# Patient Record
Sex: FEMALE | Race: WHITE | Employment: FULL TIME | URBAN - METROPOLITAN AREA
[De-identification: names, ages, dates, MRNs, and addresses within clinical notes are randomized per-mention and may not be internally consistent; named-entity substitution may affect disease eponyms.]

---

## 2018-01-25 PROBLEM — E87.1 ACUTE HYPONATREMIA: Status: ACTIVE | Noted: 2018-01-25

## 2018-01-25 PROBLEM — E87.1 HYPONATREMIA: Status: ACTIVE | Noted: 2018-01-25

## 2018-10-14 ENCOUNTER — HOSPITAL ENCOUNTER (EMERGENCY)
Age: 53
Discharge: HOME | End: 2018-10-14
Attending: EMERGENCY MEDICINE
Payer: COMMERCIAL

## 2018-10-14 ENCOUNTER — APPOINTMENT (OUTPATIENT)
Dept: GENERAL RADIOLOGY | Age: 53
End: 2018-10-14
Payer: COMMERCIAL

## 2018-10-14 VITALS
DIASTOLIC BLOOD PRESSURE: 92 MMHG | SYSTOLIC BLOOD PRESSURE: 156 MMHG | BODY MASS INDEX: 19.51 KG/M2 | WEIGHT: 106 LBS | TEMPERATURE: 98.3 F | OXYGEN SATURATION: 97 % | HEIGHT: 62 IN | RESPIRATION RATE: 14 BRPM | HEART RATE: 76 BPM

## 2018-10-14 DIAGNOSIS — S42.92XA TRAUMATIC CLOSED DISPLACED FRACTURE OF LEFT SHOULDER WITH ANTERIOR DISLOCATION, INITIAL ENCOUNTER: Primary | ICD-10-CM

## 2018-10-14 PROCEDURE — 96374 THER/PROPH/DIAG INJ IV PUSH: CPT

## 2018-10-14 PROCEDURE — 73030 X-RAY EXAM OF SHOULDER: CPT

## 2018-10-14 PROCEDURE — 96375 TX/PRO/DX INJ NEW DRUG ADDON: CPT

## 2018-10-14 PROCEDURE — 23650 CLTX SHO DSLC W/MNPJ WO ANES: CPT

## 2018-10-14 PROCEDURE — 6360000002 HC RX W HCPCS

## 2018-10-14 PROCEDURE — 99283 EMERGENCY DEPT VISIT LOW MDM: CPT

## 2018-10-14 PROCEDURE — 6360000002 HC RX W HCPCS: Performed by: EMERGENCY MEDICINE

## 2018-10-14 RX ORDER — MIDAZOLAM HYDROCHLORIDE 1 MG/ML
2 INJECTION INTRAMUSCULAR; INTRAVENOUS ONCE
Status: COMPLETED | OUTPATIENT
Start: 2018-10-14 | End: 2018-10-14

## 2018-10-14 RX ORDER — HYDROCODONE BITARTRATE AND ACETAMINOPHEN 7.5; 325 MG/1; MG/1
1 TABLET ORAL EVERY 6 HOURS PRN
Qty: 12 TABLET | Refills: 0 | Status: SHIPPED | OUTPATIENT
Start: 2018-10-14 | End: 2018-10-17

## 2018-10-14 RX ORDER — MEPERIDINE HYDROCHLORIDE 50 MG/ML
50 INJECTION INTRAMUSCULAR; INTRAVENOUS; SUBCUTANEOUS ONCE
Status: DISCONTINUED | OUTPATIENT
Start: 2018-10-14 | End: 2018-10-14

## 2018-10-14 RX ORDER — ONDANSETRON 2 MG/ML
INJECTION INTRAMUSCULAR; INTRAVENOUS
Status: COMPLETED
Start: 2018-10-14 | End: 2018-10-14

## 2018-10-14 RX ORDER — ONDANSETRON 2 MG/ML
4 INJECTION INTRAMUSCULAR; INTRAVENOUS EVERY 6 HOURS PRN
Status: DISCONTINUED | OUTPATIENT
Start: 2018-10-14 | End: 2018-10-14 | Stop reason: HOSPADM

## 2018-10-14 RX ORDER — MIDAZOLAM HYDROCHLORIDE 1 MG/ML
INJECTION INTRAMUSCULAR; INTRAVENOUS
Status: COMPLETED
Start: 2018-10-14 | End: 2018-10-14

## 2018-10-14 RX ORDER — HYDROXYZINE HYDROCHLORIDE 50 MG/ML
50 INJECTION, SOLUTION INTRAMUSCULAR ONCE
Status: DISCONTINUED | OUTPATIENT
Start: 2018-10-14 | End: 2018-10-14

## 2018-10-14 RX ADMIN — MIDAZOLAM HYDROCHLORIDE 2 MG: 1 INJECTION INTRAMUSCULAR; INTRAVENOUS at 12:40

## 2018-10-14 RX ADMIN — HYDROMORPHONE HYDROCHLORIDE 1 MG: 1 INJECTION, SOLUTION INTRAMUSCULAR; INTRAVENOUS; SUBCUTANEOUS at 12:22

## 2018-10-14 RX ADMIN — MIDAZOLAM 2 MG: 1 INJECTION INTRAMUSCULAR; INTRAVENOUS at 12:40

## 2018-10-14 RX ADMIN — ONDANSETRON HYDROCHLORIDE 4 MG: 2 SOLUTION INTRAMUSCULAR; INTRAVENOUS at 12:25

## 2018-10-14 RX ADMIN — ONDANSETRON 4 MG: 2 INJECTION INTRAMUSCULAR; INTRAVENOUS at 12:25

## 2018-10-14 RX ADMIN — HYDROMORPHONE HYDROCHLORIDE 1 MG: 1 INJECTION, SOLUTION INTRAMUSCULAR; INTRAVENOUS; SUBCUTANEOUS at 12:36

## 2018-10-14 ASSESSMENT — PAIN DESCRIPTION - ORIENTATION
ORIENTATION: RIGHT
ORIENTATION: LEFT
ORIENTATION: LEFT

## 2018-10-14 ASSESSMENT — PAIN SCALES - GENERAL
PAINLEVEL_OUTOF10: 10
PAINLEVEL_OUTOF10: 3
PAINLEVEL_OUTOF10: 10
PAINLEVEL_OUTOF10: 10
PAINLEVEL_OUTOF10: 4

## 2018-10-14 ASSESSMENT — PAIN DESCRIPTION - PROGRESSION
CLINICAL_PROGRESSION: GRADUALLY IMPROVING
CLINICAL_PROGRESSION: GRADUALLY WORSENING

## 2018-10-14 ASSESSMENT — PAIN DESCRIPTION - ONSET: ONSET: SUDDEN

## 2018-10-14 ASSESSMENT — PAIN DESCRIPTION - FREQUENCY: FREQUENCY: CONTINUOUS

## 2018-10-14 ASSESSMENT — PAIN DESCRIPTION - PAIN TYPE
TYPE: ACUTE PAIN

## 2018-10-14 ASSESSMENT — PAIN DESCRIPTION - LOCATION
LOCATION: SHOULDER

## 2018-10-14 NOTE — ED NOTES
Pt alert, answers questions appropriately, states that she is hungry, hasnnot eaten since Friday-had ordered food yesterday and then fell and was not able to eat.      Sean Burns RN  10/14/18 9221

## 2018-10-14 NOTE — ED NOTES
Pt tolerated shoulder reduction well, awake alert, and states that her shoulder feels a lot better.      Jarrett Arboleda RN  10/14/18 4943

## 2018-10-14 NOTE — ED NOTES
Taking soda without difficulty, continues to state shoulder feels better, alert and orineted x4, sister at bedside.      Emely Kingston RN  10/14/18 9504

## 2018-10-17 ENCOUNTER — OFFICE VISIT (OUTPATIENT)
Dept: ORTHOPEDIC SURGERY | Age: 53
End: 2018-10-17
Payer: COMMERCIAL

## 2018-10-17 VITALS
DIASTOLIC BLOOD PRESSURE: 88 MMHG | BODY MASS INDEX: 19.51 KG/M2 | HEART RATE: 63 BPM | WEIGHT: 106 LBS | HEIGHT: 62 IN | SYSTOLIC BLOOD PRESSURE: 127 MMHG | TEMPERATURE: 98.1 F

## 2018-10-17 DIAGNOSIS — M25.512 ACUTE PAIN OF LEFT SHOULDER: Primary | ICD-10-CM

## 2018-10-17 DIAGNOSIS — S43.005A CLOSED DISLOCATION OF LEFT SHOULDER, INITIAL ENCOUNTER: ICD-10-CM

## 2018-10-17 PROCEDURE — G8427 DOCREV CUR MEDS BY ELIG CLIN: HCPCS | Performed by: ORTHOPAEDIC SURGERY

## 2018-10-17 PROCEDURE — G8484 FLU IMMUNIZE NO ADMIN: HCPCS | Performed by: ORTHOPAEDIC SURGERY

## 2018-10-17 PROCEDURE — G8420 CALC BMI NORM PARAMETERS: HCPCS | Performed by: ORTHOPAEDIC SURGERY

## 2018-10-17 PROCEDURE — 99203 OFFICE O/P NEW LOW 30 MIN: CPT | Performed by: ORTHOPAEDIC SURGERY

## 2018-10-17 PROCEDURE — 3017F COLORECTAL CA SCREEN DOC REV: CPT | Performed by: ORTHOPAEDIC SURGERY

## 2018-10-17 PROCEDURE — 4004F PT TOBACCO SCREEN RCVD TLK: CPT | Performed by: ORTHOPAEDIC SURGERY

## 2018-10-17 RX ORDER — SODIUM CHLORIDE 1000 MG
1 TABLET, SOLUBLE MISCELLANEOUS 2 TIMES DAILY
COMMUNITY
Start: 2018-06-22

## 2018-10-17 RX ORDER — OXYCODONE HYDROCHLORIDE AND ACETAMINOPHEN 5; 325 MG/1; MG/1
1 TABLET ORAL EVERY 6 HOURS PRN
Qty: 28 TABLET | Refills: 0 | Status: ON HOLD | OUTPATIENT
Start: 2018-10-17 | End: 2018-10-23

## 2018-10-17 NOTE — PROGRESS NOTES
New Shoulder Patient Visit     Referring Provider:   No referring provider defined for this encounter. CHIEF COMPLAINT:   Chief Complaint   Patient presents with    Shoulder Pain     Left shoulder dislocation & fx greater tuberosity 10/13/18. Pt. tripped over dog falling on left side. Xray 10/14/18 [facility] ([facility] HPI:      Kimo Snyder is a 48y.o. year old Female who suffered a fall on 10/13/18 and injured the left shoulder. She initially tried to manage this conservatively at home, but had persistent pain and poor function and went to the emergency room the next day where x-rays showed a fracture dislocation. She had reduction of the shoulder was placed in the sling. She follows up here today. She is still having significant pain and is unable to move the arm. She denies any previous history of shoulder injury      PAST MEDICAL HISTORY  Past Medical History:   Diagnosis Date    Hypertension     Hyponatremia     MVP (mitral valve prolapse)        PAST SURGICAL HISTORY  Past Surgical History:   Procedure Laterality Date    FOOT SURGERY         FAMILY HISTORY   History reviewed. No pertinent family history. SOCIAL HISTORY  Social History     Occupational History    Not on file. Social History Main Topics    Smoking status: Current Every Day Smoker     Packs/day: 1.00     Types: Cigarettes    Smokeless tobacco: Never Used    Alcohol use Yes      Comment: few times a week    Drug use: No    Sexual activity: Not on file       CURRENT MEDICATIONS     Current Outpatient Prescriptions:     sodium chloride 1 g tablet, Take 1 g by mouth daily , Disp: , Rfl:     oxyCODONE-acetaminophen (PERCOCET) 5-325 MG per tablet, Take 1 tablet by mouth every 6 hours as needed for Pain for up to 7 days. ., Disp: 28 tablet, Rfl: 0    LOSARTAN POTASSIUM PO, Take by mouth nightly, Disp: , Rfl:     HYDROcodone-acetaminophen (NORCO) 7.5-325 MG per tablet, Take 1 tablet by mouth every 6 hours as needed for

## 2018-10-18 ENCOUNTER — TELEPHONE (OUTPATIENT)
Dept: ORTHOPEDIC SURGERY | Age: 53
End: 2018-10-18

## 2018-10-18 RX ORDER — NEBIVOLOL 10 MG/1
10 TABLET ORAL DAILY
Status: ON HOLD | COMMUNITY
End: 2021-03-04 | Stop reason: HOSPADM

## 2018-10-18 RX ORDER — LOSARTAN POTASSIUM 100 MG/1
100 TABLET ORAL NIGHTLY
Status: ON HOLD | COMMUNITY
End: 2021-03-01

## 2018-10-18 RX ORDER — DILTIAZEM HYDROCHLORIDE 120 MG/1
120 CAPSULE, EXTENDED RELEASE ORAL 2 TIMES DAILY
COMMUNITY
End: 2019-02-20

## 2018-10-18 NOTE — TELEPHONE ENCOUNTER
Carlos Vieira in [place] scheduling to schedule inpt L shoulder ORIF greater tuberosity (CPT: 91852) for 10/23/18 in [address] [place] [address].

## 2018-10-23 ENCOUNTER — ANESTHESIA (OUTPATIENT)
Dept: OPERATING ROOM | Age: 53
End: 2018-10-23
Payer: COMMERCIAL

## 2018-10-23 ENCOUNTER — HOSPITAL ENCOUNTER (OUTPATIENT)
Dept: GENERAL RADIOLOGY | Age: 53
Discharge: HOME | End: 2018-10-25
Attending: ORTHOPAEDIC SURGERY
Payer: COMMERCIAL

## 2018-10-23 ENCOUNTER — ANESTHESIA EVENT (OUTPATIENT)
Dept: OPERATING ROOM | Age: 53
End: 2018-10-23
Payer: COMMERCIAL

## 2018-10-23 ENCOUNTER — HOSPITAL ENCOUNTER (OUTPATIENT)
Age: 53
Setting detail: SURGERY ADMIT
Discharge: HOME | End: 2018-10-23
Attending: ORTHOPAEDIC SURGERY | Admitting: ORTHOPAEDIC SURGERY
Payer: COMMERCIAL

## 2018-10-23 VITALS
OXYGEN SATURATION: 95 % | HEIGHT: 62 IN | SYSTOLIC BLOOD PRESSURE: 157 MMHG | RESPIRATION RATE: 10 BRPM | DIASTOLIC BLOOD PRESSURE: 104 MMHG | TEMPERATURE: 98.2 F | WEIGHT: 106 LBS | BODY MASS INDEX: 19.51 KG/M2 | HEART RATE: 70 BPM

## 2018-10-23 VITALS — SYSTOLIC BLOOD PRESSURE: 146 MMHG | OXYGEN SATURATION: 100 % | DIASTOLIC BLOOD PRESSURE: 91 MMHG

## 2018-10-23 DIAGNOSIS — R52 PAIN: ICD-10-CM

## 2018-10-23 DIAGNOSIS — M25.512 ACUTE PAIN OF LEFT SHOULDER: ICD-10-CM

## 2018-10-23 DIAGNOSIS — S43.005A CLOSED DISLOCATION OF LEFT SHOULDER, INITIAL ENCOUNTER: ICD-10-CM

## 2018-10-23 PROCEDURE — L3650 SO 8 ABD RESTRAINT PRE OTS: HCPCS | Performed by: ORTHOPAEDIC SURGERY

## 2018-10-23 PROCEDURE — 3209999900 FLUORO FOR SURGICAL PROCEDURES

## 2018-10-23 PROCEDURE — 6360000002 HC RX W HCPCS: Performed by: NURSE ANESTHETIST, CERTIFIED REGISTERED

## 2018-10-23 PROCEDURE — 6360000002 HC RX W HCPCS: Performed by: ANESTHESIOLOGY

## 2018-10-23 PROCEDURE — 3700000000 HC ANESTHESIA ATTENDED CARE: Performed by: ORTHOPAEDIC SURGERY

## 2018-10-23 PROCEDURE — 7100000001 HC PACU RECOVERY - ADDTL 15 MIN: Performed by: ORTHOPAEDIC SURGERY

## 2018-10-23 PROCEDURE — 2500000003 HC RX 250 WO HCPCS: Performed by: NURSE ANESTHETIST, CERTIFIED REGISTERED

## 2018-10-23 PROCEDURE — 2580000003 HC RX 258: Performed by: ORTHOPAEDIC SURGERY

## 2018-10-23 PROCEDURE — 3600000003 HC SURGERY LEVEL 3 BASE: Performed by: ORTHOPAEDIC SURGERY

## 2018-10-23 PROCEDURE — 6360000002 HC RX W HCPCS: Performed by: ORTHOPAEDIC SURGERY

## 2018-10-23 PROCEDURE — 7100000011 HC PHASE II RECOVERY - ADDTL 15 MIN: Performed by: ORTHOPAEDIC SURGERY

## 2018-10-23 PROCEDURE — 7100000010 HC PHASE II RECOVERY - FIRST 15 MIN: Performed by: ORTHOPAEDIC SURGERY

## 2018-10-23 PROCEDURE — C1713 ANCHOR/SCREW BN/BN,TIS/BN: HCPCS | Performed by: ORTHOPAEDIC SURGERY

## 2018-10-23 PROCEDURE — 23630 OPTX GR HMRL TBRS FX INT FIX: CPT | Performed by: ORTHOPAEDIC SURGERY

## 2018-10-23 PROCEDURE — 3600000013 HC SURGERY LEVEL 3 ADDTL 15MIN: Performed by: ORTHOPAEDIC SURGERY

## 2018-10-23 PROCEDURE — 7100000000 HC PACU RECOVERY - FIRST 15 MIN: Performed by: ORTHOPAEDIC SURGERY

## 2018-10-23 PROCEDURE — 2709999900 HC NON-CHARGEABLE SUPPLY: Performed by: ORTHOPAEDIC SURGERY

## 2018-10-23 PROCEDURE — 3700000001 HC ADD 15 MINUTES (ANESTHESIA): Performed by: ORTHOPAEDIC SURGERY

## 2018-10-23 PROCEDURE — 64415 NJX AA&/STRD BRCH PLXS IMG: CPT | Performed by: ANESTHESIOLOGY

## 2018-10-23 PROCEDURE — 6370000000 HC RX 637 (ALT 250 FOR IP)

## 2018-10-23 DEVICE — IMPLANTABLE DEVICE: Type: IMPLANTABLE DEVICE | Site: HUMERUS | Status: FUNCTIONAL

## 2018-10-23 DEVICE — PLATE BNE L90MM 3 H HUM SHLDR EL SUTUREPLT: Type: IMPLANTABLE DEVICE | Site: HUMERUS | Status: FUNCTIONAL

## 2018-10-23 RX ORDER — PROMETHAZINE HYDROCHLORIDE 25 MG/ML
25 INJECTION, SOLUTION INTRAMUSCULAR; INTRAVENOUS PRN
Status: DISCONTINUED | OUTPATIENT
Start: 2018-10-23 | End: 2018-10-30 | Stop reason: HOSPADM

## 2018-10-23 RX ORDER — LABETALOL HYDROCHLORIDE 5 MG/ML
5 INJECTION, SOLUTION INTRAVENOUS EVERY 10 MIN PRN
Status: DISCONTINUED | OUTPATIENT
Start: 2018-10-23 | End: 2018-10-30 | Stop reason: HOSPADM

## 2018-10-23 RX ORDER — SODIUM CHLORIDE 0.9 % (FLUSH) 0.9 %
10 SYRINGE (ML) INJECTION PRN
Status: DISCONTINUED | OUTPATIENT
Start: 2018-10-23 | End: 2018-10-30 | Stop reason: HOSPADM

## 2018-10-23 RX ORDER — OXYCODONE HYDROCHLORIDE AND ACETAMINOPHEN 5; 325 MG/1; MG/1
TABLET ORAL
Status: COMPLETED
Start: 2018-10-23 | End: 2018-10-23

## 2018-10-23 RX ORDER — PROPOFOL 10 MG/ML
INJECTION, EMULSION INTRAVENOUS PRN
Status: DISCONTINUED | OUTPATIENT
Start: 2018-10-23 | End: 2018-10-23 | Stop reason: SDUPTHER

## 2018-10-23 RX ORDER — SODIUM CHLORIDE 0.9 % (FLUSH) 0.9 %
10 SYRINGE (ML) INJECTION EVERY 12 HOURS SCHEDULED
Status: DISCONTINUED | OUTPATIENT
Start: 2018-10-23 | End: 2018-10-30 | Stop reason: HOSPADM

## 2018-10-23 RX ORDER — SODIUM CHLORIDE 9 MG/ML
INJECTION, SOLUTION INTRAVENOUS CONTINUOUS
Status: DISCONTINUED | OUTPATIENT
Start: 2018-10-23 | End: 2018-10-30 | Stop reason: HOSPADM

## 2018-10-23 RX ORDER — ASPIRIN 325 MG
325 TABLET, DELAYED RELEASE (ENTERIC COATED) ORAL DAILY
Qty: 28 TABLET | Refills: 0 | Status: SHIPPED | OUTPATIENT
Start: 2018-10-23 | End: 2018-12-07

## 2018-10-23 RX ORDER — FENTANYL CITRATE 50 UG/ML
INJECTION, SOLUTION INTRAMUSCULAR; INTRAVENOUS PRN
Status: DISCONTINUED | OUTPATIENT
Start: 2018-10-23 | End: 2018-10-23 | Stop reason: SDUPTHER

## 2018-10-23 RX ORDER — NEOSTIGMINE METHYLSULFATE 1 MG/ML
INJECTION, SOLUTION INTRAVENOUS PRN
Status: DISCONTINUED | OUTPATIENT
Start: 2018-10-23 | End: 2018-10-23 | Stop reason: SDUPTHER

## 2018-10-23 RX ORDER — ROCURONIUM BROMIDE 10 MG/ML
INJECTION, SOLUTION INTRAVENOUS PRN
Status: DISCONTINUED | OUTPATIENT
Start: 2018-10-23 | End: 2018-10-23 | Stop reason: SDUPTHER

## 2018-10-23 RX ORDER — DEXAMETHASONE SODIUM PHOSPHATE 4 MG/ML
INJECTION, SOLUTION INTRA-ARTICULAR; INTRALESIONAL; INTRAMUSCULAR; INTRAVENOUS; SOFT TISSUE PRN
Status: DISCONTINUED | OUTPATIENT
Start: 2018-10-23 | End: 2018-10-23 | Stop reason: SDUPTHER

## 2018-10-23 RX ORDER — OXYCODONE HYDROCHLORIDE AND ACETAMINOPHEN 5; 325 MG/1; MG/1
1 TABLET ORAL EVERY 6 HOURS PRN
Qty: 28 TABLET | Refills: 0 | Status: SHIPPED | OUTPATIENT
Start: 2018-10-23 | End: 2018-10-30

## 2018-10-23 RX ORDER — MIDAZOLAM HYDROCHLORIDE 1 MG/ML
INJECTION INTRAMUSCULAR; INTRAVENOUS PRN
Status: DISCONTINUED | OUTPATIENT
Start: 2018-10-23 | End: 2018-10-23 | Stop reason: SDUPTHER

## 2018-10-23 RX ORDER — GLYCOPYRROLATE 0.2 MG/ML
INJECTION INTRAMUSCULAR; INTRAVENOUS PRN
Status: DISCONTINUED | OUTPATIENT
Start: 2018-10-23 | End: 2018-10-23 | Stop reason: SDUPTHER

## 2018-10-23 RX ORDER — FENTANYL CITRATE 50 UG/ML
50 INJECTION, SOLUTION INTRAMUSCULAR; INTRAVENOUS ONCE
Status: COMPLETED | OUTPATIENT
Start: 2018-10-23 | End: 2018-10-23

## 2018-10-23 RX ORDER — ONDANSETRON 2 MG/ML
INJECTION INTRAMUSCULAR; INTRAVENOUS PRN
Status: DISCONTINUED | OUTPATIENT
Start: 2018-10-23 | End: 2018-10-23 | Stop reason: SDUPTHER

## 2018-10-23 RX ORDER — OXYCODONE HYDROCHLORIDE AND ACETAMINOPHEN 5; 325 MG/1; MG/1
1 TABLET ORAL ONCE
Status: COMPLETED | OUTPATIENT
Start: 2018-10-23 | End: 2018-10-23

## 2018-10-23 RX ORDER — MEPERIDINE HYDROCHLORIDE 25 MG/ML
12.5 INJECTION INTRAMUSCULAR; INTRAVENOUS; SUBCUTANEOUS EVERY 5 MIN PRN
Status: DISCONTINUED | OUTPATIENT
Start: 2018-10-23 | End: 2018-10-30 | Stop reason: HOSPADM

## 2018-10-23 RX ORDER — LIDOCAINE HYDROCHLORIDE 20 MG/ML
INJECTION, SOLUTION INFILTRATION; PERINEURAL PRN
Status: DISCONTINUED | OUTPATIENT
Start: 2018-10-23 | End: 2018-10-23 | Stop reason: SDUPTHER

## 2018-10-23 RX ORDER — MIDAZOLAM HYDROCHLORIDE 1 MG/ML
1 INJECTION INTRAMUSCULAR; INTRAVENOUS EVERY 5 MIN PRN
Status: DISCONTINUED | OUTPATIENT
Start: 2018-10-23 | End: 2018-10-30 | Stop reason: HOSPADM

## 2018-10-23 RX ORDER — ROPIVACAINE HYDROCHLORIDE 5 MG/ML
30 INJECTION, SOLUTION EPIDURAL; INFILTRATION; PERINEURAL ONCE
Status: COMPLETED | OUTPATIENT
Start: 2018-10-23 | End: 2018-10-23

## 2018-10-23 RX ORDER — KETOROLAC TROMETHAMINE 10 MG/1
10 TABLET, FILM COATED ORAL EVERY 6 HOURS PRN
Qty: 8 TABLET | Refills: 0 | Status: SHIPPED | OUTPATIENT
Start: 2018-10-23 | End: 2018-11-02

## 2018-10-23 RX ADMIN — FENTANYL CITRATE 100 MCG: 50 INJECTION, SOLUTION INTRAMUSCULAR; INTRAVENOUS at 13:00

## 2018-10-23 RX ADMIN — OXYCODONE HYDROCHLORIDE AND ACETAMINOPHEN 1 TABLET: 5; 325 TABLET ORAL at 16:58

## 2018-10-23 RX ADMIN — HYDROMORPHONE HYDROCHLORIDE 0.5 MG: 1 INJECTION, SOLUTION INTRAMUSCULAR; INTRAVENOUS; SUBCUTANEOUS at 16:02

## 2018-10-23 RX ADMIN — DEXAMETHASONE SODIUM PHOSPHATE 10 MG: 4 INJECTION, SOLUTION INTRAMUSCULAR; INTRAVENOUS at 13:16

## 2018-10-23 RX ADMIN — ROCURONIUM BROMIDE 30 MG: 10 SOLUTION INTRAVENOUS at 13:00

## 2018-10-23 RX ADMIN — SODIUM CHLORIDE: 9 INJECTION, SOLUTION INTRAVENOUS at 10:30

## 2018-10-23 RX ADMIN — ROPIVACAINE HYDROCHLORIDE 30 ML: 5 INJECTION, SOLUTION EPIDURAL; INFILTRATION; PERINEURAL at 12:02

## 2018-10-23 RX ADMIN — GLYCOPYRROLATE 0.6 MG: 0.2 INJECTION, SOLUTION INTRAMUSCULAR; INTRAVENOUS at 15:25

## 2018-10-23 RX ADMIN — CEFAZOLIN SODIUM 2 G: 2 SOLUTION INTRAVENOUS at 12:53

## 2018-10-23 RX ADMIN — PROPOFOL 150 MG: 10 INJECTION, EMULSION INTRAVENOUS at 13:00

## 2018-10-23 RX ADMIN — FENTANYL CITRATE 50 MCG: 50 INJECTION, SOLUTION INTRAMUSCULAR; INTRAVENOUS at 11:52

## 2018-10-23 RX ADMIN — SODIUM CHLORIDE: 9 INJECTION, SOLUTION INTRAVENOUS at 12:53

## 2018-10-23 RX ADMIN — MIDAZOLAM HYDROCHLORIDE 2 MG: 1 INJECTION, SOLUTION INTRAMUSCULAR; INTRAVENOUS at 12:53

## 2018-10-23 RX ADMIN — Medication 3 MG: at 15:25

## 2018-10-23 RX ADMIN — MIDAZOLAM HYDROCHLORIDE 1 MG: 2 INJECTION, SOLUTION INTRAMUSCULAR; INTRAVENOUS at 11:52

## 2018-10-23 RX ADMIN — LIDOCAINE HYDROCHLORIDE 100 MG: 20 INJECTION, SOLUTION INFILTRATION; PERINEURAL at 13:00

## 2018-10-23 RX ADMIN — ONDANSETRON HYDROCHLORIDE 4 MG: 2 INJECTION, SOLUTION INTRAMUSCULAR; INTRAVENOUS at 13:16

## 2018-10-23 ASSESSMENT — PULMONARY FUNCTION TESTS
PIF_VALUE: 29
PIF_VALUE: 18
PIF_VALUE: 1
PIF_VALUE: 28
PIF_VALUE: 29
PIF_VALUE: 27
PIF_VALUE: 27
PIF_VALUE: 28
PIF_VALUE: 28
PIF_VALUE: 18
PIF_VALUE: 3
PIF_VALUE: 27
PIF_VALUE: 28
PIF_VALUE: 5
PIF_VALUE: 27
PIF_VALUE: 28
PIF_VALUE: 2
PIF_VALUE: 28
PIF_VALUE: 18
PIF_VALUE: 18
PIF_VALUE: 27
PIF_VALUE: 27
PIF_VALUE: 18
PIF_VALUE: 19
PIF_VALUE: 18
PIF_VALUE: 27
PIF_VALUE: 27
PIF_VALUE: 26
PIF_VALUE: 28
PIF_VALUE: 27
PIF_VALUE: 2
PIF_VALUE: 15
PIF_VALUE: 4
PIF_VALUE: 18
PIF_VALUE: 29
PIF_VALUE: 1
PIF_VALUE: 10
PIF_VALUE: 18
PIF_VALUE: 2
PIF_VALUE: 28
PIF_VALUE: 28
PIF_VALUE: 18
PIF_VALUE: 18
PIF_VALUE: 28
PIF_VALUE: 27
PIF_VALUE: 0
PIF_VALUE: 28
PIF_VALUE: 29
PIF_VALUE: 27
PIF_VALUE: 27
PIF_VALUE: 28
PIF_VALUE: 28
PIF_VALUE: 27
PIF_VALUE: 28
PIF_VALUE: 10
PIF_VALUE: 28
PIF_VALUE: 19
PIF_VALUE: 27
PIF_VALUE: 28
PIF_VALUE: 28
PIF_VALUE: 18
PIF_VALUE: 28
PIF_VALUE: 27
PIF_VALUE: 2
PIF_VALUE: 1
PIF_VALUE: 29
PIF_VALUE: 28
PIF_VALUE: 27
PIF_VALUE: 28
PIF_VALUE: 21
PIF_VALUE: 28
PIF_VALUE: 27
PIF_VALUE: 28
PIF_VALUE: 18
PIF_VALUE: 28
PIF_VALUE: 10
PIF_VALUE: 28
PIF_VALUE: 17
PIF_VALUE: 28
PIF_VALUE: 28
PIF_VALUE: 2
PIF_VALUE: 17
PIF_VALUE: 10
PIF_VALUE: 17
PIF_VALUE: 28
PIF_VALUE: 2
PIF_VALUE: 28
PIF_VALUE: 3
PIF_VALUE: 20
PIF_VALUE: 27
PIF_VALUE: 10
PIF_VALUE: 28
PIF_VALUE: 26
PIF_VALUE: 17
PIF_VALUE: 20
PIF_VALUE: 28
PIF_VALUE: 28
PIF_VALUE: 29
PIF_VALUE: 15
PIF_VALUE: 10
PIF_VALUE: 2
PIF_VALUE: 10
PIF_VALUE: 18
PIF_VALUE: 28
PIF_VALUE: 35
PIF_VALUE: 0
PIF_VALUE: 28
PIF_VALUE: 27
PIF_VALUE: 18
PIF_VALUE: 18
PIF_VALUE: 27
PIF_VALUE: 28
PIF_VALUE: 27
PIF_VALUE: 28
PIF_VALUE: 2
PIF_VALUE: 28
PIF_VALUE: 30
PIF_VALUE: 28
PIF_VALUE: 16
PIF_VALUE: 28
PIF_VALUE: 28
PIF_VALUE: 1
PIF_VALUE: 10
PIF_VALUE: 10
PIF_VALUE: 30
PIF_VALUE: 27
PIF_VALUE: 27
PIF_VALUE: 2
PIF_VALUE: 27
PIF_VALUE: 29
PIF_VALUE: 27
PIF_VALUE: 27
PIF_VALUE: 28
PIF_VALUE: 28
PIF_VALUE: 18
PIF_VALUE: 27
PIF_VALUE: 30
PIF_VALUE: 27
PIF_VALUE: 29
PIF_VALUE: 28
PIF_VALUE: 28
PIF_VALUE: 26
PIF_VALUE: 27
PIF_VALUE: 28
PIF_VALUE: 18
PIF_VALUE: 28
PIF_VALUE: 27
PIF_VALUE: 18
PIF_VALUE: 28
PIF_VALUE: 28
PIF_VALUE: 27
PIF_VALUE: 28
PIF_VALUE: 18
PIF_VALUE: 28
PIF_VALUE: 27
PIF_VALUE: 25

## 2018-10-23 ASSESSMENT — PAIN DESCRIPTION - PAIN TYPE
TYPE: SURGICAL PAIN

## 2018-10-23 ASSESSMENT — PAIN DESCRIPTION - ONSET
ONSET: ON-GOING

## 2018-10-23 ASSESSMENT — PAIN SCALES - GENERAL
PAINLEVEL_OUTOF10: 0
PAINLEVEL_OUTOF10: 0
PAINLEVEL_OUTOF10: 7
PAINLEVEL_OUTOF10: 0
PAINLEVEL_OUTOF10: 0
PAINLEVEL_OUTOF10: 7
PAINLEVEL_OUTOF10: 7
PAINLEVEL_OUTOF10: 8
PAINLEVEL_OUTOF10: 7

## 2018-10-23 ASSESSMENT — PAIN DESCRIPTION - LOCATION
LOCATION: SHOULDER

## 2018-10-23 ASSESSMENT — PAIN DESCRIPTION - FREQUENCY
FREQUENCY: CONTINUOUS

## 2018-10-23 ASSESSMENT — PAIN DESCRIPTION - DESCRIPTORS
DESCRIPTORS: ACHING
DESCRIPTORS: ACHING
DESCRIPTORS: ACHING;DISCOMFORT;SHARP
DESCRIPTORS: ACHING

## 2018-10-23 ASSESSMENT — PAIN DESCRIPTION - ORIENTATION
ORIENTATION: LEFT

## 2018-10-23 ASSESSMENT — PAIN - FUNCTIONAL ASSESSMENT: PAIN_FUNCTIONAL_ASSESSMENT: 0-10

## 2018-10-23 ASSESSMENT — LIFESTYLE VARIABLES: SMOKING_STATUS: 1

## 2018-10-23 NOTE — OP NOTE
was performed including confirmation of operative site and operation to be performed. Antibiotic prophylaxis, 2 g ancef was given prior to incision. The planned incision was marked, from just lateral to the coracoid, distally and laterally onto the arm. The planned incision was pre-injected with a mixture of marcaine and lidocaine. Incision was made using a 10 blade and dissection was carried down to the deltoid fascia. Medially and laterally skin flaps were elevated. The deltopectoral interval was identified by a fat stripe dividing pec medially and deltoid laterally. The cephalic vein was identified, and taken laterally. The interval was developed via blunt dissection down to the clavipectoral fascia. The subdeltoid and subcoracoid spaces were developed bluntly and a self retaining retractor was placed, retracting the pec medially and deltoid laterally. There was a fair amount of fracture hematoma that was evacuated at this point. The conjoint tendon was identified, and incised just laterally to its edge. Finger palpation was used to feel on the underside of the tendon to ensure the musculocutaneous nerve was not entrapped as the medial blade of the retractor was slid beneath the tendon. The upper 1 cm of the pec tendon was released to aid in exposure. At this point, we identified the fracture fragment which was very posterior. Blunt finger dissection was utilized in the sub-acromial space to feel around the back of the glenoid and palpate the fragment. We were able to deliver the fragment forward and hold it in place with a Daniel. At this point we noted there was 1 fairly large fragment and then several smaller fragments including some cortical comminution laterally. We did note that the upper border of the subscapularis was also torn away from the bone. There was no retraction. We worked to free up the greater tuberosity fragment. We irrigated the fracture bed copiously.   We then

## 2018-10-23 NOTE — PROGRESS NOTES
Left interscalene nerve block complete. Patient tolerated well. Family called to bedside.
products on the day of surgery    [x] If not already done, you can expect a call from registration    [x] You can expect a call the business day prior to procedure to notify you if your arrival time changes    [x] If you receive a survey after surgery we would greatly appreciate your comments    [] Parent/guardian of a minor must accompany their child and remain on the premises  the entire time they are under our care     [] Pediatric patients may bring favorite toy, blanket or comfort item with them    [] A caregiver or family member must remain with the patient during their stay if they are mentally handicapped, have dementia, disoriented or unable to use a call light or would be a safety concern if left unattended    [x] Please notify surgeon if you develop any illness between now and time of surgery (cold, cough, sore throat, fever, nausea, vomiting) or any signs of infections  including skin, wounds, and dental.    [] Other instructions    EDUCATIONAL MATERIALS PROVIDED:    [] PAT Preoperative Education Packet/Booklet     [] Medication List    [] Fluoroscopy Information Pamphlet    [] Transfusion bracelet applied with instructions    [] Joint replacement video reviewed    [] Shower with soap, lather and rinse well, and use CHG wipes provided the evening before surgery as instructed

## 2018-11-02 ENCOUNTER — OFFICE VISIT (OUTPATIENT)
Dept: ORTHOPEDIC SURGERY | Age: 53
End: 2018-11-02

## 2018-11-02 VITALS
BODY MASS INDEX: 19.51 KG/M2 | HEIGHT: 62 IN | HEART RATE: 68 BPM | WEIGHT: 106 LBS | DIASTOLIC BLOOD PRESSURE: 86 MMHG | SYSTOLIC BLOOD PRESSURE: 137 MMHG

## 2018-11-02 DIAGNOSIS — M25.512 ACUTE PAIN OF LEFT SHOULDER: ICD-10-CM

## 2018-11-02 DIAGNOSIS — S43.005A CLOSED DISLOCATION OF LEFT SHOULDER, INITIAL ENCOUNTER: Primary | ICD-10-CM

## 2018-11-02 DIAGNOSIS — Z98.890 S/P SHOULDER SURGERY: ICD-10-CM

## 2018-11-02 PROCEDURE — 99024 POSTOP FOLLOW-UP VISIT: CPT | Performed by: ORTHOPAEDIC SURGERY

## 2018-12-07 ENCOUNTER — OFFICE VISIT (OUTPATIENT)
Dept: ORTHOPEDIC SURGERY | Age: 53
End: 2018-12-07

## 2018-12-07 VITALS
BODY MASS INDEX: 19.51 KG/M2 | DIASTOLIC BLOOD PRESSURE: 122 MMHG | WEIGHT: 106 LBS | SYSTOLIC BLOOD PRESSURE: 179 MMHG | HEIGHT: 62 IN | HEART RATE: 84 BPM

## 2018-12-07 DIAGNOSIS — S43.005A CLOSED DISLOCATION OF LEFT SHOULDER, INITIAL ENCOUNTER: ICD-10-CM

## 2018-12-07 DIAGNOSIS — Z98.890 S/P SHOULDER SURGERY: Primary | ICD-10-CM

## 2018-12-07 PROCEDURE — 99024 POSTOP FOLLOW-UP VISIT: CPT | Performed by: ORTHOPAEDIC SURGERY

## 2018-12-12 ENCOUNTER — HOSPITAL ENCOUNTER (OUTPATIENT)
Dept: PHYSICAL THERAPY | Age: 53
Setting detail: THERAPIES SERIES
Discharge: HOME | End: 2018-12-12
Payer: COMMERCIAL

## 2018-12-12 PROCEDURE — G8984 CARRY CURRENT STATUS: HCPCS | Performed by: PHYSICAL THERAPIST

## 2018-12-12 PROCEDURE — G8985 CARRY GOAL STATUS: HCPCS | Performed by: PHYSICAL THERAPIST

## 2018-12-12 PROCEDURE — 97110 THERAPEUTIC EXERCISES: CPT | Performed by: PHYSICAL THERAPIST

## 2018-12-12 PROCEDURE — 97161 PT EVAL LOW COMPLEX 20 MIN: CPT | Performed by: PHYSICAL THERAPIST

## 2018-12-12 ASSESSMENT — PAIN DESCRIPTION - LOCATION: LOCATION: ARM;SHOULDER

## 2018-12-12 ASSESSMENT — PAIN DESCRIPTION - ORIENTATION: ORIENTATION: LEFT

## 2018-12-12 NOTE — PROGRESS NOTES
[address] [address] [place]      Phone: [phone]  Fax: [phone]    Physical Therapy Daily Treatment Note  Date:  2018    Patient Name:  Mimi Nye    :  1965  MRN: [id number]    Restrictions/Precautions:    Diagnosis:  s/p L shoulder surgery, closed dilocation L shoulder  Treatment Diagnosis:    Insurance/Certification information:  Medical [place]  Referring Physician:  Jennifer Avila MD  Plan of care signed (Y/N):    Visit# / total visits:    Pain level: 2-3/10   Time In:  08  Time Out:  08    Subjective:  See evaluation    Exercises:  Exercise/Equipment Resistance/Repetitions Other comments     pulleys 4 minutes       Tale slides flex, abd, ER x5 reps each                                                                                                                                Other Therapeutic Activities:  PT evaluation completed     Home Exercise Program:  18 - table slides shoulder flex, abd, and ER    Manual Treatments:  N/A    Modalities:  HP to L shoulder pre-stretch x 10 minutes    Timed Code Treatment Minutes:  10    Total Treatment Minutes:  40    Treatment/Activity Tolerance:  [x] Patient tolerated treatment well [] Patient limited by fatigue  [] Patient limited by pain  [] Patient limited by other medical complications  [] Other:     Prognosis: [x] Good [] Fair  [] Poor    Patient Requires Follow-up: [x] Yes  [] No    Plan:   [] Continue per plan of care [] Alter current plan (see comments)  [x] Plan of care initiated [] Hold pending MD visit [] Discharge  Plan for Next Session:        Electronically signed by:  Tyesha Landry, PT [id number]

## 2018-12-12 NOTE — PROGRESS NOTES
Physical Therapy  Initial Assessment  Date: 2018  Patient Name: Yesica Naranjo  MRN: [id number]  : 1965     Subjective   General  Additional Pertinent Hx: Pt reports that on 10/13/18 she fell over her dog, suffering a L shoulder dislocation and fx of the L shoulder greater tuberocity. Pt had ORIF surgery on 10/23/18. Pt was in a sling until 18. Referring Practitioner: Neymar Munoz MD  Referral Date : 18  Diagnosis: s/p L shoulder surgery, closed dilocation L shoulder  PT Visit Information  Onset Date: 10/13/18  PT Insurance Information: Medical [place]  Subjective  Subjective: Pt reports aching in the shoulder as well as a grabbing and pulling sensation in the L arm. She denies any numbness or tingling. 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  Additional Comments: Pt is R hand dominent  Objective     AROM LUE (degrees)  LUE AROM : Exceptions  L Shoulder Flexion 0-180: 100°  L Shoulder ABduction 0-180: 75°  L Shoulder Int Rotation  0-70: 45°  L Shoulder Ext Rotation  0-90: 10°    Strength RUE  Strength RUE: WNL  Comment:  55#  Strength LUE  Strength LUE: Exception  Comment: Shoulder 2+/5, elbow flex 5/5, elbow ext 4/5,  30#     Assessment   Conditions Requiring Skilled Therapeutic Intervention  Body structures, Functions, Activity limitations: Decreased ROM; Decreased strength;Decreased endurance  Prognosis: Good  Decision Making: Low Complexity  REQUIRES PT FOLLOW UP: Yes  Activity Tolerance  Activity Tolerance: Patient Tolerated treatment well         Plan   Plan  Times per week: 2x/week  Plan weeks: 8 weeks  Current Treatment Recommendations: Strengthening,

## 2018-12-14 ENCOUNTER — HOSPITAL ENCOUNTER (OUTPATIENT)
Dept: PHYSICAL THERAPY | Age: 53
Setting detail: THERAPIES SERIES
Discharge: HOME | End: 2018-12-14
Payer: COMMERCIAL

## 2018-12-14 PROCEDURE — 97110 THERAPEUTIC EXERCISES: CPT | Performed by: PHYSICAL THERAPIST

## 2018-12-14 PROCEDURE — 97140 MANUAL THERAPY 1/> REGIONS: CPT | Performed by: PHYSICAL THERAPIST

## 2018-12-19 ENCOUNTER — HOSPITAL ENCOUNTER (OUTPATIENT)
Dept: PHYSICAL THERAPY | Age: 53
Setting detail: THERAPIES SERIES
Discharge: HOME | End: 2018-12-19
Payer: COMMERCIAL

## 2018-12-19 PROCEDURE — 97110 THERAPEUTIC EXERCISES: CPT | Performed by: PHYSICAL THERAPIST

## 2018-12-19 PROCEDURE — 97140 MANUAL THERAPY 1/> REGIONS: CPT | Performed by: PHYSICAL THERAPIST

## 2018-12-21 ENCOUNTER — HOSPITAL ENCOUNTER (OUTPATIENT)
Dept: PHYSICAL THERAPY | Age: 53
Setting detail: THERAPIES SERIES
Discharge: HOME | End: 2018-12-21
Payer: COMMERCIAL

## 2018-12-21 PROCEDURE — 97140 MANUAL THERAPY 1/> REGIONS: CPT | Performed by: PHYSICAL THERAPIST

## 2018-12-21 PROCEDURE — 97110 THERAPEUTIC EXERCISES: CPT | Performed by: PHYSICAL THERAPIST

## 2018-12-21 NOTE — PROGRESS NOTES
[address]      Phone: [phone]  Fax: [phone]    Physical Therapy Daily Treatment Note  Date:  2018    Patient Name:  Zackary Ulloa    :  1965  MRN: [id number]    Restrictions/Precautions:    Diagnosis:  s/p L shoulder surgery, closed dilocation L shoulder  Treatment Diagnosis:    Insurance/Certification information:  Medical [place]  Referring Physician:  Anette Ray MD  Plan of care signed (Y/N):    Visit# / total visits:    Pain level: 2-3/10   Time In:  730  Time Out:  817    Subjective:  Pt reports significant soreness and pulling.     Exercises:  Exercise/Equipment Resistance/Repetitions Other comments     pulleys 4 minutes       Tale slides flex, abd, ER 10 sec x 10 reps each      Wall ladder shoulder flex x10 reps      Wand shoulder flex, ER Supine x 5 reps each      Wand shoulder abd x5 reps             UBE 20 reps fwd/20 reps bwd                                                                                             Other Therapeutic Activities:  N/A    Home Exercise Program:  18 - table slides shoulder flex, abd, and ER    Manual Treatments:  PROM L shoulder x 8 minutes    Modalities:  HP to L shoulder pre-stretch x 10 minutes    Timed Code Treatment Minutes:  37    Total Treatment Minutes:  47    Treatment/Activity Tolerance:  [] Patient tolerated treatment well [] Patient limited by fatigue  [x] Patient limited by pain  [] Patient limited by other medical complications  [] Other:     Prognosis: [x] Good [] Fair  [] Poor    Patient Requires Follow-up: [x] Yes  [] No    Plan:   [x] Continue per plan of care [] Alter current plan (see comments)  [] Plan of care initiated [] Hold pending MD visit [] Discharge  Plan for Next Session:        Electronically signed by:  Sofi Razo, PT [id number]

## 2018-12-27 ENCOUNTER — HOSPITAL ENCOUNTER (OUTPATIENT)
Dept: PHYSICAL THERAPY | Age: 53
Setting detail: THERAPIES SERIES
Discharge: HOME | End: 2018-12-27
Payer: COMMERCIAL

## 2018-12-27 PROCEDURE — 97110 THERAPEUTIC EXERCISES: CPT | Performed by: PHYSICAL THERAPIST

## 2018-12-27 PROCEDURE — 97530 THERAPEUTIC ACTIVITIES: CPT | Performed by: PHYSICAL THERAPIST

## 2019-01-02 ENCOUNTER — HOSPITAL ENCOUNTER (OUTPATIENT)
Dept: PHYSICAL THERAPY | Age: 54
Setting detail: THERAPIES SERIES
Discharge: HOME | End: 2019-01-02
Payer: COMMERCIAL

## 2019-01-02 PROCEDURE — 97140 MANUAL THERAPY 1/> REGIONS: CPT | Performed by: PHYSICAL THERAPIST

## 2019-01-02 PROCEDURE — 97110 THERAPEUTIC EXERCISES: CPT | Performed by: PHYSICAL THERAPIST

## 2019-01-04 ENCOUNTER — HOSPITAL ENCOUNTER (OUTPATIENT)
Dept: PHYSICAL THERAPY | Age: 54
Setting detail: THERAPIES SERIES
Discharge: HOME | End: 2019-01-04
Payer: COMMERCIAL

## 2019-01-04 PROCEDURE — 97140 MANUAL THERAPY 1/> REGIONS: CPT | Performed by: PHYSICAL THERAPIST

## 2019-01-04 PROCEDURE — 97110 THERAPEUTIC EXERCISES: CPT | Performed by: PHYSICAL THERAPIST

## 2019-01-09 ENCOUNTER — HOSPITAL ENCOUNTER (OUTPATIENT)
Dept: PHYSICAL THERAPY | Age: 54
Setting detail: THERAPIES SERIES
Discharge: HOME | End: 2019-01-09
Payer: COMMERCIAL

## 2019-01-09 PROCEDURE — 97110 THERAPEUTIC EXERCISES: CPT | Performed by: PHYSICAL THERAPIST

## 2019-01-09 PROCEDURE — 97140 MANUAL THERAPY 1/> REGIONS: CPT | Performed by: PHYSICAL THERAPIST

## 2019-01-11 ENCOUNTER — HOSPITAL ENCOUNTER (OUTPATIENT)
Dept: PHYSICAL THERAPY | Age: 54
Setting detail: THERAPIES SERIES
Discharge: HOME | End: 2019-01-11
Payer: COMMERCIAL

## 2019-01-11 PROCEDURE — 97140 MANUAL THERAPY 1/> REGIONS: CPT

## 2019-01-11 PROCEDURE — 97110 THERAPEUTIC EXERCISES: CPT

## 2019-01-16 ENCOUNTER — HOSPITAL ENCOUNTER (OUTPATIENT)
Dept: PHYSICAL THERAPY | Age: 54
Setting detail: THERAPIES SERIES
Discharge: HOME | End: 2019-01-16
Payer: COMMERCIAL

## 2019-01-16 PROCEDURE — 97110 THERAPEUTIC EXERCISES: CPT | Performed by: PHYSICAL THERAPIST

## 2019-01-16 PROCEDURE — 97140 MANUAL THERAPY 1/> REGIONS: CPT | Performed by: PHYSICAL THERAPIST

## 2019-01-18 ENCOUNTER — HOSPITAL ENCOUNTER (OUTPATIENT)
Dept: PHYSICAL THERAPY | Age: 54
Setting detail: THERAPIES SERIES
Discharge: HOME | End: 2019-01-18
Payer: COMMERCIAL

## 2019-01-18 ENCOUNTER — OFFICE VISIT (OUTPATIENT)
Dept: ORTHOPEDIC SURGERY | Age: 54
End: 2019-01-18

## 2019-01-18 VITALS
HEART RATE: 72 BPM | SYSTOLIC BLOOD PRESSURE: 160 MMHG | HEIGHT: 62 IN | WEIGHT: 106 LBS | TEMPERATURE: 97.4 F | BODY MASS INDEX: 19.51 KG/M2 | DIASTOLIC BLOOD PRESSURE: 104 MMHG

## 2019-01-18 DIAGNOSIS — S42.92XA TRAUMATIC CLOSED DISPLACED FRACTURE OF LEFT SHOULDER WITH ANTERIOR DISLOCATION, INITIAL ENCOUNTER: ICD-10-CM

## 2019-01-18 DIAGNOSIS — Z98.890 S/P SHOULDER SURGERY: Primary | ICD-10-CM

## 2019-01-18 PROCEDURE — G8427 DOCREV CUR MEDS BY ELIG CLIN: HCPCS | Performed by: ORTHOPAEDIC SURGERY

## 2019-01-18 PROCEDURE — G8484 FLU IMMUNIZE NO ADMIN: HCPCS | Performed by: ORTHOPAEDIC SURGERY

## 2019-01-18 PROCEDURE — 99024 POSTOP FOLLOW-UP VISIT: CPT | Performed by: ORTHOPAEDIC SURGERY

## 2019-01-18 PROCEDURE — 97110 THERAPEUTIC EXERCISES: CPT

## 2019-01-18 PROCEDURE — 3017F COLORECTAL CA SCREEN DOC REV: CPT | Performed by: ORTHOPAEDIC SURGERY

## 2019-01-18 PROCEDURE — 4004F PT TOBACCO SCREEN RCVD TLK: CPT | Performed by: ORTHOPAEDIC SURGERY

## 2019-01-18 PROCEDURE — G8420 CALC BMI NORM PARAMETERS: HCPCS | Performed by: ORTHOPAEDIC SURGERY

## 2019-01-18 PROCEDURE — 97140 MANUAL THERAPY 1/> REGIONS: CPT

## 2019-01-23 ENCOUNTER — HOSPITAL ENCOUNTER (OUTPATIENT)
Dept: PHYSICAL THERAPY | Age: 54
Setting detail: THERAPIES SERIES
Discharge: HOME | End: 2019-01-23
Payer: COMMERCIAL

## 2019-01-23 PROCEDURE — 97140 MANUAL THERAPY 1/> REGIONS: CPT | Performed by: PHYSICAL THERAPIST

## 2019-01-23 PROCEDURE — 97110 THERAPEUTIC EXERCISES: CPT | Performed by: PHYSICAL THERAPIST

## 2019-01-25 ENCOUNTER — HOSPITAL ENCOUNTER (OUTPATIENT)
Dept: PHYSICAL THERAPY | Age: 54
Setting detail: THERAPIES SERIES
Discharge: HOME | End: 2019-01-25
Payer: COMMERCIAL

## 2019-01-25 PROCEDURE — 97140 MANUAL THERAPY 1/> REGIONS: CPT

## 2019-01-25 PROCEDURE — 97110 THERAPEUTIC EXERCISES: CPT

## 2019-01-30 ENCOUNTER — HOSPITAL ENCOUNTER (OUTPATIENT)
Dept: PHYSICAL THERAPY | Age: 54
Setting detail: THERAPIES SERIES
Discharge: HOME | End: 2019-01-30
Payer: COMMERCIAL

## 2019-02-01 ENCOUNTER — HOSPITAL ENCOUNTER (OUTPATIENT)
Dept: PHYSICAL THERAPY | Age: 54
Setting detail: THERAPIES SERIES
Discharge: HOME | End: 2019-02-01
Payer: COMMERCIAL

## 2019-02-01 PROCEDURE — 97140 MANUAL THERAPY 1/> REGIONS: CPT

## 2019-02-01 PROCEDURE — 97110 THERAPEUTIC EXERCISES: CPT

## 2019-02-06 ENCOUNTER — HOSPITAL ENCOUNTER (OUTPATIENT)
Dept: PHYSICAL THERAPY | Age: 54
Setting detail: THERAPIES SERIES
Discharge: HOME | End: 2019-02-06
Payer: COMMERCIAL

## 2019-02-06 PROCEDURE — 97140 MANUAL THERAPY 1/> REGIONS: CPT | Performed by: PHYSICAL THERAPIST

## 2019-02-06 PROCEDURE — 97110 THERAPEUTIC EXERCISES: CPT | Performed by: PHYSICAL THERAPIST

## 2019-02-08 ENCOUNTER — HOSPITAL ENCOUNTER (OUTPATIENT)
Dept: PHYSICAL THERAPY | Age: 54
Setting detail: THERAPIES SERIES
Discharge: HOME | End: 2019-02-08
Payer: COMMERCIAL

## 2019-02-08 PROCEDURE — 97110 THERAPEUTIC EXERCISES: CPT

## 2019-02-08 PROCEDURE — 97140 MANUAL THERAPY 1/> REGIONS: CPT

## 2019-02-20 ENCOUNTER — OFFICE VISIT (OUTPATIENT)
Dept: ORTHOPEDIC SURGERY | Age: 54
End: 2019-02-20
Payer: COMMERCIAL

## 2019-02-20 VITALS
DIASTOLIC BLOOD PRESSURE: 98 MMHG | HEIGHT: 62 IN | BODY MASS INDEX: 19.51 KG/M2 | SYSTOLIC BLOOD PRESSURE: 151 MMHG | WEIGHT: 106 LBS | HEART RATE: 93 BPM | TEMPERATURE: 97.9 F

## 2019-02-20 DIAGNOSIS — S42.92XA TRAUMATIC CLOSED DISPLACED FRACTURE OF LEFT SHOULDER WITH ANTERIOR DISLOCATION, INITIAL ENCOUNTER: ICD-10-CM

## 2019-02-20 DIAGNOSIS — Z98.890 S/P SHOULDER SURGERY: Primary | ICD-10-CM

## 2019-02-20 PROCEDURE — 3017F COLORECTAL CA SCREEN DOC REV: CPT | Performed by: ORTHOPAEDIC SURGERY

## 2019-02-20 PROCEDURE — 99213 OFFICE O/P EST LOW 20 MIN: CPT | Performed by: ORTHOPAEDIC SURGERY

## 2019-02-20 PROCEDURE — G8427 DOCREV CUR MEDS BY ELIG CLIN: HCPCS | Performed by: ORTHOPAEDIC SURGERY

## 2019-02-20 PROCEDURE — G8484 FLU IMMUNIZE NO ADMIN: HCPCS | Performed by: ORTHOPAEDIC SURGERY

## 2019-02-20 PROCEDURE — G8420 CALC BMI NORM PARAMETERS: HCPCS | Performed by: ORTHOPAEDIC SURGERY

## 2019-02-20 PROCEDURE — 4004F PT TOBACCO SCREEN RCVD TLK: CPT | Performed by: ORTHOPAEDIC SURGERY

## 2019-02-20 RX ORDER — DILTIAZEM HYDROCHLORIDE 120 MG/1
CAPSULE, EXTENDED RELEASE ORAL
COMMUNITY
Start: 2019-01-08 | End: 2019-02-26

## 2019-02-21 ENCOUNTER — TELEPHONE (OUTPATIENT)
Dept: ORTHOPEDIC SURGERY | Age: 54
End: 2019-02-21

## 2019-02-26 RX ORDER — DILTIAZEM HYDROCHLORIDE 120 MG/1
120 CAPSULE, EXTENDED RELEASE ORAL 2 TIMES DAILY
COMMUNITY

## 2019-03-05 ENCOUNTER — ANESTHESIA EVENT (OUTPATIENT)
Dept: OPERATING ROOM | Age: 54
End: 2019-03-05
Payer: COMMERCIAL

## 2019-03-05 ENCOUNTER — HOSPITAL ENCOUNTER (OUTPATIENT)
Age: 54
Setting detail: OUTPATIENT SURGERY
Discharge: HOME | End: 2019-03-05
Attending: ORTHOPAEDIC SURGERY | Admitting: ORTHOPAEDIC SURGERY
Payer: COMMERCIAL

## 2019-03-05 ENCOUNTER — ANESTHESIA (OUTPATIENT)
Dept: OPERATING ROOM | Age: 54
End: 2019-03-05
Payer: COMMERCIAL

## 2019-03-05 VITALS
WEIGHT: 106 LBS | OXYGEN SATURATION: 96 % | SYSTOLIC BLOOD PRESSURE: 126 MMHG | HEIGHT: 62 IN | RESPIRATION RATE: 16 BRPM | HEART RATE: 97 BPM | TEMPERATURE: 97.3 F | BODY MASS INDEX: 19.51 KG/M2 | DIASTOLIC BLOOD PRESSURE: 88 MMHG

## 2019-03-05 VITALS — SYSTOLIC BLOOD PRESSURE: 113 MMHG | OXYGEN SATURATION: 99 % | DIASTOLIC BLOOD PRESSURE: 75 MMHG

## 2019-03-05 DIAGNOSIS — S42.92XD TRAUMATIC CLOSED DISPLACED FRACTURE OF LEFT SHOULDER WITH ANTERIOR DISLOCATION WITH ROUTINE HEALING, SUBSEQUENT ENCOUNTER: Primary | ICD-10-CM

## 2019-03-05 LAB
ANION GAP SERPL CALCULATED.3IONS-SCNC: 11 MMOL/L (ref 7–16)
BUN BLDV-MCNC: 5 MG/DL (ref 6–20)
CALCIUM SERPL-MCNC: 9.8 MG/DL (ref 8.6–10.2)
CHLORIDE BLD-SCNC: 98 MMOL/L (ref 98–107)
CO2: 27 MMOL/L (ref 22–29)
CREAT SERPL-MCNC: 0.5 MG/DL (ref 0.5–1)
GFR AFRICAN AMERICAN: >60
GFR NON-AFRICAN AMERICAN: >60 ML/MIN/1.73
GLUCOSE BLD-MCNC: 95 MG/DL (ref 74–99)
POTASSIUM SERPL-SCNC: 4.2 MMOL/L (ref 3.5–5)
SODIUM BLD-SCNC: 136 MMOL/L (ref 132–146)

## 2019-03-05 PROCEDURE — 3700000001 HC ADD 15 MINUTES (ANESTHESIA): Performed by: ORTHOPAEDIC SURGERY

## 2019-03-05 PROCEDURE — 2500000003 HC RX 250 WO HCPCS: Performed by: ORTHOPAEDIC SURGERY

## 2019-03-05 PROCEDURE — 7100000011 HC PHASE II RECOVERY - ADDTL 15 MIN: Performed by: ORTHOPAEDIC SURGERY

## 2019-03-05 PROCEDURE — 7100000010 HC PHASE II RECOVERY - FIRST 15 MIN: Performed by: ORTHOPAEDIC SURGERY

## 2019-03-05 PROCEDURE — 6360000002 HC RX W HCPCS: Performed by: ORTHOPAEDIC SURGERY

## 2019-03-05 PROCEDURE — 2580000003 HC RX 258: Performed by: ORTHOPAEDIC SURGERY

## 2019-03-05 PROCEDURE — 3700000000 HC ANESTHESIA ATTENDED CARE: Performed by: ORTHOPAEDIC SURGERY

## 2019-03-05 PROCEDURE — 6360000002 HC RX W HCPCS: Performed by: NURSE ANESTHETIST, CERTIFIED REGISTERED

## 2019-03-05 PROCEDURE — 2709999900 HC NON-CHARGEABLE SUPPLY: Performed by: ORTHOPAEDIC SURGERY

## 2019-03-05 PROCEDURE — 23700 MNPJ ANES SHO JT FIXJ APRATS: CPT | Performed by: ORTHOPAEDIC SURGERY

## 2019-03-05 PROCEDURE — 36415 COLL VENOUS BLD VENIPUNCTURE: CPT

## 2019-03-05 PROCEDURE — 80048 BASIC METABOLIC PNL TOTAL CA: CPT

## 2019-03-05 PROCEDURE — 3600000012 HC SURGERY LEVEL 2 ADDTL 15MIN: Performed by: ORTHOPAEDIC SURGERY

## 2019-03-05 PROCEDURE — 3600000002 HC SURGERY LEVEL 2 BASE: Performed by: ORTHOPAEDIC SURGERY

## 2019-03-05 RX ORDER — OXYCODONE HYDROCHLORIDE AND ACETAMINOPHEN 5; 325 MG/1; MG/1
1 TABLET ORAL
Status: DISCONTINUED | OUTPATIENT
Start: 2019-03-05 | End: 2019-03-05 | Stop reason: HOSPADM

## 2019-03-05 RX ORDER — SODIUM CHLORIDE 0.9 % (FLUSH) 0.9 %
10 SYRINGE (ML) INJECTION EVERY 12 HOURS SCHEDULED
Status: DISCONTINUED | OUTPATIENT
Start: 2019-03-05 | End: 2019-03-05 | Stop reason: HOSPADM

## 2019-03-05 RX ORDER — MIDAZOLAM HYDROCHLORIDE 1 MG/ML
INJECTION INTRAMUSCULAR; INTRAVENOUS PRN
Status: DISCONTINUED | OUTPATIENT
Start: 2019-03-05 | End: 2019-03-05 | Stop reason: SDUPTHER

## 2019-03-05 RX ORDER — KETOROLAC TROMETHAMINE 10 MG/1
10 TABLET, FILM COATED ORAL EVERY 6 HOURS PRN
Qty: 8 TABLET | Refills: 0 | Status: SHIPPED | OUTPATIENT
Start: 2019-03-05 | End: 2019-03-20

## 2019-03-05 RX ORDER — FENTANYL CITRATE 50 UG/ML
25 INJECTION, SOLUTION INTRAMUSCULAR; INTRAVENOUS EVERY 5 MIN PRN
Status: DISCONTINUED | OUTPATIENT
Start: 2019-03-05 | End: 2019-03-05 | Stop reason: HOSPADM

## 2019-03-05 RX ORDER — ONDANSETRON 2 MG/ML
4 INJECTION INTRAMUSCULAR; INTRAVENOUS
Status: DISCONTINUED | OUTPATIENT
Start: 2019-03-05 | End: 2019-03-05 | Stop reason: HOSPADM

## 2019-03-05 RX ORDER — PROPOFOL 10 MG/ML
INJECTION, EMULSION INTRAVENOUS CONTINUOUS PRN
Status: DISCONTINUED | OUTPATIENT
Start: 2019-03-05 | End: 2019-03-05 | Stop reason: SDUPTHER

## 2019-03-05 RX ORDER — SODIUM CHLORIDE 9 MG/ML
INJECTION, SOLUTION INTRAVENOUS CONTINUOUS
Status: DISCONTINUED | OUTPATIENT
Start: 2019-03-05 | End: 2019-03-05 | Stop reason: HOSPADM

## 2019-03-05 RX ORDER — SODIUM CHLORIDE 0.9 % (FLUSH) 0.9 %
10 SYRINGE (ML) INJECTION PRN
Status: DISCONTINUED | OUTPATIENT
Start: 2019-03-05 | End: 2019-03-05 | Stop reason: HOSPADM

## 2019-03-05 RX ORDER — TRIAMCINOLONE ACETONIDE 40 MG/ML
INJECTION, SUSPENSION INTRA-ARTICULAR; INTRAMUSCULAR PRN
Status: DISCONTINUED | OUTPATIENT
Start: 2019-03-05 | End: 2019-03-05 | Stop reason: ALTCHOICE

## 2019-03-05 RX ORDER — MEPERIDINE HYDROCHLORIDE 25 MG/ML
12.5 INJECTION INTRAMUSCULAR; INTRAVENOUS; SUBCUTANEOUS EVERY 5 MIN PRN
Status: DISCONTINUED | OUTPATIENT
Start: 2019-03-05 | End: 2019-03-05 | Stop reason: HOSPADM

## 2019-03-05 RX ORDER — FENTANYL CITRATE 50 UG/ML
50 INJECTION, SOLUTION INTRAMUSCULAR; INTRAVENOUS EVERY 5 MIN PRN
Status: DISCONTINUED | OUTPATIENT
Start: 2019-03-05 | End: 2019-03-05 | Stop reason: HOSPADM

## 2019-03-05 RX ORDER — BUPIVACAINE HYDROCHLORIDE 5 MG/ML
INJECTION, SOLUTION EPIDURAL; INTRACAUDAL PRN
Status: DISCONTINUED | OUTPATIENT
Start: 2019-03-05 | End: 2019-03-05 | Stop reason: ALTCHOICE

## 2019-03-05 RX ORDER — FENTANYL CITRATE 50 UG/ML
INJECTION, SOLUTION INTRAMUSCULAR; INTRAVENOUS PRN
Status: DISCONTINUED | OUTPATIENT
Start: 2019-03-05 | End: 2019-03-05 | Stop reason: SDUPTHER

## 2019-03-05 RX ORDER — HYDROCODONE BITARTRATE AND ACETAMINOPHEN 5; 325 MG/1; MG/1
1 TABLET ORAL EVERY 6 HOURS PRN
Qty: 10 TABLET | Refills: 0 | Status: SHIPPED | OUTPATIENT
Start: 2019-03-05 | End: 2019-03-10

## 2019-03-05 RX ADMIN — SODIUM CHLORIDE: 9 INJECTION, SOLUTION INTRAVENOUS at 12:58

## 2019-03-05 RX ADMIN — FENTANYL CITRATE 50 MCG: 50 INJECTION, SOLUTION INTRAMUSCULAR; INTRAVENOUS at 13:03

## 2019-03-05 RX ADMIN — FENTANYL CITRATE 50 MCG: 50 INJECTION, SOLUTION INTRAMUSCULAR; INTRAVENOUS at 13:08

## 2019-03-05 RX ADMIN — MIDAZOLAM HYDROCHLORIDE 2 MG: 1 INJECTION, SOLUTION INTRAMUSCULAR; INTRAVENOUS at 13:03

## 2019-03-05 RX ADMIN — PROPOFOL 50 MCG/KG/MIN: 10 INJECTION, EMULSION INTRAVENOUS at 13:03

## 2019-03-05 ASSESSMENT — PULMONARY FUNCTION TESTS
PIF_VALUE: 1
PIF_VALUE: 0
PIF_VALUE: 0
PIF_VALUE: 3
PIF_VALUE: 1
PIF_VALUE: 0
PIF_VALUE: 1
PIF_VALUE: 0
PIF_VALUE: 1
PIF_VALUE: 0
PIF_VALUE: 1
PIF_VALUE: 0
PIF_VALUE: 1
PIF_VALUE: 0
PIF_VALUE: 0
PIF_VALUE: 1

## 2019-03-05 ASSESSMENT — LIFESTYLE VARIABLES: SMOKING_STATUS: 1

## 2019-03-05 ASSESSMENT — PAIN SCALES - GENERAL: PAINLEVEL_OUTOF10: 0

## 2019-03-06 ENCOUNTER — HOSPITAL ENCOUNTER (OUTPATIENT)
Dept: PHYSICAL THERAPY | Age: 54
Setting detail: THERAPIES SERIES
Discharge: HOME | End: 2019-03-06
Payer: COMMERCIAL

## 2019-03-06 PROCEDURE — 97110 THERAPEUTIC EXERCISES: CPT | Performed by: PHYSICAL THERAPIST

## 2019-03-06 PROCEDURE — 97140 MANUAL THERAPY 1/> REGIONS: CPT | Performed by: PHYSICAL THERAPIST

## 2019-03-08 ENCOUNTER — HOSPITAL ENCOUNTER (OUTPATIENT)
Dept: PHYSICAL THERAPY | Age: 54
Setting detail: THERAPIES SERIES
Discharge: HOME | End: 2019-03-08
Payer: COMMERCIAL

## 2019-03-08 PROCEDURE — 97110 THERAPEUTIC EXERCISES: CPT | Performed by: PHYSICAL THERAPIST

## 2019-03-08 PROCEDURE — 97140 MANUAL THERAPY 1/> REGIONS: CPT | Performed by: PHYSICAL THERAPIST

## 2019-03-13 ENCOUNTER — HOSPITAL ENCOUNTER (OUTPATIENT)
Dept: PHYSICAL THERAPY | Age: 54
Setting detail: THERAPIES SERIES
Discharge: HOME | End: 2019-03-13
Payer: COMMERCIAL

## 2019-03-13 PROCEDURE — 97035 APP MDLTY 1+ULTRASOUND EA 15: CPT | Performed by: PHYSICAL THERAPIST

## 2019-03-13 PROCEDURE — 97140 MANUAL THERAPY 1/> REGIONS: CPT | Performed by: PHYSICAL THERAPIST

## 2019-03-13 PROCEDURE — 97110 THERAPEUTIC EXERCISES: CPT | Performed by: PHYSICAL THERAPIST

## 2019-03-15 ENCOUNTER — HOSPITAL ENCOUNTER (OUTPATIENT)
Dept: PHYSICAL THERAPY | Age: 54
Setting detail: THERAPIES SERIES
Discharge: HOME | End: 2019-03-15
Payer: COMMERCIAL

## 2019-03-15 PROCEDURE — 97110 THERAPEUTIC EXERCISES: CPT | Performed by: PHYSICAL THERAPIST

## 2019-03-15 PROCEDURE — 97035 APP MDLTY 1+ULTRASOUND EA 15: CPT | Performed by: PHYSICAL THERAPIST

## 2019-03-15 PROCEDURE — 97140 MANUAL THERAPY 1/> REGIONS: CPT | Performed by: PHYSICAL THERAPIST

## 2019-03-20 ENCOUNTER — OFFICE VISIT (OUTPATIENT)
Dept: ORTHOPEDIC SURGERY | Age: 54
End: 2019-03-20
Payer: COMMERCIAL

## 2019-03-20 VITALS
WEIGHT: 106 LBS | DIASTOLIC BLOOD PRESSURE: 93 MMHG | BODY MASS INDEX: 19.51 KG/M2 | HEART RATE: 76 BPM | SYSTOLIC BLOOD PRESSURE: 128 MMHG | HEIGHT: 62 IN

## 2019-03-20 DIAGNOSIS — Z98.890 S/P SHOULDER SURGERY: Primary | ICD-10-CM

## 2019-03-20 DIAGNOSIS — S42.92XA TRAUMATIC CLOSED DISPLACED FRACTURE OF LEFT SHOULDER WITH ANTERIOR DISLOCATION, INITIAL ENCOUNTER: ICD-10-CM

## 2019-03-20 PROCEDURE — G8484 FLU IMMUNIZE NO ADMIN: HCPCS | Performed by: ORTHOPAEDIC SURGERY

## 2019-03-20 PROCEDURE — G8420 CALC BMI NORM PARAMETERS: HCPCS | Performed by: ORTHOPAEDIC SURGERY

## 2019-03-20 PROCEDURE — G8427 DOCREV CUR MEDS BY ELIG CLIN: HCPCS | Performed by: ORTHOPAEDIC SURGERY

## 2019-03-20 PROCEDURE — 99213 OFFICE O/P EST LOW 20 MIN: CPT | Performed by: ORTHOPAEDIC SURGERY

## 2019-03-20 PROCEDURE — 3017F COLORECTAL CA SCREEN DOC REV: CPT | Performed by: ORTHOPAEDIC SURGERY

## 2019-03-20 PROCEDURE — 4004F PT TOBACCO SCREEN RCVD TLK: CPT | Performed by: ORTHOPAEDIC SURGERY

## 2019-03-21 ENCOUNTER — HOSPITAL ENCOUNTER (OUTPATIENT)
Dept: PHYSICAL THERAPY | Age: 54
Setting detail: THERAPIES SERIES
Discharge: HOME | End: 2019-03-21
Payer: COMMERCIAL

## 2019-03-21 PROCEDURE — 97110 THERAPEUTIC EXERCISES: CPT | Performed by: PHYSICAL THERAPIST

## 2019-03-21 PROCEDURE — 97035 APP MDLTY 1+ULTRASOUND EA 15: CPT | Performed by: PHYSICAL THERAPIST

## 2019-03-21 PROCEDURE — 97140 MANUAL THERAPY 1/> REGIONS: CPT | Performed by: PHYSICAL THERAPIST

## 2019-03-22 ENCOUNTER — HOSPITAL ENCOUNTER (OUTPATIENT)
Dept: PHYSICAL THERAPY | Age: 54
Setting detail: THERAPIES SERIES
Discharge: HOME | End: 2019-03-22
Payer: COMMERCIAL

## 2019-03-22 PROCEDURE — 97035 APP MDLTY 1+ULTRASOUND EA 15: CPT

## 2019-03-22 PROCEDURE — 97530 THERAPEUTIC ACTIVITIES: CPT

## 2019-03-22 PROCEDURE — 97140 MANUAL THERAPY 1/> REGIONS: CPT

## 2019-03-27 ENCOUNTER — HOSPITAL ENCOUNTER (OUTPATIENT)
Dept: PHYSICAL THERAPY | Age: 54
Setting detail: THERAPIES SERIES
Discharge: HOME | End: 2019-03-27
Payer: COMMERCIAL

## 2019-03-27 PROCEDURE — 97140 MANUAL THERAPY 1/> REGIONS: CPT | Performed by: PHYSICAL THERAPIST

## 2019-03-27 PROCEDURE — 97110 THERAPEUTIC EXERCISES: CPT | Performed by: PHYSICAL THERAPIST

## 2019-03-27 PROCEDURE — 97035 APP MDLTY 1+ULTRASOUND EA 15: CPT | Performed by: PHYSICAL THERAPIST

## 2019-03-29 ENCOUNTER — HOSPITAL ENCOUNTER (OUTPATIENT)
Dept: PHYSICAL THERAPY | Age: 54
Setting detail: THERAPIES SERIES
Discharge: HOME | End: 2019-03-29
Payer: COMMERCIAL

## 2019-03-29 PROCEDURE — 97110 THERAPEUTIC EXERCISES: CPT | Performed by: PHYSICAL THERAPIST

## 2019-03-29 PROCEDURE — 97035 APP MDLTY 1+ULTRASOUND EA 15: CPT | Performed by: PHYSICAL THERAPIST

## 2019-04-03 ENCOUNTER — HOSPITAL ENCOUNTER (OUTPATIENT)
Dept: PHYSICAL THERAPY | Age: 54
Setting detail: THERAPIES SERIES
Discharge: HOME | End: 2019-04-03
Payer: COMMERCIAL

## 2019-04-03 PROCEDURE — 97140 MANUAL THERAPY 1/> REGIONS: CPT | Performed by: PHYSICAL THERAPIST

## 2019-04-03 PROCEDURE — 97110 THERAPEUTIC EXERCISES: CPT | Performed by: PHYSICAL THERAPIST

## 2019-04-03 PROCEDURE — 97035 APP MDLTY 1+ULTRASOUND EA 15: CPT | Performed by: PHYSICAL THERAPIST

## 2019-04-03 NOTE — PROGRESS NOTES
[address] [address] [place]      Phone: [phone]  Fax: [phone]    Physical Therapy Daily Treatment Note  Date:  4/3/2019    Patient Name:  iNsh Mancilla    :  1965  MRN: [id number]    Restrictions/Precautions:    Diagnosis:  s/p L shoulder surgery, closed dislocation L shoulder  Treatment Diagnosis:    Insurance/Certification information:  Medical [place]  Referring Physician:  Lukas Nunez MD  Plan of care signed (Y/N):    Visit# / total visits:   (16 visits completed)  Pain level :  4-5/10   Time In:  734  Time Out:  827    Subjective:  Pt with no new report    Exercises:  Exercise/Equipment Resistance/Repetitions Other comments     pulleys 4 min        Wall ladder shoulder flex 5 sec x 10 reps      3# Wand shoulder flex, ER  10sec x 10 reps      3 # Wand shoulder abd 10 sec x 10 reps      IR with towel 10 sec x 3 reps      UBE 3 min F/B       Corner stretch 15 sec x 5 reps    IR/ER with tband OTB  IR x 10 reps    OTB ER x 10 reps    Side lying abd and ER 1# 2 x 10 reps each                                                Other Therapeutic Activities:  N/A    Home Exercise Program:  18 - table slides shoulder flex, abd, and ER,  19 Inferior glide ex 3x QD     Manual Treatments:  PROM L shoulder x 10 minutes    Modalities:  US L anterior and inferior shoulder joint, 1.2 W/cm², 50%, 1.0 MHz, x 8 minutes    Timed Code Treatment Minutes:  53    Total Treatment Minutes:  53      Treatment/Activity Tolerance:  [x] Patient tolerated treatment well [] Patient limited by fatigue  [] Patient limited by pain  [] Patient limited by other medical complications  [] Other:      Prognosis: [x] Good [] Fair  [] Poor    Patient Requires Follow-up: [x] Yes  [] No    Plan:   [x] Continue per plan of care [] Alter current plan (see comments)  [] Plan of care initiated [] Hold pending MD visit [] Discharge  Plan for Next Session:        Electronically signed by: Manda Caballero, [address]

## 2019-04-05 ENCOUNTER — HOSPITAL ENCOUNTER (OUTPATIENT)
Dept: PHYSICAL THERAPY | Age: 54
Setting detail: THERAPIES SERIES
Discharge: HOME | End: 2019-04-05
Payer: COMMERCIAL

## 2019-04-05 PROCEDURE — 97110 THERAPEUTIC EXERCISES: CPT | Performed by: PHYSICAL THERAPIST

## 2019-04-05 PROCEDURE — 97140 MANUAL THERAPY 1/> REGIONS: CPT | Performed by: PHYSICAL THERAPIST

## 2019-04-05 PROCEDURE — 97035 APP MDLTY 1+ULTRASOUND EA 15: CPT | Performed by: PHYSICAL THERAPIST

## 2019-04-05 NOTE — PROGRESS NOTES
[address] [address] [place]      Phone: [phone]  Fax: [phone]    Physical Therapy Daily Treatment Note  Date:  2019    Patient Name:  Mary Eddy    :  1965  MRN: [id number]    Restrictions/Precautions:    Diagnosis:  s/p L shoulder surgery, closed dislocation L shoulder  Treatment Diagnosis:    Insurance/Certification information:  Medical [place]  Referring Physician:  Elvis Sen MD  Plan of care signed (Y/N):    Visit# / total visits:  10/12 (16 visits completed)  Pain level :  4-5/10   Time In:  0732  Time Out:  827    Subjective:  Pt with no new report    Exercises:  Exercise/Equipment Resistance/Repetitions Other comments     pulleys 4 min        Wall ladder shoulder flex 5 sec x 10 reps      3# Wand shoulder flex, ER  10sec x 10 reps      3 # Wand shoulder abd 10 sec x 10 reps      IR with towel 10 sec x 3 reps      UBE 3 min F/B       Corner stretch 15 sec x 5 reps    IR/ER with tband GTB  IR x 10 reps    OTB ER x 10 reps    Side lying abd and ER 1# 2 x 10 reps each                                                Other Therapeutic Activities:  N/A    Home Exercise Program:  18 - table slides shoulder flex, abd, and ER,  19 Inferior glide ex 3x QD     Manual Treatments:  PROM L shoulder x 10 minutes    Modalities:  US L anterior and inferior shoulder joint, 1.2 W/cm², 50%, 1.0 MHz, x 8 minutes    Timed Code Treatment Minutes:  53    Total Treatment Minutes:  55      Treatment/Activity Tolerance:  [x] Patient tolerated treatment well [] Patient limited by fatigue  [] Patient limited by pain  [] Patient limited by other medical complications  [] Other:      Prognosis: [x] Good [] Fair  [] Poor    Patient Requires Follow-up: [x] Yes  [] No    Plan:   [x] Continue per plan of care [] Alter current plan (see comments)  [] Plan of care initiated [] Hold pending MD visit [] Discharge  Plan for Next Session:        Electronically signed by:  MATEUSZ [place] [address] [address]

## 2019-04-10 ENCOUNTER — HOSPITAL ENCOUNTER (OUTPATIENT)
Dept: PHYSICAL THERAPY | Age: 54
Setting detail: THERAPIES SERIES
Discharge: HOME | End: 2019-04-10
Payer: COMMERCIAL

## 2019-04-10 PROCEDURE — 97110 THERAPEUTIC EXERCISES: CPT | Performed by: PHYSICAL THERAPIST

## 2019-04-10 PROCEDURE — 97035 APP MDLTY 1+ULTRASOUND EA 15: CPT | Performed by: PHYSICAL THERAPIST

## 2019-04-12 ENCOUNTER — HOSPITAL ENCOUNTER (OUTPATIENT)
Dept: PHYSICAL THERAPY | Age: 54
Setting detail: THERAPIES SERIES
Discharge: HOME | End: 2019-04-12
Payer: COMMERCIAL

## 2019-04-12 PROCEDURE — 97035 APP MDLTY 1+ULTRASOUND EA 15: CPT | Performed by: PHYSICAL THERAPIST

## 2019-04-12 PROCEDURE — 97110 THERAPEUTIC EXERCISES: CPT | Performed by: PHYSICAL THERAPIST

## 2019-04-12 NOTE — PROGRESS NOTES
per plan of care [] Alter current plan (see comments)  [] Plan of care initiated [] Hold pending MD visit [x] Discharge  Plan for Next Session:        Electronically signed by:  MATEUSZ [address]

## 2019-04-12 NOTE — DISCHARGE SUMMARY
[address]      Phone: [phone]  Fax: [phone]    Physical Therapy  Out Patient Discharge Summary     Date:  2019    Patient Name:  Maximo Blackman    :  1965  MRN: [id number]    DIAGNOSIS:  S/p L shoulder surgery, closed dislocation L shoulder, s/p manipulation on 3/5/19  REFERRING PHYSICIAN:  Devante Elizabeth MD    ATTENDANCE:  Pt has attended 28 of 28 scheduled treatments from 18 to 19. TREATMENTS RECEIVED:  Manual therapy, HP, US, stretching, ROM, strength training    INITIAL STATUS:  · Left shoulder AROM: flex 100°, abd 75°, IR 45°, ER 10°  · Strength left shoulder 2+/5 throughout  · All daily activities impaired due injury    FINAL STATUS:  · Left shoulder AROM: flex 145°, abd 145°, IR 72°, ER 60°  · Strength: shoulder flex 4-/5, abd 3+/5, IR 4-/5, ER 3+/5  · Pt continues to have some difficulty performing daily tasks due to pain and weakness of the left UE and has been unable to return to work    GOALS:  0 out of 3 Long Term Goals were obtained. LONG TERM GOALS NOT OBTAINED/REASON:  Pt has demonstrated some slow progress with ROM, strength, and function of the left UE since manipulation. Pt continues to have significant pain in the shoulder/UE, causing guarding and limiting ROM and strength. Shoulder had an empty end feel with PROM and pt unable to progress with strength training activities due to pain. PATIENT GOALS:  To regain us of left arm and return to work    REASON FOR DISCHARGE:  Pt has received max benefits from PT    PATIENT EDUCATION/INSTRUCTIONS:  Pt educated on ROM, stretching, and strength training activities for her HEP. RECOMMENDATIONS:  Discharge to [facility] with recommendation to follow-up with physician due to continued pain and deficits. Thank you for the opportunity to work with your patient.  If you have questions or comments, please feel free to contact me by phone or fax.      Electronically Signed by: Sudhir Narayanan, PT [id number]  4/12/2019

## 2019-05-01 ENCOUNTER — OFFICE VISIT (OUTPATIENT)
Dept: ORTHOPEDIC SURGERY | Age: 54
End: 2019-05-01
Payer: COMMERCIAL

## 2019-05-01 VITALS
BODY MASS INDEX: 19.51 KG/M2 | HEART RATE: 74 BPM | WEIGHT: 106 LBS | SYSTOLIC BLOOD PRESSURE: 148 MMHG | HEIGHT: 62 IN | DIASTOLIC BLOOD PRESSURE: 103 MMHG

## 2019-05-01 DIAGNOSIS — Z98.890 S/P SHOULDER SURGERY: Primary | ICD-10-CM

## 2019-05-01 DIAGNOSIS — S42.92XA TRAUMATIC CLOSED DISPLACED FRACTURE OF LEFT SHOULDER WITH ANTERIOR DISLOCATION, INITIAL ENCOUNTER: ICD-10-CM

## 2019-05-01 DIAGNOSIS — M25.512 ACUTE PAIN OF LEFT SHOULDER: ICD-10-CM

## 2019-05-01 PROCEDURE — 3017F COLORECTAL CA SCREEN DOC REV: CPT | Performed by: ORTHOPAEDIC SURGERY

## 2019-05-01 PROCEDURE — 99213 OFFICE O/P EST LOW 20 MIN: CPT | Performed by: ORTHOPAEDIC SURGERY

## 2019-05-01 PROCEDURE — G8427 DOCREV CUR MEDS BY ELIG CLIN: HCPCS | Performed by: ORTHOPAEDIC SURGERY

## 2019-05-01 PROCEDURE — G8420 CALC BMI NORM PARAMETERS: HCPCS | Performed by: ORTHOPAEDIC SURGERY

## 2019-05-01 PROCEDURE — 20610 DRAIN/INJ JOINT/BURSA W/O US: CPT | Performed by: ORTHOPAEDIC SURGERY

## 2019-05-01 PROCEDURE — 4004F PT TOBACCO SCREEN RCVD TLK: CPT | Performed by: ORTHOPAEDIC SURGERY

## 2019-05-01 RX ORDER — TRIAMCINOLONE ACETONIDE 40 MG/ML
40 INJECTION, SUSPENSION INTRA-ARTICULAR; INTRAMUSCULAR ONCE
Status: COMPLETED | OUTPATIENT
Start: 2019-05-01 | End: 2019-05-01

## 2019-05-01 RX ORDER — LIDOCAINE HYDROCHLORIDE 10 MG/ML
4 INJECTION, SOLUTION INFILTRATION; PERINEURAL ONCE
Status: COMPLETED | OUTPATIENT
Start: 2019-05-01 | End: 2019-05-01

## 2019-05-01 RX ADMIN — TRIAMCINOLONE ACETONIDE 40 MG: 40 INJECTION, SUSPENSION INTRA-ARTICULAR; INTRAMUSCULAR at 10:17

## 2019-05-01 RX ADMIN — LIDOCAINE HYDROCHLORIDE 4 ML: 10 INJECTION, SOLUTION INFILTRATION; PERINEURAL at 10:17

## 2019-05-01 NOTE — PROGRESS NOTES
Assisted  with 4 ml lidocaine, 1 ml kenalog in the left shoulder, patient tolerated well, verbal instructions were given. Patient expresses understanding.
stiffness  Overall she is still fairly stiff. We discussed it may be up to a year before she fully can recognize her full range of motion. I feel she has had some degree of frozen shoulder following initial surgery. We were able to manipulate her up to about 150° on recent manipulation so I think she will continue to improve in terms of range of motion. We discussed another steroid injection today which she was agreeable with proceeding . If she doesn't improve we discussed potential further imaging and possibly arthroscopy.   We will see her back in about 3 months with repeat x-rays of the shoulder    Constance Sim MD  Orthopaedic Surgery   5/1/19  9:33 AM

## 2019-08-02 ENCOUNTER — OFFICE VISIT (OUTPATIENT)
Dept: ORTHOPEDIC SURGERY | Age: 54
End: 2019-08-02
Payer: COMMERCIAL

## 2019-08-02 VITALS — SYSTOLIC BLOOD PRESSURE: 135 MMHG | HEART RATE: 67 BPM | DIASTOLIC BLOOD PRESSURE: 97 MMHG

## 2019-08-02 DIAGNOSIS — Z98.890 S/P SHOULDER SURGERY: Primary | ICD-10-CM

## 2019-08-02 DIAGNOSIS — S42.92XA TRAUMATIC CLOSED DISPLACED FRACTURE OF LEFT SHOULDER WITH ANTERIOR DISLOCATION, INITIAL ENCOUNTER: ICD-10-CM

## 2019-08-02 PROCEDURE — 3017F COLORECTAL CA SCREEN DOC REV: CPT | Performed by: ORTHOPAEDIC SURGERY

## 2019-08-02 PROCEDURE — G8427 DOCREV CUR MEDS BY ELIG CLIN: HCPCS | Performed by: ORTHOPAEDIC SURGERY

## 2019-08-02 PROCEDURE — G8420 CALC BMI NORM PARAMETERS: HCPCS | Performed by: ORTHOPAEDIC SURGERY

## 2019-08-02 PROCEDURE — 4004F PT TOBACCO SCREEN RCVD TLK: CPT | Performed by: ORTHOPAEDIC SURGERY

## 2019-08-02 PROCEDURE — 99213 OFFICE O/P EST LOW 20 MIN: CPT | Performed by: ORTHOPAEDIC SURGERY

## 2019-10-09 ENCOUNTER — OFFICE VISIT (OUTPATIENT)
Dept: ORTHOPEDIC SURGERY | Age: 54
End: 2019-10-09
Payer: COMMERCIAL

## 2019-10-09 VITALS — BODY MASS INDEX: 19.51 KG/M2 | HEIGHT: 62 IN | WEIGHT: 106 LBS

## 2019-10-09 DIAGNOSIS — S42.92XD TRAUMATIC CLOSED DISPLACED FRACTURE OF LEFT SHOULDER WITH ANTERIOR DISLOCATION WITH ROUTINE HEALING, SUBSEQUENT ENCOUNTER: Primary | ICD-10-CM

## 2019-10-09 PROCEDURE — 3017F COLORECTAL CA SCREEN DOC REV: CPT | Performed by: ORTHOPAEDIC SURGERY

## 2019-10-09 PROCEDURE — G8484 FLU IMMUNIZE NO ADMIN: HCPCS | Performed by: ORTHOPAEDIC SURGERY

## 2019-10-09 PROCEDURE — G8420 CALC BMI NORM PARAMETERS: HCPCS | Performed by: ORTHOPAEDIC SURGERY

## 2019-10-09 PROCEDURE — 4004F PT TOBACCO SCREEN RCVD TLK: CPT | Performed by: ORTHOPAEDIC SURGERY

## 2019-10-09 PROCEDURE — 99213 OFFICE O/P EST LOW 20 MIN: CPT | Performed by: ORTHOPAEDIC SURGERY

## 2019-10-09 PROCEDURE — G8427 DOCREV CUR MEDS BY ELIG CLIN: HCPCS | Performed by: ORTHOPAEDIC SURGERY

## 2019-10-09 RX ORDER — BROMPHENIRAMINE MALEATE, PSEUDOEPHEDRINE HYDROCHLORIDE, AND DEXTROMETHORPHAN HYDROBROMIDE 2; 30; 10 MG/5ML; MG/5ML; MG/5ML
SYRUP ORAL
Status: ON HOLD | COMMUNITY
Start: 2019-10-08 | End: 2022-09-28 | Stop reason: HOSPADM

## 2019-10-09 RX ORDER — CEFDINIR 300 MG/1
CAPSULE ORAL
Status: ON HOLD | COMMUNITY
Start: 2019-10-08 | End: 2022-09-28 | Stop reason: HOSPADM

## 2020-12-28 NOTE — ED PROVIDER NOTES
Saturation Interpretation: Normal      ---------------------------------------------------PHYSICAL EXAM--------------------------------------      Constitutional/General: Alert and oriented x3, well appearing, non toxic in NAD  Head: NC/AT  Eyes: PERRL, EOMI    Neck: Supple, full ROM, no meningeal signs  Pulmonary: Lungs clear to auscultation bilaterally, no wheezes, rales, or rhonchi. Not in respiratory distress  Cardiovascular:  Regular rate and rhythm, no murmurs, gallops, or rubs. 2+ distal pulses    Extremities: There is decreased range of motion of the left shoulder secondary to pain with obvious swelling and tenderness of the proximal humerus area  Skin: warm and dry without rash  Neurologic: GCS 15,  Psych: Normal Affect      ------------------------------ ED COURSE/MEDICAL DECISION MAKING----------------------      Medical Decision Making:      Procedure note; the right shoulder fracture dislocation was reduced after/ the patient was sedated and given analgesia with IV Zofran and Dilaudid and Versed with the proper precautions with continuous pulse oximetry and cardiac monitoring. Traction countertraction was applied and the dislocation was reduced easily after which the patient tolerated the procedure well and felt immediate relief    Time: 1:05p  Re-evaluation. Patients symptoms are improving  Repeat physical examination is significantly improved    Counseling: The emergency provider has spoken with the patient and family member patient and sister and discussed todays results, in addition to providing specific details for the plan of care and counseling regarding the diagnosis and prognosis. Questions are answered at this time and they are agreeable with the plan.      --------------------------------- IMPRESSION AND DISPOSITION ---------------------------------    IMPRESSION  1.  Traumatic closed displaced fracture of left shoulder with anterior dislocation, initial encounter Keystone Flap Text: The defect edges were debeveled with a #15 scalpel blade.  Given the location of the defect, shape of the defect a keystone flap was deemed most appropriate.  Using a sterile surgical marker, an appropriate keystone flap was drawn incorporating the defect, outlining the appropriate donor tissue and placing the expected incisions within the relaxed skin tension lines where possible. The area thus outlined was incised deep to adipose tissue with a #15 scalpel blade.  The skin margins were undermined to an appropriate distance in all directions around the primary defect and laterally outward around the flap utilizing iris scissors.

## 2021-02-27 ENCOUNTER — HOSPITAL ENCOUNTER (INPATIENT)
Age: 56
LOS: 5 days | Discharge: HOME | DRG: 641 | End: 2021-03-04
Attending: GENERAL PRACTICE | Admitting: FAMILY MEDICINE
Payer: COMMERCIAL

## 2021-02-27 ENCOUNTER — APPOINTMENT (OUTPATIENT)
Dept: GENERAL RADIOLOGY | Age: 56
End: 2021-02-27
Payer: COMMERCIAL

## 2021-02-27 ENCOUNTER — HOSPITAL ENCOUNTER (EMERGENCY)
Age: 56
Discharge: ANOTHER HOSPITAL | End: 2021-02-27
Attending: EMERGENCY MEDICINE
Payer: COMMERCIAL

## 2021-02-27 VITALS
RESPIRATION RATE: 16 BRPM | HEART RATE: 58 BPM | BODY MASS INDEX: 19.51 KG/M2 | SYSTOLIC BLOOD PRESSURE: 123 MMHG | OXYGEN SATURATION: 96 % | WEIGHT: 106 LBS | HEIGHT: 62 IN | DIASTOLIC BLOOD PRESSURE: 64 MMHG | TEMPERATURE: 97.9 F

## 2021-02-27 DIAGNOSIS — E87.1 HYPONATREMIA: ICD-10-CM

## 2021-02-27 DIAGNOSIS — R42 DIZZINESS: Primary | ICD-10-CM

## 2021-02-27 LAB
ALBUMIN SERPL-MCNC: 4.6 G/DL (ref 3.5–5.2)
ALP BLD-CCNC: 90 U/L (ref 35–104)
ALT SERPL-CCNC: 30 U/L (ref 0–32)
ANION GAP SERPL CALCULATED.3IONS-SCNC: 13 MMOL/L (ref 7–16)
ANION GAP SERPL CALCULATED.3IONS-SCNC: 8 MMOL/L (ref 7–16)
AST SERPL-CCNC: 31 U/L (ref 0–31)
BASOPHILS ABSOLUTE: 0.03 E9/L (ref 0–0.2)
BASOPHILS RELATIVE PERCENT: 0.4 % (ref 0–2)
BILIRUB SERPL-MCNC: 0.3 MG/DL (ref 0–1.2)
BUN BLDV-MCNC: 12 MG/DL (ref 6–20)
BUN BLDV-MCNC: 9 MG/DL (ref 6–20)
CALCIUM SERPL-MCNC: 9 MG/DL (ref 8.6–10.2)
CALCIUM SERPL-MCNC: 9.8 MG/DL (ref 8.6–10.2)
CHLORIDE BLD-SCNC: 80 MMOL/L (ref 98–107)
CHLORIDE BLD-SCNC: 88 MMOL/L (ref 98–107)
CO2: 25 MMOL/L (ref 22–29)
CO2: 26 MMOL/L (ref 22–29)
CREAT SERPL-MCNC: 0.7 MG/DL (ref 0.5–1)
CREAT SERPL-MCNC: 0.9 MG/DL (ref 0.5–1)
EKG ATRIAL RATE: 55 BPM
EKG P AXIS: 70 DEGREES
EKG P-R INTERVAL: 190 MS
EKG Q-T INTERVAL: 444 MS
EKG QRS DURATION: 78 MS
EKG QTC CALCULATION (BAZETT): 424 MS
EKG R AXIS: 59 DEGREES
EKG T AXIS: 52 DEGREES
EKG VENTRICULAR RATE: 55 BPM
EOSINOPHILS ABSOLUTE: 0.07 E9/L (ref 0.05–0.5)
EOSINOPHILS RELATIVE PERCENT: 0.9 % (ref 0–6)
GFR AFRICAN AMERICAN: >60
GFR AFRICAN AMERICAN: >60
GFR NON-AFRICAN AMERICAN: >60 ML/MIN/1.73
GFR NON-AFRICAN AMERICAN: >60 ML/MIN/1.73
GLUCOSE BLD-MCNC: 109 MG/DL (ref 74–99)
GLUCOSE BLD-MCNC: 96 MG/DL (ref 74–99)
HCT VFR BLD CALC: 36.7 % (ref 34–48)
HEMOGLOBIN: 13.3 G/DL (ref 11.5–15.5)
IMMATURE GRANULOCYTES #: 0.05 E9/L
IMMATURE GRANULOCYTES %: 0.6 % (ref 0–5)
LYMPHOCYTES ABSOLUTE: 1.35 E9/L (ref 1.5–4)
LYMPHOCYTES RELATIVE PERCENT: 17.3 % (ref 20–42)
MAGNESIUM: 1.5 MG/DL (ref 1.6–2.6)
MCH RBC QN AUTO: 33.6 PG (ref 26–35)
MCHC RBC AUTO-ENTMCNC: 36.2 % (ref 32–34.5)
MCV RBC AUTO: 92.7 FL (ref 80–99.9)
MONOCYTES ABSOLUTE: 0.53 E9/L (ref 0.1–0.95)
MONOCYTES RELATIVE PERCENT: 6.8 % (ref 2–12)
NEUTROPHILS ABSOLUTE: 5.79 E9/L (ref 1.8–7.3)
NEUTROPHILS RELATIVE PERCENT: 74 % (ref 43–80)
OSMOLALITY: 255 MOSM/KG (ref 285–310)
PDW BLD-RTO: 11.5 FL (ref 11.5–15)
PHOSPHORUS: 4.3 MG/DL (ref 2.5–4.5)
PLATELET # BLD: 469 E9/L (ref 130–450)
PMV BLD AUTO: 8.2 FL (ref 7–12)
POTASSIUM REFLEX MAGNESIUM: 5.3 MMOL/L (ref 3.5–5)
POTASSIUM SERPL-SCNC: 4.6 MMOL/L (ref 3.5–5)
RBC # BLD: 3.96 E12/L (ref 3.5–5.5)
SODIUM BLD-SCNC: 118 MMOL/L (ref 132–146)
SODIUM BLD-SCNC: 122 MMOL/L (ref 132–146)
TOTAL PROTEIN: 7.6 G/DL (ref 6.4–8.3)
TROPONIN: <0.01 NG/ML (ref 0–0.03)
WBC # BLD: 7.8 E9/L (ref 4.5–11.5)

## 2021-02-27 PROCEDURE — 96374 THER/PROPH/DIAG INJ IV PUSH: CPT

## 2021-02-27 PROCEDURE — 85025 COMPLETE CBC W/AUTO DIFF WBC: CPT

## 2021-02-27 PROCEDURE — 83930 ASSAY OF BLOOD OSMOLALITY: CPT

## 2021-02-27 PROCEDURE — 71045 X-RAY EXAM CHEST 1 VIEW: CPT

## 2021-02-27 PROCEDURE — 6360000002 HC RX W HCPCS: Performed by: EMERGENCY MEDICINE

## 2021-02-27 PROCEDURE — 93005 ELECTROCARDIOGRAM TRACING: CPT | Performed by: EMERGENCY MEDICINE

## 2021-02-27 PROCEDURE — 84100 ASSAY OF PHOSPHORUS: CPT

## 2021-02-27 PROCEDURE — 80053 COMPREHEN METABOLIC PANEL: CPT

## 2021-02-27 PROCEDURE — 84484 ASSAY OF TROPONIN QUANT: CPT

## 2021-02-27 PROCEDURE — 6370000000 HC RX 637 (ALT 250 FOR IP): Performed by: EMERGENCY MEDICINE

## 2021-02-27 PROCEDURE — 93010 ELECTROCARDIOGRAM REPORT: CPT | Performed by: INTERNAL MEDICINE

## 2021-02-27 PROCEDURE — 36415 COLL VENOUS BLD VENIPUNCTURE: CPT

## 2021-02-27 PROCEDURE — 2580000003 HC RX 258: Performed by: EMERGENCY MEDICINE

## 2021-02-27 PROCEDURE — 96361 HYDRATE IV INFUSION ADD-ON: CPT

## 2021-02-27 PROCEDURE — 99285 EMERGENCY DEPT VISIT HI MDM: CPT

## 2021-02-27 PROCEDURE — 80048 BASIC METABOLIC PNL TOTAL CA: CPT

## 2021-02-27 PROCEDURE — 1200000000 HC SEMI PRIVATE

## 2021-02-27 PROCEDURE — 83735 ASSAY OF MAGNESIUM: CPT

## 2021-02-27 RX ORDER — SODIUM CHLORIDE 1000 MG
1 TABLET, SOLUBLE MISCELLANEOUS
Status: DISCONTINUED | OUTPATIENT
Start: 2021-02-28 | End: 2021-02-27

## 2021-02-27 RX ORDER — AZILSARTAN KAMEDOXOMIL 40 MG/1
40 TABLET ORAL NIGHTLY
Status: ON HOLD | COMMUNITY
End: 2021-03-04 | Stop reason: HOSPADM

## 2021-02-27 RX ORDER — ONDANSETRON 2 MG/ML
4 INJECTION INTRAMUSCULAR; INTRAVENOUS EVERY 6 HOURS PRN
Status: DISCONTINUED | OUTPATIENT
Start: 2021-02-27 | End: 2021-03-04 | Stop reason: HOSPADM

## 2021-02-27 RX ORDER — SODIUM CHLORIDE 1000 MG
1 TABLET, SOLUBLE MISCELLANEOUS 2 TIMES DAILY
Status: DISCONTINUED | OUTPATIENT
Start: 2021-02-28 | End: 2021-03-01

## 2021-02-27 RX ORDER — ACETAMINOPHEN 650 MG/1
650 SUPPOSITORY RECTAL EVERY 6 HOURS PRN
Status: DISCONTINUED | OUTPATIENT
Start: 2021-02-27 | End: 2021-03-04 | Stop reason: HOSPADM

## 2021-02-27 RX ORDER — ONDANSETRON 2 MG/ML
4 INJECTION INTRAMUSCULAR; INTRAVENOUS ONCE
Status: COMPLETED | OUTPATIENT
Start: 2021-02-27 | End: 2021-02-27

## 2021-02-27 RX ORDER — POLYETHYLENE GLYCOL 3350 17 G/17G
17 POWDER, FOR SOLUTION ORAL DAILY PRN
Status: DISCONTINUED | OUTPATIENT
Start: 2021-02-27 | End: 2021-03-04 | Stop reason: HOSPADM

## 2021-02-27 RX ORDER — 0.9 % SODIUM CHLORIDE 0.9 %
1000 INTRAVENOUS SOLUTION INTRAVENOUS ONCE
Status: COMPLETED | OUTPATIENT
Start: 2021-02-27 | End: 2021-02-27

## 2021-02-27 RX ORDER — MECLIZINE HCL 12.5 MG/1
25 TABLET ORAL ONCE
Status: COMPLETED | OUTPATIENT
Start: 2021-02-27 | End: 2021-02-27

## 2021-02-27 RX ORDER — PROMETHAZINE HYDROCHLORIDE 25 MG/1
12.5 TABLET ORAL EVERY 6 HOURS PRN
Status: DISCONTINUED | OUTPATIENT
Start: 2021-02-27 | End: 2021-03-04 | Stop reason: HOSPADM

## 2021-02-27 RX ORDER — ACETAMINOPHEN 325 MG/1
650 TABLET ORAL EVERY 6 HOURS PRN
Status: DISCONTINUED | OUTPATIENT
Start: 2021-02-27 | End: 2021-03-04 | Stop reason: HOSPADM

## 2021-02-27 RX ADMIN — ONDANSETRON 4 MG: 2 INJECTION INTRAMUSCULAR; INTRAVENOUS at 13:10

## 2021-02-27 RX ADMIN — MECLIZINE 25 MG: 12.5 TABLET ORAL at 13:10

## 2021-02-27 RX ADMIN — SODIUM CHLORIDE 1000 ML: 9 INJECTION, SOLUTION INTRAVENOUS at 13:08

## 2021-02-27 NOTE — PROGRESS NOTES
Call placed to Dr. Gerard Shah for admission orders.     Electronically signed by Xander Posey RN on 2/27/2021 at 5:20 PM

## 2021-02-27 NOTE — ED PROVIDER NOTES
HPI:  2/27/21,   Time: 1:07 PM ERICK Gordon is a 50 y.o. female presenting to the ED for dizziness/nausea, beginning 1 week ago. The complaint has been intermittent, mild in severity, and worsened by changing position. Better when lays down/still, goes away when still. Describes dizziness as lightheaded feeling. Hx same, states low na and k+ cause. No emesis, mild diarrhea. No fever/chill/sweats/cough/congestion/cp/sob/abd pain    Review of Systems:   Pertinent positives and negatives are stated within HPI, all other systems reviewed and are negative.          --------------------------------------------- PAST HISTORY ---------------------------------------------  Past Medical History:  has a past medical history of Acid reflux, Fracture of left shoulder, Frozen shoulder, Hypertension, Hyponatremia, and MVP (mitral valve prolapse). Past Surgical History:  has a past surgical history that includes Foot surgery (Right, 1980's); open treatment prox humeral fracture (Left, 10/23/2018); CLOSED MANIPULATION SHOULDER (Left, 3/5/2019); and Skin cancer excision (2018). Social History:  reports that she has been smoking cigarettes. She has a 30.00 pack-year smoking history. She has never used smokeless tobacco. She reports current alcohol use. She reports that she does not use drugs. Family History: family history is not on file. The patients home medications have been reviewed. Allergies: Patient has no known allergies.         ---------------------------------------------------PHYSICAL EXAM--------------------------------------    Constitutional/General: Alert and oriented x3, well appearing, non toxic in NAD  Head: Normocephalic and atraumatic  Eyes: PERRL, EOMI, conjunctive normal, sclera non icteric  Mouth: Oropharynx clear, handling secretions, no trismus, no asymmetry of the posterior oropharynx or uvular edema  Neck: Supple, full ROM, non tender to palpation in the midline, no stridor, no crepitus, no meningeal signs  Respiratory: Lungs clear to auscultation bilaterally, no wheezes, rales, or rhonchi. Not in respiratory distress  Cardiovascular:  Regular rate. Regular rhythm. No murmurs, gallops, or rubs. 2+ distal pulses  Chest: No chest wall tenderness  GI:  Abdomen Soft, Non tender, Non distended. +BS. No organomegaly, no palpable masses,  No rebound, guarding, or rigidity. Musculoskeletal: Moves all extremities x 4. Warm and well perfused, no clubbing, cyanosis, or edema. Capillary refill <3 seconds  Integument: skin warm and dry. No rashes. Lymphatic: no lymphadenopathy noted  Neurologic: GCS 15, no focal deficits, symmetric strength 5/5 in the upper and lower extremities bilaterally, nml sensory, cn2-12 intact  Psychiatric: Normal Affect    -------------------------------------------------- RESULTS -------------------------------------------------  I have personally reviewed all laboratory and imaging results for this patient. Results are listed below.      LABS:  Results for orders placed or performed during the hospital encounter of 02/27/21   CBC Auto Differential   Result Value Ref Range    WBC 7.8 4.5 - 11.5 E9/L    RBC 3.96 3.50 - 5.50 E12/L    Hemoglobin 13.3 11.5 - 15.5 g/dL    Hematocrit 36.7 34.0 - 48.0 %    MCV 92.7 80.0 - 99.9 fL    MCH 33.6 26.0 - 35.0 pg    MCHC 36.2 (H) 32.0 - 34.5 %    RDW 11.5 11.5 - 15.0 fL    Platelets 352 (H) 532 - 450 E9/L    MPV 8.2 7.0 - 12.0 fL    Neutrophils % 74.0 43.0 - 80.0 %    Immature Granulocytes % 0.6 0.0 - 5.0 %    Lymphocytes % 17.3 (L) 20.0 - 42.0 %    Monocytes % 6.8 2.0 - 12.0 %    Eosinophils % 0.9 0.0 - 6.0 %    Basophils % 0.4 0.0 - 2.0 %    Neutrophils Absolute 5.79 1.80 - 7.30 E9/L    Immature Granulocytes # 0.05 E9/L    Lymphocytes Absolute 1.35 (L) 1.50 - 4.00 E9/L    Monocytes Absolute 0.53 0.10 - 0.95 E9/L    Eosinophils Absolute 0.07 0.05 - 0.50 E9/L    Basophils Absolute 0.03 0.00 - 0.20 E9/L   Comprehensive Metabolic Panel w/ Reflex to MG   Result Value Ref Range    Sodium 118 (LL) 132 - 146 mmol/L    Potassium reflex Magnesium 5.3 (H) 3.5 - 5.0 mmol/L    Chloride 80 (L) 98 - 107 mmol/L    CO2 25 22 - 29 mmol/L    Anion Gap 13 7 - 16 mmol/L    Glucose 109 (H) 74 - 99 mg/dL    BUN 12 6 - 20 mg/dL    CREATININE 0.9 0.5 - 1.0 mg/dL    GFR Non-African American >60 >=60 mL/min/1.73    GFR African American >60     Calcium 9.8 8.6 - 10.2 mg/dL    Total Protein 7.6 6.4 - 8.3 g/dL    Albumin 4.6 3.5 - 5.2 g/dL    Total Bilirubin 0.3 0.0 - 1.2 mg/dL    Alkaline Phosphatase 90 35 - 104 U/L    ALT 30 0 - 32 U/L    AST 31 0 - 31 U/L   Troponin   Result Value Ref Range    Troponin <0.01 0.00 - 0.03 ng/mL   EKG 12 Lead   Result Value Ref Range    Ventricular Rate 55 BPM    Atrial Rate 55 BPM    P-R Interval 190 ms    QRS Duration 78 ms    Q-T Interval 444 ms    QTc Calculation (Bazett) 424 ms    P Axis 70 degrees    R Axis 59 degrees    T Axis 52 degrees       RADIOLOGY:  Interpreted by Radiologist.  XR CHEST PORTABLE   Final Result   Opacities in left hilar location and left lung base could indicate pneumonia   or atelectasis. Short-term follow-up could be helpful for further evaluation. EKG: This EKG is signed and interpreted by the EP. Time:   Rate:   Rhythm:   Interpretation:   Comparison:       ------------------------- NURSING NOTES AND VITALS REVIEWED ---------------------------   The nursing notes within the ED encounter and vital signs as below have been reviewed by myself. /64   Pulse 58   Temp 97.9 °F (36.6 °C) (Oral)   Resp 16   Ht 5' 2\" (1.575 m)   Wt 106 lb (48.1 kg)   LMP 05/20/2012   SpO2 96%   BMI 19.39 kg/m²   Oxygen Saturation Interpretation: Normal    The patients available past medical records and past encounters were reviewed.         ------------------------------ ED COURSE/MEDICAL DECISION MAKING----------------------  Medications   0.9 % sodium chloride bolus (0 mLs Intravenous Stopped 2/27/21 1340)   ondansetron (ZOFRAN) injection 4 mg (4 mg Intravenous Given 2/27/21 1310)   meclizine (ANTIVERT) tablet 25 mg (25 mg Oral Given 2/27/21 1310)         ED COURSE:       Medical Decision Making:    Na noted, admit for further care, transfer to [place], pt agreeable with poc. Of not, iv bolus given before na resulted, after fluid restricted      This patient's ED course included: a personal history and physicial examination    This patient has remained hemodynamically stable during their ED course. Re-Evaluations:             Re-evaluation. Patients symptoms show no change    Re-examination  2/27/21   1:07 PM EST          Vital Signs:   Vitals:    02/27/21 1247 02/27/21 1254 02/27/21 1459   BP: 112/69  123/64   Pulse: 56  58   Resp: 16  16   Temp: 97.8 °F (36.6 °C)  97.9 °F (36.6 °C)   TempSrc: Infrared  Oral   SpO2: 97%  96%   Weight:  106 lb (48.1 kg)    Height:  5' 2\" (1.575 m)      Card/Pulm:  Rhythm: normal rate. Heart Sounds: no murmurs, gallops, or rubs. clear to auscultation, no wheezes or rales and unlabored breathing. Capillary Refill: normal.  Radial Pulse:  equal.  Skin:  Warm. Consultations:             Dr Jaxson Pineda    Critical Care:         Counseling: The emergency provider has spoken with the patient and discussed todays results, in addition to providing specific details for the plan of care and counseling regarding the diagnosis and prognosis. Questions are answered at this time and they are agreeable with the plan.       --------------------------------- IMPRESSION AND DISPOSITION ---------------------------------    IMPRESSION  1. Dizziness    2. Hyponatremia        DISPOSITION  Disposition: Transfer to [facility] to [facility]  Patient condition is stable    NOTE: This report was transcribed using voice recognition software.  Every effort was made to ensure accuracy; however, inadvertent computerized transcription errors may be present        Juan Day MD  02/28/21 5398

## 2021-02-27 NOTE — PROGRESS NOTES
Pt. States she had a CT of her head a few years ago that picked up [employer]". She said she gets left sided headaches often. Her mom had a hx of MS and she is nervous that she might have it as well.     Electronically signed by Jackie Grier RN on 2/27/2021 at 5:28 PM

## 2021-02-27 NOTE — ED NOTES
Phone call to [facility] to place patient in will call for transportation to [facility] when bed available. Spoke to [place].      Adriano Bullard RN  02/27/21 1452

## 2021-02-27 NOTE — ED NOTES
Nurse to nurse report given to Jenny Peraza on 5W at [facility].      Leny Flores, RN  02/27/21 2659

## 2021-02-28 ENCOUNTER — APPOINTMENT (OUTPATIENT)
Dept: CT IMAGING | Age: 56
DRG: 641 | End: 2021-02-28
Attending: GENERAL PRACTICE
Payer: COMMERCIAL

## 2021-02-28 LAB
ALBUMIN SERPL-MCNC: 3.7 G/DL (ref 3.5–5.2)
ALP BLD-CCNC: 79 U/L (ref 35–104)
ALT SERPL-CCNC: 23 U/L (ref 0–32)
ANION GAP SERPL CALCULATED.3IONS-SCNC: 11 MMOL/L (ref 7–16)
ANION GAP SERPL CALCULATED.3IONS-SCNC: 8 MMOL/L (ref 7–16)
ANION GAP SERPL CALCULATED.3IONS-SCNC: 9 MMOL/L (ref 7–16)
AST SERPL-CCNC: 26 U/L (ref 0–31)
BASOPHILS ABSOLUTE: 0.03 E9/L (ref 0–0.2)
BASOPHILS RELATIVE PERCENT: 0.6 % (ref 0–2)
BILIRUB SERPL-MCNC: <0.2 MG/DL (ref 0–1.2)
BUN BLDV-MCNC: 10 MG/DL (ref 6–20)
BUN BLDV-MCNC: 7 MG/DL (ref 6–20)
BUN BLDV-MCNC: 8 MG/DL (ref 6–20)
C-REACTIVE PROTEIN: 0.6 MG/DL (ref 0–0.4)
CALCIUM SERPL-MCNC: 8.7 MG/DL (ref 8.6–10.2)
CALCIUM SERPL-MCNC: 9.1 MG/DL (ref 8.6–10.2)
CALCIUM SERPL-MCNC: 9.2 MG/DL (ref 8.6–10.2)
CHLORIDE BLD-SCNC: 83 MMOL/L (ref 98–107)
CHLORIDE BLD-SCNC: 85 MMOL/L (ref 98–107)
CHLORIDE BLD-SCNC: 89 MMOL/L (ref 98–107)
CO2: 25 MMOL/L (ref 22–29)
CO2: 27 MMOL/L (ref 22–29)
CO2: 28 MMOL/L (ref 22–29)
CREAT SERPL-MCNC: 0.7 MG/DL (ref 0.5–1)
CREAT SERPL-MCNC: 0.7 MG/DL (ref 0.5–1)
CREAT SERPL-MCNC: 0.8 MG/DL (ref 0.5–1)
EOSINOPHILS ABSOLUTE: 0.12 E9/L (ref 0.05–0.5)
EOSINOPHILS RELATIVE PERCENT: 2.4 % (ref 0–6)
GFR AFRICAN AMERICAN: >60
GFR NON-AFRICAN AMERICAN: >60 ML/MIN/1.73
GLUCOSE BLD-MCNC: 66 MG/DL (ref 74–99)
GLUCOSE BLD-MCNC: 90 MG/DL (ref 74–99)
GLUCOSE BLD-MCNC: 97 MG/DL (ref 74–99)
HCT VFR BLD CALC: 35.4 % (ref 34–48)
HEMOGLOBIN: 12.2 G/DL (ref 11.5–15.5)
IMMATURE GRANULOCYTES #: 0.03 E9/L
IMMATURE GRANULOCYTES %: 0.6 % (ref 0–5)
LYMPHOCYTES ABSOLUTE: 1.52 E9/L (ref 1.5–4)
LYMPHOCYTES RELATIVE PERCENT: 30 % (ref 20–42)
MAGNESIUM: 1.4 MG/DL (ref 1.6–2.6)
MCH RBC QN AUTO: 32.3 PG (ref 26–35)
MCHC RBC AUTO-ENTMCNC: 34.5 % (ref 32–34.5)
MCV RBC AUTO: 93.7 FL (ref 80–99.9)
MONOCYTES ABSOLUTE: 0.44 E9/L (ref 0.1–0.95)
MONOCYTES RELATIVE PERCENT: 8.7 % (ref 2–12)
NEUTROPHILS ABSOLUTE: 2.92 E9/L (ref 1.8–7.3)
NEUTROPHILS RELATIVE PERCENT: 57.7 % (ref 43–80)
OSMOLALITY URINE: 157 MOSM/KG (ref 300–900)
OSMOLALITY URINE: 280 MOSM/KG (ref 300–900)
OSMOLALITY: 260 MOSM/KG (ref 285–310)
PDW BLD-RTO: 11.8 FL (ref 11.5–15)
PLATELET # BLD: 426 E9/L (ref 130–450)
PMV BLD AUTO: 8.3 FL (ref 7–12)
POTASSIUM REFLEX MAGNESIUM: 4.8 MMOL/L (ref 3.5–5)
POTASSIUM SERPL-SCNC: 4.1 MMOL/L (ref 3.5–5)
POTASSIUM SERPL-SCNC: 4.4 MMOL/L (ref 3.5–5)
PROCALCITONIN: 0.07 NG/ML (ref 0–0.08)
RBC # BLD: 3.78 E12/L (ref 3.5–5.5)
SEDIMENTATION RATE, ERYTHROCYTE: 25 MM/HR (ref 0–20)
SODIUM BLD-SCNC: 117 MMOL/L (ref 132–146)
SODIUM BLD-SCNC: 124 MMOL/L (ref 132–146)
SODIUM BLD-SCNC: 124 MMOL/L (ref 132–146)
SODIUM URINE: 48 MMOL/L
SODIUM URINE: 68 MMOL/L
TOTAL PROTEIN: 6.1 G/DL (ref 6.4–8.3)
TSH SERPL DL<=0.05 MIU/L-ACNC: 0.97 UIU/ML (ref 0.27–4.2)
URIC ACID, SERUM: 2.8 MG/DL (ref 2.4–5.7)
WBC # BLD: 5.1 E9/L (ref 4.5–11.5)

## 2021-02-28 PROCEDURE — 83930 ASSAY OF BLOOD OSMOLALITY: CPT

## 2021-02-28 PROCEDURE — 84300 ASSAY OF URINE SODIUM: CPT

## 2021-02-28 PROCEDURE — 85651 RBC SED RATE NONAUTOMATED: CPT

## 2021-02-28 PROCEDURE — 36415 COLL VENOUS BLD VENIPUNCTURE: CPT

## 2021-02-28 PROCEDURE — 85025 COMPLETE CBC W/AUTO DIFF WBC: CPT

## 2021-02-28 PROCEDURE — 84443 ASSAY THYROID STIM HORMONE: CPT

## 2021-02-28 PROCEDURE — 6370000000 HC RX 637 (ALT 250 FOR IP): Performed by: FAMILY MEDICINE

## 2021-02-28 PROCEDURE — 71270 CT THORAX DX C-/C+: CPT

## 2021-02-28 PROCEDURE — 83735 ASSAY OF MAGNESIUM: CPT

## 2021-02-28 PROCEDURE — 83935 ASSAY OF URINE OSMOLALITY: CPT

## 2021-02-28 PROCEDURE — 80048 BASIC METABOLIC PNL TOTAL CA: CPT

## 2021-02-28 PROCEDURE — 84145 PROCALCITONIN (PCT): CPT

## 2021-02-28 PROCEDURE — 2580000003 HC RX 258: Performed by: INTERNAL MEDICINE

## 2021-02-28 PROCEDURE — 1200000000 HC SEMI PRIVATE

## 2021-02-28 PROCEDURE — 6360000004 HC RX CONTRAST MEDICATION: Performed by: RADIOLOGY

## 2021-02-28 PROCEDURE — 86140 C-REACTIVE PROTEIN: CPT

## 2021-02-28 PROCEDURE — 80053 COMPREHEN METABOLIC PANEL: CPT

## 2021-02-28 PROCEDURE — 84550 ASSAY OF BLOOD/URIC ACID: CPT

## 2021-02-28 RX ORDER — SODIUM CHLORIDE 9 MG/ML
INJECTION, SOLUTION INTRAVENOUS CONTINUOUS
Status: ACTIVE | OUTPATIENT
Start: 2021-02-28 | End: 2021-02-28

## 2021-02-28 RX ADMIN — ACETAMINOPHEN 650 MG: 325 TABLET ORAL at 19:13

## 2021-02-28 RX ADMIN — Medication 1 G: at 08:23

## 2021-02-28 RX ADMIN — Medication 1 G: at 00:26

## 2021-02-28 RX ADMIN — Medication 1 G: at 20:11

## 2021-02-28 RX ADMIN — IOPAMIDOL 75 ML: 755 INJECTION, SOLUTION INTRAVENOUS at 12:08

## 2021-02-28 RX ADMIN — SODIUM CHLORIDE: 9 INJECTION, SOLUTION INTRAVENOUS at 15:19

## 2021-02-28 ASSESSMENT — PAIN DESCRIPTION - DESCRIPTORS: DESCRIPTORS: ACHING

## 2021-02-28 ASSESSMENT — PAIN - FUNCTIONAL ASSESSMENT: PAIN_FUNCTIONAL_ASSESSMENT: ACTIVITIES ARE NOT PREVENTED

## 2021-02-28 ASSESSMENT — PAIN DESCRIPTION - ORIENTATION: ORIENTATION: ANTERIOR

## 2021-02-28 ASSESSMENT — PAIN DESCRIPTION - ONSET
ONSET: ON-GOING
ONSET: ON-GOING

## 2021-02-28 ASSESSMENT — PAIN DESCRIPTION - PROGRESSION
CLINICAL_PROGRESSION: GRADUALLY WORSENING
CLINICAL_PROGRESSION: NOT CHANGED

## 2021-02-28 ASSESSMENT — PAIN DESCRIPTION - LOCATION: LOCATION: HEAD

## 2021-02-28 ASSESSMENT — PAIN SCALES - GENERAL: PAINLEVEL_OUTOF10: 1

## 2021-02-28 ASSESSMENT — PAIN DESCRIPTION - FREQUENCY: FREQUENCY: CONTINUOUS

## 2021-02-28 NOTE — CONSULTS
Patient seen and examined. Consult dictated. Patient is a 65-year-old lady who has underlying history of chronic hyponatremia. She has had initial work-up for hyponatremia during her hospitalization in 2018. Subsequently patient saw Dr. Karon Jackson 1 time for follow-up but did not follow-up after that. Patient states that she has had work-up at [place] for her hyponatremia and the work-up was inconclusive. Patient now presents with dizziness and nausea. Her blood pressures were on the low side. She states that she has had poor oral intake upon presentation. Patient serum sodium have improved. The clinical picture suggestive of an SIADH-like picture. Will repeat urine studies. Continue salt supplements for now. Check TSH and cortisol level. Patient had a CT scan of the chest with IV contrast.  Patient's blood pressures have been on the low side. Without waiting for the repeat serum sodium levels we will start the patient on gentle hydration. Dr. So Garner, Thank You for allowing me to participate in the care of this patient. Will follow the patient with you.     William Lopez MD  Nephrology    Electronically signed by Donna Chamberlain MD on 2/28/2021 at 2:49 PM

## 2021-02-28 NOTE — PROGRESS NOTES
Dr. Latanya Whittington notified per patient request for home meds. Dr. Latanya Whittington will continue to hold BPs meds/Cardizem while patient hypotensive. See new orders. Electronically signed by Karthik Delgado RN on 2/27/2021 at 11:45 PM

## 2021-02-28 NOTE — PROGRESS NOTES
Message sent to Dr. Neftaly Cisse - covering for Dr. Ivan Carranza.     Electronically signed by Reji Cullen RN on 2/27/2021 at 7:27 PM

## 2021-02-28 NOTE — PROGRESS NOTES
Message sent to Dr. Karlene Martienz regarding urine Na and Urine osmolality. See orders.     Electronically signed by Xander Posey RN on 2/28/2021 at 4:46 PM

## 2021-02-28 NOTE — PROGRESS NOTES
Notified Dr. Omar Aleman of bloodwork results and still waiting for urine results.     Electronically signed by Margaret Watts RN on 2/28/2021 at 3:44 PM

## 2021-02-28 NOTE — PROGRESS NOTES
New consult sent to Dr. Silvestre Romero.     Electronically signed by Xander Posey RN on 2/28/2021 at 5:45 PM

## 2021-02-28 NOTE — PROGRESS NOTES
Pt to be NPO for CT Chest at 1230. Pt. Educated and aware.     Electronically signed by Delio Santacruz RN on 2/28/2021 at 8:27 AM

## 2021-02-28 NOTE — PLAN OF CARE
Problem: Pain:  Goal: Pain level will decrease  Description: Pain level will decrease  Outcome: Met This Shift  Goal: Control of acute pain  Description: Control of acute pain  Outcome: Met This Shift  Goal: Control of chronic pain  Description: Control of chronic pain  Outcome: Met This Shift     Problem: Falls - Risk of:  Goal: Will remain free from falls  Description: Will remain free from falls  Outcome: Met This Shift  Goal: Absence of physical injury  Description: Absence of physical injury  Outcome: Met This Shift     Problem: Discharge Planning:  Goal: Discharged to appropriate level of care  Description: Discharged to appropriate level of care  Outcome: Not Met This Shift

## 2021-02-28 NOTE — H&P
CHEST:  Clear to auscultation. HEART:  Regular rate and rhythm without murmur, gallop, or rub. ABDOMEN:  Soft, nontender. EXTREMITIES:  Reveal no cyanosis, clubbing, or edema. NEUROLOGIC:  Once again, neurologically, there are no focal deficits. LABORATORY DATA:  This morning, her sodium is 124. Renal function is  normal.  White count is normal at this time. Chest x-ray shows  opacities of the left hilar location of the left lung base, which could  indicate pneumonia or atelectasis. DIAGNOSTIC IMPRESSION:  Hyponatremia, etiology? Does have an abnormal  chest x-ray and a longstanding history of tobacco use. PLAN:  Nephrology has been consulted for management of her hyponatremia. We did hold her blood pressure medication since her pressure is still on  the low side. We will obtain a CAT scan of the chest also. Discharge  plan home when stable.         Inga Stack MD    D: 02/28/2021 7:05:58       T: 02/28/2021 7:08:04     [email or web address]  Job#: [id number]     Doc#: [id number]    CC:

## 2021-03-01 LAB
ANION GAP SERPL CALCULATED.3IONS-SCNC: 10 MMOL/L (ref 7–16)
ANION GAP SERPL CALCULATED.3IONS-SCNC: 9 MMOL/L (ref 7–16)
ANION GAP SERPL CALCULATED.3IONS-SCNC: 9 MMOL/L (ref 7–16)
BUN BLDV-MCNC: 5 MG/DL (ref 6–20)
BUN BLDV-MCNC: 7 MG/DL (ref 6–20)
BUN BLDV-MCNC: 7 MG/DL (ref 6–20)
CALCIUM SERPL-MCNC: 9.2 MG/DL (ref 8.6–10.2)
CALCIUM SERPL-MCNC: 9.6 MG/DL (ref 8.6–10.2)
CALCIUM SERPL-MCNC: 9.9 MG/DL (ref 8.6–10.2)
CHLORIDE BLD-SCNC: 83 MMOL/L (ref 98–107)
CHLORIDE BLD-SCNC: 84 MMOL/L (ref 98–107)
CHLORIDE BLD-SCNC: 85 MMOL/L (ref 98–107)
CO2: 26 MMOL/L (ref 22–29)
CO2: 27 MMOL/L (ref 22–29)
CO2: 29 MMOL/L (ref 22–29)
CORTISOL TOTAL: 17.87 MCG/DL (ref 2.68–18.4)
CREAT SERPL-MCNC: 0.6 MG/DL (ref 0.5–1)
CREAT SERPL-MCNC: 0.7 MG/DL (ref 0.5–1)
CREAT SERPL-MCNC: 1 MG/DL (ref 0.5–1)
GFR AFRICAN AMERICAN: >60
GFR NON-AFRICAN AMERICAN: 58 ML/MIN/1.73
GFR NON-AFRICAN AMERICAN: >60 ML/MIN/1.73
GFR NON-AFRICAN AMERICAN: >60 ML/MIN/1.73
GLUCOSE BLD-MCNC: 86 MG/DL (ref 74–99)
GLUCOSE BLD-MCNC: 87 MG/DL (ref 74–99)
GLUCOSE BLD-MCNC: 96 MG/DL (ref 74–99)
OSMOLALITY URINE: 202 MOSM/KG (ref 300–900)
POTASSIUM SERPL-SCNC: 4.2 MMOL/L (ref 3.5–5)
POTASSIUM SERPL-SCNC: 5.4 MMOL/L (ref 3.5–5)
POTASSIUM SERPL-SCNC: 5.7 MMOL/L (ref 3.5–5)
SODIUM BLD-SCNC: 119 MMOL/L (ref 132–146)
SODIUM BLD-SCNC: 121 MMOL/L (ref 132–146)
SODIUM BLD-SCNC: 122 MMOL/L (ref 132–146)
SODIUM URINE: 62 MMOL/L

## 2021-03-01 PROCEDURE — 6370000000 HC RX 637 (ALT 250 FOR IP): Performed by: INTERNAL MEDICINE

## 2021-03-01 PROCEDURE — 6370000000 HC RX 637 (ALT 250 FOR IP): Performed by: FAMILY MEDICINE

## 2021-03-01 PROCEDURE — 83935 ASSAY OF URINE OSMOLALITY: CPT

## 2021-03-01 PROCEDURE — 36415 COLL VENOUS BLD VENIPUNCTURE: CPT

## 2021-03-01 PROCEDURE — 80048 BASIC METABOLIC PNL TOTAL CA: CPT

## 2021-03-01 PROCEDURE — 1200000000 HC SEMI PRIVATE

## 2021-03-01 PROCEDURE — 84300 ASSAY OF URINE SODIUM: CPT

## 2021-03-01 PROCEDURE — 82533 TOTAL CORTISOL: CPT

## 2021-03-01 RX ORDER — CARVEDILOL 6.25 MG/1
12.5 TABLET ORAL 2 TIMES DAILY
Status: DISCONTINUED | OUTPATIENT
Start: 2021-03-01 | End: 2021-03-04 | Stop reason: HOSPADM

## 2021-03-01 RX ORDER — LOSARTAN POTASSIUM 50 MG/1
100 TABLET ORAL NIGHTLY
Status: DISCONTINUED | OUTPATIENT
Start: 2021-03-01 | End: 2021-03-04 | Stop reason: HOSPADM

## 2021-03-01 RX ORDER — SODIUM CHLORIDE 1000 MG
1 TABLET, SOLUBLE MISCELLANEOUS 3 TIMES DAILY
Status: DISCONTINUED | OUTPATIENT
Start: 2021-03-01 | End: 2021-03-04 | Stop reason: HOSPADM

## 2021-03-01 RX ORDER — SODIUM CHLORIDE 1000 MG
1 TABLET, SOLUBLE MISCELLANEOUS ONCE
Status: COMPLETED | OUTPATIENT
Start: 2021-03-01 | End: 2021-03-01

## 2021-03-01 RX ORDER — DILTIAZEM HYDROCHLORIDE 120 MG/1
120 CAPSULE, COATED, EXTENDED RELEASE ORAL EVERY 12 HOURS SCHEDULED
Status: DISCONTINUED | OUTPATIENT
Start: 2021-03-01 | End: 2021-03-04 | Stop reason: HOSPADM

## 2021-03-01 RX ADMIN — CARVEDILOL 12.5 MG: 6.25 TABLET, FILM COATED ORAL at 21:20

## 2021-03-01 RX ADMIN — CARVEDILOL 12.5 MG: 6.25 TABLET, FILM COATED ORAL at 10:53

## 2021-03-01 RX ADMIN — DILTIAZEM HYDROCHLORIDE 120 MG: 120 CAPSULE, COATED, EXTENDED RELEASE ORAL at 10:53

## 2021-03-01 RX ADMIN — ACETAMINOPHEN 650 MG: 325 TABLET ORAL at 08:18

## 2021-03-01 RX ADMIN — Medication 1 G: at 08:18

## 2021-03-01 RX ADMIN — Medication 1 G: at 21:20

## 2021-03-01 RX ADMIN — Medication 1 G: at 23:20

## 2021-03-01 RX ADMIN — ACETAMINOPHEN 650 MG: 325 TABLET ORAL at 19:25

## 2021-03-01 RX ADMIN — Medication 1 G: at 14:05

## 2021-03-01 RX ADMIN — LOSARTAN POTASSIUM 100 MG: 50 TABLET, FILM COATED ORAL at 21:20

## 2021-03-01 RX ADMIN — DILTIAZEM HYDROCHLORIDE 120 MG: 120 CAPSULE, COATED, EXTENDED RELEASE ORAL at 21:20

## 2021-03-01 RX ADMIN — Medication 1 G: at 02:31

## 2021-03-01 ASSESSMENT — PAIN SCALES - GENERAL
PAINLEVEL_OUTOF10: 3
PAINLEVEL_OUTOF10: 3
PAINLEVEL_OUTOF10: 0

## 2021-03-01 NOTE — CONSULTS
[id number] [address]                                  CONSULTATION    PATIENT NAME: Ariane Hamlin                     :        1965  MED REC NO:   [id number]                            ROOM:       1157  ACCOUNT NO:   [de-identified]                           ADMIT DATE: 2021  PROVIDER:     Amandeep Vitale MD    CONSULT DATE:  2021    AGE:  42-year-old. ADMITTING PROVIDER:  Neela Short DO    REASON FOR CONSULTATION:  Hyponatremia. HISTORY OF PRESENT ILLNESS:  The patient is being seen in consultation  at the request of Dr. Lelia Gerber. The patient is a 42-year-old   lady who has underlying history of chronic hyponatremia and in fact, has  been seen by Dr. Paul Su in her practice in the past.  The last  time she saw Dr. Meghan Johnson was in 2018 and subsequently, the patient  chose not to follow up. The patient was initially seen in consultation  for hyponatremia in 2018. At that time, the hyponatremia was treated  with oral salt tablets. The patient states that she has been taking  salt tablets intermittently. The patient now presented to the emergency  room at [address] [place] [address] for dizziness and nausea  starting a week prior to presentation. The patient had poor oral intake  over this period of time. She denied any associated nausea, vomiting,  or diarrhea. The patient does have a prior history of significant  alcohol use in the form of beer, but she states that she has cut down  her beer intake significantly. Upon presentation, the patient was found  to have a serum sodium of 118 mmol/L and it has gone up to 124 mmol/L  this morning. The patient has not been hydrated with any IV fluids. The patient's serum potassium was also somewhat high upon presentation,  which was 5.3 mmol/L and is 4.8 _____ this morning.   It is of note that  the patient states that she has had workup for her hyponatremia at  [facility] as well and the workup was essentially unremarkable. PAST MEDICAL HISTORY:  Significant for history of chronic hyponatremia,  mitral valve prolapse, benign essential hypertension, osteoarthritis,  gastroesophageal reflux disease. PAST SURGICAL HISTORY:  Significant for surgery on her foot. She had an  open reduction and internal fixation of the proximal humeral fracture on  the left side. The patient had excision of a malignant skin lesion. The patient has had left shoulder manipulation for a frozen shoulder. ALLERGIES:  The patient has no known drug allergies. MEDICATIONS:  Prior to admission include azilsartan medoxomil (Edarbi)  40 mg daily, potassium chloride 20 mEq daily, diltiazem extended release  120 mg twice a day, Bystolic 10 mg daily, sodium chloride 1 gm twice a  day, Catapres 0.2 mg twice a day, and losartan 100 mg at bedtime. The  patient is not sure if she was taking losartan. CURRENT MEDICATIONS:  The patient is on sodium chloride 1 gm twice a  day, Lovenox 40 mg subcutaneously daily, Glycolax 17 gm p.r.n.,  Phenergan p.r.n., Tylenol p.r.n., and Zofran p.r.n. SOCIAL HISTORY:  The patient has a 35-pack-year history of smoking. She  states that she smokes about three-fourths of a pack of cigarettes a  day. She states that she did drink significant amounts of beer in the  past, but now drinks alcohol only on social occasions. FAMILY HISTORY:  Significant for the demise of her mother at the age of  66 due to carcinoma of the pancreas. Her mother had ischemic heart  disease and hypertension. Her mother also had a cerebrovascular  accident. The patient's father  at the age of 80 and had a cardiac  arrest.  Her father had ischemic heart disease and carcinoma of the  colon as well as cerebrovascular accident. REVIEW OF SYSTEMS:  Negative for any fever or chills. No cough or  wheezing.   She has some shortness of breath with exertion. No  orthopnea. No paroxysmal nocturnal dyspnea. No hemoptysis. No chest  pain or palpitations. She has had poor oral intake with mild diarrhea. No nausea or vomiting. No abdominal pain. Denies any dysuria,   hesitancy, urgency, increased or decreased frequency of micturition. PHYSICAL EXAMINATION:  GENERAL:  The patient is awake and alert, in no acute distress. VITAL SIGNS:  Blood pressure is 93/55, pulse is 68, respiratory rate is  18, temperature 98.9 degrees Fahrenheit. HEENT:  Head is normocephalic and atraumatic. Eyes:  Sclerae are  nonicteric. Pupils are equal and reactive to light and accommodation. Fundus examination is deferred. Mouth and throat:  Dry oral mucosa  without any mucosal ulceration or exudate. NECK:  Supple. No distention. No carotid bruits. No palpable masses. CHEST:  Symmetrical.  LUNGS:  Vesicular breath sounds equal bilaterally. No rales or rhonchi. HEART:  Regular rate and rhythm without any pericardial rub or gallop. ABDOMEN:  Soft and nontender. Normal bowel sounds. No rebound  tenderness. No palpable masses. EXTREMITIES:  The patient has no pedal edema. IMAGING STUDIES:  Chest x-ray upon presentation revealed opacities in  the hilar location in the left lung base. OTHER LAB DATA:  Urine sodium was 48 with urine osmolality of 157. Labs  from this morning:  Sodium 124 mmol/L, potassium 4.8 mmol/L, chloride 89  mmol/L, CO2 of 87 mmol/L, creatinine 0.7 mg/dL, BUN 7 mg/dL, calcium 9.1  mg/dL. IMPRESSION:  1. Hyponatremia. Hyponatremia in this patient of unknown etiology, but  of chronic duration. It is somewhat unclear if the patient was indeed  taking sodium chloride tablets. Nevertheless, the patient's serum  sodium has improved despite not being on any maintenance IVs.  The  patient apparently was given some IV fluids in the ER.   The low serum  creatinine and BUN along with increase in the serum sodium levels  without IV fluids suggest possibility of underlying SIADH. We will  recheck electrolytes. We will also check urine sodium and osmolality  once again. Previous urine osmolality was high. 2.  Hyperkalemia. Hyperkalemia may be a reflection of use of potassium  supplements. Hyperkalemia has improved. 3.  History of benign essential hypertension. The patient's blood  pressures have been on the low side. Keep the patient off any  antihypertensive agents at the present time. PLAN:  Plan is to follow serial electrolytes, repeat urine sodium and  osmolality. Check uric acid levels. Check TSH and plasma cortisol  levels. Rest of plans per orders. Thank you for allowing me to participate in the care of this patient. We will follow the patient with you.         Lo Dyer MD    D: 02/28/2021 14:59:35       T: 02/28/2021 19:47:34     [email or web address]  Job#: [id number]     Doc#: [id number]    CC:

## 2021-03-01 NOTE — PROGRESS NOTES
Dr. Hollis  notified of most recent BMP results via [facility]. See new orders. Electronically signed by Billye Jeans. Bommer, RN on 3/1/2021 at 6:32 AM

## 2021-03-01 NOTE — PATIENT CARE CONFERENCE
[facility] Quality Flow/Interdisciplinary Rounds Progress Note        Quality Flow Rounds held on March 1, 2021    Disciplines Attending:  Bedside Nurse, ,  and Nursing Unit Leadership    Ina Cristobal was admitted on 2/27/2021  4:52 PM    Anticipated Discharge Date:  Expected Discharge Date: 03/01/21    Disposition:    Micky Score:  Micky Scale Score: 21    Readmission Risk              Risk of Unplanned Readmission:        10           Discussed patient goal for the day, patient clinical progression, and barriers to discharge.   The following Goal(s) of the Day/Commitment(s) have been identified:  Labs - Report Results      Marsh Olszewski  March 1, 2021

## 2021-03-01 NOTE — PROGRESS NOTES
Dr. Ramandeep Braden notified of BMP results via [employer] message. Electronically signed by Krishan Delgado RN on 3/1/2021 at 12:50 AM

## 2021-03-01 NOTE — PROGRESS NOTES
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 Patient denies any recent ingestion of alcoholic beverages. Does admit to not eating well and drinking a lot of liquids last few days. Her cortisol level was 17.87, TSH 0.97. Urine osmolarity remains low reflecting recent IV fluids patient received for hypotension    Physical Exam:    Vitals:    03/01/21 0800   BP: (!) 160/90   Pulse: 68   Resp: 18   Temp: 97.7 °F (36.5 °C)   SpO2: 99%       I/O last 24 hours:  Intake/Output 240/1750    Weight: 117    General Appearance:  awake, alert, oriented, in no acute distress  Skin: No rash  Neck:  neck- supple, no mass, non-tender and no bruits  Lungs: Scattered rhonchi  Heart: Regular rhythm no murmur rub     Abdominal: Abdomen soft, non-tender. BS normal. No masses,  No organomegaly  Extremities: Extremities warm to touch, pink, with no edema.   Peripheral Pulses:  +2    DATA:    CBC with Differential:    Lab Results   Component Value Date    WBC 5.1 02/28/2021    RBC 3.78 02/28/2021    HGB 12.2 02/28/2021    HCT 35.4 02/28/2021     02/28/2021    MCV 93.7 02/28/2021    MCH 32.3 02/28/2021    MCHC 34.5 02/28/2021    RDW 11.8 02/28/2021    NRBC 0.9 01/26/2018    SEGSPCT 81 05/23/2012    LYMPHOPCT 30.0 02/28/2021    MONOPCT 8.7 02/28/2021    BASOPCT 0.6 02/28/2021    MONOSABS 0.44 02/28/2021    LYMPHSABS 1.52 02/28/2021    EOSABS 0.12 02/28/2021    BASOSABS 0.03 02/28/2021     BMP:    Lab Results   Component Value Date     03/01/2021    K 5.7 03/01/2021    K 4.8 02/28/2021    CL 84 03/01/2021    CO2 29 03/01/2021    BUN 5 03/01/2021    LABALBU 3.7 02/28/2021    LABALBU 4.6 05/23/2012    CREATININE 0.6 03/01/2021    CALCIUM 9.9 03/01/2021 GFRAA >60 03/01/2021    LABGLOM >60 03/01/2021    GLUCOSE 86 03/01/2021    GLUCOSE 99 05/27/2012          sodium chloride  1 g Oral TID    dilTIAZem  120 mg Oral 2 times per day    losartan  100 mg Oral Nightly    carvedilol  12.5 mg Oral BID    enoxaparin  40 mg Subcutaneous Daily       promethazine **OR** ondansetron, polyethylene glycol, acetaminophen **OR** acetaminophen    IMPRESSION/RECOMMENDATIONS:      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   Some weight gain reflecting IV fluids  Hypertension resume home meds although we will substitute losartan and carvedilol as hospital does not carry [place] and Bystolic  COPD does have some dry cracking cough  Aneurysmal dilation of ascending aorta      Mikayla Mei MD  3/1/2021 1:56 PM

## 2021-03-01 NOTE — CONSULTS
Abigail Godinez M.D.,[facility]  Carine Wong D.O., F.A.C.O.I., Carlin Carrera M.D. Kerry Magallon M.D., Pearson Favre, M.D. Eduin Peñaloza D.O. Patient:  Sean Arce 50 y.o. female MRN: [id number]     Date of Service: 3/1/2021      PULMONARY CONSULTATION    Reason for Consultation: Abnormal CT chest  Referring Physician: Dr. Zina Bermudez with the referring physician will be sent via the electronic medical record. Chief Complaint: Dizziness nausea    CODE STATUS: Full code    SUBJECTIVE:  HPI:  Sean Arce is a 50 y.o. with a past medical history significant for hypertension GERD, chronic hyponatremia who presented to the emergency room department for dizziness and nausea for about a week. Her work-up showed sodium of 180 at time of admission. As a part of the work-up she had a CT of her chest we have consulted for this abnormality. Past Medical History:   Diagnosis Date    Acid reflux     Fracture of left shoulder 10/2018    Frozen shoulder     left    Hypertension     Hyponatremia     MVP (mitral valve prolapse)     no issues       Past Surgical History:   Procedure Laterality Date    CLOSED MANIPULATION SHOULDER Left 3/5/2019    LEFT SHOULDER MANIPULATION UNDER ANTESTHESIA, STERIOD INJECTION TO FOLLOW performed by Jeffrey Banuelos MD at [address] Right 1980's    bone alignment    OPEN TREATMENT PROX HUMERAL FRACTURE Left 10/23/2018    LEFT SHOULDER OPEN REDUCTION INTERNAL FIXATION GREATER TUBEROSITY ++ARTHREX++ INTERSCALINE BLOCK++ performed by Jeffrey Banuelos MD at [address]  2018    left forearm       History reviewed. No pertinent family history.     Social History:   Social History     Socioeconomic History    Marital status:      Spouse name: Not on file    Number of children: Not on file    Years of education: Not on file    Highest education level: Not on file   Occupational History    Not on file   Social Needs    Financial resource strain: Not on file    Food insecurity     Worry: Not on file     Inability: Not on file    Transportation needs     Medical: Not on file     Non-medical: Not on file   Tobacco Use    Smoking status: Current Every Day Smoker     Packs/day: 1.00     Years: 30.00     Pack years: 30.00     Types: Cigarettes    Smokeless tobacco: Never Used   Substance and Sexual Activity    Alcohol use: Yes     Comment: few times a week    Drug use: No    Sexual activity: Not on file   Lifestyle    Physical activity     Days per week: Not on file     Minutes per session: Not on file    Stress: Not on file   Relationships    Social connections     Talks on phone: Not on file     Gets together: Not on file     Attends Buddhism service: Not on file     Active member of club or organization: Not on file     Attends meetings of clubs or organizations: Not on file     Relationship status: Not on file    Intimate partner violence     Fear of current or ex partner: Not on file     Emotionally abused: Not on file     Physically abused: Not on file     Forced sexual activity: Not on file   Other Topics Concern    Not on file   Social History Narrative    Not on file     Smoking history: The patient is a Current smoker . she started smoking in high school she smokes almost close to a pack a day       ETOH:   reports current alcohol use. Exposures: There  is not history of TB or TB exposure. There is not asbestos or silica dust exposure. The patient reports does not have coal, foundry, quarry or [employer] exposure. Recent travel history none. There is not  history of recreational or IV drug use. There is not hot tub exposure. The patient does not have any exotic pets, turtles or exotic birds. Vaccines: There is no immunization history on file for this patient.      Home Meds: Medications Prior to Admission: Azilsartan Medoxomil (EDARBI) 40 MG TABS, Take 40 mg by mouth nightly   Potassium Chloride (KLOR-CON PO), Take 20 mEq by mouth daily   diltiazem (DILACOR XR) 120 MG extended release capsule, Take 120 mg by mouth 2 times daily Instructed to take morning of surgery with a sip of water  nebivolol (BYSTOLIC) 10 MG tablet, Take 10 mg by mouth daily Instructed to take morning of surgery with a sip of water  sodium chloride 1 g tablet, Take 1 g by mouth 2 times daily Instructed to take morning of surgery with a sip of water per Dr. Kayden Carreon  cloNIDine (CATAPRES) 0.2 MG tablet, Take 0.2 mg by mouth 2 times daily Instructed to take morning of surgery with a sip of water  cefdinir (OMNICEF) 300 MG capsule,   brompheniramine-pseudoephedrine-DM 2-30-10 MG/5ML syrup,   [DISCONTINUED] losartan (COZAAR) 100 MG tablet, Take 100 mg by mouth nightly    CURRENT MEDS :  Scheduled Meds:   sodium chloride  1 g Oral TID    dilTIAZem  120 mg Oral 2 times per day    losartan  100 mg Oral Nightly    carvedilol  12.5 mg Oral BID    enoxaparin  40 mg Subcutaneous Daily       Continuous Infusions:      No Known Allergies    REVIEW OF SYSTEMS:  Constitutional: Denies fever, weight loss, night sweats, and fatigue  Skin: Denies pigmentation, dark lesions, and rashes   HEENT: Denies hearing loss, tinnitus, ear drainage, epistaxis, sore throat, and hoarseness. Cardiovascular: Denies palpitations, chest pain, and chest pressure. Respiratory: Denies cough, dyspnea at rest, hemoptysis, apnea, and choking.   Gastrointestinal: Denies nausea, vomiting, poor appetite, diarrhea, heartburn or reflux  Genitourinary: Denies dysuria, frequency, urgency or hematuria  Musculoskeletal: Denies myalgias, muscle weakness, and bone pain  Neurological: Denies dizziness, vertigo, headache, and focal weakness  Psychological: Denies anxiety and depression  Endocrine: Denies heat intolerance and cold intolerance  Hematopoietic/Lymphatic: Denies bleeding problems and blood transfusions    OBJECTIVE:   BP (!) 160/90 Comment: manual  Pulse 68   Temp 97.7 °F (36.5 °C) (Oral)   Resp 18   Ht 5' 2\" (1.575 m)   Wt 117 lb (53.1 kg)   LMP 05/20/2012   SpO2 99%   BMI 21.40 kg/m²   SpO2 Readings from Last 1 Encounters:   03/01/21 99%        I/O:    Intake/Output Summary (Last 24 hours) at 3/1/2021 1228  Last data filed at 3/1/2021 9734  Gross per 24 hour   Intake 240 ml   Output 2450 ml   Net -2210 ml     Vent Information  SpO2: 99 %                Physical Exam:  General: The patient is lying in bed comfortably without any distress. Breathing is not labored  HEENT: Pupils are equal round and reactive to light, there are no oral lesions and no post-nasal drip   Neck: supple without adenopathy  Cardiovascular: regular rate and rhythm without murmur or gallop  Respiratory: Clear to auscultation bilaterally without wheezing or crackles. Air entry is symmetric  Abdomen: soft, non-tender, non-distended, normal bowel sounds  Extremities: warm, no edema, no clubbing  Skin: no rash or lesion  Neurologic: CN II-XII grossly intact, no focal deficits    Pulmonary Function Testing personally reviewed and interpreted      Imaging personally reviewed:      Narrative   EXAMINATION:   CT OF THE CHEST WITH AND WITHOUT CONTRAST 2/28/2021 12:05 pm   TECHNIQUE:   CT of the chest was performed without and with the administration of   intravenous contrast. Multiplanar reformatted images are provided for review. Dose modulation, iterative reconstruction, and/or weight based adjustment of   the mA/kV was utilized to reduce the radiation dose to as low as reasonably   achievable. COMPARISON:   None.    HISTORY:   ORDERING SYSTEM PROVIDED HISTORY: abnormal CXR tobacco use hyponatremia   TECHNOLOGIST PROVIDED HISTORY:   Reason for exam:->abnormal CXR tobacco use hyponatremia   FINDINGS:   Mediastinum: The heart is normal in size.  The ascending aorta is aneurysmal   to 4.5 x 4.4 cm.  No dissection.  The main pulmonary artery is normal in   caliber.  No lymphadenopathy. .   Lungs/pleura: Ground-glass opacities are seen in the lingula and left lower   lobe, which are likely infectious/inflammatory in etiology.  Subtle   ground-glass opacities are seen in the lateral segment of the right middle   lobe.  2 mm subpleural nodule seen in the right upper lobe (series 3, image   34).  Small subpleural emphysematous blebs are seen in the upper lobes.  It   has mild bronchial wall thickening is seen.  The central airway is otherwise   clear.  No pneumothorax or pleural effusion is seen. Upper Abdomen: An 11 mm enhancing lesion is seen in the left lobe of the   liver (series 5, image 107). Soft Tissues/Bones: The visualized bones are intact without fracture or focal   lesion.  Surgical plate and screw are seen transfixing old healed fractures   in the left proximal humerus.  A       Impression   1. Ground-glass opacities in the lingula and left lower lobe and minimally   within the right middle lobe, likely infectious/inflammatory. 2. Mild emphysematous changes.  Mild bronchial wall thickening, which may   signify bronchitis, either acute or chronic. 3. Aneurysmal dilation of the ascending aorta to 4.5 x 4.4 cm.   4. 11 mm enhancing lesion in the left lobe of the liver.  Further   characterization with pre and post contrast enhanced MRI of the liver is   recommended.            Labs:  Lab Results   Component Value Date    WBC 5.1 02/28/2021    HGB 12.2 02/28/2021    HCT 35.4 02/28/2021    MCV 93.7 02/28/2021    MCH 32.3 02/28/2021    MCHC 34.5 02/28/2021    RDW 11.8 02/28/2021     02/28/2021    MPV 8.3 02/28/2021     Lab Results   Component Value Date     03/01/2021    K 5.7 03/01/2021    K 4.8 02/28/2021    CL 84 03/01/2021    CO2 29 03/01/2021    BUN 5 03/01/2021    CREATININE 0.6 03/01/2021    LABALBU 3.7 02/28/2021    LABALBU 4.6 05/23/2012    CALCIUM 9.9 03/01/2021    GFRAA >60 03/01/2021    LABGLOM >60 03/01/2021     Lab Results   Component Value Date

## 2021-03-01 NOTE — CARE COORDINATION
Introduced my self and provided explanation of CM role to patient. Patient is awake, alert, and aware of current diagnosis and treatment plan including nephrology consult, serial lab testing. Patient verbalizes no concerns and identifies no areas to focus on nor barriers to discharge. She plans on a return to home. She states she lives alone but has family support. She voices she is independent and still working, etc.  Patient is established with a pcp and denies any issue with retail pharmaceutical coverage. Will follow along with  and assist with discharge planning as necessary. Meron Enrique.  Jossue, MSN, RN  [facility] Case Management  [phone]

## 2021-03-02 LAB
ANION GAP SERPL CALCULATED.3IONS-SCNC: 10 MMOL/L (ref 7–16)
ANION GAP SERPL CALCULATED.3IONS-SCNC: 9 MMOL/L (ref 7–16)
BUN BLDV-MCNC: 5 MG/DL (ref 6–20)
BUN BLDV-MCNC: 9 MG/DL (ref 6–20)
CALCIUM SERPL-MCNC: 8.9 MG/DL (ref 8.6–10.2)
CALCIUM SERPL-MCNC: 9.5 MG/DL (ref 8.6–10.2)
CHLORIDE BLD-SCNC: 87 MMOL/L (ref 98–107)
CHLORIDE BLD-SCNC: 90 MMOL/L (ref 98–107)
CO2: 26 MMOL/L (ref 22–29)
CO2: 27 MMOL/L (ref 22–29)
CREAT SERPL-MCNC: 0.6 MG/DL (ref 0.5–1)
CREAT SERPL-MCNC: 0.7 MG/DL (ref 0.5–1)
GFR AFRICAN AMERICAN: >60
GFR AFRICAN AMERICAN: >60
GFR NON-AFRICAN AMERICAN: >60 ML/MIN/1.73
GFR NON-AFRICAN AMERICAN: >60 ML/MIN/1.73
GLUCOSE BLD-MCNC: 106 MG/DL (ref 74–99)
GLUCOSE BLD-MCNC: 99 MG/DL (ref 74–99)
OSMOLALITY URINE: 251 MOSM/KG (ref 300–900)
POTASSIUM SERPL-SCNC: 4.4 MMOL/L (ref 3.5–5)
POTASSIUM SERPL-SCNC: 4.7 MMOL/L (ref 3.5–5)
SODIUM BLD-SCNC: 122 MMOL/L (ref 132–146)
SODIUM BLD-SCNC: 127 MMOL/L (ref 132–146)
SODIUM URINE: 82 MMOL/L

## 2021-03-02 PROCEDURE — 36415 COLL VENOUS BLD VENIPUNCTURE: CPT

## 2021-03-02 PROCEDURE — 80048 BASIC METABOLIC PNL TOTAL CA: CPT

## 2021-03-02 PROCEDURE — 83935 ASSAY OF URINE OSMOLALITY: CPT

## 2021-03-02 PROCEDURE — 6370000000 HC RX 637 (ALT 250 FOR IP): Performed by: INTERNAL MEDICINE

## 2021-03-02 PROCEDURE — 1200000000 HC SEMI PRIVATE

## 2021-03-02 PROCEDURE — 84300 ASSAY OF URINE SODIUM: CPT

## 2021-03-02 RX ADMIN — CARVEDILOL 12.5 MG: 6.25 TABLET, FILM COATED ORAL at 21:05

## 2021-03-02 RX ADMIN — DILTIAZEM HYDROCHLORIDE 120 MG: 120 CAPSULE, COATED, EXTENDED RELEASE ORAL at 08:50

## 2021-03-02 RX ADMIN — CARVEDILOL 12.5 MG: 6.25 TABLET, FILM COATED ORAL at 08:50

## 2021-03-02 RX ADMIN — Medication 1 G: at 21:05

## 2021-03-02 RX ADMIN — DILTIAZEM HYDROCHLORIDE 120 MG: 120 CAPSULE, COATED, EXTENDED RELEASE ORAL at 21:05

## 2021-03-02 RX ADMIN — Medication 1 G: at 13:30

## 2021-03-02 RX ADMIN — Medication 7.5 MG: at 09:59

## 2021-03-02 RX ADMIN — LOSARTAN POTASSIUM 100 MG: 50 TABLET, FILM COATED ORAL at 21:05

## 2021-03-02 RX ADMIN — Medication 1 G: at 08:50

## 2021-03-02 ASSESSMENT — PAIN SCALES - GENERAL
PAINLEVEL_OUTOF10: 0
PAINLEVEL_OUTOF10: 0

## 2021-03-02 NOTE — PLAN OF CARE
Problem: Pain:  Goal: Pain level will decrease  Description: Pain level will decrease  3/2/2021 0947 by Basilia Smith RN  Outcome: Met This Shift  3/2/2021 0348 by Paul Vela RN  Outcome: Ongoing     Problem: Falls - Risk of:  Goal: Will remain free from falls  Description: Will remain free from falls  3/2/2021 0947 by Basilia Smith RN  Outcome: Met This Shift  3/2/2021 0348 by Paul Vela RN  Outcome: Ongoing

## 2021-03-02 NOTE — PROGRESS NOTES
Patient seen overall improving. Sodium still low will follow.  Fluid restrictions and salt replacement

## 2021-03-02 NOTE — PATIENT CARE CONFERENCE
P Quality Flow/Interdisciplinary Rounds Progress Note        Quality Flow Rounds held on March 2, 2021    Disciplines Attending:  Bedside Nurse, ,  and Nursing Unit Leadership    Adarsh Pittman was admitted on 2/27/2021  4:52 PM    Anticipated Discharge Date:  Expected Discharge Date: 03/01/21    Disposition:    Micky Score:  Micky Scale Score: 21    Readmission Risk              Risk of Unplanned Readmission:        11           Discussed patient goal for the day, patient clinical progression, and barriers to discharge.   The following Goal(s) of the Day/Commitment(s) have been identified:  Labs - Report Results      Tong Light  March 2, 2021

## 2021-03-02 NOTE — PROGRESS NOTES
Department of Internal Medicine  Nephrology Attending Progress Note        SUBJECTIVE:  Mrs Carlos A Desai blood pressure has improved with resumption of her home meds. Some improvement in serum sodium, patient remains asymptomatic, her urine osmolarity is slightly higher today suggesting patient is adhering to some fluid restriction. Physical Exam:    Vitals:    03/02/21 0848   BP: 117/83   Pulse: 67   Resp: 18   Temp: 98.3 °F (36.8 °C)   SpO2: 98%       I/O last 24 hours:  Intake/Output 900/1950:    Weight: 113    General Appearance:  awake, alert, oriented, in no acute distress  Skin: No rash  Neck:  neck- supple, no mass, non-tender and no bruits  Lungs: Scattered rhonchi  Heart: Regular rhythm no murmur rub     Abdominal: Abdomen soft, non-tender. BS normal. No masses,  No organomegaly  Extremities: Extremities warm to touch, pink, with no edema.   Peripheral Pulses:  +2    DATA:    CBC with Differential:    Lab Results   Component Value Date    WBC 5.1 02/28/2021    RBC 3.78 02/28/2021    HGB 12.2 02/28/2021    HCT 35.4 02/28/2021     02/28/2021    MCV 93.7 02/28/2021    MCH 32.3 02/28/2021    MCHC 34.5 02/28/2021    RDW 11.8 02/28/2021    NRBC 0.9 01/26/2018    SEGSPCT 81 05/23/2012    LYMPHOPCT 30.0 02/28/2021    MONOPCT 8.7 02/28/2021    BASOPCT 0.6 02/28/2021    MONOSABS 0.44 02/28/2021    LYMPHSABS 1.52 02/28/2021    EOSABS 0.12 02/28/2021    BASOSABS 0.03 02/28/2021     BMP:    Lab Results   Component Value Date     03/02/2021    K 4.4 03/02/2021    K 4.8 02/28/2021    CL 87 03/02/2021    CO2 26 03/02/2021    BUN 5 03/02/2021    LABALBU 3.7 02/28/2021    LABALBU 4.6 05/23/2012    CREATININE 0.6 03/02/2021    CALCIUM 9.5 03/02/2021    GFRAA >60 03/02/2021    LABGLOM >60 03/02/2021    GLUCOSE 106 03/02/2021    GLUCOSE 99 05/27/2012            sodium chloride  1 g Oral TID    dilTIAZem  120 mg Oral 2 times per day    losartan  100 mg Oral Nightly    carvedilol  12.5 mg Oral BID    enoxaparin 40 mg Subcutaneous Daily       promethazine **OR** ondansetron, polyethylene glycol, acetaminophen **OR** acetaminophen    IMPRESSION/RECOMMENDATIONS:      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     Will give 7.5 mg of tolvaptan  Hypertension improved since resumption of home meds although we will substitute losartan and carvedilol as hospital does not carry [place] and Bystolic  COPD does have some dry cracking cough  Aneurysmal dilation of ascending aorta      Patricia Kohler MD  3/2/2021 12:43 PM

## 2021-03-02 NOTE — CARE COORDINATION
Patient verbalizes no concerns and identifies no areas to focus on nor barriers to discharge. She plans on a return to home. She states she lives alone but has family support. She voices she is independent and still working, etc.      Awaiting normalization of sodium (122 this AM); primary care and nephrology aware. Patient on fluid restriction and salt replacement per order. Will follow along with  and assist with discharge planning as necessary. Jayson Byrd.  Jossue, MSN, RN  [facility] Case Management  [phone]

## 2021-03-03 LAB
ANION GAP SERPL CALCULATED.3IONS-SCNC: 10 MMOL/L (ref 7–16)
ANION GAP SERPL CALCULATED.3IONS-SCNC: 11 MMOL/L (ref 7–16)
BUN BLDV-MCNC: 10 MG/DL (ref 6–20)
BUN BLDV-MCNC: 7 MG/DL (ref 6–20)
CALCIUM SERPL-MCNC: 9.7 MG/DL (ref 8.6–10.2)
CALCIUM SERPL-MCNC: 9.7 MG/DL (ref 8.6–10.2)
CHLORIDE BLD-SCNC: 89 MMOL/L (ref 98–107)
CHLORIDE BLD-SCNC: 91 MMOL/L (ref 98–107)
CO2: 27 MMOL/L (ref 22–29)
CO2: 27 MMOL/L (ref 22–29)
CREAT SERPL-MCNC: 0.7 MG/DL (ref 0.5–1)
CREAT SERPL-MCNC: 0.8 MG/DL (ref 0.5–1)
GFR AFRICAN AMERICAN: >60
GFR AFRICAN AMERICAN: >60
GFR NON-AFRICAN AMERICAN: >60 ML/MIN/1.73
GFR NON-AFRICAN AMERICAN: >60 ML/MIN/1.73
GLUCOSE BLD-MCNC: 104 MG/DL (ref 74–99)
GLUCOSE BLD-MCNC: 85 MG/DL (ref 74–99)
OSMOLALITY URINE: 195 MOSM/KG (ref 300–900)
POTASSIUM SERPL-SCNC: 4 MMOL/L (ref 3.5–5)
POTASSIUM SERPL-SCNC: 4.2 MMOL/L (ref 3.5–5)
SODIUM BLD-SCNC: 127 MMOL/L (ref 132–146)
SODIUM BLD-SCNC: 128 MMOL/L (ref 132–146)

## 2021-03-03 PROCEDURE — 6370000000 HC RX 637 (ALT 250 FOR IP): Performed by: INTERNAL MEDICINE

## 2021-03-03 PROCEDURE — 80048 BASIC METABOLIC PNL TOTAL CA: CPT

## 2021-03-03 PROCEDURE — 83935 ASSAY OF URINE OSMOLALITY: CPT

## 2021-03-03 PROCEDURE — 36415 COLL VENOUS BLD VENIPUNCTURE: CPT

## 2021-03-03 PROCEDURE — 1200000000 HC SEMI PRIVATE

## 2021-03-03 RX ADMIN — Medication 7.5 MG: at 13:57

## 2021-03-03 RX ADMIN — CARVEDILOL 12.5 MG: 6.25 TABLET, FILM COATED ORAL at 08:55

## 2021-03-03 RX ADMIN — DILTIAZEM HYDROCHLORIDE 120 MG: 120 CAPSULE, COATED, EXTENDED RELEASE ORAL at 08:55

## 2021-03-03 RX ADMIN — CARVEDILOL 12.5 MG: 6.25 TABLET, FILM COATED ORAL at 20:52

## 2021-03-03 RX ADMIN — Medication 1 G: at 08:55

## 2021-03-03 RX ADMIN — Medication 1 G: at 13:57

## 2021-03-03 RX ADMIN — LOSARTAN POTASSIUM 100 MG: 50 TABLET, FILM COATED ORAL at 20:52

## 2021-03-03 RX ADMIN — Medication 1 G: at 20:53

## 2021-03-03 RX ADMIN — DILTIAZEM HYDROCHLORIDE 120 MG: 120 CAPSULE, COATED, EXTENDED RELEASE ORAL at 20:53

## 2021-03-03 ASSESSMENT — PAIN SCALES - GENERAL
PAINLEVEL_OUTOF10: 0
PAINLEVEL_OUTOF10: 0

## 2021-03-03 NOTE — PROGRESS NOTES
Department of Internal Medicine  Nephrology Attending Progress Note        SUBJECTIVE:  Mrs Fletcher Holger complaining of being bored.,  She may be thinking about cutting down on tobacco use in view of her abnormal CT. Her sodium is improving    Physical Exam:    Vitals:    03/03/21 0815   BP: 102/63   Pulse: 60   Resp: 16   Temp: 98.2 °F (36.8 °C)   SpO2: 100%       I/O last 24 hours:  Intake/Output 800/1650    Weight:109    General Appearance:  awake, alert, oriented, in no acute distress  Skin: No rash  Neck:  neck- supple, no mass, non-tender and no bruits  Lungs: Scattered rhonchi  Heart: Regular rhythm no murmur rub     Abdominal: Abdomen soft, non-tender. BS normal. No masses,  No organomegaly  Extremities: Extremities warm to touch, pink, with no edema.   Peripheral Pulses:  +2    DATA:    CBC with Differential:    Lab Results   Component Value Date    WBC 5.1 02/28/2021    RBC 3.78 02/28/2021    HGB 12.2 02/28/2021    HCT 35.4 02/28/2021     02/28/2021    MCV 93.7 02/28/2021    MCH 32.3 02/28/2021    MCHC 34.5 02/28/2021    RDW 11.8 02/28/2021    NRBC 0.9 01/26/2018    SEGSPCT 81 05/23/2012    LYMPHOPCT 30.0 02/28/2021    MONOPCT 8.7 02/28/2021    BASOPCT 0.6 02/28/2021    MONOSABS 0.44 02/28/2021    LYMPHSABS 1.52 02/28/2021    EOSABS 0.12 02/28/2021    BASOSABS 0.03 02/28/2021     BMP:    Lab Results   Component Value Date     03/03/2021    K 4.0 03/03/2021    K 4.8 02/28/2021    CL 89 03/03/2021    CO2 27 03/03/2021    BUN 7 03/03/2021    LABALBU 3.7 02/28/2021    LABALBU 4.6 05/23/2012    CREATININE 0.7 03/03/2021    CALCIUM 9.7 03/03/2021    GFRAA >60 03/03/2021    LABGLOM >60 03/03/2021    GLUCOSE 85 03/03/2021    GLUCOSE 99 05/27/2012          tolvaptan  7.5 mg Oral Daily    sodium chloride  1 g Oral TID    dilTIAZem  120 mg Oral 2 times per day    losartan  100 mg Oral Nightly    carvedilol  12.5 mg Oral BID    enoxaparin  40 mg Subcutaneous Daily       promethazine **OR** ondansetron, polyethylene glycol, acetaminophen **OR** acetaminophen    IMPRESSION/RECOMMENDATIONS:      Hyponatremia data consistent with dilutional such as primary polydipsia, continue salt tablets and fluid restriction   Will give 7.5 mg of tolvaptan  Hypertension improved since resumption of home meds although we  Substituted  losartan and carvedilol as hospital does not carry [place] [facility]  COPD s/p pulmonary evaluation  Aneurysmal dilation of ascending aorta      Lisa Rangel MD  3/3/2021 11:23 AM

## 2021-03-03 NOTE — PROGRESS NOTES
[facility] Quality Flow/Interdisciplinary Rounds Progress Note        Quality Flow Rounds held on March 3, 2021    Disciplines Attending:  Bedside Nurse, ,  and Nursing Unit Leadership    Alan Elizabeth was admitted on 2/27/2021  4:52 PM    Anticipated Discharge Date:  Expected Discharge Date: 03/04/21    Disposition:    Micky Score:  Micky Scale Score: 22    Readmission Risk              Risk of Unplanned Readmission:        10           Discussed patient goal for the day, patient clinical progression, and barriers to discharge.   The following Goal(s) of the Day/Commitment(s) have been identified:  Diagnostics - Report Results and Labs - Report Results      Kori Paniagua  March 3, 2021

## 2021-03-04 VITALS
WEIGHT: 100.38 LBS | HEART RATE: 65 BPM | BODY MASS INDEX: 18.47 KG/M2 | DIASTOLIC BLOOD PRESSURE: 65 MMHG | RESPIRATION RATE: 16 BRPM | SYSTOLIC BLOOD PRESSURE: 109 MMHG | OXYGEN SATURATION: 99 % | HEIGHT: 62 IN | TEMPERATURE: 97.7 F

## 2021-03-04 LAB
ANION GAP SERPL CALCULATED.3IONS-SCNC: 11 MMOL/L (ref 7–16)
BUN BLDV-MCNC: 8 MG/DL (ref 6–20)
CALCIUM SERPL-MCNC: 10 MG/DL (ref 8.6–10.2)
CHLORIDE BLD-SCNC: 94 MMOL/L (ref 98–107)
CO2: 27 MMOL/L (ref 22–29)
CREAT SERPL-MCNC: 0.7 MG/DL (ref 0.5–1)
GFR AFRICAN AMERICAN: >60
GFR NON-AFRICAN AMERICAN: >60 ML/MIN/1.73
GLUCOSE BLD-MCNC: 91 MG/DL (ref 74–99)
OSMOLALITY: 274 MOSM/KG (ref 285–310)
POTASSIUM SERPL-SCNC: 3.9 MMOL/L (ref 3.5–5)
SODIUM BLD-SCNC: 131 MMOL/L (ref 132–146)
SODIUM BLD-SCNC: 132 MMOL/L (ref 132–146)

## 2021-03-04 PROCEDURE — 36415 COLL VENOUS BLD VENIPUNCTURE: CPT

## 2021-03-04 PROCEDURE — 84295 ASSAY OF SERUM SODIUM: CPT

## 2021-03-04 PROCEDURE — 83930 ASSAY OF BLOOD OSMOLALITY: CPT

## 2021-03-04 PROCEDURE — 6370000000 HC RX 637 (ALT 250 FOR IP): Performed by: INTERNAL MEDICINE

## 2021-03-04 PROCEDURE — 80048 BASIC METABOLIC PNL TOTAL CA: CPT

## 2021-03-04 RX ORDER — CARVEDILOL 12.5 MG/1
12.5 TABLET ORAL 2 TIMES DAILY
Qty: 60 TABLET | Refills: 3 | Status: SHIPPED | OUTPATIENT
Start: 2021-03-04

## 2021-03-04 RX ORDER — TOLVAPTAN 15 MG/1
7.5 TABLET ORAL DAILY
Qty: 30 TABLET | Refills: 2 | Status: SHIPPED | OUTPATIENT
Start: 2021-03-05

## 2021-03-04 RX ADMIN — DILTIAZEM HYDROCHLORIDE 120 MG: 120 CAPSULE, COATED, EXTENDED RELEASE ORAL at 08:49

## 2021-03-04 RX ADMIN — Medication 1 G: at 08:47

## 2021-03-04 RX ADMIN — Medication 7.5 MG: at 08:47

## 2021-03-04 RX ADMIN — Medication 1 G: at 13:32

## 2021-03-04 RX ADMIN — CARVEDILOL 12.5 MG: 6.25 TABLET, FILM COATED ORAL at 08:48

## 2021-03-04 ASSESSMENT — PAIN SCALES - GENERAL
PAINLEVEL_OUTOF10: 0
PAINLEVEL_OUTOF10: 0

## 2021-03-04 NOTE — PROGRESS NOTES
Department of Internal Medicine  Nephrology Attending Progress Note        SUBJECTIVE:  Mrs Araceli Guerrier is being seen for hyponatremia    3/4/21:Pt wants to go home today. No new complaints    Physical Exam:    Vitals:    21 0745   BP: 110/60   Pulse: 60   Resp: 20   Temp: 98.3 °F (36.8 °C)   SpO2: 99%      VITALS:  /60   Pulse 60   Temp 98.3 °F (36.8 °C) (Oral)   Resp 20   Ht 5' 2\" (1.575 m)   Wt 100 lb 6 oz (45.5 kg)   LMP 2012   SpO2 99%   BMI 18.36 kg/m²   CURRENT TEMPERATURE:  Temp: 98.3 °F (36.8 °C)  MAXIMUM TEMPERATURE OVER 24HRS:  Temp (24hrs), Av.2 °F (36.8 °C), Min:98.1 °F (36.7 °C), Max:98.3 °F (36.8 °C)    TEMPERATURE RANGE OVER 24HRS:   Temp  Av.2 °F (36.8 °C)  Min: 98.1 °F (36.7 °C)  Max: 98.3 °F (36.8 °C)  TEMPERATURE RANGE OVER 8HRS:   Temp  Av.3 °F (36.8 °C)  Min: 98.3 °F (36.8 °C)  Max: 98.3 °F (36.8 °C)  CURRENT RESPIRATORY RATE:  Resp: 20  24HR RESPIRATORY RATE RANGE:  Resp  Av  Min: 16  Max: 20  CURRENT PULSE:  Pulse: 60  24HR PULSE RANGE: Pulse  Av  Min: 54  Max: 60  CURRENT BLOOD PRESSURE:  BP: 110/60  24HR BLOOD PRESSURE RANGE:  Systolic (12UFO), MCR:703 , Min:108 , TXJ:041   ; Diastolic (37SFS), PVW:36, Min:60, Max:71    8HR BLOOD PRESSURE RANGE:  BP: (110)/(60)   CURRENT PULSE OXIMETRY:  SpO2: 99 %  24HR PULSE OXIMETRY RANGE:  SpO2  Av.5 %  Min: 99 %  Max: 100 %  8HR PULSE OXIMETRY RANGE:  SpO2  Av %  Min: 99 %  Max: 99 %  24HR INTAKE/OUTPUT:    Intake/Output Summary (Last 24 hours) at 3/4/2021 0959  Last data filed at 3/4/2021 0359  Gross per 24 hour   Intake 150 ml   Output 900 ml   Net -750 ml     8HR INTAKE OUTPUT:  No intake/output data recorded.   VENT SETTINGS:    Vent Information  SpO2: 99 %  Additional Respiratory  Assessments  Pulse: 60  Resp: 20  SpO2: 99 %    General Appearance:  awake, alert, oriented, in no acute distress  Skin: No rash  Neck:  neck- supple, no mass, non-tender and no bruits  Lungs: Scattered

## 2021-03-04 NOTE — PATIENT CARE CONFERENCE
P Quality Flow/Interdisciplinary Rounds Progress Note        Quality Flow Rounds held on March 4, 2021    Disciplines Attending:  Bedside Nurse, ,  and Nursing Unit Leadership    Cathi Gann was admitted on 2/27/2021  4:52 PM    Anticipated Discharge Date:  Expected Discharge Date: 03/04/21    Disposition:    Micky Score:  Micky Scale Score: 21    Readmission Risk              Risk of Unplanned Readmission:        10           Discussed patient goal for the day, patient clinical progression, and barriers to discharge.   The following Goal(s) of the Day/Commitment(s) have been identified:  Discharge - Obtain Order      Minnie Tam  March 4, 2021

## 2021-03-04 NOTE — CARE COORDINATION
Patient verbalizes no concerns and identifies no areas to focus on nor barriers to discharge.   She plans on a return to home. Ysabel Parham states she lives alone but has family support.  She voices she is independent and still working, etc.       Dc order has been placed by PCP. Reinaldo Marcum, MSN, RN  [facility] Case Management  [phone]

## 2021-03-04 NOTE — PROGRESS NOTES
CLINICAL PHARMACY NOTE: MEDS TO [address] Select Patient?: No  Total # of Prescriptions Filled: 1   The following medications were delivered to the patient:  · CARVEDILOL 12.5 MG    Total # of Interventions Completed: 2  Time Spent (min): 30    Additional Documentation:

## 2021-03-08 ENCOUNTER — HOSPITAL ENCOUNTER (OUTPATIENT)
Age: 56
Discharge: HOME | End: 2021-03-08
Payer: COMMERCIAL

## 2021-03-08 LAB
ANION GAP SERPL CALCULATED.3IONS-SCNC: 10 MMOL/L (ref 7–16)
BUN BLDV-MCNC: 15 MG/DL (ref 6–20)
CALCIUM SERPL-MCNC: 9.2 MG/DL (ref 8.6–10.2)
CHLORIDE BLD-SCNC: 95 MMOL/L (ref 98–107)
CO2: 26 MMOL/L (ref 22–29)
CREAT SERPL-MCNC: 0.7 MG/DL (ref 0.5–1)
GFR AFRICAN AMERICAN: >60
GFR NON-AFRICAN AMERICAN: >60 ML/MIN/1.73
GLUCOSE BLD-MCNC: 82 MG/DL (ref 74–99)
POTASSIUM SERPL-SCNC: 3.9 MMOL/L (ref 3.5–5)
SODIUM BLD-SCNC: 131 MMOL/L (ref 132–146)

## 2021-03-08 PROCEDURE — 36415 COLL VENOUS BLD VENIPUNCTURE: CPT

## 2021-03-08 PROCEDURE — 80048 BASIC METABOLIC PNL TOTAL CA: CPT

## 2021-03-23 NOTE — DISCHARGE SUMMARY
[address] [address]                               DISCHARGE SUMMARY    PATIENT NAME: Michelle Garibay                     :        1965  MED REC NO:   [id number]                            ROOM:  ACCOUNT NO:   [de-identified]                           ADMIT DATE: 2021  PROVIDER:     Jasper Camara DO                  [facility] DATE:    FINAL DIAGNOSES:  1. Hyponatremia, delusional type, likely beer potomania. 2.  Benign essential hypertension. 3.  COPD. 4.  Ascending aortic aneurysm. HOSPITAL COURSE:  The patient is a 70-year-old with a history of  hypertension, had previous admissions for hyponatremia in 2018, was also  seen at the [facility] for same. There is somewhat of a drinking  history. She likes and sips her beer quite a bit. She came into the  emergency department because of some vague like symptoms and she was  found to have a sodium of 118 and was subsequently admitted for that for  nephrology consults and management of hyponatremia. So, she was given  some fluid restrictions and placed on sodium chloride tablets 1 gm three  times a day. Sodium came up rather slowly, but eventually she was given  some tolvaptan at 7.5 mg daily and sodium came up rather nicely at 131. So, she feels well. There is no real seizure activity. Her blood  pressure was pretty good. She was maintained on her medications for  hypertension, which included Coreg, Dilacor/diltiazem, and Catapres. She will continue on those medications and she is advised to restrict  her fluids and certainly take these sodium chloride tablets 1 gm three  times a day. We will see if we can get her the tolvaptan if necessary,  but I will check her electrolytes again in the office in about a week to  make sure that they are holding steady. She will be discharged to home  and she can resume her normal activities.   At the time of discharge, her  condition was good and her prognosis is good.         Jose Carlos Kerns DO    D: 03/22/2021 13:54:34       T: 03/23/2021 10:33:47     [email or web address]  Job#: [id number]     Doc#: [id number]    CC:

## 2022-05-26 ENCOUNTER — HOSPITAL ENCOUNTER (OUTPATIENT)
Dept: NUCLEAR MEDICINE | Age: 57
Discharge: HOME | End: 2022-05-26
Payer: COMMERCIAL

## 2022-05-26 ENCOUNTER — HOSPITAL ENCOUNTER (OUTPATIENT)
Dept: NON INVASIVE DIAGNOSTICS | Age: 57
Discharge: HOME | End: 2022-05-26
Payer: COMMERCIAL

## 2022-05-26 VITALS — WEIGHT: 102 LBS | BODY MASS INDEX: 18.77 KG/M2 | HEIGHT: 62 IN

## 2022-05-26 LAB
LV EF: 74 %
LVEF MODALITY: NORMAL

## 2022-05-26 PROCEDURE — 78452 HT MUSCLE IMAGE SPECT MULT: CPT | Performed by: INTERNAL MEDICINE

## 2022-05-26 PROCEDURE — 93018 CV STRESS TEST I&R ONLY: CPT | Performed by: INTERNAL MEDICINE

## 2022-05-26 PROCEDURE — 3430000000 HC RX DIAGNOSTIC RADIOPHARMACEUTICAL: Performed by: RADIOLOGY

## 2022-05-26 PROCEDURE — 93016 CV STRESS TEST SUPVJ ONLY: CPT | Performed by: INTERNAL MEDICINE

## 2022-05-26 PROCEDURE — A9500 TC99M SESTAMIBI: HCPCS | Performed by: RADIOLOGY

## 2022-05-26 PROCEDURE — 78452 HT MUSCLE IMAGE SPECT MULT: CPT

## 2022-05-26 PROCEDURE — 6360000002 HC RX W HCPCS: Performed by: GENERAL PRACTICE

## 2022-05-26 PROCEDURE — 93017 CV STRESS TEST TRACING ONLY: CPT

## 2022-05-26 RX ADMIN — Medication 30 MILLICURIE: at 10:00

## 2022-05-26 RX ADMIN — REGADENOSON 0.4 MG: 0.08 INJECTION, SOLUTION INTRAVENOUS at 10:27

## 2022-05-26 RX ADMIN — Medication 10 MILLICURIE: at 08:15

## 2022-05-26 NOTE — PROCEDURES
Lexiscan Stress EKG Report: Indication: Atypical angina. Baseline EKG: normal EKG, normal sinus rhythm. Stress EKG: No ST-T changes. Arrhythmias: None. Symptoms: None. Pt was initially started on an exercise treadmill. Exercise time 5 minutes. Hypertensive BP response. No ischemia at 72% MPHR. Test stopped due to hypertensive response and inability to achieve target heart rate. Summary:  Unremarkable lexiscan stress EKG. See separate report for stress perfusion results.      Juan C Morales MD  Cardiologist  Cardiology, [facility]) Physicians

## 2022-09-20 ENCOUNTER — APPOINTMENT (OUTPATIENT)
Dept: CT IMAGING | Age: 57
DRG: 177 | End: 2022-09-20
Payer: COMMERCIAL

## 2022-09-20 ENCOUNTER — APPOINTMENT (OUTPATIENT)
Dept: GENERAL RADIOLOGY | Age: 57
DRG: 177 | End: 2022-09-20
Payer: COMMERCIAL

## 2022-09-20 ENCOUNTER — HOSPITAL ENCOUNTER (INPATIENT)
Age: 57
LOS: 8 days | Discharge: HOME | DRG: 177 | End: 2022-09-28
Attending: EMERGENCY MEDICINE | Admitting: INTERNAL MEDICINE
Payer: COMMERCIAL

## 2022-09-20 DIAGNOSIS — E86.0 DEHYDRATION: ICD-10-CM

## 2022-09-20 DIAGNOSIS — J96.01 ACUTE RESPIRATORY FAILURE WITH HYPOXIA (HCC): Primary | ICD-10-CM

## 2022-09-20 DIAGNOSIS — R91.8 ABNORMAL CT SCAN, LUNG: ICD-10-CM

## 2022-09-20 DIAGNOSIS — J18.9 PNEUMONIA DUE TO INFECTIOUS ORGANISM, UNSPECIFIED LATERALITY, UNSPECIFIED PART OF LUNG: ICD-10-CM

## 2022-09-20 DIAGNOSIS — E87.6 HYPOKALEMIA: ICD-10-CM

## 2022-09-20 DIAGNOSIS — U07.1 COVID: ICD-10-CM

## 2022-09-20 LAB
ALBUMIN SERPL-MCNC: 3.1 G/DL (ref 3.5–5.2)
ALP BLD-CCNC: 96 U/L (ref 35–104)
ALT SERPL-CCNC: 23 U/L (ref 0–32)
ANION GAP SERPL CALCULATED.3IONS-SCNC: 14 MMOL/L (ref 7–16)
AST SERPL-CCNC: 51 U/L (ref 0–31)
BASOPHILS ABSOLUTE: 0 E9/L (ref 0–0.2)
BASOPHILS RELATIVE PERCENT: 0 % (ref 0–2)
BILIRUB SERPL-MCNC: 0.8 MG/DL (ref 0–1.2)
BUN BLDV-MCNC: 20 MG/DL (ref 6–20)
CALCIUM SERPL-MCNC: 8.6 MG/DL (ref 8.6–10.2)
CHLORIDE BLD-SCNC: 76 MMOL/L (ref 98–107)
CO2: 35 MMOL/L (ref 22–29)
CREAT SERPL-MCNC: 0.8 MG/DL (ref 0.5–1)
D DIMER: 2384 NG/ML DDU
EOSINOPHILS ABSOLUTE: 0 E9/L (ref 0.05–0.5)
EOSINOPHILS RELATIVE PERCENT: 0 % (ref 0–6)
GFR AFRICAN AMERICAN: >60
GFR NON-AFRICAN AMERICAN: >60 ML/MIN/1.73
GLUCOSE BLD-MCNC: 147 MG/DL (ref 74–99)
HCT VFR BLD CALC: 40.6 % (ref 34–48)
HEMOGLOBIN: 14.8 G/DL (ref 11.5–15.5)
LACTIC ACID: 2.5 MMOL/L (ref 0.5–2.2)
LYMPHOCYTES ABSOLUTE: 0.48 E9/L (ref 1.5–4)
LYMPHOCYTES RELATIVE PERCENT: 4 % (ref 20–42)
MAGNESIUM: 1.9 MG/DL (ref 1.6–2.6)
MCH RBC QN AUTO: 33.7 PG (ref 26–35)
MCHC RBC AUTO-ENTMCNC: 36.5 % (ref 32–34.5)
MCV RBC AUTO: 92.5 FL (ref 80–99.9)
MONOCYTES ABSOLUTE: 0.48 E9/L (ref 0.1–0.95)
MONOCYTES RELATIVE PERCENT: 4 % (ref 2–12)
NEUTROPHILS ABSOLUTE: 11.04 E9/L (ref 1.8–7.3)
NEUTROPHILS RELATIVE PERCENT: 92 % (ref 43–80)
PDW BLD-RTO: 11.6 FL (ref 11.5–15)
PLATELET # BLD: 262 E9/L (ref 130–450)
PMV BLD AUTO: 9.7 FL (ref 7–12)
POTASSIUM SERPL-SCNC: 3 MMOL/L (ref 3.5–5)
PRO-BNP: 2185 PG/ML (ref 0–125)
RBC # BLD: 4.39 E12/L (ref 3.5–5.5)
SARS-COV-2, NAAT: DETECTED
SODIUM BLD-SCNC: 125 MMOL/L (ref 132–146)
TOTAL PROTEIN: 6.5 G/DL (ref 6.4–8.3)
TROPONIN, HIGH SENSITIVITY: 12 NG/L (ref 0–9)
TROPONIN, HIGH SENSITIVITY: 7 NG/L (ref 0–9)
WBC # BLD: 12 E9/L (ref 4.5–11.5)

## 2022-09-20 PROCEDURE — 82803 BLOOD GASES ANY COMBINATION: CPT

## 2022-09-20 PROCEDURE — 96367 TX/PROPH/DG ADDL SEQ IV INF: CPT

## 2022-09-20 PROCEDURE — 87635 SARS-COV-2 COVID-19 AMP PRB: CPT

## 2022-09-20 PROCEDURE — 85025 COMPLETE CBC W/AUTO DIFF WBC: CPT

## 2022-09-20 PROCEDURE — 99285 EMERGENCY DEPT VISIT HI MDM: CPT

## 2022-09-20 PROCEDURE — 6360000004 HC RX CONTRAST MEDICATION: Performed by: RADIOLOGY

## 2022-09-20 PROCEDURE — 84484 ASSAY OF TROPONIN QUANT: CPT

## 2022-09-20 PROCEDURE — 83735 ASSAY OF MAGNESIUM: CPT

## 2022-09-20 PROCEDURE — 83605 ASSAY OF LACTIC ACID: CPT

## 2022-09-20 PROCEDURE — 93005 ELECTROCARDIOGRAM TRACING: CPT | Performed by: EMERGENCY MEDICINE

## 2022-09-20 PROCEDURE — 96375 TX/PRO/DX INJ NEW DRUG ADDON: CPT

## 2022-09-20 PROCEDURE — 96361 HYDRATE IV INFUSION ADD-ON: CPT

## 2022-09-20 PROCEDURE — 71045 X-RAY EXAM CHEST 1 VIEW: CPT

## 2022-09-20 PROCEDURE — 2580000003 HC RX 258: Performed by: EMERGENCY MEDICINE

## 2022-09-20 PROCEDURE — 86140 C-REACTIVE PROTEIN: CPT

## 2022-09-20 PROCEDURE — 83880 ASSAY OF NATRIURETIC PEPTIDE: CPT

## 2022-09-20 PROCEDURE — 36415 COLL VENOUS BLD VENIPUNCTURE: CPT

## 2022-09-20 PROCEDURE — 3E0333Z INTRODUCTION OF ANTI-INFLAMMATORY INTO PERIPHERAL VEIN, PERCUTANEOUS APPROACH: ICD-10-PCS | Performed by: INTERNAL MEDICINE

## 2022-09-20 PROCEDURE — 2000000000 HC ICU R&B

## 2022-09-20 PROCEDURE — 85378 FIBRIN DEGRADE SEMIQUANT: CPT

## 2022-09-20 PROCEDURE — 6370000000 HC RX 637 (ALT 250 FOR IP): Performed by: EMERGENCY MEDICINE

## 2022-09-20 PROCEDURE — 6360000002 HC RX W HCPCS: Performed by: EMERGENCY MEDICINE

## 2022-09-20 PROCEDURE — 96365 THER/PROPH/DIAG IV INF INIT: CPT

## 2022-09-20 PROCEDURE — 2700000000 HC OXYGEN THERAPY PER DAY

## 2022-09-20 PROCEDURE — 71275 CT ANGIOGRAPHY CHEST: CPT

## 2022-09-20 PROCEDURE — 96374 THER/PROPH/DIAG INJ IV PUSH: CPT

## 2022-09-20 PROCEDURE — 2500000003 HC RX 250 WO HCPCS: Performed by: EMERGENCY MEDICINE

## 2022-09-20 PROCEDURE — 80053 COMPREHEN METABOLIC PANEL: CPT

## 2022-09-20 RX ORDER — DEXAMETHASONE SODIUM PHOSPHATE 10 MG/ML
10 INJECTION, SOLUTION INTRAMUSCULAR; INTRAVENOUS ONCE
Status: COMPLETED | OUTPATIENT
Start: 2022-09-20 | End: 2022-09-20

## 2022-09-20 RX ORDER — ACETAMINOPHEN 650 MG/1
650 SUPPOSITORY RECTAL EVERY 6 HOURS PRN
Status: DISCONTINUED | OUTPATIENT
Start: 2022-09-20 | End: 2022-09-28 | Stop reason: HOSPADM

## 2022-09-20 RX ORDER — 0.9 % SODIUM CHLORIDE 0.9 %
1000 INTRAVENOUS SOLUTION INTRAVENOUS ONCE
Status: COMPLETED | OUTPATIENT
Start: 2022-09-20 | End: 2022-09-20

## 2022-09-20 RX ORDER — DEXTROSE MONOHYDRATE 100 MG/ML
INJECTION, SOLUTION INTRAVENOUS CONTINUOUS PRN
Status: DISCONTINUED | OUTPATIENT
Start: 2022-09-20 | End: 2022-09-28 | Stop reason: HOSPADM

## 2022-09-20 RX ORDER — BUDESONIDE 0.5 MG/2ML
0.5 INHALANT ORAL 2 TIMES DAILY
Status: DISCONTINUED | OUTPATIENT
Start: 2022-09-21 | End: 2022-09-28 | Stop reason: HOSPADM

## 2022-09-20 RX ORDER — SODIUM CHLORIDE 0.9 % (FLUSH) 0.9 %
5-40 SYRINGE (ML) INJECTION PRN
Status: DISCONTINUED | OUTPATIENT
Start: 2022-09-20 | End: 2022-09-28 | Stop reason: HOSPADM

## 2022-09-20 RX ORDER — ARFORMOTEROL TARTRATE 15 UG/2ML
15 SOLUTION RESPIRATORY (INHALATION) 2 TIMES DAILY
Status: DISCONTINUED | OUTPATIENT
Start: 2022-09-21 | End: 2022-09-28 | Stop reason: HOSPADM

## 2022-09-20 RX ORDER — ONDANSETRON 2 MG/ML
4 INJECTION INTRAMUSCULAR; INTRAVENOUS EVERY 6 HOURS PRN
Status: DISCONTINUED | OUTPATIENT
Start: 2022-09-20 | End: 2022-09-21

## 2022-09-20 RX ORDER — PANTOPRAZOLE SODIUM 40 MG/1
40 TABLET, DELAYED RELEASE ORAL
Status: DISCONTINUED | OUTPATIENT
Start: 2022-09-21 | End: 2022-09-28 | Stop reason: HOSPADM

## 2022-09-20 RX ORDER — ONDANSETRON 4 MG/1
4 TABLET, ORALLY DISINTEGRATING ORAL EVERY 8 HOURS PRN
Status: DISCONTINUED | OUTPATIENT
Start: 2022-09-20 | End: 2022-09-21

## 2022-09-20 RX ORDER — POLYETHYLENE GLYCOL 3350 17 G/17G
17 POWDER, FOR SOLUTION ORAL DAILY PRN
Status: DISCONTINUED | OUTPATIENT
Start: 2022-09-20 | End: 2022-09-28 | Stop reason: HOSPADM

## 2022-09-20 RX ORDER — ENOXAPARIN SODIUM 100 MG/ML
30 INJECTION SUBCUTANEOUS 2 TIMES DAILY
Status: DISCONTINUED | OUTPATIENT
Start: 2022-09-21 | End: 2022-09-21

## 2022-09-20 RX ORDER — ACETAMINOPHEN 325 MG/1
650 TABLET ORAL EVERY 6 HOURS PRN
Status: DISCONTINUED | OUTPATIENT
Start: 2022-09-20 | End: 2022-09-28 | Stop reason: HOSPADM

## 2022-09-20 RX ORDER — SODIUM CHLORIDE 0.9 % (FLUSH) 0.9 %
5-40 SYRINGE (ML) INJECTION EVERY 12 HOURS SCHEDULED
Status: DISCONTINUED | OUTPATIENT
Start: 2022-09-21 | End: 2022-09-28 | Stop reason: HOSPADM

## 2022-09-20 RX ORDER — MAGNESIUM SULFATE 1 G/100ML
1000 INJECTION INTRAVENOUS ONCE
Status: COMPLETED | OUTPATIENT
Start: 2022-09-20 | End: 2022-09-20

## 2022-09-20 RX ORDER — POTASSIUM CHLORIDE 20 MEQ/1
40 TABLET, EXTENDED RELEASE ORAL ONCE
Status: COMPLETED | OUTPATIENT
Start: 2022-09-20 | End: 2022-09-20

## 2022-09-20 RX ORDER — IPRATROPIUM BROMIDE AND ALBUTEROL SULFATE 2.5; .5 MG/3ML; MG/3ML
1 SOLUTION RESPIRATORY (INHALATION)
Status: DISCONTINUED | OUTPATIENT
Start: 2022-09-21 | End: 2022-09-28 | Stop reason: HOSPADM

## 2022-09-20 RX ORDER — SODIUM CHLORIDE 9 MG/ML
INJECTION, SOLUTION INTRAVENOUS PRN
Status: DISCONTINUED | OUTPATIENT
Start: 2022-09-20 | End: 2022-09-28 | Stop reason: HOSPADM

## 2022-09-20 RX ORDER — DEXAMETHASONE SODIUM PHOSPHATE 10 MG/ML
10 INJECTION, SOLUTION INTRAMUSCULAR; INTRAVENOUS EVERY 24 HOURS
Status: DISCONTINUED | OUTPATIENT
Start: 2022-09-26 | End: 2022-09-25

## 2022-09-20 RX ORDER — MAGNESIUM SULFATE HEPTAHYDRATE 500 MG/ML
1000 INJECTION, SOLUTION INTRAMUSCULAR; INTRAVENOUS ONCE
Status: DISCONTINUED | OUTPATIENT
Start: 2022-09-20 | End: 2022-09-20 | Stop reason: CLARIF

## 2022-09-20 RX ORDER — IPRATROPIUM BROMIDE AND ALBUTEROL SULFATE 2.5; .5 MG/3ML; MG/3ML
1 SOLUTION RESPIRATORY (INHALATION)
Status: DISCONTINUED | OUTPATIENT
Start: 2022-09-20 | End: 2022-09-20

## 2022-09-20 RX ADMIN — SODIUM CHLORIDE 1000 ML: 9 INJECTION, SOLUTION INTRAVENOUS at 15:25

## 2022-09-20 RX ADMIN — POTASSIUM CHLORIDE 40 MEQ: 1500 TABLET, EXTENDED RELEASE ORAL at 16:39

## 2022-09-20 RX ADMIN — IPRATROPIUM BROMIDE AND ALBUTEROL SULFATE 1 AMPULE: 2.5; .5 SOLUTION RESPIRATORY (INHALATION) at 15:18

## 2022-09-20 RX ADMIN — DEXAMETHASONE SODIUM PHOSPHATE 10 MG: 10 INJECTION, SOLUTION INTRAMUSCULAR; INTRAVENOUS at 15:18

## 2022-09-20 RX ADMIN — DOXYCYCLINE 100 MG: 100 INJECTION, POWDER, LYOPHILIZED, FOR SOLUTION INTRAVENOUS at 18:29

## 2022-09-20 RX ADMIN — CEFTRIAXONE 2000 MG: 2 INJECTION, POWDER, FOR SOLUTION INTRAMUSCULAR; INTRAVENOUS at 18:30

## 2022-09-20 RX ADMIN — IOPAMIDOL 75 ML: 755 INJECTION, SOLUTION INTRAVENOUS at 16:58

## 2022-09-20 RX ADMIN — MAGNESIUM SULFATE HEPTAHYDRATE 1000 MG: 1 INJECTION, SOLUTION INTRAVENOUS at 16:35

## 2022-09-20 ASSESSMENT — PAIN DESCRIPTION - PAIN TYPE: TYPE: ACUTE PAIN

## 2022-09-20 ASSESSMENT — PAIN DESCRIPTION - ORIENTATION: ORIENTATION: RIGHT;UPPER

## 2022-09-20 ASSESSMENT — PAIN DESCRIPTION - FREQUENCY: FREQUENCY: CONTINUOUS

## 2022-09-20 ASSESSMENT — PAIN DESCRIPTION - ONSET: ONSET: ON-GOING

## 2022-09-20 ASSESSMENT — PAIN SCALES - GENERAL: PAINLEVEL_OUTOF10: 10

## 2022-09-20 ASSESSMENT — PAIN DESCRIPTION - DESCRIPTORS: DESCRIPTORS: DISCOMFORT;SHARP;SHOOTING

## 2022-09-20 ASSESSMENT — PAIN DESCRIPTION - LOCATION: LOCATION: BACK

## 2022-09-20 ASSESSMENT — PAIN - FUNCTIONAL ASSESSMENT: PAIN_FUNCTIONAL_ASSESSMENT: PREVENTS OR INTERFERES WITH MANY ACTIVE NOT PASSIVE ACTIVITIES

## 2022-09-20 NOTE — ED PROVIDER NOTES
HPI:  9/20/22, Time: 3:10 PM EDT         Almas Torres is a 56 y.o. female presenting to the ED for sharp chest pains and sharp right side thoracic back pains, cough,congestion, nausea and shortness of breath, beginning 5 days ago, Friday. She took a home covid test Friday and it was positive. Another test Sunday was still positive. She is a smoker. Her symptoms became worse today so her sister brought her in. The complaint has been persistent, moderate in severity, and worsened by nothing. Patient denies fever/chills, sore throat, edema, headache, visual disturbances, focal paresthesias, focal weakness, abdominal pain, vomiting, diarrhea, constipation, dysuria, hematuria, trauma, neck or back pain, rash or other complaints. ROS:   A complete review of systems was performed and all pertinent positives and negatives are stated within HPI, all other systems reviewed and are negative.      --------------------------------------------- PAST HISTORY ---------------------------------------------  Past Medical History:  has a past medical history of Acid reflux, Fracture of left shoulder, Frozen shoulder, Hypertension, Hyponatremia, and MVP (mitral valve prolapse). Past Surgical History:  has a past surgical history that includes Foot surgery (Right, 1980's); open treatment prox humeral fracture (Left, 10/23/2018); CLOSED MANIPULATION SHOULDER (Left, 3/5/2019); and Skin cancer excision (2018). Social History:  reports that she has been smoking cigarettes. She has a 30.00 pack-year smoking history. She has never used smokeless tobacco. She reports current alcohol use. She reports that she does not use drugs. Family History: family history is not on file. The patients home medications have been reviewed. Allergies: Patient has no known allergies.         ----------------------------------------PHYSICAL EXAM--------------------------------------  Constitutional:  Well developed, well nourished but very thin, no acute distress, non-toxic appearance   Eyes:  PERRL, conjunctiva normal, EOMI  HENT:  Atraumatic, external ears normal, nose normal, oropharynx moist, no pharyngeal exudates. Neck- normal range of motion, no nuchal rigidity   Respiratory:  No respiratory distress, diffusely diminished breath sounds, no rales, scattered expiratory wheezing, no rhonchi  Cardiovascular:  Normal rate, normal rhythm, no murmurs, no gallops, no rubs. Radial and DP pulses 2+ bilaterally. Compartments are soft. She is warm and well perfused. Cap refill less than 3 seconds. GI:  Soft, nondistended, normal bowel sounds, nontender, no organomegaly, no mass, no rebound, no guarding   :  No costovertebral angle tenderness   Musculoskeletal:  No edema, no tenderness, no deformities. Back- no tenderness  Integument:  Well hydrated, no visible rash. Adequate perfusion. Lymphatic:  No cervical lymphadenopathy noted   Neurologic:  Alert & oriented x 3, CN 2-12 normal, no focal deficits noted. Normal gait. Psychiatric:  Speech and behavior appropriate       -------------------------------------------------- RESULTS -------------------------------------------------  I have personally reviewed all laboratory and imaging results for this patient. Results are listed below.      LABS:  Results for orders placed or performed during the hospital encounter of 09/20/22   COVID-19, Rapid    Specimen: Nasopharyngeal Swab   Result Value Ref Range    SARS-CoV-2, NAAT DETECTED (A) Not Detected   CBC with Auto Differential   Result Value Ref Range    WBC 12.0 (H) 4.5 - 11.5 E9/L    RBC 4.39 3.50 - 5.50 E12/L    Hemoglobin 14.8 11.5 - 15.5 g/dL    Hematocrit 40.6 34.0 - 48.0 %    MCV 92.5 80.0 - 99.9 fL    MCH 33.7 26.0 - 35.0 pg    MCHC 36.5 (H) 32.0 - 34.5 %    RDW 11.6 11.5 - 15.0 fL    Platelets 816 006 - 909 E9/L    MPV 9.7 7.0 - 12.0 fL    Neutrophils % 92.0 (H) 43.0 - 80.0 %    Lymphocytes % 4.0 (L) 20.0 - 42.0 %    Monocytes % 4.0 2.0 - 12.0 %    Eosinophils % 0.0 0.0 - 6.0 %    Basophils % 0.0 0.0 - 2.0 %    Neutrophils Absolute 11.04 (H) 1.80 - 7.30 E9/L    Lymphocytes Absolute 0.48 (L) 1.50 - 4.00 E9/L    Monocytes Absolute 0.48 0.10 - 0.95 E9/L    Eosinophils Absolute 0.00 (L) 0.05 - 0.50 E9/L    Basophils Absolute 0.00 0.00 - 0.20 E9/L   Comprehensive Metabolic Panel   Result Value Ref Range    Sodium 125 (L) 132 - 146 mmol/L    Potassium 3.0 (L) 3.5 - 5.0 mmol/L    Chloride 76 (L) 98 - 107 mmol/L    CO2 35 (H) 22 - 29 mmol/L    Anion Gap 14 7 - 16 mmol/L    Glucose 147 (H) 74 - 99 mg/dL    BUN 20 6 - 20 mg/dL    Creatinine 0.8 0.5 - 1.0 mg/dL    GFR Non-African American >60 >=60 mL/min/1.73    GFR African American >60     Calcium 8.6 8.6 - 10.2 mg/dL    Total Protein 6.5 6.4 - 8.3 g/dL    Albumin 3.1 (L) 3.5 - 5.2 g/dL    Total Bilirubin 0.8 0.0 - 1.2 mg/dL    Alkaline Phosphatase 96 35 - 104 U/L    ALT 23 0 - 32 U/L    AST 51 (H) 0 - 31 U/L   D-Dimer, Quantitative   Result Value Ref Range    D-Dimer, Quant 2384 ng/mL DDU   Troponin   Result Value Ref Range    Troponin, High Sensitivity 12 (H) 0 - 9 ng/L   Brain Natriuretic Peptide   Result Value Ref Range    Pro-BNP 2,185 (H) 0 - 125 pg/mL   Lactic Acid   Result Value Ref Range    Lactic Acid 2.5 (H) 0.5 - 2.2 mmol/L   Magnesium   Result Value Ref Range    Magnesium 1.9 1.6 - 2.6 mg/dL   EKG 12 Lead   Result Value Ref Range    Ventricular Rate 79 BPM    Atrial Rate 79 BPM    P-R Interval 186 ms    QRS Duration 94 ms    Q-T Interval 442 ms    QTc Calculation (Bazett) 506 ms    P Axis 43 degrees    R Axis 30 degrees    T Axis 36 degrees       RADIOLOGY:  Interpreted by Radiologist.  CTA PULMONARY W CONTRAST   Final Result   1. No evidence of pulmonary embolism. 2.  New right middle lobe consolidative process concerning for an underlying   endobronchial lesion with postobstructive atelectasis.       3.  Multiple scattered new areas of opacity within the lungs more so left   than right concerning for atypical pneumonia or viral airway disease      4. 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 5.  New small right pleural effusion with adjacent compressive atelectasis      RECOMMENDATIONS:   Aorta is normal in caliber without acute abnormality. Unavailable         XR CHEST PORTABLE   Final Result   Right lower lung pneumonia. Follow-up study until complete resolution   recommended. EKG Interpretation  Time: 15:44 PM EDT  Rhythm: normal sinus   Rate: 79  Axis: normal  Conduction: prolonged QTc 506  ST Segments: no acute change  T Waves: no acute change  Clinical Impression: prolonged QT interval  Comparison to prior EKG: changes compared to previous EKG      ------------------------- NURSING NOTES AND VITALS REVIEWED ---------------------------  The nursing notes within the ED encounter and vital signs as below have been reviewed by myself. /86   Pulse 73   Temp 98.6 °F (37 °C) (Temporal)   Resp 22   Wt 101 lb (45.8 kg)   LMP 05/20/2012   SpO2 94%   BMI 18.47 kg/m²   Oxygen Saturation Interpretation: Abnormal and Improved after treatment      The patients available past medical records and past encounters were reviewed.         ------------------------------ ED COURSE/MEDICAL DECISION MAKING----------------------  Medications   ipratropium-albuterol (DUONEB) nebulizer solution 1 ampule (1 ampule Inhalation Given 9/20/22 1518)   doxycycline (VIBRAMYCIN) 100 mg in dextrose 5 % 100 mL IVPB ([id number]) (100 mg IntraVENous New Bag 9/20/22 1829)   0.9 % sodium chloride bolus (0 mLs IntraVENous Stopped 9/20/22 1625)   dexamethasone (PF) (DECADRON) injection 10 mg (10 mg IntraVENous Given 9/20/22 1518)   potassium chloride (KLOR-CON M) extended release tablet 40 mEq (40 mEq Oral Given 9/20/22 1639)   magnesium sulfate 1000 mg in dextrose 5% 100 mL IVPB (0 mg IntraVENous Stopped 9/20/22 1746)   iopamidol (ISOVUE-370) 76 % injection 75 mL (75 mLs IntraVENous Given 9/20/22 1658)   cefTRIAXone (ROCEPHIN) 2,000 mg in sterile water 20 mL IV syringe (2,000 mg IntraVENous Given 9/20/22 1830)           Procedures:   none      Medical Decision Making:    Covid +, hypoxia. IV decadron and duonebs provided with supplemental oxygen via non-rebreather. Scan negative for pulmonary embolism, concern for pneumonia and questionable concern for lung neoplasm. IV Rocephin and doxycycline provided for coverage of pneumonia in the event it is a supra infection. Dehydration with low sodium, potassium and chloride, IV fluids provided. This patient's ED course included: re-evaluation prior to disposition, IV medications, and a personal history and physicial eaxmination    This patient has remained hemodynamically stable, improved, and been closely monitored during their ED course. Re-Evaluations:  Time: 6:44 PM EDT   Re-evaluation. Patients symptoms are improving  Repeat physical examination is improved      Consultations:   6:44 PM EDT  Spoke with Dr Ephraim Campos, discussed case, accepts admission. Admit to ICU  Dr Alex Randall to speak with Ephraim Wong, Dr Sea Campos, who accepts patient to ICU      Critical Care: Please note that the withdrawal or failure to initiate urgent interventions for this patient would likely result in a life threatening deterioration or permanent disability. Accordingly this patient received 30 minutes of critical care time, excluding separately billable procedures. Adam Crocker, DO, am the Primary Provider of Record    Counseling: The emergency provider has spoken with the patient and sister and discussed todays results, in addition to providing specific details for the plan of care and counseling regarding the diagnosis and prognosis.   Questions are answered at this time and they are agreeable with the plan.    --------------------------- IMPRESSION AND DISPOSITION ---------------------------------    IMPRESSION  1. Acute respiratory failure with hypoxia ([facility] [address]    2. COVID    3. Pneumonia due to infectious organism, unspecified laterality, unspecified part of lung    4. Dehydration    5. Abnormal CT scan, lung    6.  Hypokalemia        DISPOSITION  Disposition: Admit to telemetry  Patient condition is stable             Magdalena Petit DO  09/20/22 6797 no

## 2022-09-21 ENCOUNTER — APPOINTMENT (OUTPATIENT)
Dept: GENERAL RADIOLOGY | Age: 57
DRG: 177 | End: 2022-09-21
Payer: COMMERCIAL

## 2022-09-21 PROBLEM — J96.00 ACUTE RESPIRATORY FAILURE (HCC): Status: ACTIVE | Noted: 2022-09-21

## 2022-09-21 LAB
AADO2: 459.7 MMHG
ALBUMIN SERPL-MCNC: 2.9 G/DL (ref 3.5–5.2)
ALP BLD-CCNC: 101 U/L (ref 35–104)
ALT SERPL-CCNC: 26 U/L (ref 0–32)
ANION GAP SERPL CALCULATED.3IONS-SCNC: 13 MMOL/L (ref 7–16)
ANISOCYTOSIS: ABNORMAL
AST SERPL-CCNC: 57 U/L (ref 0–31)
B.E.: 1.2 MMOL/L (ref -3–3)
B.E.: 6.2 MMOL/L (ref -3–3)
BASOPHILS ABSOLUTE: 0 E9/L (ref 0–0.2)
BASOPHILS RELATIVE PERCENT: 0.1 % (ref 0–2)
BILIRUB SERPL-MCNC: 0.4 MG/DL (ref 0–1.2)
BUN BLDV-MCNC: 17 MG/DL (ref 6–20)
C-REACTIVE PROTEIN: 26 MG/DL (ref 0–0.4)
CALCIUM SERPL-MCNC: 8.2 MG/DL (ref 8.6–10.2)
CARDIOPULMONARY BYPASS: NO
CHLORIDE BLD-SCNC: 80 MMOL/L (ref 98–107)
CO2: 29 MMOL/L (ref 22–29)
COHB: 0.8 % (ref 0–1.5)
CREAT SERPL-MCNC: 0.5 MG/DL (ref 0.5–1)
CRITICAL: ABNORMAL
DATE ANALYZED: ABNORMAL
DATE OF COLLECTION: ABNORMAL
DELIVERY SYSTEMS: ABNORMAL
DEVICE: ABNORMAL
DOHLE BODIES: ABNORMAL
EKG ATRIAL RATE: 79 BPM
EKG P AXIS: 43 DEGREES
EKG P-R INTERVAL: 186 MS
EKG Q-T INTERVAL: 442 MS
EKG QRS DURATION: 94 MS
EKG QTC CALCULATION (BAZETT): 506 MS
EKG R AXIS: 30 DEGREES
EKG T AXIS: 36 DEGREES
EKG VENTRICULAR RATE: 79 BPM
EOSINOPHILS ABSOLUTE: 0 E9/L (ref 0.05–0.5)
EOSINOPHILS RELATIVE PERCENT: 0.1 % (ref 0–6)
ETHANOL: <10 MG/DL (ref 0–0.08)
FERRITIN: 2289 NG/ML
FIBRINOGEN: 672 MG/DL (ref 200–400)
FIO2: 15
FIO2: 80 %
GFR AFRICAN AMERICAN: >60
GFR NON-AFRICAN AMERICAN: >60 ML/MIN/1.73
GLUCOSE BLD-MCNC: 119 MG/DL (ref 74–99)
HCO3: 23.8 MMOL/L (ref 22–26)
HCO3: 29.3 MMOL/L (ref 22–26)
HCT VFR BLD CALC: 42.9 % (ref 34–48)
HEMOGLOBIN: 15.4 G/DL (ref 11.5–15.5)
HHB: 11.5 % (ref 0–5)
L. PNEUMOPHILA SEROGP 1 UR AG: NORMAL
LAB: ABNORMAL
LACTIC ACID: 1.2 MMOL/L (ref 0.5–2.2)
LYMPHOCYTES ABSOLUTE: 0.3 E9/L (ref 1.5–4)
LYMPHOCYTES RELATIVE PERCENT: 1.7 % (ref 20–42)
Lab: ABNORMAL
MAGNESIUM: 2.4 MG/DL (ref 1.6–2.6)
MCH RBC QN AUTO: 33.7 PG (ref 26–35)
MCHC RBC AUTO-ENTMCNC: 35.9 % (ref 32–34.5)
MCV RBC AUTO: 93.9 FL (ref 80–99.9)
METHB: 0.3 % (ref 0–1.5)
MODE: ABNORMAL
MONOCYTES ABSOLUTE: 0.91 E9/L (ref 0.1–0.95)
MONOCYTES RELATIVE PERCENT: 6.1 % (ref 2–12)
MYELOCYTE PERCENT: 0.9 % (ref 0–0)
NEUTROPHILS ABSOLUTE: 13.98 E9/L (ref 1.8–7.3)
NEUTROPHILS RELATIVE PERCENT: 91.3 % (ref 43–80)
O2 SATURATION: 90.1 % (ref 92–98.5)
O2 SATURATION: 95.2 % (ref 92–98.5)
O2HB: 87.4 % (ref 94–97)
OPERATOR ID: 7294
OPERATOR ID: ABNORMAL
OSMOLALITY: 262 MOSM/KG (ref 285–310)
PATIENT TEMP: 37 C
PCO2 37: 31.6 MMHG (ref 35–45)
PCO2: 37.1 MMHG (ref 35–45)
PDW BLD-RTO: 11.8 FL (ref 11.5–15)
PFO2: 0.65 MMHG/%
PH 37: 7.49 (ref 7.35–7.45)
PH BLOOD GAS: 7.51 (ref 7.35–7.45)
PLATELET # BLD: 313 E9/L (ref 130–450)
PMV BLD AUTO: 9.6 FL (ref 7–12)
PO2 37: 69.5 MMHG (ref 80–100)
PO2: 51.8 MMHG (ref 75–100)
POC SOURCE: ABNORMAL
POTASSIUM REFLEX MAGNESIUM: 3.5 MMOL/L (ref 3.5–5)
PROCALCITONIN: 2.01 NG/ML (ref 0–0.08)
RBC # BLD: 4.57 E12/L (ref 3.5–5.5)
REASON FOR REJECTION: NORMAL
REJECTED TEST: NORMAL
RI(T): 8.87
SODIUM BLD-SCNC: 122 MMOL/L (ref 132–146)
SOURCE, BLOOD GAS: ABNORMAL
STREP PNEUMONIAE ANTIGEN, URINE: NORMAL
THB: 15.4 G/DL (ref 11.5–16.5)
TIME ANALYZED: 1008
TOTAL PROTEIN: 6.3 G/DL (ref 6.4–8.3)
VACUOLATED NEUTROPHILS: ABNORMAL
VITAMIN D 25-HYDROXY: 41 NG/ML (ref 30–100)
VITAMIN D 25-HYDROXY: 45 NG/ML (ref 30–100)
WBC # BLD: 15.2 E9/L (ref 4.5–11.5)

## 2022-09-21 PROCEDURE — 94640 AIRWAY INHALATION TREATMENT: CPT

## 2022-09-21 PROCEDURE — 6370000000 HC RX 637 (ALT 250 FOR IP): Performed by: INTERNAL MEDICINE

## 2022-09-21 PROCEDURE — 82077 ASSAY SPEC XCP UR&BREATH IA: CPT

## 2022-09-21 PROCEDURE — 84145 PROCALCITONIN (PCT): CPT

## 2022-09-21 PROCEDURE — 87449 NOS EACH ORGANISM AG IA: CPT

## 2022-09-21 PROCEDURE — 2580000003 HC RX 258: Performed by: INTERNAL MEDICINE

## 2022-09-21 PROCEDURE — 6360000002 HC RX W HCPCS: Performed by: INTERNAL MEDICINE

## 2022-09-21 PROCEDURE — 83735 ASSAY OF MAGNESIUM: CPT

## 2022-09-21 PROCEDURE — 85025 COMPLETE CBC W/AUTO DIFF WBC: CPT

## 2022-09-21 PROCEDURE — 80053 COMPREHEN METABOLIC PANEL: CPT

## 2022-09-21 PROCEDURE — 82728 ASSAY OF FERRITIN: CPT

## 2022-09-21 PROCEDURE — 86481 TB AG RESPONSE T-CELL SUSP: CPT

## 2022-09-21 PROCEDURE — 83930 ASSAY OF BLOOD OSMOLALITY: CPT

## 2022-09-21 PROCEDURE — 2000000000 HC ICU R&B

## 2022-09-21 PROCEDURE — 36600 WITHDRAWAL OF ARTERIAL BLOOD: CPT

## 2022-09-21 PROCEDURE — 85384 FIBRINOGEN ACTIVITY: CPT

## 2022-09-21 PROCEDURE — 82805 BLOOD GASES W/O2 SATURATION: CPT

## 2022-09-21 PROCEDURE — 99223 1ST HOSP IP/OBS HIGH 75: CPT | Performed by: INTERNAL MEDICINE

## 2022-09-21 PROCEDURE — 86698 HISTOPLASMA ANTIBODY: CPT

## 2022-09-21 PROCEDURE — 71045 X-RAY EXAM CHEST 1 VIEW: CPT

## 2022-09-21 PROCEDURE — 2700000000 HC OXYGEN THERAPY PER DAY

## 2022-09-21 PROCEDURE — 2500000003 HC RX 250 WO HCPCS: Performed by: INTERNAL MEDICINE

## 2022-09-21 PROCEDURE — 82306 VITAMIN D 25 HYDROXY: CPT

## 2022-09-21 PROCEDURE — 36415 COLL VENOUS BLD VENIPUNCTURE: CPT

## 2022-09-21 RX ORDER — DILTIAZEM HYDROCHLORIDE 60 MG/1
120 CAPSULE, EXTENDED RELEASE ORAL 2 TIMES DAILY
Status: DISCONTINUED | OUTPATIENT
Start: 2022-09-21 | End: 2022-09-28 | Stop reason: HOSPADM

## 2022-09-21 RX ORDER — ASCORBIC ACID 500 MG
500 TABLET ORAL DAILY
Status: DISCONTINUED | OUTPATIENT
Start: 2022-09-21 | End: 2022-09-26

## 2022-09-21 RX ORDER — SODIUM CHLORIDE 1000 MG
1 TABLET, SOLUBLE MISCELLANEOUS 2 TIMES DAILY
Status: DISCONTINUED | OUTPATIENT
Start: 2022-09-21 | End: 2022-09-28 | Stop reason: HOSPADM

## 2022-09-21 RX ORDER — THIAMINE HYDROCHLORIDE 100 MG/ML
100 INJECTION, SOLUTION INTRAMUSCULAR; INTRAVENOUS DAILY
Status: COMPLETED | OUTPATIENT
Start: 2022-09-24 | End: 2022-09-26

## 2022-09-21 RX ORDER — CARVEDILOL 6.25 MG/1
12.5 TABLET ORAL 2 TIMES DAILY
Status: DISCONTINUED | OUTPATIENT
Start: 2022-09-21 | End: 2022-09-28 | Stop reason: HOSPADM

## 2022-09-21 RX ORDER — THIAMINE HYDROCHLORIDE 100 MG/ML
100 INJECTION, SOLUTION INTRAMUSCULAR; INTRAVENOUS DAILY
Status: DISCONTINUED | OUTPATIENT
Start: 2022-09-24 | End: 2022-09-21

## 2022-09-21 RX ORDER — CLONIDINE HYDROCHLORIDE 0.2 MG/1
0.2 TABLET ORAL 3 TIMES DAILY
Status: DISCONTINUED | OUTPATIENT
Start: 2022-09-21 | End: 2022-09-28 | Stop reason: HOSPADM

## 2022-09-21 RX ORDER — FOLIC ACID 1 MG/1
1 TABLET ORAL DAILY
Status: DISCONTINUED | OUTPATIENT
Start: 2022-09-21 | End: 2022-09-28 | Stop reason: HOSPADM

## 2022-09-21 RX ORDER — ZINC SULFATE 50(220)MG
50 CAPSULE ORAL DAILY
Status: DISCONTINUED | OUTPATIENT
Start: 2022-09-21 | End: 2022-09-26

## 2022-09-21 RX ORDER — ENOXAPARIN SODIUM 100 MG/ML
40 INJECTION SUBCUTANEOUS DAILY
Status: DISCONTINUED | OUTPATIENT
Start: 2022-09-22 | End: 2022-09-28 | Stop reason: HOSPADM

## 2022-09-21 RX ORDER — LANOLIN ALCOHOL/MO/W.PET/CERES
100 CREAM (GRAM) TOPICAL DAILY
Status: DISCONTINUED | OUTPATIENT
Start: 2022-09-21 | End: 2022-09-21

## 2022-09-21 RX ORDER — THIAMINE HYDROCHLORIDE 100 MG/ML
500 INJECTION, SOLUTION INTRAMUSCULAR; INTRAVENOUS DAILY
Status: DISCONTINUED | OUTPATIENT
Start: 2022-09-21 | End: 2022-09-21

## 2022-09-21 RX ADMIN — DOXYCYCLINE 100 MG: 100 INJECTION, POWDER, LYOPHILIZED, FOR SOLUTION INTRAVENOUS at 18:42

## 2022-09-21 RX ADMIN — BUDESONIDE 500 MCG: 0.5 SUSPENSION RESPIRATORY (INHALATION) at 20:31

## 2022-09-21 RX ADMIN — SODIUM CHLORIDE 1 G: 1 TABLET ORAL at 20:33

## 2022-09-21 RX ADMIN — IPRATROPIUM BROMIDE AND ALBUTEROL SULFATE 1 AMPULE: .5; 2.5 SOLUTION RESPIRATORY (INHALATION) at 20:31

## 2022-09-21 RX ADMIN — CARVEDILOL 12.5 MG: 6.25 TABLET, FILM COATED ORAL at 10:32

## 2022-09-21 RX ADMIN — CLONIDINE HYDROCHLORIDE 0.2 MG: 0.2 TABLET ORAL at 13:50

## 2022-09-21 RX ADMIN — THIAMINE HYDROCHLORIDE 500 MG: 100 INJECTION, SOLUTION INTRAMUSCULAR; INTRAVENOUS at 11:27

## 2022-09-21 RX ADMIN — SODIUM CHLORIDE 1 G: 1 TABLET ORAL at 08:43

## 2022-09-21 RX ADMIN — DICLOFENAC SODIUM 2 G: 10 GEL TOPICAL at 18:33

## 2022-09-21 RX ADMIN — DOXYCYCLINE 100 MG: 100 INJECTION, POWDER, LYOPHILIZED, FOR SOLUTION INTRAVENOUS at 07:34

## 2022-09-21 RX ADMIN — IPRATROPIUM BROMIDE AND ALBUTEROL SULFATE 1 AMPULE: .5; 2.5 SOLUTION RESPIRATORY (INHALATION) at 16:41

## 2022-09-21 RX ADMIN — CLONIDINE HYDROCHLORIDE 0.2 MG: 0.2 TABLET ORAL at 22:12

## 2022-09-21 RX ADMIN — DEXAMETHASONE SODIUM PHOSPHATE 20 MG: 4 INJECTION, SOLUTION INTRAMUSCULAR; INTRAVENOUS at 08:40

## 2022-09-21 RX ADMIN — FOLIC ACID 1 MG: 1 TABLET ORAL at 10:33

## 2022-09-21 RX ADMIN — Medication 50 MG: at 20:33

## 2022-09-21 RX ADMIN — ENOXAPARIN SODIUM 30 MG: 100 INJECTION SUBCUTANEOUS at 08:46

## 2022-09-21 RX ADMIN — SODIUM CHLORIDE 1 G: 1 TABLET ORAL at 02:49

## 2022-09-21 RX ADMIN — DILTIAZEM HYDROCHLORIDE 120 MG: 60 CAPSULE, EXTENDED RELEASE ORAL at 11:27

## 2022-09-21 RX ADMIN — Medication 10 ML: at 08:44

## 2022-09-21 RX ADMIN — CEFTRIAXONE 1000 MG: 1 INJECTION, POWDER, FOR SOLUTION INTRAMUSCULAR; INTRAVENOUS at 18:33

## 2022-09-21 RX ADMIN — IPRATROPIUM BROMIDE AND ALBUTEROL SULFATE 1 AMPULE: .5; 2.5 SOLUTION RESPIRATORY (INHALATION) at 08:22

## 2022-09-21 RX ADMIN — Medication 10 ML: at 20:12

## 2022-09-21 RX ADMIN — CARVEDILOL 12.5 MG: 6.25 TABLET, FILM COATED ORAL at 22:12

## 2022-09-21 RX ADMIN — ENOXAPARIN SODIUM 30 MG: 100 INJECTION SUBCUTANEOUS at 00:06

## 2022-09-21 RX ADMIN — PANTOPRAZOLE SODIUM 40 MG: 40 TABLET, DELAYED RELEASE ORAL at 08:43

## 2022-09-21 RX ADMIN — CLONIDINE HYDROCHLORIDE 0.2 MG: 0.2 TABLET ORAL at 10:32

## 2022-09-21 RX ADMIN — OXYCODONE HYDROCHLORIDE AND ACETAMINOPHEN 500 MG: 500 TABLET ORAL at 20:33

## 2022-09-21 RX ADMIN — ARFORMOTEROL TARTRATE 15 MCG: 15 SOLUTION RESPIRATORY (INHALATION) at 08:22

## 2022-09-21 RX ADMIN — DILTIAZEM HYDROCHLORIDE 120 MG: 60 CAPSULE, EXTENDED RELEASE ORAL at 20:35

## 2022-09-21 RX ADMIN — IPRATROPIUM BROMIDE AND ALBUTEROL SULFATE 1 AMPULE: .5; 2.5 SOLUTION RESPIRATORY (INHALATION) at 12:43

## 2022-09-21 RX ADMIN — ARFORMOTEROL TARTRATE 15 MCG: 15 SOLUTION RESPIRATORY (INHALATION) at 20:31

## 2022-09-21 RX ADMIN — BUDESONIDE 500 MCG: 0.5 SUSPENSION RESPIRATORY (INHALATION) at 08:22

## 2022-09-21 ASSESSMENT — ENCOUNTER SYMPTOMS
SHORTNESS OF BREATH: 1
EYES NEGATIVE: 1
ALLERGIC/IMMUNOLOGIC NEGATIVE: 1
GASTROINTESTINAL NEGATIVE: 1
COUGH: 1

## 2022-09-21 ASSESSMENT — PAIN SCALES - GENERAL
PAINLEVEL_OUTOF10: 0
PAINLEVEL_OUTOF10: 0

## 2022-09-21 NOTE — H&P
MD Sana Corral  Internal medicine   History and Physical      CHIEF COMPLAINT: Shortness of breath      HISTORY OF PRESENT ILLNESS:    Patient was seen in medical ICU earlier this morning  Spoke with the ER physician at the time of admission  Data reviewed in detail with the patient  82-year-old  woman unvaccinated for COVID presents with 1 week history of progressive dyspnea associated with cough and fever  She was exposed to someone with COVID a week ago  She tested positive for COVID  Admitted to ICU due to acute hypoxic respiratory failure  She feels better this morning    Past Medical History:    Past Medical History:   Diagnosis Date    Acid reflux     Fracture of left shoulder 10/2018    Frozen shoulder     left    Hypertension     Hyponatremia     MVP (mitral valve prolapse)     no issues       Past Surgical History:    Past Surgical History:   Procedure Laterality Date    CLOSED MANIPULATION SHOULDER Left 3/5/2019    LEFT SHOULDER MANIPULATION UNDER ANTESTHESIA, STERIOD INJECTION TO FOLLOW performed by Michael Abreu MD at [address] [place] [address] Right 1980's    bone alignment    OPEN TREATMENT PROX HUMERAL FRACTURE Left 10/23/2018    LEFT SHOULDER OPEN REDUCTION INTERNAL FIXATION GREATER TUBEROSITY ++ARTHREX++ INTERSCALINE BLOCK++ performed by Michael Abreu MD at [facility] 2018    left forearm       Medications Prior to Admission:    Medications Prior to Admission: carvedilol (COREG) 12.5 MG tablet, Take 1 tablet by mouth 2 times daily (Patient taking differently: Take 25 mg by mouth 2 times daily)  tolvaptan (SAMSCA) 15 MG TABS tablet, Take 0.5 tablets by mouth daily (Patient not taking: Reported on 9/20/2022)  cefdinir (OMNICEF) 300 MG capsule,   brompheniramine-pseudoephedrine-DM 2-30-10 MG/5ML syrup,   diltiazem (DILACOR XR) 120 MG extended release capsule, Take 120 mg by mouth 2 times daily Instructed to take morning of surgery with a sip of water  sodium chloride 1 g tablet, Take 1 g by mouth 2 times daily Instructed to take morning of surgery with a sip of water per Dr. Polly Harris  cloNIDine (CATAPRES) 0.2 MG tablet, Take 0.2 mg by mouth 3 times daily Instructed to take morning of surgery with a sip of water    Allergies:    Patient has no known allergies. Social History:    reports that she has been smoking cigarettes. She has a 30.00 pack-year smoking history. She has never used smokeless tobacco. She reports current alcohol use. She reports that she does not use drugs. Family History:   family history is not on file. REVIEW OF SYSTEMS:  As above in the HPI, otherwise negative    PHYSICAL EXAM:    Vitals:  /77   Pulse 56   Temp 96.8 °F (36 °C) (Axillary)   Resp 22   Ht 5' 2\" (1.575 m)   Wt 98 lb 3.2 oz (44.5 kg)   LMP 05/20/2012   SpO2 92%   BMI 17.96 kg/m²     General:  Awake, alert, oriented X 3. Thin built somewhat tired emaciated  On high flow oxygen through nasal cannula  HEENT:  Normocephalic, atraumatic. Pupils equal, round, reactive to light. No scleral icterus. No conjunctival injection   No nasal discharge. Neck:  Supple no adenopathy  Heart:  RRR, no murmurs, gallops, rubs  Lungs: Breath sounds diminished especially right lower base  Abdomen:   Bowel sounds present, soft, nontender, no masses, no organomegaly, no peritoneal signs  Extremities:  No clubbing, cyanosis, or edema  Skin:  Warm and dry, no open lesions or rash  Neuro:  Cranial nerves 2-12 intact, no focal deficits  Generalized muscle atrophy noted  Breast: deferred  Rectal: deferred  Genitalia:  deferred    LABS:  Data reviewed      ASSESSMENT:      Principal Problem:    Acute respiratory failure ([address]  COVID infection  Superimposed bacterial pneumonia  Comorbidities present and past as listed below  Patient Active Problem List   Diagnosis    Hyponatremia    Acute hyponatremia    Traumatic closed displaced fracture of left shoulder with anterior dislocation    Acute respiratory failure (HCC)           PLAN:  Oxygen supplementation/wean as tolerated  Steroids intravenously  Bronchodilators  Empiric antibiotics with Rocephin and doxycycline  ICU care as outlined  Questions answered to her Marly Ramos MD  6:29 PM  9/21/2022

## 2022-09-21 NOTE — PLAN OF CARE
Problem: Skin/Tissue Integrity  Goal: Absence of new skin breakdown  Description: 1. Monitor for areas of redness and/or skin breakdown  2. Assess vascular access sites hourly  3. Every 4-6 hours minimum:  Change oxygen saturation probe site  4. Every 4-6 hours:  If on nasal continuous positive airway pressure, respiratory therapy assess nares and determine need for appliance change or resting period. 9/21/2022 [id number] by Faye Motley RN  Outcome: Progressing     Problem: Safety - Adult  Goal: Free from fall injury  9/21/2022 0812 by Faye Motley RN  Outcome: Progressing     Problem: Pain  Goal: Verbalizes/displays adequate comfort level or baseline comfort level  9/21/2022 0812 by Faye Motley RN  Outcome: Progressing   Plan of care discussed with patient / family.     Problem: Discharge Planning  Goal: Discharge to home or other facility with appropriate resources  9/21/2022 0646 by Ignacio Camejo RN  Outcome: Not Progressing

## 2022-09-21 NOTE — CONSULTS
[facility]  Internal Medicine Residency Program  MICU Consult    Patient:  Conner Gordon 56 y.o. female MRN: [id number]     Date of Service: 9/20/2022    Hospital Day: 1      Chief complaint: Shortness of breath, congestion, cough, chest pain and nausea     History Obtained From:  patient    History of Present Illness   Conner Gordon is a 56 y.o. female with PMH of acid reflux, HTN, hyponatremia, MVP, ascending aortic aneurysms, chronic hyponatremia, COPD not on inhalers or home oxygen, and left shoulder fracture s/p ORIF in Oct 2018 who presented to the ED with complaints of shortness of breath, cough, congestion, generalized body aches and right sided rib pain. According to the patient she was in her usual state of health until Friday when she started to have productive cough with whitish, yellowish sputum. she reports that she started to have shortness of breath as well. It is the first time she has shortness of breath of that magnitude, she has shortness of breath at rest as well as congestion and body aches as well. After the cough and shortness of breath started on Friday, on Saturday she started to have fever and chills, but did not measure her temperature at that time. One week ago she reports having been exposed to someone who had a positive COVID-19 test at work. Subsequent test for her back then was negative. She is not vaccinated against COVID-19. No new pets at home. She denies vomiting, diarrhea, nausea, and no longer has fever. When she could not bear it anymore, she decided to go to [facility] ED and received breathing treatment and transferred to the ICU for further management. ED Course: In the ED she was hemodynamically unstable with oxygen requirement, SPO2 was 70%, /75 mmHg, Temp 98.6, RR 20, Pulse 63. Workup in the ED was significant for Hb 14.8, Hct 40.6, WBC 12.0, Plt 262.  BMP: Na 125, K 3.0, chloride low with 76, AG 14, Glucose 147, AST 51, ALT 23, d-dimer 2384, Troponin was normal, pro-BNP 2185. CTA pulmonary showed no evidence of pulmonary embolism but right middle lobe consolidative process. , and new right pleural effusion. CXR showed right lower lobe pneumonia. EKG: normal sinus     ED Meds: Patient was given ceftriaxone 2g, doxycyline 100mg, duonebs, magnesium sulfate and potassium chloride, Decadron 10 mg.     ED Fluids: Patient was given 1L bolus of NS     Previous Hospital Admission: Last admission was in March 2021 at [facility] for hyponatremia, previous admissions for hyponatremia in [place] as well. She was given sodium tablet then with no improvement, and given tolvaptan which improved her sodium to 131. Past Medical History:       Diagnosis Date    Acid reflux     Fracture of left shoulder 10/2018    Frozen shoulder     left    Hypertension     Hyponatremia     MVP (mitral valve prolapse)     no issues       Past Surgical History:        Procedure Laterality Date    CLOSED MANIPULATION SHOULDER Left 3/5/2019    LEFT SHOULDER MANIPULATION UNDER ANTESTHESIA, STERIOD INJECTION TO FOLLOW performed by Nish aWng MD at [place] [address] Right 1980's    bone alignment    OPEN TREATMENT PROX HUMERAL FRACTURE Left 10/23/2018    LEFT SHOULDER OPEN REDUCTION INTERNAL FIXATION GREATER TUBEROSITY ++ARTHREX++ INTERSCALINE BLOCK++ performed by Nish Wang MD at [facility] 2018    left forearm       Medications Prior to Admission:    Prior to Admission medications    Medication Sig Start Date End Date Taking?  Authorizing Provider   carvedilol (COREG) 12.5 MG tablet Take 1 tablet by mouth 2 times daily  Patient taking differently: Take 25 mg by mouth 2 times daily 3/4/21   Rosmery Franco DO   tolvaptan (SAMSCA) 15 MG TABS tablet Take 0.5 tablets by mouth daily  Patient not taking: Reported on 9/20/2022 3/5/21   Rosmery Franco DO   cefdinir (OMNICEF) 300 MG capsule  10/8/19   Historical kg/m².      PHYSICAL EXAMINATION:  Physical Exam  Constitutional:       General: She is in acute distress. Appearance: Normal appearance. She is ill-appearing. Interventions: Face mask in place. HENT:      Head: Normocephalic and atraumatic. Mouth/Throat:      Mouth: Mucous membranes are dry. Cardiovascular:      Rate and Rhythm: Normal rate and regular rhythm. Heart sounds: Normal heart sounds, S1 normal and S2 normal. Heart sounds not distant. No murmur heard. No S3 or S4 sounds. Pulmonary:      Effort: Tachypnea, accessory muscle usage and respiratory distress present. Breath sounds: Examination of the right-upper field reveals rhonchi. Examination of the right-middle field reveals rhonchi. Examination of the right-lower field reveals rhonchi. Rhonchi present. Abdominal:      General: Abdomen is flat. Bowel sounds are normal. There is no distension. Palpations: Abdomen is soft. There is no mass. Tenderness: There is no abdominal tenderness. Musculoskeletal:      Cervical back: No rigidity or tenderness. Right lower leg: No edema. Left lower leg: No edema. Skin:     General: Skin is dry. Neurological:      Mental Status: She is alert. Mental status is at baseline. Psychiatric:         Mood and Affect: Mood normal.         Behavior: Behavior normal.         Judgment: Judgment normal.        Any additional physical findings:    Lines     site day    Art line   None    TLC None    PICC None    Hemoaccess None      VENT SETTINGS (Comprehensive) (if applicable): Additional Respiratory Assessments  Heart Rate: 73  Resp: 22  SpO2: 95 %    ABGs:   No results for input(s): PH, PCO2, PO2, HCO3, BE, O2SAT in the last 72 hours.     Laboratory findings:  Complete Blood Count:   Recent Labs     09/20/22  1511   WBC 12.0*   HGB 14.8   HCT 40.6           Last 3 Blood Glucose:   Recent Labs     09/20/22  1511   GLUCOSE 147*        PT/INR:    Lab Results Component Value Date/Time    PROTIME 11.6 05/27/2012 05:43 PM    INR 1.1 05/27/2012 05:43 PM     PTT:    Lab Results   Component Value Date/Time    APTT 28.0 05/27/2012 05:43 PM       Comprehensive Metabolic Profile:   Recent Labs     09/20/22  1511   *   K 3.0*   CL 76*   CO2 35*   BUN 20   CREATININE 0.8   GLUCOSE 147*   CALCIUM 8.6   PROT 6.5   LABALBU 3.1*   BILITOT 0.8   ALKPHOS 96   AST 51*   ALT 23      Magnesium:   Lab Results   Component Value Date/Time    MG 1.9 09/20/2022 03:25 PM     Phosphorus:   Lab Results   Component Value Date/Time    PHOS 4.3 02/27/2021 10:45 PM     Ionized Calcium: No results found for: CAION   Troponin: No results for input(s): TROPONINI in the last 72 hours. Imaging Studies        XR CHEST PORTABLE    Result Date: 9/20/2022  EXAMINATION: ONE XRAY VIEW OF THE CHEST 9/20/2022 4:27 pm COMPARISON: None. HISTORY: ORDERING SYSTEM PROVIDED HISTORY: hypoxia, covid TECHNOLOGIST PROVIDED HISTORY: Reason for exam:->hypoxia, covid FINDINGS: There is opacity in the right lower lung. Mild increased markings in the left lung. The heart is not enlarged. There is no pneumothorax. Minimal blunting of the costophrenic angles. Surgical hardware noted in the left proximal humerus. Right lower lung pneumonia. Follow-up study until complete resolution recommended. CTA PULMONARY W CONTRAST    Result Date: 9/20/2022  EXAMINATION: CTA OF THE CHEST 9/20/2022 4:35 pm TECHNIQUE: CTA of the chest was performed after the administration of intravenous contrast.  Multiplanar reformatted images are provided for review. MIP images are provided for review. Automated exposure control, iterative reconstruction, and/or weight based adjustment of the mA/kV was utilized to reduce the radiation dose to as low as reasonably achievable.  COMPARISON: 02/28/2021 HISTORY: ORDERING SYSTEM PROVIDED HISTORY: rule out PE TECHNOLOGIST PROVIDED HISTORY: Reason for exam:->rule out PE Decision Support Exception - unselect if not a suspected or confirmed emergency medical condition->Emergency Medical Condition (MA) FINDINGS: Pulmonary Arteries: Pulmonary arteries are adequately opacified for evaluation. No evidence of intraluminal filling defect to suggest pulmonary embolism. Main pulmonary artery is normal in caliber. mediastinal lymphadenopathy Mediastinum: There is no evidence of supraclavicular or axillary lymphadenopathy. Within the mediastinum however there are multiple new areas of lymphadenopathy. In the right paratracheal region there is a 13 mm lymph node seen on axial image (3:99). There is right paratracheal lymphadenopathy on axial image (3:108) measuring 15 mm and new subcarinal lymphadenopathy measuring 11 mm on axial image (3:111). The heart and pericardium demonstrate no acute abnormality. There is no acute abnormality of the thoracic aorta. Again noted is a fusiform aneurysm of the ascending thoracic aorta. This measures 47 x 46 mm as compared with 45 x 44 mm on the study of 02/28/2021 no signs of dissection are observed. 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 Lungs/pleura: The lungs reveal new patchy areas of ground-glass parenchymal density projecting over the right upper lung medially as well as left upper lung some of which contains ground-glass opacity with honeycombing in the left mid lower lung there are scattered areas of ground-glass opacities having the appearance of open rotation tree-in-bud\" ankle densities this suggest underlying inflammatory process. There is new lingula linear opacity that suggest atelectasis. There is a new large consolidative process in the right middle lobe that contains air bronchograms is concerning for pneumonia but there are is also a soft tissue component extending into the hilum and this is concerning for an underlying endobronchial lesion with postobstructive atelectasis.   Short-term follow-up or bronchoscopy may be helpful in further characterization if clinically indicated. No pulmonary edema. No evidence of  pneumothorax. There is a new small right pleural effusion with adjacent atelectasis. Upper Abdomen: Limited images of the upper abdomen are unremarkable. Again noted is an 8 mm and 2 focus in segment 2 seen on axial image (3:203. This is stable when compared the previous study and could represent an hemangiomata. Sonography or MRI would be of increased sensitivity if clinically indicated. The visualized pancreas spleen and adrenal glands revealed no acute abnormalities. The kidneys reveal symmetrical density Soft Tissues/Bones: No acute bone or soft tissue abnormality. 1.  No evidence of pulmonary embolism. 2.  New right middle lobe consolidative process concerning for an underlying endobronchial lesion with postobstructive atelectasis. 3.  Multiple scattered new areas of opacity within the lungs more so left than right concerning for atypical pneumonia or viral airway disease 4. Slight interval increase in size of the fusiform aneurysm of the ascending thoracic aorta and surgical consultation may be helpful in further characterization. 5.  New small right pleural effusion with adjacent compressive atelectasis RECOMMENDATIONS: Aorta is normal in caliber without acute abnormality. Unavailable       EKG: normal EKG, normal sinus rhythm, unchanged from previous tracings. Resident's Assessment and Plan     Clau Rodriguez is a 56 y.o. female came with   has a past medical history of Acid reflux, Fracture of left shoulder, Frozen shoulder, Hypertension, Hyponatremia, and MVP (mitral valve prolapse).   CC of Cough, congestion, nausea, shortness of breath and chest pain     Assessment:    Pulmonology   Acute hypoxic respiratory failure likely 2/2 COVID-pneumonia vs COPD exacerbation   Recent COVID-19 detected   Previous history of COPD   Shortness of breath, cough, fatigue, congestion   CT shows new right middle lobe consolidative process with concerns for post-obstructive atelectasis   SPO2 during first contact with a provider was 70%   Co-worker diagnosed with COVID 1 week prior to her having symptoms, not vaccinated against COVID-19  Generalized body aches, congestion, shortness of breath and cough     Plan   Respiratory panel   Sputum induced studies   Legionella antigen in urine, strep pneumoniae   Blood cultures   Breathing treatment with (Brovana, Pulmicort and Duonebs)   ESR, CRP, Procalcitonin   Currently on Decadron     Right lower lobe pneumonia likey 2/2 COVID-19 or CAP   CT shows new right middle lobe consolidative process with concerns for post-obstructive atelectasis   SPO2 during first contact with provider, SPO2 was 70%   Monitor respiratory status     Plan   Respiratory panel   Sputum induced studies   Legionella antigen in urine, strep pneumoniae   Blood cultures   Breathing treatment with (Brovana, Pulmicort and Duonebs)   ESR, CRP, Procalcitonin     New small right pleural effusion likely 2/2 new infectious process vs lung abscess   Monitor for resolution during infectious process treatment     Hx of COPD   No PFT on file, not on any inhalers at home, not on home oxygen     Hx of pulmonary long nodule     Plan   Breathing treatment during admission   Monitor respiratory status     Cardiology   Elevated pro-BNP, likely 2/2 acute infectious pulmonary process   Monitor     Hx of ascending aortic aneurysm (4.5 x 4.4 x 4cm with increase in size recently)     Hx of HTN   Previous workup for secondary HTN with ultrasound ruled out renal artery stenosis   Continue diltiazem, clonidine and carvedilol     Elevated pro-BNP likely 2/2 new infectious pulmonary process vs cardiac vs renal but no issues with those for now   Pro-BNP 2185 no baseline   Hx of mitral valve prolapse     Nephrology   [facility]  Lactic acid   Monitor lactic acid, trend until <2   IV fluids     Hypokalemia   K level 3.0, Replaced   Monitor BMP Hematology   Leukocytosis likely 2/2 infectious process   Monitor CBC, antibiotic to treat the underlying infectious process     Nephrology   Chronic hyponatremia on sodium tablet   Sodium on admission 125, has a long history of hyponatremia   Consult nephrology   Continue with salt tablets BID     Hypochloremic metabolic alkalosis likely 2/2 vomiting   HCO3 35, chloride 76     GI   Elevated AST  AST 54, ALT wnl   Drinks 2-3 times weekly, 3-4 beers       PT/OT: Not indicated yet   DVT ppx: Lovenox  GI ppx: Protonix       Code Status:   Full code        Gal Covington MD, PGY-1  Attending physician: Dr. Santa Hansen     NOTE: This report was transcribed using voice recognition software. Every effort was made to ensure accuracy; however, inadvertent computerized transcription errors may be present. [place]  Department of Pulmonary, Critical Care and Sleep Medicine  [address] [facility]  Department of Internal Medicine      During multidisciplinary team rounds Debby Marie is a 56 y.o. female was seen, examined and discussed. This is confirmation that I have personally seen and examined the patient and that the key elements of the encounter were performed by me (> 85 % time). The medications & laboratory data was discussed and adjusted where necessary. The radiographic images were reviewed or with radiologist or consultant if felt dis-concordant with the exam or history. The above findings were corroborated, plans confirmed and changes made if needed. Family is updated at the bedside as available. Key issues of the case were discussed among consultants. Critical Care time is documented if appropriate.       Froy Koch DO, FACP, FCCP, [facility],

## 2022-09-21 NOTE — ACP (ADVANCE CARE PLANNING)
Advance Care Planning   Healthcare Decision Maker:    Primary Decision Maker: Dennis Jauregui - Brother/Sister - [phone]    Click here to complete Healthcare Decision Makers including selection of the Healthcare Decision Maker Relationship (ie \"Primary\"). CM spoke with patient's sister Eduardo Fitzgerald at [phone]. Patient is not  & has no adult children, her parents have passed. Eduardo Fitzgerald is her only sister. Eduardo Fitzgerald does have a son.

## 2022-09-21 NOTE — ED NOTES
Report given to JARRETT Paz [facility], room 7478)     Joann Cat RN  09/20/22 1530

## 2022-09-21 NOTE — PLAN OF CARE
Problem: Skin/Tissue Integrity  Goal: Absence of new skin breakdown  Description: 1. Monitor for areas of redness and/or skin breakdown  2. Assess vascular access sites hourly  3. Every 4-6 hours minimum:  Change oxygen saturation probe site  4. Every 4-6 hours:  If on nasal continuous positive airway pressure, respiratory therapy assess nares and determine need for appliance change or resting period.   Outcome: Progressing     Problem: Safety - Adult  Goal: Free from fall injury  Outcome: Progressing     Problem: Pain  Goal: Verbalizes/displays adequate comfort level or baseline comfort level  Outcome: Progressing     Problem: Discharge Planning  Goal: Discharge to home or other facility with appropriate resources  Outcome: Not Progressing

## 2022-09-21 NOTE — CARE COORDINATION
9/21:  Transition of care:  Pt presented to the [place] ER for SOB. Pt was transferred on 9/20 to [facility]. Pt is in [facility] for Covid + 9/20. Pt is on 50 L/Aervo at 90%, Iv Thiamine, Iv Doxycycline, Iv Rocephin & Iv Decadron. CM spoke with pt's sister Armida Ackerman at [phone]. Pt's PCP is Dr. David Pike in [facility]. Pt lives alone with 1 step to enter. The bed/bathroom are on the 1st floor. PTA pt was independent & has no DME at home. Pt has no hx of SNF/HHC. Needs are unclear & will depend on 02 needs. Will need PT/OT evals. Sw/CM will continue to follow for dc planning.   Electronically signed by Nikolas Oliver RN on 9/21/2022 at 3:37 PM

## 2022-09-22 ENCOUNTER — APPOINTMENT (OUTPATIENT)
Dept: GENERAL RADIOLOGY | Age: 57
DRG: 177 | End: 2022-09-22
Payer: COMMERCIAL

## 2022-09-22 LAB
AADO2: 451.7 MMHG
ANION GAP SERPL CALCULATED.3IONS-SCNC: 11 MMOL/L (ref 7–16)
ANISOCYTOSIS: ABNORMAL
B.E.: 5.3 MMOL/L (ref -3–3)
BASOPHILS ABSOLUTE: 0.14 E9/L (ref 0–0.2)
BASOPHILS RELATIVE PERCENT: 0.9 % (ref 0–2)
BUN BLDV-MCNC: 20 MG/DL (ref 6–20)
CALCIUM SERPL-MCNC: 7.8 MG/DL (ref 8.6–10.2)
CHLORIDE BLD-SCNC: 82 MMOL/L (ref 98–107)
CO2: 29 MMOL/L (ref 22–29)
COHB: 0.9 % (ref 0–1.5)
CREAT SERPL-MCNC: 0.5 MG/DL (ref 0.5–1)
CRITICAL: ABNORMAL
DATE ANALYZED: ABNORMAL
DATE OF COLLECTION: ABNORMAL
EOSINOPHILS ABSOLUTE: 0 E9/L (ref 0.05–0.5)
EOSINOPHILS RELATIVE PERCENT: 0 % (ref 0–6)
FIO2: 80 %
GFR AFRICAN AMERICAN: >60
GFR NON-AFRICAN AMERICAN: >60 ML/MIN/1.73
GLUCOSE BLD-MCNC: 167 MG/DL (ref 74–99)
HCO3: 28.9 MMOL/L (ref 22–26)
HCT VFR BLD CALC: 37.5 % (ref 34–48)
HEMOGLOBIN: 13.8 G/DL (ref 11.5–15.5)
HHB: 8.6 % (ref 0–5)
LAB: ABNORMAL
LYMPHOCYTES ABSOLUTE: 0.45 E9/L (ref 1.5–4)
LYMPHOCYTES RELATIVE PERCENT: 2.6 % (ref 20–42)
Lab: ABNORMAL
MCH RBC QN AUTO: 34.6 PG (ref 26–35)
MCHC RBC AUTO-ENTMCNC: 36.8 % (ref 32–34.5)
MCV RBC AUTO: 94 FL (ref 80–99.9)
METER GLUCOSE: 122 MG/DL (ref 74–99)
METHB: 0.2 % (ref 0–1.5)
MODE: ABNORMAL
MONOCYTES ABSOLUTE: 0.6 E9/L (ref 0.1–0.95)
MONOCYTES RELATIVE PERCENT: 3.5 % (ref 2–12)
NEUTROPHILS ABSOLUTE: 14.04 E9/L (ref 1.8–7.3)
NEUTROPHILS RELATIVE PERCENT: 93 % (ref 43–80)
O2 SATURATION: 92.1 % (ref 92–98.5)
O2HB: 90.3 % (ref 94–97)
OPERATOR ID: 2962
PATIENT TEMP: 37 C
PCO2: 39 MMHG (ref 35–45)
PDW BLD-RTO: 11.8 FL (ref 11.5–15)
PFO2: 0.72 MMHG/%
PH BLOOD GAS: 7.49 (ref 7.35–7.45)
PLATELET # BLD: 401 E9/L (ref 130–450)
PMV BLD AUTO: 10 FL (ref 7–12)
PO2: 57.8 MMHG (ref 75–100)
POIKILOCYTES: ABNORMAL
POTASSIUM SERPL-SCNC: 3.9 MMOL/L (ref 3.5–5)
RBC # BLD: 3.99 E12/L (ref 3.5–5.5)
RI(T): 7.81
SODIUM BLD-SCNC: 122 MMOL/L (ref 132–146)
SOURCE, BLOOD GAS: ABNORMAL
TARGET CELLS: ABNORMAL
THB: 14.9 G/DL (ref 11.5–16.5)
TIME ANALYZED: 602
VACUOLATED NEUTROPHILS: ABNORMAL
WBC # BLD: 15.1 E9/L (ref 4.5–11.5)

## 2022-09-22 PROCEDURE — 82962 GLUCOSE BLOOD TEST: CPT

## 2022-09-22 PROCEDURE — 6370000000 HC RX 637 (ALT 250 FOR IP): Performed by: INTERNAL MEDICINE

## 2022-09-22 PROCEDURE — 2580000003 HC RX 258: Performed by: INTERNAL MEDICINE

## 2022-09-22 PROCEDURE — 80048 BASIC METABOLIC PNL TOTAL CA: CPT

## 2022-09-22 PROCEDURE — 2000000000 HC ICU R&B

## 2022-09-22 PROCEDURE — 2700000000 HC OXYGEN THERAPY PER DAY

## 2022-09-22 PROCEDURE — 6360000002 HC RX W HCPCS: Performed by: INTERNAL MEDICINE

## 2022-09-22 PROCEDURE — 2500000003 HC RX 250 WO HCPCS: Performed by: INTERNAL MEDICINE

## 2022-09-22 PROCEDURE — 99233 SBSQ HOSP IP/OBS HIGH 50: CPT | Performed by: INTERNAL MEDICINE

## 2022-09-22 PROCEDURE — 87040 BLOOD CULTURE FOR BACTERIA: CPT

## 2022-09-22 PROCEDURE — 85025 COMPLETE CBC W/AUTO DIFF WBC: CPT

## 2022-09-22 PROCEDURE — 94640 AIRWAY INHALATION TREATMENT: CPT

## 2022-09-22 PROCEDURE — 82805 BLOOD GASES W/O2 SATURATION: CPT

## 2022-09-22 PROCEDURE — 36415 COLL VENOUS BLD VENIPUNCTURE: CPT

## 2022-09-22 PROCEDURE — 71045 X-RAY EXAM CHEST 1 VIEW: CPT

## 2022-09-22 RX ORDER — SODIUM CHLORIDE FOR INHALATION 3 %
4 VIAL, NEBULIZER (ML) INHALATION 2 TIMES DAILY
Status: DISCONTINUED | OUTPATIENT
Start: 2022-09-22 | End: 2022-09-27

## 2022-09-22 RX ADMIN — SODIUM CHLORIDE SOLN NEBU 3% 4 ML: 3 NEBU SOLN at 20:10

## 2022-09-22 RX ADMIN — SODIUM CHLORIDE SOLN NEBU 3% 4 ML: 3 NEBU SOLN at 11:44

## 2022-09-22 RX ADMIN — SODIUM CHLORIDE 1 G: 1 TABLET ORAL at 21:12

## 2022-09-22 RX ADMIN — Medication 10 ML: at 21:13

## 2022-09-22 RX ADMIN — BUDESONIDE 500 MCG: 0.5 SUSPENSION RESPIRATORY (INHALATION) at 08:36

## 2022-09-22 RX ADMIN — DILTIAZEM HYDROCHLORIDE 120 MG: 60 CAPSULE, EXTENDED RELEASE ORAL at 21:12

## 2022-09-22 RX ADMIN — CARVEDILOL 12.5 MG: 6.25 TABLET, FILM COATED ORAL at 21:12

## 2022-09-22 RX ADMIN — ARFORMOTEROL TARTRATE 15 MCG: 15 SOLUTION RESPIRATORY (INHALATION) at 20:09

## 2022-09-22 RX ADMIN — CEFTRIAXONE 1000 MG: 1 INJECTION, POWDER, FOR SOLUTION INTRAMUSCULAR; INTRAVENOUS at 21:13

## 2022-09-22 RX ADMIN — Medication 50 MG: at 08:52

## 2022-09-22 RX ADMIN — TRIMETHOBENZAMIDE HYDROCHLORIDE 200 MG: 100 INJECTION INTRAMUSCULAR at 21:13

## 2022-09-22 RX ADMIN — IPRATROPIUM BROMIDE AND ALBUTEROL SULFATE 1 AMPULE: .5; 2.5 SOLUTION RESPIRATORY (INHALATION) at 11:44

## 2022-09-22 RX ADMIN — PANTOPRAZOLE SODIUM 40 MG: 40 TABLET, DELAYED RELEASE ORAL at 05:54

## 2022-09-22 RX ADMIN — DEXAMETHASONE SODIUM PHOSPHATE 20 MG: 4 INJECTION, SOLUTION INTRAMUSCULAR; INTRAVENOUS at 08:55

## 2022-09-22 RX ADMIN — CARVEDILOL 12.5 MG: 6.25 TABLET, FILM COATED ORAL at 08:52

## 2022-09-22 RX ADMIN — THIAMINE HYDROCHLORIDE 500 MG: 100 INJECTION, SOLUTION INTRAMUSCULAR; INTRAVENOUS at 10:43

## 2022-09-22 RX ADMIN — CLONIDINE HYDROCHLORIDE 0.2 MG: 0.2 TABLET ORAL at 13:54

## 2022-09-22 RX ADMIN — ARFORMOTEROL TARTRATE 15 MCG: 15 SOLUTION RESPIRATORY (INHALATION) at 08:36

## 2022-09-22 RX ADMIN — DILTIAZEM HYDROCHLORIDE 120 MG: 60 CAPSULE, EXTENDED RELEASE ORAL at 08:52

## 2022-09-22 RX ADMIN — IPRATROPIUM BROMIDE AND ALBUTEROL SULFATE 1 AMPULE: .5; 2.5 SOLUTION RESPIRATORY (INHALATION) at 08:36

## 2022-09-22 RX ADMIN — ENOXAPARIN SODIUM 40 MG: 100 INJECTION SUBCUTANEOUS at 08:52

## 2022-09-22 RX ADMIN — IPRATROPIUM BROMIDE AND ALBUTEROL SULFATE 1 AMPULE: .5; 2.5 SOLUTION RESPIRATORY (INHALATION) at 20:09

## 2022-09-22 RX ADMIN — CLONIDINE HYDROCHLORIDE 0.2 MG: 0.2 TABLET ORAL at 08:52

## 2022-09-22 RX ADMIN — DOXYCYCLINE 100 MG: 100 INJECTION, POWDER, LYOPHILIZED, FOR SOLUTION INTRAVENOUS at 06:16

## 2022-09-22 RX ADMIN — ACETAMINOPHEN 650 MG: 325 TABLET, FILM COATED ORAL at 13:53

## 2022-09-22 RX ADMIN — FOLIC ACID 1 MG: 1 TABLET ORAL at 08:53

## 2022-09-22 RX ADMIN — BUDESONIDE 500 MCG: 0.5 SUSPENSION RESPIRATORY (INHALATION) at 20:10

## 2022-09-22 RX ADMIN — OXYCODONE HYDROCHLORIDE AND ACETAMINOPHEN 500 MG: 500 TABLET ORAL at 08:52

## 2022-09-22 RX ADMIN — CLONIDINE HYDROCHLORIDE 0.2 MG: 0.2 TABLET ORAL at 21:11

## 2022-09-22 RX ADMIN — SODIUM CHLORIDE 1 G: 1 TABLET ORAL at 08:52

## 2022-09-22 ASSESSMENT — PAIN SCALES - GENERAL
PAINLEVEL_OUTOF10: 0

## 2022-09-22 NOTE — PLAN OF CARE
Problem: Discharge Planning  Goal: Discharge to home or other facility with appropriate resources  Outcome: Progressing     Problem: Skin/Tissue Integrity  Goal: Absence of new skin breakdown  Description: 1. Monitor for areas of redness and/or skin breakdown  2. Assess vascular access sites hourly  3. Every 4-6 hours minimum:  Change oxygen saturation probe site  4. Every 4-6 hours:  If on nasal continuous positive airway pressure, respiratory therapy assess nares and determine need for appliance change or resting period.   9/22/2022 0027 by Rudi Pedersen RN  Outcome: Progressing  9/21/2022 2256 by Magdiel Anguiano  Outcome: Progressing     Problem: Safety - Adult  Goal: Free from fall injury  9/22/2022 0027 by Rudi Pedersen RN  Outcome: Progressing  9/21/2022 2256 by Magdiel Anguiano  Outcome: Progressing     Problem: Pain  Goal: Verbalizes/displays adequate comfort level or baseline comfort level  Outcome: Progressing

## 2022-09-22 NOTE — PROGRESS NOTES
[address] [facility]  Department of Internal Medicine   Internal Medicine Residency   MICU Progress Note    Patient:  Joselyn Gutierrez 56 y.o. female  MRN: [id number]     Date of Service: 2022    Allergy: Patient has no known allergies. Subjective   This morning patient was seen and examined at bedside. Still requiring 30 L of oxygen, patient was seen on Air[employer]. She reports that she is doing better, had a bowel movement, is eating even though not too much, and is moving around her wound to use the commode. No other complaint for this morning. 24h change: Back pain resolved with Voltaren gel  ROS: Denies Fever/chills/CP/SOB/N/V/D/C/Dysuria/Blood in stool or urine  Objective     VITAL SIGNS:  /85   Pulse 64   Temp (!) 96.5 °F (35.8 °C) (Axillary)   Resp 23   Ht 5' 2\" (1.575 m)   Wt 98 lb 3.2 oz (44.5 kg)   LMP 2012   SpO2 (!) 89%   BMI 17.96 kg/m²   Tmax over 24 hours:  Temp (24hrs), Av °F (36.1 °C), Min:96.5 °F (35.8 °C), Max:97.4 °F (36.3 °C)      Patient Vitals for the past 6 hrs:   BP Temp Temp src Pulse Resp SpO2   22 1100 116/85 -- -- 64 23 (!) 89 %   22 1000 (!) 96/58 -- -- 60 21 94 %   22 0900 -- -- -- 58 30 91 %   22 0830 -- -- -- 52 23 95 %   22 0829 -- -- -- 54 21 95 %   22 0828 -- -- -- 54 22 94 %   22 0827 -- -- -- 60 17 93 %   22 0800 126/82 (!) 96.5 °F (35.8 °C) Axillary 56 18 94 %   22 0700 -- -- -- 51 26 95 %   22 0600 -- -- -- 58 24 --         Intake/Output Summary (Last 24 hours) at 2022 1128  Last data filed at 2022 8992  Gross per 24 hour   Intake 814.14 ml   Output 200 ml   Net 614.14 ml     Wt Readings from Last 2 Encounters:   22 98 lb 3.2 oz (44.5 kg)   22 102 lb (46.3 kg)     Body mass index is 17.96 kg/m².         I & O - 24hr:   Intake/Output Summary (Last 24 hours) at 2022 1128  Last data filed at 2022 4415  Gross per 24 hour   Intake 814.14 ml   Output 200 ml Net 614.14 ml       Physical Exam:  General Appearance: alert, appears stated age, cachectic, and cooperative  Neck: no adenopathy, no carotid bruit, no JVD, supple, symmetrical, trachea midline, and thyroid not enlarged, symmetric, no tenderness/mass/nodules  Lung: rhonchi bilaterally, improved compared to yesterday. Heart: regular rate and rhythm, S1, S2 normal, no murmur, click, rub or gallop  Abdomen: soft, non-tender; bowel sounds normal; no masses,  no organomegaly  Extremities:  extremities normal, atraumatic, no cyanosis or edema  Musculoskeletal: No joint swelling, no muscle tenderness. ROM normal in all joints of extremities.    Neurologic: Mental status: Alert, oriented, thought content appropriate    Lines     site day    Art line   None    TLC None    PICC None    Hemoaccess None      VENT SETTINGS (Comprehensive) (if applicable):  Vent Information  Equipment Changed: Humidification  Additional Respiratory Assessments  Heart Rate: 64  Resp: 23  SpO2: (!) 89 %  Humidification Source: Heated wire  Humidification Temp: 31    ABGs:   Recent Labs     09/22/22  0602   PH 7.488*   PCO2 39.0   PO2 57.8*   HCO3 28.9*   BE 5.3*   O2SAT 92.1       Laboratory findings:  Complete Blood Count:   Recent Labs     09/20/22  1511 09/21/22  0555 09/22/22  0903   WBC 12.0* 15.2* 15.1*   HGB 14.8 15.4 13.8   HCT 40.6 42.9 37.5    313 401        Last 3 Blood Glucose:   Recent Labs     09/20/22  1511 09/21/22  0555 09/22/22  0903   GLUCOSE 147* 119* 167*        PT/INR:    Lab Results   Component Value Date/Time    PROTIME 11.6 05/27/2012 05:43 PM    INR 1.1 05/27/2012 05:43 PM     PTT:    Lab Results   Component Value Date/Time    APTT 28.0 05/27/2012 05:43 PM       Comprehensive Metabolic Profile:   Recent Labs     09/20/22  1511 09/21/22  0555 09/22/22  0903   * 122* 122*   K 3.0* 3.5 3.9   CL 76* 80* 82*   CO2 35* 29 29   BUN 20 17 20   CREATININE 0.8 0.5 0.5   GLUCOSE 147* 119* 167*   CALCIUM 8.6 8.2* 7.8* PROT 6.5 6.3*  --    LABALBU 3.1* 2.9*  --    BILITOT 0.8 0.4  --    ALKPHOS 96 101  --    AST 51* 57*  --    ALT 23 26  --       Magnesium:   Lab Results   Component Value Date/Time    MG 2.4 09/21/2022 05:55 AM     Phosphorus:   Lab Results   Component Value Date/Time    PHOS 4.3 02/27/2021 10:45 PM     Ionized Calcium: No results found for: CAION   Troponin: No results for input(s): TROPONINI in the last 72 hours. Medications     Infusions: (Fluid, Sedation, Vasopressors)  IVF:     Vasopressors  None  Sedation  None    Nutrition: Adult regular diet      ATB:   Antibiotics  Days   Ceftriaxone 2   Vibramycin  2       Cultures: Positive for strep pneumo antigen        Imaging     XR CHEST PORTABLE    Result Date: 9/22/2022  1. Stable chest 2. Rounded perihilar density concerning for mass, stable 3. Minimal bibasilar parenchymal density suggest atelectasis or infiltrate, unchanged. XR CHEST PORTABLE    Result Date: 9/21/2022  Persistent bibasilar infiltrates most pronounced on the right with small left-sided pleural effusion. XR CHEST PORTABLE    Result Date: 9/20/2022  Right lower lung pneumonia. Follow-up study until complete resolution recommended. CTA PULMONARY W CONTRAST    Result Date: 9/20/2022  1. No evidence of pulmonary embolism. 2.  New right middle lobe consolidative process concerning for an underlying endobronchial lesion with postobstructive atelectasis. 3.  Multiple scattered new areas of opacity within the lungs more so left than right concerning for atypical pneumonia or viral airway disease 4. Slight interval increase in size of the fusiform aneurysm of the ascending thoracic aorta and surgical consultation may be helpful in further characterization. 5.  New small right pleural effusion with adjacent compressive atelectasis RECOMMENDATIONS: Aorta is normal in caliber without acute abnormality.  Unavailable        Resident's Assessment and Plan     Iker Cabrera   , 56 y.o. , female came with CC : Shortness of breath, generalized body aches, cough     has a past medical history of Acid reflux, Fracture of left shoulder, Frozen shoulder, Hypertension, Hyponatremia, and MVP (mitral valve prolapse).          Days since Admission: 09/20/2022  Days on Ventilator : None    Consults:   None    Assessment:  Pulmonology   Acute hypoxic respiratory failure likely 2/2 COVID-pneumonia vs COPD exacerbation -resolving  Recent COVID-19 detected   Previous history of COPD   Shortness of breath, cough, fatigue, congestion   CT shows new right middle lobe consolidative process with concerns for post-obstructive atelectasis   SPO2 during first contact with a provider was 70%   Co-worker diagnosed with COVID 1 week prior to her having symptoms, not vaccinated against COVID-19  Generalized body aches, congestion, shortness of breath and cough      Plan   Respiratory panel   Sputum induced studies pending  Legionella antigen in urine negative, strep pneumoniae positive  Breathing treatment with (Brovana, Pulmicort and Duonebs)        Right lower lobe pneumonia likey 2/2 COVID-19 superimposed on Strep pneumoniae   CT shows new right middle lobe consolidative process with concerns for post-obstructive atelectasis   SPO2 during first contact with provider, SPO2 was 70%   Monitor respiratory status      Respiratory panel positive for Covid-19   Sputum induced studies pending  Legionella antigen in urine negative, strep pneumoniae positive  Breathing treatment with (Brovana, Pulmicort and Duonebs)      New small right pleural effusion likely 2/2 new infectious process vs lung abscess   Monitor for resolution during infectious process treatment      Hx of COPD   No PFT on file, not on any inhalers at home, not on home oxygen      Hx of pulmonary long nodule      Plan   Breathing treatment during admission   Monitor respiratory status      Cardiology   Elevated pro-BNP, likely 2/2 acute infectious pulmonary process Monitor      Hx of ascending aortic aneurysm (4.5 x 4.4 x 4cm with increase in size recently)      Hx of HTN   Previous workup for secondary HTN with ultrasound ruled out renal artery stenosis   Continue diltiazem, clonidine and carvedilol      Elevated pro-BNP likely 2/2 new infectious pulmonary process vs cardiac vs renal but no issues with those for now   Pro-BNP 2185 no baseline   Hx of mitral valve prolapse      Nephrology -resolved  NAGMA  Lactic acid 1.2 IV fluids      Hypokalemia -resolved  K level 3.9   Monitor BMP      Hematology   Leukocytosis likely 2/2 infectious process   Monitor CBC, antibiotic to treat the underlying infectious process      Nephrology   Chronic hyponatremia on sodium tablet   Sodium on admission 125, has a long history of hyponatremia   Consult nephrology   Continue with salt tablets BID      GI   Elevated AST  AST 54, ALT wnl   Drinks 2-3 times weekly, 3-4 beers     Problems resolved during this admission:   Hypokalemia  Lactic acidosis        PT/OT: Might need after discharge  DVT ppx: Lovenox  GI ppx: Protonix      Code Status:   Full code    Disposition: Continue current management    Yehuda Chacon MD, PGY-1    Attending physician: Dr. Kaleb Castro     NOTE: This report was transcribed using voice recognition software. Every effort was made to ensure accuracy; however, inadvertent computerized transcription errors may be present. Emily  Department of Pulmonary, Critical Care and Sleep Medicine  [address] [facility]  Department of Internal Medicine      During multidisciplinary team rounds Colin Malave is a 56 y.o. female was seen, examined and discussed. This is confirmation that I have personally seen and examined the patient and that the key elements of the encounter were performed by me (> 85 % time). The medications & laboratory data was discussed and adjusted where necessary.  The radiographic images were reviewed or with radiologist or consultant if felt dis-concordant with the exam or history. The above findings were corroborated, plans confirmed and changes made if needed. Family is updated at the bedside as available. Key issues of the case were discussed among consultants. Critical Care time is documented if appropriate.       Nicholas Mcpherson DO, FACP, FCCP, [place],

## 2022-09-22 NOTE — PROGRESS NOTES
Rima Elizalde MD FACP  Internal medicine  Progress Notes      CHIEF COMPLAINT: Shortness of breath      HISTORY OF PRESENT ILLNESS:    9/21/2022  Patient was seen in medical ICU earlier this morning  Spoke with the ER physician at the time of admission  Data reviewed in detail with the patient  31-year-old  woman unvaccinated for COVID presents with 1 week history of progressive dyspnea associated with cough and fever  She was exposed to someone with COVID a week ago  She tested positive for COVID  Admitted to ICU due to acute hypoxic respiratory failure  She feels better this morning  9/22/2022  Patient was seen on the medical ICU earlier this morning  She is [facility]  She states she feels better  Remains on high flow oxygen    Past Medical History:    Past Medical History:   Diagnosis Date    Acid reflux     Fracture of left shoulder 10/2018    Frozen shoulder     left    Hypertension     Hyponatremia     MVP (mitral valve prolapse)     no issues       Past Surgical History:    Past Surgical History:   Procedure Laterality Date    CLOSED MANIPULATION SHOULDER Left 3/5/2019    LEFT SHOULDER MANIPULATION UNDER ANTESTHESIA, STERIOD INJECTION TO FOLLOW performed by Nayla Momin MD at [facility] [address] Right 1980's    bone alignment    OPEN TREATMENT PROX HUMERAL FRACTURE Left 10/23/2018    LEFT SHOULDER OPEN REDUCTION INTERNAL FIXATION GREATER TUBEROSITY ++ARTHREX++ INTERSCALINE BLOCK++ performed by Nayla Momin MD at [facility] [address] 2018    left forearm       Medications Prior to Admission:    Medications Prior to Admission: carvedilol (COREG) 12.5 MG tablet, Take 1 tablet by mouth 2 times daily (Patient taking differently: Take 25 mg by mouth 2 times daily)  tolvaptan (SAMSCA) 15 MG TABS tablet, Take 0.5 tablets by mouth daily (Patient not taking: Reported on 9/20/2022)  cefdinir (OMNICEF) 300 MG capsule,   brompheniramine-pseudoephedrine-DM 2-30-10 MG/5ML syrup,   diltiazem (DILACOR XR) 120 MG extended release capsule, Take 120 mg by mouth 2 times daily Instructed to take morning of surgery with a sip of water  sodium chloride 1 g tablet, Take 1 g by mouth 2 times daily Instructed to take morning of surgery with a sip of water per Dr. Peggye Cabot  cloNIDine (CATAPRES) 0.2 MG tablet, Take 0.2 mg by mouth 3 times daily Instructed to take morning of surgery with a sip of water    Allergies:    Patient has no known allergies. Social History:    reports that she has been smoking cigarettes. She has a 30.00 pack-year smoking history. She has never used smokeless tobacco. She reports current alcohol use. She reports that she does not use drugs. Family History:   family history is not on file. REVIEW OF SYSTEMS:  As above in the HPI, otherwise negative    PHYSICAL EXAM:    Vitals:  /82   Pulse 58   Temp (!) 96.5 °F (35.8 °C) (Axillary)   Resp 30   Ht 5' 2\" (1.575 m)   Wt 98 lb 3.2 oz (44.5 kg)   LMP 05/20/2012   SpO2 91%   BMI 17.96 kg/m²     General:  Awake, alert, oriented X 3. Thin built somewhat tired emaciated  On high flow oxygen through nasal cannula  HEENT:  Normocephalic, atraumatic. Pupils equal, round, reactive to light. No scleral icterus. No conjunctival injection   No nasal discharge. Neck:  Supple no adenopathy  Heart:  RRR, no murmurs, gallops, rubs  Lungs: Breath sounds diminished especially right lower base  Abdomen:   Bowel sounds present, soft, nontender, no masses, no organomegaly, no peritoneal signs  Extremities:  No clubbing, cyanosis, or edema  Skin:  Warm and dry, no open lesions or rash  Neuro:  Cranial nerves 2-12 intact, no focal deficits  Generalized muscle atrophy noted  Breast: deferred  Rectal: deferred  Genitalia:  deferred    LABS:  Data reviewed      ASSESSMENT:      Principal Problem:    Acute respiratory failure ([address]  COVID infection  Superimposed bacterial pneumonia  Comorbidities present and past as listed below  Patient Active Problem List   Diagnosis    Hyponatremia    Acute hyponatremia    Traumatic closed displaced fracture of left shoulder with anterior dislocation    Acute respiratory failure (HCC)           PLAN:  Oxygen supplementation/wean as tolerated  Steroids intravenously  Bronchodilators  Empiric antibiotics with Rocephin and doxycycline  ICU care as outlined  Questions answered to her satisfaction    Sridhar Dye MD  10:18 AM  9/22/2022

## 2022-09-23 ENCOUNTER — APPOINTMENT (OUTPATIENT)
Dept: GENERAL RADIOLOGY | Age: 57
DRG: 177 | End: 2022-09-23
Payer: COMMERCIAL

## 2022-09-23 LAB
ALBUMIN SERPL-MCNC: 2.8 G/DL (ref 3.5–5.2)
ALP BLD-CCNC: 108 U/L (ref 35–104)
ALT SERPL-CCNC: 35 U/L (ref 0–32)
ANION GAP SERPL CALCULATED.3IONS-SCNC: 11 MMOL/L (ref 7–16)
ANISOCYTOSIS: ABNORMAL
AST SERPL-CCNC: 42 U/L (ref 0–31)
B.E.: 7.2 MMOL/L (ref -3–3)
BASOPHILS ABSOLUTE: 0 E9/L (ref 0–0.2)
BASOPHILS RELATIVE PERCENT: 0.3 % (ref 0–2)
BILIRUB SERPL-MCNC: 0.4 MG/DL (ref 0–1.2)
BUN BLDV-MCNC: 16 MG/DL (ref 6–20)
BURR CELLS: ABNORMAL
CALCIUM SERPL-MCNC: 8.3 MG/DL (ref 8.6–10.2)
CHLORIDE BLD-SCNC: 82 MMOL/L (ref 98–107)
CO2: 29 MMOL/L (ref 22–29)
COHB: 0.1 % (ref 0–1.5)
CREAT SERPL-MCNC: 0.4 MG/DL (ref 0.5–1)
CRITICAL: ABNORMAL
DATE ANALYZED: ABNORMAL
DATE OF COLLECTION: ABNORMAL
EOSINOPHILS ABSOLUTE: 0 E9/L (ref 0.05–0.5)
EOSINOPHILS RELATIVE PERCENT: 0 % (ref 0–6)
GFR AFRICAN AMERICAN: >60
GFR NON-AFRICAN AMERICAN: >60 ML/MIN/1.73
GLUCOSE BLD-MCNC: 140 MG/DL (ref 74–99)
HCO3: 31.1 MMOL/L (ref 22–26)
HCT VFR BLD CALC: 36.7 % (ref 34–48)
HEMOGLOBIN: 13.6 G/DL (ref 11.5–15.5)
HHB: 4.7 % (ref 0–5)
LAB: ABNORMAL
LYMPHOCYTES ABSOLUTE: 0.3 E9/L (ref 1.5–4)
LYMPHOCYTES RELATIVE PERCENT: 1.7 % (ref 20–42)
Lab: ABNORMAL
MCH RBC QN AUTO: 34.7 PG (ref 26–35)
MCHC RBC AUTO-ENTMCNC: 37.1 % (ref 32–34.5)
MCV RBC AUTO: 93.6 FL (ref 80–99.9)
METER GLUCOSE: 149 MG/DL (ref 74–99)
METHB: 0.1 % (ref 0–1.5)
MODE: ABNORMAL
MONOCYTES ABSOLUTE: 0.76 E9/L (ref 0.1–0.95)
MONOCYTES RELATIVE PERCENT: 5.2 % (ref 2–12)
NEUTROPHILS ABSOLUTE: 14.14 E9/L (ref 1.8–7.3)
NEUTROPHILS RELATIVE PERCENT: 93.1 % (ref 43–80)
O2 SATURATION: 96.2 % (ref 92–98.5)
O2HB: 95.1 % (ref 94–97)
OPERATOR ID: ABNORMAL
PATIENT TEMP: 37 C
PCO2: 41.6 MMHG (ref 35–45)
PDW BLD-RTO: 11.7 FL (ref 11.5–15)
PH BLOOD GAS: 7.49 (ref 7.35–7.45)
PLATELET # BLD: 409 E9/L (ref 130–450)
PMV BLD AUTO: 9.4 FL (ref 7–12)
PO2: 76.6 MMHG (ref 75–100)
POIKILOCYTES: ABNORMAL
POTASSIUM SERPL-SCNC: 3.3 MMOL/L (ref 3.5–5)
RBC # BLD: 3.92 E12/L (ref 3.5–5.5)
SODIUM BLD-SCNC: 122 MMOL/L (ref 132–146)
SOURCE, BLOOD GAS: ABNORMAL
THB: 13.3 G/DL (ref 11.5–16.5)
TIME ANALYZED: 1537
TOTAL PROTEIN: 5.8 G/DL (ref 6.4–8.3)
WBC # BLD: 15.2 E9/L (ref 4.5–11.5)

## 2022-09-23 PROCEDURE — 6370000000 HC RX 637 (ALT 250 FOR IP): Performed by: INTERNAL MEDICINE

## 2022-09-23 PROCEDURE — 2580000003 HC RX 258: Performed by: INTERNAL MEDICINE

## 2022-09-23 PROCEDURE — 82805 BLOOD GASES W/O2 SATURATION: CPT

## 2022-09-23 PROCEDURE — 6370000000 HC RX 637 (ALT 250 FOR IP)

## 2022-09-23 PROCEDURE — 82962 GLUCOSE BLOOD TEST: CPT

## 2022-09-23 PROCEDURE — 80053 COMPREHEN METABOLIC PANEL: CPT

## 2022-09-23 PROCEDURE — 2700000000 HC OXYGEN THERAPY PER DAY

## 2022-09-23 PROCEDURE — 6360000002 HC RX W HCPCS

## 2022-09-23 PROCEDURE — 6360000002 HC RX W HCPCS: Performed by: INTERNAL MEDICINE

## 2022-09-23 PROCEDURE — 85025 COMPLETE CBC W/AUTO DIFF WBC: CPT

## 2022-09-23 PROCEDURE — 94640 AIRWAY INHALATION TREATMENT: CPT

## 2022-09-23 PROCEDURE — 71045 X-RAY EXAM CHEST 1 VIEW: CPT

## 2022-09-23 PROCEDURE — 2000000000 HC ICU R&B

## 2022-09-23 PROCEDURE — 99233 SBSQ HOSP IP/OBS HIGH 50: CPT | Performed by: INTERNAL MEDICINE

## 2022-09-23 RX ORDER — POTASSIUM CHLORIDE 7.45 MG/ML
10 INJECTION INTRAVENOUS
Status: COMPLETED | OUTPATIENT
Start: 2022-09-23 | End: 2022-09-23

## 2022-09-23 RX ADMIN — ARFORMOTEROL TARTRATE 15 MCG: 15 SOLUTION RESPIRATORY (INHALATION) at 08:21

## 2022-09-23 RX ADMIN — SODIUM CHLORIDE SOLN NEBU 3% 4 ML: 3 NEBU SOLN at 15:44

## 2022-09-23 RX ADMIN — BUDESONIDE 500 MCG: 0.5 SUSPENSION RESPIRATORY (INHALATION) at 08:21

## 2022-09-23 RX ADMIN — DEXAMETHASONE SODIUM PHOSPHATE 20 MG: 4 INJECTION, SOLUTION INTRAMUSCULAR; INTRAVENOUS at 08:55

## 2022-09-23 RX ADMIN — BUDESONIDE 500 MCG: 0.5 SUSPENSION RESPIRATORY (INHALATION) at 19:34

## 2022-09-23 RX ADMIN — DILTIAZEM HYDROCHLORIDE 120 MG: 60 CAPSULE, EXTENDED RELEASE ORAL at 08:43

## 2022-09-23 RX ADMIN — ARFORMOTEROL TARTRATE 15 MCG: 15 SOLUTION RESPIRATORY (INHALATION) at 19:34

## 2022-09-23 RX ADMIN — PANTOPRAZOLE SODIUM 40 MG: 40 TABLET, DELAYED RELEASE ORAL at 05:47

## 2022-09-23 RX ADMIN — IPRATROPIUM BROMIDE AND ALBUTEROL SULFATE 1 AMPULE: .5; 2.5 SOLUTION RESPIRATORY (INHALATION) at 15:44

## 2022-09-23 RX ADMIN — FOLIC ACID 1 MG: 1 TABLET ORAL at 08:43

## 2022-09-23 RX ADMIN — IPRATROPIUM BROMIDE AND ALBUTEROL SULFATE 1 AMPULE: .5; 2.5 SOLUTION RESPIRATORY (INHALATION) at 13:39

## 2022-09-23 RX ADMIN — Medication 50 MG: at 08:44

## 2022-09-23 RX ADMIN — THIAMINE HYDROCHLORIDE 500 MG: 100 INJECTION, SOLUTION INTRAMUSCULAR; INTRAVENOUS at 10:22

## 2022-09-23 RX ADMIN — CEFTRIAXONE 1000 MG: 1 INJECTION, POWDER, FOR SOLUTION INTRAMUSCULAR; INTRAVENOUS at 17:45

## 2022-09-23 RX ADMIN — Medication 10 ML: at 20:29

## 2022-09-23 RX ADMIN — POTASSIUM CHLORIDE 10 MEQ: 7.46 INJECTION, SOLUTION INTRAVENOUS at 10:14

## 2022-09-23 RX ADMIN — CARVEDILOL 12.5 MG: 6.25 TABLET, FILM COATED ORAL at 20:28

## 2022-09-23 RX ADMIN — CLONIDINE HYDROCHLORIDE 0.2 MG: 0.2 TABLET ORAL at 08:44

## 2022-09-23 RX ADMIN — IPRATROPIUM BROMIDE AND ALBUTEROL SULFATE 1 AMPULE: .5; 2.5 SOLUTION RESPIRATORY (INHALATION) at 19:34

## 2022-09-23 RX ADMIN — CLONIDINE HYDROCHLORIDE 0.2 MG: 0.2 TABLET ORAL at 20:28

## 2022-09-23 RX ADMIN — SODIUM CHLORIDE SOLN NEBU 3% 4 ML: 3 NEBU SOLN at 13:39

## 2022-09-23 RX ADMIN — POTASSIUM BICARBONATE 20 MEQ: 782 TABLET, EFFERVESCENT ORAL at 08:43

## 2022-09-23 RX ADMIN — DILTIAZEM HYDROCHLORIDE 120 MG: 60 CAPSULE, EXTENDED RELEASE ORAL at 20:28

## 2022-09-23 RX ADMIN — CLONIDINE HYDROCHLORIDE 0.2 MG: 0.2 TABLET ORAL at 13:34

## 2022-09-23 RX ADMIN — SODIUM CHLORIDE 1 G: 1 TABLET ORAL at 20:28

## 2022-09-23 RX ADMIN — ENOXAPARIN SODIUM 40 MG: 100 INJECTION SUBCUTANEOUS at 08:42

## 2022-09-23 RX ADMIN — OXYCODONE HYDROCHLORIDE AND ACETAMINOPHEN 500 MG: 500 TABLET ORAL at 08:44

## 2022-09-23 RX ADMIN — SODIUM CHLORIDE 1 G: 1 TABLET ORAL at 08:43

## 2022-09-23 RX ADMIN — POTASSIUM CHLORIDE 10 MEQ: 7.46 INJECTION, SOLUTION INTRAVENOUS at 08:55

## 2022-09-23 RX ADMIN — CARVEDILOL 12.5 MG: 6.25 TABLET, FILM COATED ORAL at 08:44

## 2022-09-23 ASSESSMENT — PAIN SCALES - GENERAL
PAINLEVEL_OUTOF10: 0

## 2022-09-23 NOTE — PROGRESS NOTES
[facility]  Department of Internal Medicine   Internal Medicine Residency   MICU Progress Note    Patient:  Pio Francisco 56 y.o. female  MRN: [id number]     Date of Service: 9/23/2022    Allergy: Patient has no known allergies. Subjective   The patient was seen and examined in the morning today. The patient had no any significant complains overnight. She was tolerating well with the high flow heated nasal cannula. Patient could sleep well overnight. She complained of cough with productive sputum but was not worsening from the previous day. The patient is requiring less oxygen than the previous day. ABG done in the afternoon showed improved oxygenation compared to the previous one.     24h change: Oxygen requirement coming down to 30 L of O2 per minute  ROS: Denies Fever/chills/CP/SOB/N/V/D/C/Dysuria/Blood in stool or urine  Objective     VS: /79   Pulse 61   Temp (!) 96.6 °F (35.9 °C) (Oral)   Resp 22   Ht 5' 2\" (1.575 m)   Wt 98 lb 3.2 oz (44.5 kg)   LMP 05/20/2012   SpO2 98%   BMI 17.96 kg/m²           I & O - 24hr:   Intake/Output Summary (Last 24 hours) at 9/23/2022 1612  Last data filed at 9/23/2022 1000  Gross per 24 hour   Intake 950 ml   Output 700 ml   Net 250 ml       Physical Exam:  General Appearance: alert, appears stated age, and cooperative  Neck: no adenopathy, no carotid bruit, no JVD, supple, symmetrical, trachea midline, and thyroid not enlarged, symmetric, no tenderness/mass/nodules  Lung: B/L decreased air entry, rhonchi present in the right inframammary region  Heart: regular rate and rhythm, S1, S2 normal, no murmur, click, rub or gallop  Abdomen: soft, non-tender; bowel sounds normal; no masses,  no organomegaly  Extremities:  extremities normal, atraumatic, no cyanosis or edema  Musculoskeletal: No joint swelling, no muscle tenderness. ROM normal in all joints of extremities.    Neurologic: Mental status: Alert, oriented, thought content appropriate    Lines     site day    Art line   None    TLC None    PICC None    Hemoaccess None    Oxygen:    Heated high flow nasal canula at 30L/min/face mask     Lab Results   Component Value Date/Time    PH 7.492 09/23/2022 03:37 PM    PCO2 41.6 09/23/2022 03:37 PM    PO2 76.6 09/23/2022 03:37 PM    HCO3 31.1 09/23/2022 03:37 PM    HCO3 23.8 09/20/2022 04:18 PM    BE 7.2 09/23/2022 03:37 PM    THB 13.3 09/23/2022 03:37 PM    O2SAT 96.2 09/23/2022 03:37 PM        Medications       Nutrition:   Regular adult diet ( fluid restriction)  ATB:   Antibiotics  Days   Rocephin 3               Patient currently has   Isolation  DVT prophylaxis : LOVENOX      Labs   CBC with Differential:    Lab Results   Component Value Date/Time    WBC 15.2 09/23/2022 05:35 AM    RBC 3.92 09/23/2022 05:35 AM    HGB 13.6 09/23/2022 05:35 AM    HCT 36.7 09/23/2022 05:35 AM     09/23/2022 05:35 AM    MCV 93.6 09/23/2022 05:35 AM    MCH 34.7 09/23/2022 05:35 AM    MCHC 37.1 09/23/2022 05:35 AM    RDW 11.7 09/23/2022 05:35 AM    NRBC 0.9 01/26/2018 06:13 AM    SEGSPCT 81 05/23/2012 03:15 PM    LYMPHOPCT 1.7 09/23/2022 05:35 AM    MONOPCT 5.2 09/23/2022 05:35 AM    MYELOPCT 0.9 09/21/2022 05:55 AM    BASOPCT 0.3 09/23/2022 05:35 AM    MONOSABS 0.76 09/23/2022 05:35 AM    LYMPHSABS 0.30 09/23/2022 05:35 AM    EOSABS 0.00 09/23/2022 05:35 AM    BASOSABS 0.00 09/23/2022 05:35 AM     CMP:    Lab Results   Component Value Date/Time     09/23/2022 05:35 AM    K 3.3 09/23/2022 05:35 AM    K 3.5 09/21/2022 05:55 AM    CL 82 09/23/2022 05:35 AM    CO2 29 09/23/2022 05:35 AM    BUN 16 09/23/2022 05:35 AM    CREATININE 0.4 09/23/2022 05:35 AM    GFRAA >60 09/23/2022 05:35 AM    LABGLOM >60 09/23/2022 05:35 AM    GLUCOSE 140 09/23/2022 05:35 AM    GLUCOSE 99 05/27/2012 05:43 PM    PROT 5.8 09/23/2022 05:35 AM    LABALBU 2.8 09/23/2022 05:35 AM    LABALBU 4.6 05/23/2012 03:15 PM    CALCIUM 8.3 09/23/2022 05:35 AM    BILITOT 0.4 09/23/2022 05:35 AM    ALKPHOS 108 09/23/2022 05:35 AM    AST 42 09/23/2022 05:35 AM    ALT 35 09/23/2022 05:35 AM     BMP:    Lab Results   Component Value Date/Time     09/23/2022 05:35 AM    K 3.3 09/23/2022 05:35 AM    K 3.5 09/21/2022 05:55 AM    CL 82 09/23/2022 05:35 AM    CO2 29 09/23/2022 05:35 AM    BUN 16 09/23/2022 05:35 AM    LABALBU 2.8 09/23/2022 05:35 AM    LABALBU 4.6 05/23/2012 03:15 PM    CREATININE 0.4 09/23/2022 05:35 AM    CALCIUM 8.3 09/23/2022 05:35 AM    GFRAA >60 09/23/2022 05:35 AM    LABGLOM >60 09/23/2022 05:35 AM    GLUCOSE 140 09/23/2022 05:35 AM    GLUCOSE 99 05/27/2012 05:43 PM     Calcium:    Lab Results   Component Value Date/Time    CALCIUM 8.3 09/23/2022 05:35 AM     Magnesium:    Lab Results   Component Value Date/Time    MG 2.4 09/21/2022 05:55 AM     Phosphorus:    Lab Results   Component Value Date/Time    PHOS 4.3 02/27/2021 10:45 PM       Imaging Studies:  CXR:   Impression   1. No interval change. 2. Grossly stable pulmonary infiltrates in the right parahilar and bibasilar   regions. 3. Questionable small left pleural effusion.            Resident's Assessment and Plan     Pulmonology     Acute hypoxic respiratory failure 2/2 COVID pneumonia with superimposed Strep pneumonia CAP -resolving  Patient improving, requiring O2 less than yesterday  Recent COVID-19 detected   Previous history of COPD   Shortness of breath, cough, fatigue, congestion   CT shows new right middle lobe consolidative process with concerns for post-obstructive atelectasis   Co-worker diagnosed with COVID 1 week prior to her having symptoms, not vaccinated against COVID-19  On Rocephin  daily and Decadron for COVID-19 3 days completed  On [facility] with 30 L of O2     Plan   Continue on Rocephin and Decadron  Breathing treatment with (Brovana, Pulmicort and Duonebs)   Continue to wean off oxygen        Right lower lobe pneumonia likey 2/2 COVID-19 superimposed on Strep pneumoniae   CT shows new right middle lobe consolidative process with concerns for post-obstructive atelectasis   SPO2 during first contact with provider, SPO2 was 70%   Monitor respiratory status   Strept pneumoniae urine antigen positive  Plan:  Continue on Rocephin and heated high flow nasal cannula  Breathing treatment with (Brovana, Pulmicort and Duonebs)      New small right pleural effusion likely 2/2 CAP   Improving than the initial presentation   CXR improving than presentation    Hx of ? COPD   No PFT on file, not on any inhalers at home, not on home oxygen      Hx of pulmonary long nodule           Cardiology     Elevated pro-BNP, likely 2/2 acute infectious pulmonary process with baseline ? Heart failure  on presentation was 2,185  patient on Clonidine 0.2 mg TID, coreg 12.5 mg BID and Cardizem 120 mg BID  no signs and symptoms of fluid retention   Plan: Continue to monitor BP and signs of heart failure     Hx of ascending aortic aneurysm (4.5 x 4.4 x 4cm with increase in size recently)      Hx of HTN   Previous workup for secondary HTN with ultrasound ruled out renal artery stenosis   Patient on Clonidine 0.2 mg TID, coreg 12.5 mg BID and Cardizem 120 mg BID  Continue diltiazem, clonidine and carvedilol     Hx of Mitral valve prolapse    Nephrology     Chronic hypontremia 2/2 ?  Medication use   Na level 122 today in the morning  No any new symptoms  Patient was in iron tab supplements  Plan:   Continue to fluid restriction   Avoid iv fluids and iv electrolyte replacement  Continue on the home medications    NAGMA, resolved     Hypokalemia   K level 3.3 today in the morning, replaced  Plan:  Monitor BMP daily   Replace K with oral supplements, avoid iv supplementation     Hematology     Leukocytosis likely 2/2 CAP and COVID-19  WBC count in decreasing trend @ 15.2 today  Plan:   Monitor CBC daily  Rocephin for the CAP    Gastroenterology:    Elevated AST and ALT  AST 42 and ALT of 35  Drinks 2-3 times weekly, 3-4 beers   Plan:   Patient kept on Thiamine 241 mg OD and folic acid     Problems resolved during this admission:   Lactic acidosis           PT/OT: Might need after discharge  DVT ppx: Lovenox  GI ppx: Protonix        Code Status:   Full code    Karthik Woo MD, PGY-1    Attending physician: Dr. Sharif More, [address] [place] [address]  Department of Pulmonary, Critical Care and Sleep Medicine  [address] [facility]  Department of Internal Medicine      During multidisciplinary team rounds Conner Gordon is a 56 y.o. female was seen, examined and discussed. This is confirmation that I have personally seen and examined the patient and that the key elements of the encounter were performed by me (> 85 % time). The medications & laboratory data was discussed and adjusted where necessary. The radiographic images were reviewed or with radiologist or consultant if felt dis-concordant with the exam or history. The above findings were corroborated, plans confirmed and changes made if needed. Family is updated at the bedside as available. Key issues of the case were discussed among consultants. Critical Care time is documented if appropriate.       Tran Flynn DO, FACP, FCCP, [facility],

## 2022-09-23 NOTE — PROGRESS NOTES
Maura Martinez MD FACP  Internal medicine  Progress Notes      CHIEF COMPLAINT: Shortness of breath      HISTORY OF PRESENT ILLNESS:    9/21/2022  Patient was seen in medical ICU earlier this morning  Spoke with the ER physician at the time of admission  Data reviewed in detail with the patient  19-year-old  woman unvaccinated for COVID presents with 1 week history of progressive dyspnea associated with cough and fever  She was exposed to someone with COVID a week ago  She tested positive for COVID  Admitted to ICU due to acute hypoxic respiratory failure  She feels better this morning  9/22/2022  Patient was seen on the medical ICU earlier this morning  She is in isolation room due to Payal Guardado  She states she feels better  Remains on high flow oxygen  9/23/2022  Patient was seen in the ICU earlier this morning  Remains on high flow oxygen  Overall she feels better    Past Medical History:    Past Medical History:   Diagnosis Date    Acid reflux     Fracture of left shoulder 10/2018    Frozen shoulder     left    Hypertension     Hyponatremia     MVP (mitral valve prolapse)     no issues       Past Surgical History:    Past Surgical History:   Procedure Laterality Date    CLOSED MANIPULATION SHOULDER Left 3/5/2019    LEFT SHOULDER MANIPULATION UNDER ANTESTHESIA, STERIOD INJECTION TO FOLLOW performed by Nakita Mtz MD at [address] Right 1980's    bone alignment    OPEN TREATMENT PROX HUMERAL FRACTURE Left 10/23/2018    LEFT SHOULDER OPEN REDUCTION INTERNAL FIXATION GREATER TUBEROSITY ++ARTHREX++ INTERSCALINE BLOCK++ performed by Nakita Mtz MD at [facility] 207 2018    left forearm       Medications Prior to Admission:    Medications Prior to Admission: carvedilol (COREG) 12.5 MG tablet, Take 1 tablet by mouth 2 times daily (Patient taking differently: Take 25 mg by mouth 2 times daily)  tolvaptan (SAMSCA) 15 MG TABS tablet, Take 0.5 tablets by mouth daily (Patient not taking: Reported on 9/20/2022)  cefdinir (OMNICEF) 300 MG capsule,   brompheniramine-pseudoephedrine-DM 2-30-10 MG/5ML syrup,   diltiazem (DILACOR XR) 120 MG extended release capsule, Take 120 mg by mouth 2 times daily Instructed to take morning of surgery with a sip of water  sodium chloride 1 g tablet, Take 1 g by mouth 2 times daily Instructed to take morning of surgery with a sip of water per Dr. Peggye Cabot  cloNIDine (CATAPRES) 0.2 MG tablet, Take 0.2 mg by mouth 3 times daily Instructed to take morning of surgery with a sip of water    Allergies:    Patient has no known allergies. Social History:    reports that she has been smoking cigarettes. She has a 30.00 pack-year smoking history. She has never used smokeless tobacco. She reports current alcohol use. She reports that she does not use drugs. Family History:   family history is not on file. REVIEW OF SYSTEMS:  As above in the HPI, otherwise negative    PHYSICAL EXAM:    Vitals:  /81   Pulse 79   Temp (!) 96.6 °F (35.9 °C) (Axillary)   Resp 20   Ht 5' 2\" (1.575 m)   Wt 98 lb 3.2 oz (44.5 kg)   LMP 05/20/2012   SpO2 93%   BMI 17.96 kg/m²     General:  Awake, alert, oriented X 3. Thin built somewhat tired emaciated  On high flow oxygen through nasal cannula  HEENT:  Normocephalic, atraumatic. Pupils equal, round, reactive to light. No scleral icterus. No conjunctival injection   No nasal discharge. Neck:  Supple no adenopathy  Heart:  RRR, no murmurs, gallops, rubs  Lungs: Breath sounds diminished especially right lower base  Abdomen:   Bowel sounds present, soft, nontender, no masses, no organomegaly, no peritoneal signs  Extremities:  No clubbing, cyanosis, or edema  Skin:  Warm and dry, no open lesions or rash  Neuro:  Cranial nerves 2-12 intact, no focal deficits  Generalized muscle atrophy noted  Breast: deferred  Rectal: deferred  Genitalia:  deferred    LABS:  Data reviewed      ASSESSMENT:      Principal Problem: Acute respiratory failure (HCC)  COVID infection  Superimposed bacterial pneumonia  Comorbidities present and past as listed below  Patient Active Problem List   Diagnosis    Hyponatremia    Acute hyponatremia    Traumatic closed displaced fracture of left shoulder with anterior dislocation    Acute respiratory failure (HCC)           PLAN:  Chest x-ray showing improvement  Oxygen supplementation/wean as tolerated  Steroids intravenously  Bronchodilators  Empiric antibiotics with Rocephin and doxycycline  ICU care as outlined  Questions answered to her satisfaction    Sridhar Dye MD  11:11 AM  9/23/2022

## 2022-09-23 NOTE — PLAN OF CARE
Problem: Skin/Tissue Integrity  Goal: Absence of new skin breakdown  Description: 1. Monitor for areas of redness and/or skin breakdown  2. Assess vascular access sites hourly  3. Every 4-6 hours minimum:  Change oxygen saturation probe site  4. Every 4-6 hours:  If on nasal continuous positive airway pressure, respiratory therapy assess nares and determine need for appliance change or resting period.   9/22/2022 2027 by Ele Perea RN  Outcome: Progressing  9/22/2022 1817 by Jem Mccarty RN  Outcome: Progressing     Problem: Safety - Adult  Goal: Free from fall injury  9/22/2022 2027 by Ele Perea RN  Outcome: Progressing  Flowsheets (Taken 9/22/2022 2024)  Free From Fall Injury: Instruct family/caregiver on patient safety  9/22/2022 1817 by Jem Mccarty RN  Outcome: Progressing     Problem: Pain  Goal: Verbalizes/displays adequate comfort level or baseline comfort level  9/22/2022 2027 by Ele Perea RN  Outcome: Progressing  Flowsheets (Taken 9/22/2022 2000)  Verbalizes/displays adequate comfort level or baseline comfort level: Encourage patient to monitor pain and request assistance  9/22/2022 1817 by Jem Mccarty RN  Outcome: Progressing

## 2022-09-23 NOTE — PLAN OF CARE
adequate comfort level or baseline comfort level: Encourage patient to monitor pain and request assistance  Taken 9/23/2022 1300  Verbalizes/displays adequate comfort level or baseline comfort level: Encourage patient to monitor pain and request assistance  Taken 9/23/2022 0800  Verbalizes/displays adequate comfort level or baseline comfort level: Encourage patient to monitor pain and request assistance     Problem: ABCDS Injury Assessment  Goal: Absence of physical injury  Outcome: Progressing  Flowsheets (Taken 9/23/2022 0800)  Absence of Physical Injury: Implement safety measures based on patient assessment

## 2022-09-23 NOTE — CARE COORDINATION
9/23:  Update CM Note:  Pt presented to [facility] for SOB. Pt was transferred on 9/20 to [facility]. Pt is in [facility] for Covid + 9/20. Pt is on 50 L/Aervo at 90%SPO2/FIo2 80%. Pt is on Iv Thiamine, Iv Rocephin & Iv Decadron. CM spoke with pt's sister Armida Ackerman at [phone]. Pt's PCP is Dr. David Pike in [facility]. Pt lives alone with 1 step to enter. The bed/bathroom are on the 1st floor. PTA pt was independent & has no DME at home. Pt has no hx of SNF/HHC. Needs are unclear & will depend on 02 needs. Will need PT/OT evals. Sw/GURDEEP will continue to follow for dc planning.   Electronically signed by Nikolas Oliver RN on 9/23/2022 at 4:54 PM

## 2022-09-24 ENCOUNTER — APPOINTMENT (OUTPATIENT)
Dept: GENERAL RADIOLOGY | Age: 57
DRG: 177 | End: 2022-09-24
Payer: COMMERCIAL

## 2022-09-24 LAB
ALBUMIN SERPL-MCNC: 2.9 G/DL (ref 3.5–5.2)
ALP BLD-CCNC: 107 U/L (ref 35–104)
ALT SERPL-CCNC: 45 U/L (ref 0–32)
ANION GAP SERPL CALCULATED.3IONS-SCNC: 10 MMOL/L (ref 7–16)
ANISOCYTOSIS: ABNORMAL
AST SERPL-CCNC: 40 U/L (ref 0–31)
BASOPHILS ABSOLUTE: 0 E9/L (ref 0–0.2)
BASOPHILS RELATIVE PERCENT: 0.3 % (ref 0–2)
BILIRUB SERPL-MCNC: 0.4 MG/DL (ref 0–1.2)
BUN BLDV-MCNC: 12 MG/DL (ref 6–20)
CALCIUM SERPL-MCNC: 8.4 MG/DL (ref 8.6–10.2)
CHLORIDE BLD-SCNC: 85 MMOL/L (ref 98–107)
CO2: 29 MMOL/L (ref 22–29)
COMMENT: NORMAL
CREAT SERPL-MCNC: 0.4 MG/DL (ref 0.5–1)
EOSINOPHILS ABSOLUTE: 0 E9/L (ref 0.05–0.5)
EOSINOPHILS RELATIVE PERCENT: 0 % (ref 0–6)
GFR AFRICAN AMERICAN: >60
GFR NON-AFRICAN AMERICAN: >60 ML/MIN/1.73
GLUCOSE BLD-MCNC: 162 MG/DL (ref 74–99)
HCT VFR BLD CALC: 36 % (ref 34–48)
HEMOGLOBIN: 12.9 G/DL (ref 11.5–15.5)
LYMPHOCYTES ABSOLUTE: 0.34 E9/L (ref 1.5–4)
LYMPHOCYTES RELATIVE PERCENT: 1.7 % (ref 20–42)
MCH RBC QN AUTO: 33.4 PG (ref 26–35)
MCHC RBC AUTO-ENTMCNC: 35.8 % (ref 32–34.5)
MCV RBC AUTO: 93.3 FL (ref 80–99.9)
METER GLUCOSE: 194 MG/DL (ref 74–99)
MONOCYTES ABSOLUTE: 0.51 E9/L (ref 0.1–0.95)
MONOCYTES RELATIVE PERCENT: 2.6 % (ref 2–12)
NEUTROPHILS ABSOLUTE: 16.22 E9/L (ref 1.8–7.3)
NEUTROPHILS RELATIVE PERCENT: 95.7 % (ref 43–80)
OVALOCYTES: ABNORMAL
PDW BLD-RTO: 11.9 FL (ref 11.5–15)
PLATELET # BLD: 439 E9/L (ref 130–450)
PMV BLD AUTO: 9.1 FL (ref 7–12)
POIKILOCYTES: ABNORMAL
POTASSIUM SERPL-SCNC: 3.9 MMOL/L (ref 3.5–5)
RBC # BLD: 3.86 E12/L (ref 3.5–5.5)
REPORT: NORMAL
SARS-COV-2, NAAT: DETECTED
SODIUM BLD-SCNC: 124 MMOL/L (ref 132–146)
TOTAL PROTEIN: 5.8 G/DL (ref 6.4–8.3)
WBC # BLD: 16.9 E9/L (ref 4.5–11.5)

## 2022-09-24 PROCEDURE — 87635 SARS-COV-2 COVID-19 AMP PRB: CPT

## 2022-09-24 PROCEDURE — 6370000000 HC RX 637 (ALT 250 FOR IP): Performed by: INTERNAL MEDICINE

## 2022-09-24 PROCEDURE — 2000000000 HC ICU R&B

## 2022-09-24 PROCEDURE — 2580000003 HC RX 258: Performed by: INTERNAL MEDICINE

## 2022-09-24 PROCEDURE — 94640 AIRWAY INHALATION TREATMENT: CPT

## 2022-09-24 PROCEDURE — 80053 COMPREHEN METABOLIC PANEL: CPT

## 2022-09-24 PROCEDURE — 82962 GLUCOSE BLOOD TEST: CPT

## 2022-09-24 PROCEDURE — 6360000002 HC RX W HCPCS: Performed by: INTERNAL MEDICINE

## 2022-09-24 PROCEDURE — 2700000000 HC OXYGEN THERAPY PER DAY

## 2022-09-24 PROCEDURE — 85025 COMPLETE CBC W/AUTO DIFF WBC: CPT

## 2022-09-24 PROCEDURE — 71045 X-RAY EXAM CHEST 1 VIEW: CPT

## 2022-09-24 PROCEDURE — 99291 CRITICAL CARE FIRST HOUR: CPT | Performed by: INTERNAL MEDICINE

## 2022-09-24 RX ADMIN — DICLOFENAC SODIUM 2 G: 10 GEL TOPICAL at 20:54

## 2022-09-24 RX ADMIN — IPRATROPIUM BROMIDE AND ALBUTEROL SULFATE 1 AMPULE: .5; 2.5 SOLUTION RESPIRATORY (INHALATION) at 08:03

## 2022-09-24 RX ADMIN — CLONIDINE HYDROCHLORIDE 0.2 MG: 0.2 TABLET ORAL at 20:50

## 2022-09-24 RX ADMIN — OXYCODONE HYDROCHLORIDE AND ACETAMINOPHEN 500 MG: 500 TABLET ORAL at 08:52

## 2022-09-24 RX ADMIN — PANTOPRAZOLE SODIUM 40 MG: 40 TABLET, DELAYED RELEASE ORAL at 06:04

## 2022-09-24 RX ADMIN — IPRATROPIUM BROMIDE AND ALBUTEROL SULFATE 1 AMPULE: .5; 2.5 SOLUTION RESPIRATORY (INHALATION) at 20:01

## 2022-09-24 RX ADMIN — Medication 10 ML: at 08:50

## 2022-09-24 RX ADMIN — THIAMINE HYDROCHLORIDE 100 MG: 100 INJECTION, SOLUTION INTRAMUSCULAR; INTRAVENOUS at 10:50

## 2022-09-24 RX ADMIN — ENOXAPARIN SODIUM 40 MG: 100 INJECTION SUBCUTANEOUS at 08:52

## 2022-09-24 RX ADMIN — ARFORMOTEROL TARTRATE 15 MCG: 15 SOLUTION RESPIRATORY (INHALATION) at 20:00

## 2022-09-24 RX ADMIN — CLONIDINE HYDROCHLORIDE 0.2 MG: 0.2 TABLET ORAL at 08:51

## 2022-09-24 RX ADMIN — DILTIAZEM HYDROCHLORIDE 120 MG: 60 CAPSULE, EXTENDED RELEASE ORAL at 08:56

## 2022-09-24 RX ADMIN — IPRATROPIUM BROMIDE AND ALBUTEROL SULFATE 1 AMPULE: .5; 2.5 SOLUTION RESPIRATORY (INHALATION) at 16:19

## 2022-09-24 RX ADMIN — DEXAMETHASONE SODIUM PHOSPHATE 20 MG: 4 INJECTION, SOLUTION INTRAMUSCULAR; INTRAVENOUS at 12:48

## 2022-09-24 RX ADMIN — CARVEDILOL 12.5 MG: 6.25 TABLET, FILM COATED ORAL at 09:34

## 2022-09-24 RX ADMIN — CEFTRIAXONE 1000 MG: 1 INJECTION, POWDER, FOR SOLUTION INTRAMUSCULAR; INTRAVENOUS at 19:03

## 2022-09-24 RX ADMIN — Medication 10 ML: at 20:52

## 2022-09-24 RX ADMIN — IPRATROPIUM BROMIDE AND ALBUTEROL SULFATE 1 AMPULE: .5; 2.5 SOLUTION RESPIRATORY (INHALATION) at 11:23

## 2022-09-24 RX ADMIN — BUDESONIDE 500 MCG: 0.5 SUSPENSION RESPIRATORY (INHALATION) at 20:01

## 2022-09-24 RX ADMIN — CARVEDILOL 12.5 MG: 6.25 TABLET, FILM COATED ORAL at 20:50

## 2022-09-24 RX ADMIN — ARFORMOTEROL TARTRATE 15 MCG: 15 SOLUTION RESPIRATORY (INHALATION) at 08:03

## 2022-09-24 RX ADMIN — CLONIDINE HYDROCHLORIDE 0.2 MG: 0.2 TABLET ORAL at 14:33

## 2022-09-24 RX ADMIN — SODIUM CHLORIDE SOLN NEBU 3% 4 ML: 3 NEBU SOLN at 20:01

## 2022-09-24 RX ADMIN — FOLIC ACID 1 MG: 1 TABLET ORAL at 08:50

## 2022-09-24 RX ADMIN — Medication 50 MG: at 08:50

## 2022-09-24 RX ADMIN — SODIUM CHLORIDE 1 G: 1 TABLET ORAL at 20:50

## 2022-09-24 RX ADMIN — SODIUM CHLORIDE 1 G: 1 TABLET ORAL at 08:50

## 2022-09-24 RX ADMIN — SODIUM CHLORIDE SOLN NEBU 3% 4 ML: 3 NEBU SOLN at 11:23

## 2022-09-24 RX ADMIN — DILTIAZEM HYDROCHLORIDE 120 MG: 60 CAPSULE, EXTENDED RELEASE ORAL at 20:50

## 2022-09-24 RX ADMIN — BUDESONIDE 500 MCG: 0.5 SUSPENSION RESPIRATORY (INHALATION) at 08:03

## 2022-09-24 ASSESSMENT — PAIN SCALES - GENERAL
PAINLEVEL_OUTOF10: 0
PAINLEVEL_OUTOF10: 5

## 2022-09-24 ASSESSMENT — PAIN DESCRIPTION - LOCATION: LOCATION: SHOULDER

## 2022-09-24 NOTE — PROGRESS NOTES
Increased fi02 back to 80% for a sa02 of 87-88%   09/24/22 0910   Oxygen Therapy/Pulse Ox   O2 Therapy Oxygen humidified   O2 Device Heated high flow cannula   O2 Flow Rate (L/min) 50 L/min   FiO2  80 %   Heart Rate 57   Resp 21   SpO2 93 %

## 2022-09-24 NOTE — PROGRESS NOTES
Haider Bell,    Internal medicine  Progress Notes      CHIEF COMPLAINT: Shortness of breath          Past Medical History:    Past Medical History:   Diagnosis Date    Acid reflux     Fracture of left shoulder 10/2018    Frozen shoulder     left    Hypertension     Hyponatremia     MVP (mitral valve prolapse)     no issues       Past Surgical History:    Past Surgical History:   Procedure Laterality Date    CLOSED MANIPULATION SHOULDER Left 3/5/2019    LEFT SHOULDER MANIPULATION UNDER ANTESTHESIA, STERIOD INJECTION TO FOLLOW performed by Jalen Wray MD at [address] [place], [place] [address] Right 1980's    bone alignment    OPEN TREATMENT PROX HUMERAL FRACTURE Left 10/23/2018    LEFT SHOULDER OPEN REDUCTION INTERNAL FIXATION GREATER TUBEROSITY ++ARTHREX++ INTERSCALINE BLOCK++ performed by Jalen Wray MD at [facility] 13 2018    left forearm       Medications Prior to Admission:    Medications Prior to Admission: carvedilol (COREG) 12.5 MG tablet, Take 1 tablet by mouth 2 times daily (Patient taking differently: Take 25 mg by mouth 2 times daily)  tolvaptan (SAMSCA) 15 MG TABS tablet, Take 0.5 tablets by mouth daily (Patient not taking: Reported on 9/20/2022)  cefdinir (OMNICEF) 300 MG capsule,   brompheniramine-pseudoephedrine-DM 2-30-10 MG/5ML syrup,   diltiazem (DILACOR XR) 120 MG extended release capsule, Take 120 mg by mouth 2 times daily Instructed to take morning of surgery with a sip of water  sodium chloride 1 g tablet, Take 1 g by mouth 2 times daily Instructed to take morning of surgery with a sip of water per Dr. Sarah Mari  cloNIDine (CATAPRES) 0.2 MG tablet, Take 0.2 mg by mouth 3 times daily Instructed to take morning of surgery with a sip of water        PHYSICAL EXAM:  Pt resting in nad, events of past 24 hrs reviewed with records and staff at bedside  Vitals:  /88   Pulse 76   Temp 98.6 °F (37 °C) (Oral)   Resp 25   Ht 5' 2\" (1.575 m)   Wt 98 lb 3.2 oz (44.5 kg)

## 2022-09-24 NOTE — PROGRESS NOTES
[facility]  Department of Internal Medicine   Internal Medicine Residency   MICU Progress Note    Patient:  Clau Rodriguez 56 y.o. female  MRN: [id number]     Date of Service: 9/24/2022    Allergy: Patient has no known allergies. Subjective   The patient is seen and examined in the morning today. The patient had no any significant events overnight. She is tolerating well with high flow via nasal cannula on 30 L of oxygen per minute. Patient did sleep well overnight and is eating well. She stated that her cough was improving and fell yesterday. The patient is requiring less oxygen support than the previous day. 24h change: No significant changes  ROS: Denies Fever/chills/CP/SOB/N/V/D/C/Dysuria/Blood in stool or urine  Objective     VS: BP (!) 148/94   Pulse 64   Temp (!) 96.3 °F (35.7 °C) (Axillary)   Resp 24   Ht 5' 2\" (1.575 m)   Wt 98 lb 3.2 oz (44.5 kg)   LMP 05/20/2012   SpO2 94%   BMI 17.96 kg/m²           I & O - 24hr:   Intake/Output Summary (Last 24 hours) at 9/24/2022 1215  Last data filed at 9/24/2022 1051  Gross per 24 hour   Intake 1200 ml   Output 1250 ml   Net -50 ml       Physical Exam:  General Appearance: alert, appears stated age, and cooperative  Neck: no adenopathy, no carotid bruit, no JVD, supple, symmetrical, trachea midline, and thyroid not enlarged, symmetric, no tenderness/mass/nodules  Lung: B/L decreased air entry, occasional rhonchi present in the left lower lung field  Heart: regular rate and rhythm, S1, S2 normal, no murmur, click, rub or gallop  Abdomen: soft, non-tender; bowel sounds normal; no masses,  no organomegaly  Extremities:  extremities normal, atraumatic, no cyanosis or edema  Musculoskeletal: No joint swelling, no muscle tenderness. ROM normal in all joints of extremities.    Neurologic: Mental status: Alert, oriented, thought content appropriate  Lines     site day    Art line   None    TLC None    PICC None    Hemoaccess None    Oxygen: Heated high flow oxygen via nasal cannula at 30L/min  Lab Results   Component Value Date/Time    PH 7.492 09/23/2022 03:37 PM    PCO2 41.6 09/23/2022 03:37 PM    PO2 76.6 09/23/2022 03:37 PM    HCO3 31.1 09/23/2022 03:37 PM    HCO3 23.8 09/20/2022 04:18 PM    BE 7.2 09/23/2022 03:37 PM    THB 13.3 09/23/2022 03:37 PM    O2SAT 96.2 09/23/2022 03:37 PM        Medications     Nutrition:   Regular adult diet ( fluid restriction)  ATB:   Antibiotics  Days   Rocephin 4               Patient currently has         Isolation  DVT prophylaxis/ GI prophylaxis: Lovenox/ Protonix    CBC with Differential:    Lab Results   Component Value Date/Time    WBC 16.9 09/24/2022 04:37 AM    RBC 3.86 09/24/2022 04:37 AM    HGB 12.9 09/24/2022 04:37 AM    HCT 36.0 09/24/2022 04:37 AM     09/24/2022 04:37 AM    MCV 93.3 09/24/2022 04:37 AM    MCH 33.4 09/24/2022 04:37 AM    MCHC 35.8 09/24/2022 04:37 AM    RDW 11.9 09/24/2022 04:37 AM    NRBC 0.9 01/26/2018 06:13 AM    SEGSPCT 81 05/23/2012 03:15 PM    LYMPHOPCT 1.7 09/24/2022 04:37 AM    MONOPCT 2.6 09/24/2022 04:37 AM    MYELOPCT 0.9 09/21/2022 05:55 AM    BASOPCT 0.3 09/24/2022 04:37 AM    MONOSABS 0.51 09/24/2022 04:37 AM    LYMPHSABS 0.34 09/24/2022 04:37 AM    EOSABS 0.00 09/24/2022 04:37 AM    BASOSABS 0.00 09/24/2022 04:37 AM     BMP:    Lab Results   Component Value Date/Time     09/24/2022 04:37 AM    K 3.9 09/24/2022 04:37 AM    K 3.5 09/21/2022 05:55 AM    CL 85 09/24/2022 04:37 AM    CO2 29 09/24/2022 04:37 AM    BUN 12 09/24/2022 04:37 AM    LABALBU 2.9 09/24/2022 04:37 AM    LABALBU 4.6 05/23/2012 03:15 PM    CREATININE 0.4 09/24/2022 04:37 AM    CALCIUM 8.4 09/24/2022 04:37 AM    GFRAA >60 09/24/2022 04:37 AM    LABGLOM >60 09/24/2022 04:37 AM    GLUCOSE 162 09/24/2022 04:37 AM    GLUCOSE 99 05/27/2012 05:43 PM     Calcium:    Lab Results   Component Value Date/Time    CALCIUM 8.4 09/24/2022 04:37 AM     Magnesium:    Lab Results   Component Value Date/Time MG 2.4 09/21/2022 05:55 AM     Phosphorus:    Lab Results   Component Value Date/Time    PHOS 4.3 02/27/2021 10:45 PM       Imaging Studies:  CXR:    Improving area of consolidation in the right middle lobe. 2.  Development of mild to moderate left-sided pleural effusion with areas of   atelectasis or consolidation in the left lower lobe. Resident's Assessment and Plan     Pulmonology      Acute hypoxic respiratory failure 2/2 COVID pneumonia with superimposed Strep pneumonia CAP -resolving  Patient improving, requiring O2 less than yesterday  Recent COVID-19 detected   Previous history of COPD   Shortness of breath, cough, fatigue, congestion   CT shows new right middle lobe consolidative process with concerns for post-obstructive atelectasis   Co-worker diagnosed with COVID 1 week prior to her having symptoms, not vaccinated against COVID-19  On Rocephin  daily and Decadron 20 mg  for COVID-19 4 days completed  On [facility] with 30 L of O2     Plan   Continue Rocephin and Decadron   Breathing treatment with (Brovana, Pulmicort and Duonebs)   Continue to wean off oxygen        Right lower lobe pneumonia likey 2/2 COVID-19 superimposed on Strep pneumoniae   CT shows new right middle lobe consolidative process with concerns for post-obstructive atelectasis   SPO2 during first contact with provider, SPO2 was 70%   Monitor respiratory status   Strept pneumoniae urine antigen positive  Plan:  Continue on Rocephin and heated high flow nasal cannula  Breathing treatment with (Brovana, Pulmicort and Duonebs)      New small right pleural effusion likely 2/2 CAP   Improving than the initial presentation   CXR improving than presentation     Hx of ? COPD   No PFT on file, not on any inhalers at home, not on home oxygen      Hx of pulmonary long nodule         Cardiology      Elevated pro-BNP, likely 2/2 acute infectious pulmonary process with baseline ?  Heart failure  on presentation was 2,185  patient on Clonidine 0.2 mg TID, coreg 12.5 mg BID and Cardizem 120 mg BID  no signs and symptoms of fluid retention   Plan: Continue to monitor BP and signs of heart failure     Hx of ascending aortic aneurysm (4.5 x 4.4 x 4cm with increase in size recently)      Hx of HTN   Previous workup for secondary HTN with ultrasound ruled out renal artery stenosis   Patient on Clonidine 0.2 mg TID, coreg 12.5 mg BID and Cardizem 120 mg BID  Continue diltiazem, clonidine and carvedilol      Hx of Mitral valve prolapse     Nephrology      Chronic hypontremia 2/2 ?  Medication use   Na level 124 today in the morning  No any new symptoms  Patient was in Sodium tab supplements  Plan:   Continue to fluid restriction   Avoid iv fluids and iv electrolyte replacement  Continue on the home medications     NAGMA, resolved     Hypokalemia   K level 3.9 today in the morning, replaced  Plan:  Monitor BMP daily   Replace K with oral supplements, avoid iv supplementation     Hematology      Leukocytosis likely 2/2 CAP and COVID-19  WBC count in increasing trend @ 16.9 today may be due to steroid use  Plan:   Monitor CBC daily  Rocephin for the CAP     Gastroenterology:     Elevated AST and ALT  AST 40 and ALT of 45  Drinks 2-3 times weekly, 3-4 beers   Plan:   Patient kept on Thiamine 998 mg OD and folic acid  Plan to change to Multivitamin tablets QD after the loading dose finished     Problems resolved during this admission:   Lactic acidosis           PT/OT: Might need after discharge  DVT ppx: Lovenox  GI ppx: Protonix        Code Status:   Full code      Aric Snowden MD, PGY-1    Attending physician: Dr. Catherine Morejon, [address] [place] [address]  Department of Pulmonary, Critical Care and Sleep Medicine  [address] [facility]  Department of Internal Medicine  Attending Statement    This patient has a high probability of sudden clinically significant deterioration, which requires the highest level of physician preparedness to intervene urgently. I managed/supervised life or organ supporting interventions that required frequent physician assessment. I devoted my full attention to the direct care of this patient for the period of time indicated below. Time I spent with family of surrogate(s) is included only if the patient was incapable of providing the necessary information or participating in medical decision making. In addition to time devoted to teaching and to any procedure.  CCT 41 minutes    Erica Sandoval, DO

## 2022-09-24 NOTE — PROGRESS NOTES
Comprehensive Nutrition Assessment    Type and Reason for Visit:  Initial (ICU LOS)    Nutrition Recommendations/Plan:   Continue current diet  Start ensure BID and magic cup once/day to promote adequate oral intake  Will monitor     Malnutrition Assessment:  Malnutrition Status: At risk for malnutrition (Comment) (09/24/22 6974)    Context:  Chronic Illness     Findings of the 6 clinical characteristics of malnutrition:  Energy Intake:  75% or less estimated energy requirements for 1 month or longer  Weight Loss:  No significant weight loss     Body Fat Loss:  Unable to assess (COVID ISO)     Muscle Mass Loss:  Unable to assess (COVID ISO)    Fluid Accumulation:  No significant fluid accumulation     Strength:  Not Performed    Nutrition Assessment:    pt adm d/t thoracic back pains/ COVID-19; pt a/ acute resp failure w/ hypoxia; PMhx of HTN, MVP, COPD; pt w/ avg intake of ~20-50% at meals- will start ONS and monitor. Nutrition Related Findings:    A&Ox4; no edema; abd WNL; +I/O Wound Type: None       Current Nutrition Intake & Therapies:    Average Meal Intake: 26-50% (avg)  Average Supplements Intake: None Ordered  ADULT DIET; Regular  ADULT ORAL NUTRITION SUPPLEMENT; Breakfast, Lunch; Standard High Calorie/High Protein Oral Supplement  ADULT ORAL NUTRITION SUPPLEMENT; Dinner; Frozen Oral Supplement    Anthropometric Measures:  Height: 5' 2\" (157.5 cm)  Ideal Body Weight (IBW): 110 lbs (50 kg)       Current Body Weight: 98 lb 3.2 oz (44.5 kg) (9/20-actual), 89.3 % IBW.     Current BMI (kg/m2): 18  Usual Body Weight: 100 lb 6 oz (45.5 kg) (3/4/21-actual)  % Weight Change (Calculated): -2.2  Weight Adjustment For: No Adjustment                 BMI Categories: Underweight (BMI less than 18.5)    Estimated Daily Nutrient Needs:  Energy Requirements Based On: Formula  Weight Used for Energy Requirements: Current  Energy (kcal/day): 8708-7909  Weight Used for Protein Requirements: Current  Protein (g/day): 1.5-1.8g/kgxCBW=65-80g  Method Used for Fluid Requirements: 1 ml/kcal  Fluid (ml/day): per critical care    Nutrition Diagnosis:   Inadequate oral intake related to impaired respiratory function as evidenced by intake 26-50%, intake 0-25%    Nutrition Interventions:   Food and/or Nutrient Delivery: Continue Current Diet, Start Oral Nutrition Supplement (Enlive BID; magic cup once/day)  Nutrition Education/Counseling: No recommendation at this time  Coordination of Nutrition Care: Continue to monitor while inpatient       Goals:     Goals: by next RD assessment, PO intake 75% or greater       Nutrition Monitoring and Evaluation:   Behavioral-Environmental Outcomes: None Identified  Food/Nutrient Intake Outcomes: Food and Nutrient Intake, Supplement Intake  Physical Signs/Symptoms Outcomes: Biochemical Data, Nutrition Focused Physical Findings, Chewing or Swallowing, Skin, Weight, GI Status, Fluid Status or Edema, Hemodynamic Status    Discharge Planning:     Too soon to determine     Zahira Sellers RD  Contact: [phone]

## 2022-09-25 LAB
ALBUMIN SERPL-MCNC: 2.7 G/DL (ref 3.5–5.2)
ALP BLD-CCNC: 98 U/L (ref 35–104)
ALT SERPL-CCNC: 49 U/L (ref 0–32)
ANION GAP SERPL CALCULATED.3IONS-SCNC: 10 MMOL/L (ref 7–16)
ANISOCYTOSIS: ABNORMAL
AST SERPL-CCNC: 38 U/L (ref 0–31)
BASOPHILS ABSOLUTE: 0.01 E9/L (ref 0–0.2)
BASOPHILS RELATIVE PERCENT: 0.1 % (ref 0–2)
BILIRUB SERPL-MCNC: 0.3 MG/DL (ref 0–1.2)
BUN BLDV-MCNC: 10 MG/DL (ref 6–20)
CALCIUM SERPL-MCNC: 8.2 MG/DL (ref 8.6–10.2)
CHLORIDE BLD-SCNC: 88 MMOL/L (ref 98–107)
CO2: 28 MMOL/L (ref 22–29)
CREAT SERPL-MCNC: 0.4 MG/DL (ref 0.5–1)
EOSINOPHILS ABSOLUTE: 0 E9/L (ref 0.05–0.5)
EOSINOPHILS RELATIVE PERCENT: 0 % (ref 0–6)
GFR AFRICAN AMERICAN: >60
GFR NON-AFRICAN AMERICAN: >60 ML/MIN/1.73
GLUCOSE BLD-MCNC: 145 MG/DL (ref 74–99)
HCT VFR BLD CALC: 33 % (ref 34–48)
HEMOGLOBIN: 12.1 G/DL (ref 11.5–15.5)
IMMATURE GRANULOCYTES #: 0.19 E9/L
IMMATURE GRANULOCYTES %: 1.5 % (ref 0–5)
LYMPHOCYTES ABSOLUTE: 0.3 E9/L (ref 1.5–4)
LYMPHOCYTES RELATIVE PERCENT: 2.4 % (ref 20–42)
MCH RBC QN AUTO: 34.5 PG (ref 26–35)
MCHC RBC AUTO-ENTMCNC: 36.7 % (ref 32–34.5)
MCV RBC AUTO: 94 FL (ref 80–99.9)
METER GLUCOSE: 166 MG/DL (ref 74–99)
MONOCYTES ABSOLUTE: 0.52 E9/L (ref 0.1–0.95)
MONOCYTES RELATIVE PERCENT: 4.1 % (ref 2–12)
NEUTROPHILS ABSOLUTE: 11.57 E9/L (ref 1.8–7.3)
NEUTROPHILS RELATIVE PERCENT: 91.9 % (ref 43–80)
OVALOCYTES: ABNORMAL
PDW BLD-RTO: 11.6 FL (ref 11.5–15)
PLATELET # BLD: 389 E9/L (ref 130–450)
PMV BLD AUTO: 9.2 FL (ref 7–12)
POIKILOCYTES: ABNORMAL
POLYCHROMASIA: ABNORMAL
POTASSIUM SERPL-SCNC: 4 MMOL/L (ref 3.5–5)
RBC # BLD: 3.51 E12/L (ref 3.5–5.5)
SODIUM BLD-SCNC: 126 MMOL/L (ref 132–146)
TOTAL PROTEIN: 5.4 G/DL (ref 6.4–8.3)
WBC # BLD: 12.6 E9/L (ref 4.5–11.5)

## 2022-09-25 PROCEDURE — 80053 COMPREHEN METABOLIC PANEL: CPT

## 2022-09-25 PROCEDURE — 6360000002 HC RX W HCPCS: Performed by: INTERNAL MEDICINE

## 2022-09-25 PROCEDURE — 2580000003 HC RX 258: Performed by: INTERNAL MEDICINE

## 2022-09-25 PROCEDURE — 82962 GLUCOSE BLOOD TEST: CPT

## 2022-09-25 PROCEDURE — 6370000000 HC RX 637 (ALT 250 FOR IP): Performed by: INTERNAL MEDICINE

## 2022-09-25 PROCEDURE — 82784 ASSAY IGA/IGD/IGG/IGM EACH: CPT

## 2022-09-25 PROCEDURE — 2700000000 HC OXYGEN THERAPY PER DAY

## 2022-09-25 PROCEDURE — 99291 CRITICAL CARE FIRST HOUR: CPT | Performed by: INTERNAL MEDICINE

## 2022-09-25 PROCEDURE — 94640 AIRWAY INHALATION TREATMENT: CPT

## 2022-09-25 PROCEDURE — 2000000000 HC ICU R&B

## 2022-09-25 PROCEDURE — 85025 COMPLETE CBC W/AUTO DIFF WBC: CPT

## 2022-09-25 RX ORDER — DEXAMETHASONE SODIUM PHOSPHATE 10 MG/ML
6 INJECTION, SOLUTION INTRAMUSCULAR; INTRAVENOUS EVERY 24 HOURS
Status: COMPLETED | OUTPATIENT
Start: 2022-09-26 | End: 2022-09-28

## 2022-09-25 RX ADMIN — Medication 10 ML: at 09:24

## 2022-09-25 RX ADMIN — BUDESONIDE 500 MCG: 0.5 SUSPENSION RESPIRATORY (INHALATION) at 20:04

## 2022-09-25 RX ADMIN — Medication 50 MG: at 09:22

## 2022-09-25 RX ADMIN — CLONIDINE HYDROCHLORIDE 0.2 MG: 0.2 TABLET ORAL at 13:43

## 2022-09-25 RX ADMIN — IPRATROPIUM BROMIDE AND ALBUTEROL SULFATE 1 AMPULE: .5; 2.5 SOLUTION RESPIRATORY (INHALATION) at 11:58

## 2022-09-25 RX ADMIN — DICLOFENAC SODIUM 2 G: 10 GEL TOPICAL at 05:20

## 2022-09-25 RX ADMIN — DILTIAZEM HYDROCHLORIDE 120 MG: 60 CAPSULE, EXTENDED RELEASE ORAL at 20:35

## 2022-09-25 RX ADMIN — ARFORMOTEROL TARTRATE 15 MCG: 15 SOLUTION RESPIRATORY (INHALATION) at 20:04

## 2022-09-25 RX ADMIN — CARVEDILOL 12.5 MG: 6.25 TABLET, FILM COATED ORAL at 09:22

## 2022-09-25 RX ADMIN — CARVEDILOL 12.5 MG: 6.25 TABLET, FILM COATED ORAL at 20:36

## 2022-09-25 RX ADMIN — ENOXAPARIN SODIUM 40 MG: 100 INJECTION SUBCUTANEOUS at 09:23

## 2022-09-25 RX ADMIN — THIAMINE HYDROCHLORIDE 100 MG: 100 INJECTION, SOLUTION INTRAMUSCULAR; INTRAVENOUS at 09:23

## 2022-09-25 RX ADMIN — BUDESONIDE 500 MCG: 0.5 SUSPENSION RESPIRATORY (INHALATION) at 09:07

## 2022-09-25 RX ADMIN — IPRATROPIUM BROMIDE AND ALBUTEROL SULFATE 1 AMPULE: .5; 2.5 SOLUTION RESPIRATORY (INHALATION) at 09:07

## 2022-09-25 RX ADMIN — DICLOFENAC SODIUM 2 G: 10 GEL TOPICAL at 20:40

## 2022-09-25 RX ADMIN — SODIUM CHLORIDE SOLN NEBU 3% 4 ML: 3 NEBU SOLN at 11:58

## 2022-09-25 RX ADMIN — CLONIDINE HYDROCHLORIDE 0.2 MG: 0.2 TABLET ORAL at 09:22

## 2022-09-25 RX ADMIN — CLONIDINE HYDROCHLORIDE 0.2 MG: 0.2 TABLET ORAL at 20:35

## 2022-09-25 RX ADMIN — IPRATROPIUM BROMIDE AND ALBUTEROL SULFATE 1 AMPULE: .5; 2.5 SOLUTION RESPIRATORY (INHALATION) at 20:05

## 2022-09-25 RX ADMIN — SODIUM CHLORIDE 1 G: 1 TABLET ORAL at 20:35

## 2022-09-25 RX ADMIN — CEFTRIAXONE 1000 MG: 1 INJECTION, POWDER, FOR SOLUTION INTRAMUSCULAR; INTRAVENOUS at 18:43

## 2022-09-25 RX ADMIN — ARFORMOTEROL TARTRATE 15 MCG: 15 SOLUTION RESPIRATORY (INHALATION) at 09:06

## 2022-09-25 RX ADMIN — OXYCODONE HYDROCHLORIDE AND ACETAMINOPHEN 500 MG: 500 TABLET ORAL at 09:22

## 2022-09-25 RX ADMIN — DILTIAZEM HYDROCHLORIDE 120 MG: 60 CAPSULE, EXTENDED RELEASE ORAL at 09:28

## 2022-09-25 RX ADMIN — SODIUM CHLORIDE 1 G: 1 TABLET ORAL at 09:28

## 2022-09-25 RX ADMIN — Medication 10 ML: at 20:36

## 2022-09-25 RX ADMIN — IPRATROPIUM BROMIDE AND ALBUTEROL SULFATE 1 AMPULE: .5; 2.5 SOLUTION RESPIRATORY (INHALATION) at 15:38

## 2022-09-25 RX ADMIN — SODIUM CHLORIDE SOLN NEBU 3% 4 ML: 3 NEBU SOLN at 20:05

## 2022-09-25 RX ADMIN — FOLIC ACID 1 MG: 1 TABLET ORAL at 09:23

## 2022-09-25 RX ADMIN — DEXAMETHASONE SODIUM PHOSPHATE 20 MG: 4 INJECTION, SOLUTION INTRAMUSCULAR; INTRAVENOUS at 09:41

## 2022-09-25 RX ADMIN — PANTOPRAZOLE SODIUM 40 MG: 40 TABLET, DELAYED RELEASE ORAL at 05:20

## 2022-09-25 ASSESSMENT — PAIN SCALES - GENERAL
PAINLEVEL_OUTOF10: 0
PAINLEVEL_OUTOF10: 3
PAINLEVEL_OUTOF10: 0

## 2022-09-25 ASSESSMENT — PAIN DESCRIPTION - LOCATION: LOCATION: SHOULDER

## 2022-09-25 NOTE — PROGRESS NOTES
Corazon Robles, DO   Internal medicine  Progress Notes      CHIEF COMPLAINT: Shortness of breath          Past Medical History:    Past Medical History:   Diagnosis Date    Acid reflux     Fracture of left shoulder 10/2018    Frozen shoulder     left    Hypertension     Hyponatremia     MVP (mitral valve prolapse)     no issues       Past Surgical History:    Past Surgical History:   Procedure Laterality Date    CLOSED MANIPULATION SHOULDER Left 3/5/2019    LEFT SHOULDER MANIPULATION UNDER ANTESTHESIA, STERIOD INJECTION TO FOLLOW performed by Rosana Barth MD at [place] [address] Right 1980's    bone alignment    OPEN TREATMENT PROX HUMERAL FRACTURE Left 10/23/2018    LEFT SHOULDER OPEN REDUCTION INTERNAL FIXATION GREATER TUBEROSITY ++ARTHREX++ INTERSCALINE BLOCK++ performed by Rosana Barth MD at [facility] [address] 2018    left forearm       Medications Prior to Admission:    Medications Prior to Admission: carvedilol (COREG) 12.5 MG tablet, Take 1 tablet by mouth 2 times daily (Patient taking differently: Take 25 mg by mouth 2 times daily)  tolvaptan (SAMSCA) 15 MG TABS tablet, Take 0.5 tablets by mouth daily (Patient not taking: Reported on 9/20/2022)  cefdinir (OMNICEF) 300 MG capsule,   brompheniramine-pseudoephedrine-DM 2-30-10 MG/5ML syrup,   diltiazem (DILACOR XR) 120 MG extended release capsule, Take 120 mg by mouth 2 times daily Instructed to take morning of surgery with a sip of water  sodium chloride 1 g tablet, Take 1 g by mouth 2 times daily Instructed to take morning of surgery with a sip of water per Dr. Mayi Horton  cloNIDine (CATAPRES) 0.2 MG tablet, Take 0.2 mg by mouth 3 times daily Instructed to take morning of surgery with a sip of water        PHYSICAL EXAM:  Pt resting in nad, events of past 24 hrs reviewed with records and staff at bedside  Vitals:  /83   Pulse 52   Temp 97.4 °F (36.3 °C) (Oral)   Resp 14   Ht 5' 2\" (1.575 m)   Wt 98 lb 3.2 oz (44.5 kg) LMP 05/20/2012   SpO2 99%   BMI 17.96 kg/m²     General:  Awake, alert, oriented X 3. Thin built somewhat tired emaciated  On high flow oxygen through nasal cannula  HEENT:  Normocephalic, atraumatic. Pupils equal, round, reactive to light. No scleral icterus. No conjunctival injection   No nasal discharge. Neck:  Supple no adenopathy  Heart:  RRR, no murmurs, gallops, rubs  Lungs: Breath sounds diminished especially right lower base  Abdomen:   Bowel sounds present, soft, nontender, no masses, no organomegaly, no peritoneal signs  Extremities:  No clubbing, cyanosis, or edema  Skin:  Warm and dry, no open lesions or rash  Neuro:  Cranial nerves 2-12 intact, no focal deficits  Generalized muscle atrophy noted      Lab Results   Component Value Date    WBC 12.6 (H) 09/25/2022    HGB 12.1 09/25/2022    HCT 33.0 (L) 09/25/2022    MCV 94.0 09/25/2022     09/25/2022     Lab Results   Component Value Date/Time     09/25/2022 04:19 AM    K 4.0 09/25/2022 04:19 AM    K 3.5 09/21/2022 05:55 AM    CL 88 09/25/2022 04:19 AM    CO2 28 09/25/2022 04:19 AM    BUN 10 09/25/2022 04:19 AM    CREATININE 0.4 09/25/2022 04:19 AM    GLUCOSE 145 09/25/2022 04:19 AM    GLUCOSE 99 05/27/2012 05:43 PM    CALCIUM 8.2 09/25/2022 04:19 AM            ASSESSMENT:      Principal Problem:    Acute respiratory failure ([address]  COVID infection  Superimposed bacterial pneumonia  Comorbidities present and past as listed below  Patient Active Problem List   Diagnosis    Hyponatremia    Acute hyponatremia    Traumatic closed displaced fracture of left shoulder with anterior dislocation    Acute respiratory failure ([address]           PLAN:  Critical care  Wean steroids as tolerated  Abx   Wean O2- on high rohan still      Garey Hammans, DO  6:24 AM  9/25/2022

## 2022-09-25 NOTE — PROGRESS NOTES
[address] [facility]  Department of Internal Medicine   Internal Medicine Residency   MICU Progress Note    Patient:  Yoly White 56 y.o. female  MRN: [id number]     Date of Service: 9/25/2022    Allergy: Patient has no known allergies. Subjective   The patient was seen and examined in the morning today. Overnight the patient had no any significant events. She could sleep well last night. There were concerns regarding her not taking her breathing treatments well. She was counseled regarding the need of breathing treatments for her illness and she agreed on that. The person in improving and is requiring less amount of oxygen than the previous day. Probable transfer to the floors tomorrow if the oxygen requirement comes down. 24h change: No significant changes. ROS: Denies Fever/chills/CP/SOB/N/V/D/C/Dysuria/Blood in stool or urine  Objective     VS: BP (!) 158/94   Pulse 62   Temp 97.4 °F (36.3 °C) (Oral)   Resp 17   Ht 5' 2\" (1.575 m)   Wt 98 lb 3.2 oz (44.5 kg)   LMP 05/20/2012   SpO2 98%   BMI 17.96 kg/m²           I & O - 24hr:   Intake/Output Summary (Last 24 hours) at 9/25/2022 3936  Last data filed at 9/25/2022 0800  Gross per 24 hour   Intake 1240 ml   Output 2675 ml   Net -1435 ml       Physical Exam:  General Appearance: alert, appears stated age, and cooperative  Neck: no adenopathy, no carotid bruit, no JVD, supple, symmetrical, trachea midline, and thyroid not enlarged, symmetric, no tenderness/mass/nodules  Lung: B/L decreased air entry, occasional rhonchi present in the left lower lung field  Heart: regular rate and rhythm, S1, S2 normal, no murmur, click, rub or gallop  Abdomen: soft, non-tender; bowel sounds normal; no masses,  no organomegaly  Extremities:  extremities normal, atraumatic, no cyanosis or edema  Musculoskeletal: No joint swelling, no muscle tenderness. ROM normal in all joints of extremities.    Neurologic: Mental status: Alert, oriented, thought content appropriate    Lines     site day    Art line   None    TLC None    PICC None    Hemoaccess None    Oxygen:    Heated high flow oxygen via nasal canula ( AIRVO) @ 50 L/min with FIO2 of 70%      ABG:     Lab Results   Component Value Date/Time    PH 7.492 09/23/2022 03:37 PM    PCO2 41.6 09/23/2022 03:37 PM    PO2 76.6 09/23/2022 03:37 PM    HCO3 31.1 09/23/2022 03:37 PM    HCO3 23.8 09/20/2022 04:18 PM    BE 7.2 09/23/2022 03:37 PM    THB 13.3 09/23/2022 03:37 PM    O2SAT 96.2 09/23/2022 03:37 PM        Medications     Nutrition:   Regular adult diet ( Fluid restriction)  ATB:   Antibiotics  Days   Rocephin 5               Patient currently has          Isolation  DVT prophylaxis/ GI prophylaxis: Lovenox/Protonix  PT/OT    Labs   CBC with Differential:    Lab Results   Component Value Date/Time    WBC 12.6 09/25/2022 04:19 AM    RBC 3.51 09/25/2022 04:19 AM    HGB 12.1 09/25/2022 04:19 AM    HCT 33.0 09/25/2022 04:19 AM     09/25/2022 04:19 AM    MCV 94.0 09/25/2022 04:19 AM    MCH 34.5 09/25/2022 04:19 AM    MCHC 36.7 09/25/2022 04:19 AM    RDW 11.6 09/25/2022 04:19 AM    NRBC 0.9 01/26/2018 06:13 AM    SEGSPCT 81 05/23/2012 03:15 PM    LYMPHOPCT 2.4 09/25/2022 04:19 AM    MONOPCT 4.1 09/25/2022 04:19 AM    MYELOPCT 0.9 09/21/2022 05:55 AM    BASOPCT 0.1 09/25/2022 04:19 AM    MONOSABS 0.52 09/25/2022 04:19 AM    LYMPHSABS 0.30 09/25/2022 04:19 AM    EOSABS 0.00 09/25/2022 04:19 AM    BASOSABS 0.01 09/25/2022 04:19 AM     CMP:    Lab Results   Component Value Date/Time     09/25/2022 04:19 AM    K 4.0 09/25/2022 04:19 AM    K 3.5 09/21/2022 05:55 AM    CL 88 09/25/2022 04:19 AM    CO2 28 09/25/2022 04:19 AM    BUN 10 09/25/2022 04:19 AM    CREATININE 0.4 09/25/2022 04:19 AM    GFRAA >60 09/25/2022 04:19 AM    LABGLOM >60 09/25/2022 04:19 AM    GLUCOSE 145 09/25/2022 04:19 AM    GLUCOSE 99 05/27/2012 05:43 PM    PROT 5.4 09/25/2022 04:19 AM    LABALBU 2.7 09/25/2022 04:19 AM    LABALBU 4.6 05/23/2012 03:15 PM    CALCIUM 8.2 09/25/2022 04:19 AM    BILITOT 0.3 09/25/2022 04:19 AM    ALKPHOS 98 09/25/2022 04:19 AM    AST 38 09/25/2022 04:19 AM    ALT 49 09/25/2022 04:19 AM     Calcium:    Lab Results   Component Value Date/Time    CALCIUM 8.2 09/25/2022 04:19 AM     Magnesium:    Lab Results   Component Value Date/Time    MG 2.4 09/21/2022 05:55 AM     Phosphorus:    Lab Results   Component Value Date/Time    PHOS 4.3 02/27/2021 10:45 PM         Resident's Assessment and Plan     Pulmonology      Acute hypoxic respiratory failure 2/2 COVID pneumonia with superimposed Strep pneumonia CAP -resolving  Patient improving, requiring O2 less than yesterday @ 50 L of O2 at 70% FiO2 in AirVo  Recent COVID-19 detected, repeat test COVID-19 still positive  Previous history of COPD   Shortness of breath, cough, fatigue, congestion   CT shows new right middle lobe consolidative process with concerns for post-obstructive atelectasis   Co-worker diagnosed with COVID 1 week prior to her having symptoms, not vaccinated against COVID-19  On Rocephin  daily and Decadron 20 mg  for COVID-19 5 days completed  On [facility] with 30 L of O2     Plan   Continue Rocephin and start 6 mg of Decadron from tomorrow  Breathing treatment with (Brovana, Pulmicort and Duonebs)   Continue to wean off oxygen        Right lower lobe pneumonia likey 2/2 COVID-19 superimposed on Strep pneumoniae   CT shows new right middle lobe consolidative process with concerns for post-obstructive atelectasis   SPO2 during first contact with provider, SPO2 was 70%   Monitor respiratory status   Strept pneumoniae urine antigen positive  Plan:  Continue on Rocephin and heated high flow nasal cannula  Try weaning off oxygen requirements  Breathing treatment with (Brovana, Pulmicort and Duonebs)      New small right pleural effusion likely 2/2 CAP   Improving than the initial presentation   CXR improving than presentation     Hx of ?  COPD   No PFT on file, not on any inhalers at home, not on home oxygen      Hx of pulmonary lung nodule         Cardiology      Elevated pro-BNP, likely 2/2 acute infectious pulmonary process with baseline ? Heart failure  on presentation was 2,185  patient on Clonidine 0.2 mg TID, coreg 12.5 mg BID and Cardizem 120 mg BID  no signs and symptoms of fluid retention   Plan: Continue to monitor BP and signs of heart failure     Hx of ascending aortic aneurysm (4.5 x 4.4 x 4cm with increase in size recently)      Hx of HTN   Previous workup for secondary HTN with ultrasound ruled out renal artery stenosis   Patient on Clonidine 0.2 mg TID, coreg 12.5 mg BID and Cardizem 120 mg BID  Continue diltiazem, clonidine and carvedilol      Hx of Mitral valve prolapse     Nephrology      Chronic hypontremia 2/2 ? Medication use   Na level 126 today in the morning  No any new symptoms  Patient was in Sodium tab supplements  Plan:   Continue to fluid restriction   Avoid iv fluids and iv electrolyte replacement  Continue on the Sodium chloride tablets 1 g BID     NAGMA, resolved     Hypokalemia, resolved today  K level 4.0 today in the morning, replaced  Plan:  Monitor BMP daily   Replace K with oral supplements, avoid iv supplementation if needed     Hematology      Leukocytosis likely 2/2 CAP and COVID-19  WBC count in decreasing trend @ 12.6 today   Plan:   Monitor CBC daily  Continue Rocephin for the CAP  Steroids dose to be decreased to 6 mg DECADRON from tomorrow     Gastroenterology:     Elevated AST and ALT.  resolving  AST 38 and ALT of 49  Drinks 2-3 times weekly, 3-4 beers   Plan:   Patient kept on Thiamine 362 mg OD and folic acid  Plan to change to Multivitamin tablets QD after the loading dose finished     Problems resolved during this admission:   Lactic acidosis           PT/OT: Might need after discharge  DVT ppx: Lovenox 40 mg  GI ppx: Protonix 40 mg         Code Status:   Full code      Mohini Oconnor MD, PGY-1    Attending physician: Dr. Mayra Dai [place]  Department of Pulmonary, Critical Care and Sleep Medicine  [address] [facility]  Department of Internal Medicine  Attending Statement    This patient has a high probability of sudden clinically significant deterioration, which requires the highest level of physician preparedness to intervene urgently. I managed/supervised life or organ supporting interventions that required frequent physician assessment. I devoted my full attention to the direct care of this patient for the period of time indicated below. Time I spent with family of surrogate(s) is included only if the patient was incapable of providing the necessary information or participating in medical decision making. In addition to time devoted to teaching and to any procedure.  CCT 41 minutes    Shanita Medina,

## 2022-09-25 NOTE — PLAN OF CARE
Problem: Skin/Tissue Integrity  Goal: Absence of new skin breakdown  Description: 1. Monitor for areas of redness and/or skin breakdown  2. Assess vascular access sites hourly  3. Every 4-6 hours minimum:  Change oxygen saturation probe site  4. Every 4-6 hours:  If on nasal continuous positive airway pressure, respiratory therapy assess nares and determine need for appliance change or resting period.   9/24/2022 2119 by Kalpana Mac RN  Outcome: Progressing  9/24/2022 1132 by Imtiaz Johnson RN  Outcome: Progressing     Problem: Safety - Adult  Goal: Free from fall injury  9/24/2022 2119 by Kalpana Mac RN  Outcome: Progressing  Flowsheets (Taken 9/24/2022 2118)  Free From Fall Injury: Instruct family/caregiver on patient safety  9/24/2022 1132 by Imtiaz Johnson RN  Outcome: Progressing     Problem: Pain  Goal: Verbalizes/displays adequate comfort level or baseline comfort level  9/24/2022 2119 by Kalpana Mac RN  Outcome: Progressing  Flowsheets (Taken 9/24/2022 2000)  Verbalizes/displays adequate comfort level or baseline comfort level: Encourage patient to monitor pain and request assistance  9/24/2022 1132 by Imtiaz Johnson RN  Outcome: Progressing

## 2022-09-26 ENCOUNTER — APPOINTMENT (OUTPATIENT)
Dept: GENERAL RADIOLOGY | Age: 57
DRG: 177 | End: 2022-09-26
Payer: COMMERCIAL

## 2022-09-26 LAB
ALBUMIN SERPL-MCNC: 2.9 G/DL (ref 3.5–5.2)
ALP BLD-CCNC: 96 U/L (ref 35–104)
ALT SERPL-CCNC: 67 U/L (ref 0–32)
ANION GAP SERPL CALCULATED.3IONS-SCNC: 9 MMOL/L (ref 7–16)
AST SERPL-CCNC: 32 U/L (ref 0–31)
BASOPHILS ABSOLUTE: 0.02 E9/L (ref 0–0.2)
BASOPHILS RELATIVE PERCENT: 0.1 % (ref 0–2)
BILIRUB SERPL-MCNC: 0.3 MG/DL (ref 0–1.2)
BUN BLDV-MCNC: 11 MG/DL (ref 6–20)
CALCIUM SERPL-MCNC: 8.2 MG/DL (ref 8.6–10.2)
CHLORIDE BLD-SCNC: 87 MMOL/L (ref 98–107)
CO2: 29 MMOL/L (ref 22–29)
CREAT SERPL-MCNC: 0.4 MG/DL (ref 0.5–1)
EOSINOPHILS ABSOLUTE: 0 E9/L (ref 0.05–0.5)
EOSINOPHILS RELATIVE PERCENT: 0 % (ref 0–6)
GFR AFRICAN AMERICAN: >60
GFR NON-AFRICAN AMERICAN: >60 ML/MIN/1.73
GLUCOSE BLD-MCNC: 116 MG/DL (ref 74–99)
HCT VFR BLD CALC: 32.2 % (ref 34–48)
HEMOGLOBIN: 11.6 G/DL (ref 11.5–15.5)
IGG: 799 MG/DL (ref 700–1600)
IMMATURE GRANULOCYTES #: 0.26 E9/L
IMMATURE GRANULOCYTES %: 1.6 % (ref 0–5)
LYMPHOCYTES ABSOLUTE: 0.33 E9/L (ref 1.5–4)
LYMPHOCYTES RELATIVE PERCENT: 2 % (ref 20–42)
MCH RBC QN AUTO: 33.5 PG (ref 26–35)
MCHC RBC AUTO-ENTMCNC: 36 % (ref 32–34.5)
MCV RBC AUTO: 93.1 FL (ref 80–99.9)
MONOCYTES ABSOLUTE: 0.74 E9/L (ref 0.1–0.95)
MONOCYTES RELATIVE PERCENT: 4.5 % (ref 2–12)
NEUTROPHILS ABSOLUTE: 15.26 E9/L (ref 1.8–7.3)
NEUTROPHILS RELATIVE PERCENT: 91.8 % (ref 43–80)
OVALOCYTES: ABNORMAL
PDW BLD-RTO: 11.8 FL (ref 11.5–15)
PLATELET # BLD: 416 E9/L (ref 130–450)
PMV BLD AUTO: 9.2 FL (ref 7–12)
POIKILOCYTES: ABNORMAL
POTASSIUM SERPL-SCNC: 3.7 MMOL/L (ref 3.5–5)
RBC # BLD: 3.46 E12/L (ref 3.5–5.5)
SARS-COV-2, NAAT: NOT DETECTED
SODIUM BLD-SCNC: 125 MMOL/L (ref 132–146)
TOTAL PROTEIN: 5.4 G/DL (ref 6.4–8.3)
VACUOLATED NEUTROPHILS: ABNORMAL
WBC # BLD: 16.6 E9/L (ref 4.5–11.5)

## 2022-09-26 PROCEDURE — 6370000000 HC RX 637 (ALT 250 FOR IP): Performed by: INTERNAL MEDICINE

## 2022-09-26 PROCEDURE — 2700000000 HC OXYGEN THERAPY PER DAY

## 2022-09-26 PROCEDURE — 71045 X-RAY EXAM CHEST 1 VIEW: CPT

## 2022-09-26 PROCEDURE — 80053 COMPREHEN METABOLIC PANEL: CPT

## 2022-09-26 PROCEDURE — 2000000000 HC ICU R&B

## 2022-09-26 PROCEDURE — 6360000002 HC RX W HCPCS: Performed by: INTERNAL MEDICINE

## 2022-09-26 PROCEDURE — 87635 SARS-COV-2 COVID-19 AMP PRB: CPT

## 2022-09-26 PROCEDURE — 2580000003 HC RX 258: Performed by: INTERNAL MEDICINE

## 2022-09-26 PROCEDURE — 94640 AIRWAY INHALATION TREATMENT: CPT

## 2022-09-26 PROCEDURE — 6360000002 HC RX W HCPCS

## 2022-09-26 PROCEDURE — 85025 COMPLETE CBC W/AUTO DIFF WBC: CPT

## 2022-09-26 RX ORDER — POTASSIUM CHLORIDE 7.45 MG/ML
10 INJECTION INTRAVENOUS
Status: DISCONTINUED | OUTPATIENT
Start: 2022-09-26 | End: 2022-09-26

## 2022-09-26 RX ADMIN — SODIUM CHLORIDE SOLN NEBU 3% 4 ML: 3 NEBU SOLN at 13:06

## 2022-09-26 RX ADMIN — Medication 10 ML: at 09:54

## 2022-09-26 RX ADMIN — Medication 10 ML: at 21:28

## 2022-09-26 RX ADMIN — FOLIC ACID 1 MG: 1 TABLET ORAL at 09:53

## 2022-09-26 RX ADMIN — SODIUM CHLORIDE 1 G: 1 TABLET ORAL at 21:27

## 2022-09-26 RX ADMIN — CARVEDILOL 12.5 MG: 6.25 TABLET, FILM COATED ORAL at 09:53

## 2022-09-26 RX ADMIN — CLONIDINE HYDROCHLORIDE 0.2 MG: 0.2 TABLET ORAL at 09:51

## 2022-09-26 RX ADMIN — DILTIAZEM HYDROCHLORIDE 120 MG: 60 CAPSULE, EXTENDED RELEASE ORAL at 09:53

## 2022-09-26 RX ADMIN — SODIUM CHLORIDE SOLN NEBU 3% 4 ML: 3 NEBU SOLN at 16:26

## 2022-09-26 RX ADMIN — CLONIDINE HYDROCHLORIDE 0.2 MG: 0.2 TABLET ORAL at 21:27

## 2022-09-26 RX ADMIN — ENOXAPARIN SODIUM 40 MG: 100 INJECTION SUBCUTANEOUS at 09:54

## 2022-09-26 RX ADMIN — ARFORMOTEROL TARTRATE 15 MCG: 15 SOLUTION RESPIRATORY (INHALATION) at 20:17

## 2022-09-26 RX ADMIN — BUDESONIDE 500 MCG: 0.5 SUSPENSION RESPIRATORY (INHALATION) at 08:44

## 2022-09-26 RX ADMIN — THIAMINE HYDROCHLORIDE 100 MG: 100 INJECTION, SOLUTION INTRAMUSCULAR; INTRAVENOUS at 09:54

## 2022-09-26 RX ADMIN — CARVEDILOL 12.5 MG: 6.25 TABLET, FILM COATED ORAL at 21:27

## 2022-09-26 RX ADMIN — DILTIAZEM HYDROCHLORIDE 120 MG: 60 CAPSULE, EXTENDED RELEASE ORAL at 21:27

## 2022-09-26 RX ADMIN — IPRATROPIUM BROMIDE AND ALBUTEROL SULFATE 1 AMPULE: .5; 2.5 SOLUTION RESPIRATORY (INHALATION) at 13:06

## 2022-09-26 RX ADMIN — BUDESONIDE 500 MCG: 0.5 SUSPENSION RESPIRATORY (INHALATION) at 20:17

## 2022-09-26 RX ADMIN — OXYCODONE HYDROCHLORIDE AND ACETAMINOPHEN 500 MG: 500 TABLET ORAL at 09:53

## 2022-09-26 RX ADMIN — PANTOPRAZOLE SODIUM 40 MG: 40 TABLET, DELAYED RELEASE ORAL at 06:28

## 2022-09-26 RX ADMIN — Medication 50 MG: at 09:53

## 2022-09-26 RX ADMIN — ARFORMOTEROL TARTRATE 15 MCG: 15 SOLUTION RESPIRATORY (INHALATION) at 08:44

## 2022-09-26 RX ADMIN — CLONIDINE HYDROCHLORIDE 0.2 MG: 0.2 TABLET ORAL at 13:17

## 2022-09-26 RX ADMIN — DEXAMETHASONE SODIUM PHOSPHATE 6 MG: 10 INJECTION, SOLUTION INTRAMUSCULAR; INTRAVENOUS at 09:55

## 2022-09-26 RX ADMIN — IPRATROPIUM BROMIDE AND ALBUTEROL SULFATE 1 AMPULE: .5; 2.5 SOLUTION RESPIRATORY (INHALATION) at 16:26

## 2022-09-26 RX ADMIN — POTASSIUM BICARBONATE 20 MEQ: 782 TABLET, EFFERVESCENT ORAL at 13:17

## 2022-09-26 RX ADMIN — SODIUM CHLORIDE 1 G: 1 TABLET ORAL at 09:54

## 2022-09-26 RX ADMIN — IPRATROPIUM BROMIDE AND ALBUTEROL SULFATE 1 AMPULE: .5; 2.5 SOLUTION RESPIRATORY (INHALATION) at 20:17

## 2022-09-26 ASSESSMENT — PAIN SCALES - GENERAL
PAINLEVEL_OUTOF10: 0

## 2022-09-26 NOTE — PLAN OF CARE
Problem: Discharge Planning  Goal: Discharge to home or other facility with appropriate resources  9/26/2022 0603 by Vitaliy Wilde RN  Outcome: Not Progressing  9/26/2022 0602 by Vitaliy Wilde RN  Outcome: Not Progressing  9/26/2022 0602 by Vitaliy Wilde RN  Outcome: Not Progressing  9/26/2022 0601 by Vitaliy Wilde RN  Outcome: Not Progressing  9/26/2022 0600 by Vitaliy Wilde RN  Outcome: Not Progressing  9/26/2022 0600 by Vitaliy Wilde RN  Outcome: Not Progressing     Problem: Skin/Tissue Integrity  Goal: Absence of new skin breakdown  Description: 1. Monitor for areas of redness and/or skin breakdown  2. Assess vascular access sites hourly  3. Every 4-6 hours minimum:  Change oxygen saturation probe site  4. Every 4-6 hours:  If on nasal continuous positive airway pressure, respiratory therapy assess nares and determine need for appliance change or resting period.   9/26/2022 0603 by Vitaliy Wilde RN  Outcome: Progressing  9/26/2022 0602 by Vitaliy Wilde RN  Outcome: Progressing  9/26/2022 0602 by Vitaliy Wilde RN  Outcome: Progressing  9/26/2022 0601 by Vitaliy Wilde RN  Outcome: Progressing  9/26/2022 0600 by Vitaliy Wilde RN  Outcome: Progressing  9/26/2022 0600 by Vitaliy Wilde RN  Outcome: Progressing     Problem: Safety - Adult  Goal: Free from fall injury  9/26/2022 0603 by Vitaliy Wilde RN  Outcome: Progressing  9/26/2022 0602 by Vitaliy Wilde RN  Outcome: Progressing  9/26/2022 0602 by Vitaliy Wilde RN  Outcome: Progressing  9/26/2022 0601 by Vitaliy Wilde RN  Outcome: Progressing  9/26/2022 0600 by Vitaliy Wilde RN  Outcome: Progressing  9/26/2022 0600 by Vitaliy Wilde RN  Outcome: Progressing     Problem: Pain  Goal: Verbalizes/displays adequate comfort level or baseline comfort level  9/26/2022 0603 by Vitaliy Wilde RN  Outcome: Progressing  9/26/2022 0602 by Vitaliy Wilde RN  Outcome: Progressing  9/26/2022 0602 by Vitaliy Wilde RN  Outcome: Progressing  9/26/2022 0601 by Vitaliy Wilde RN  Outcome: Progressing  9/26/2022 0600 by Denise Flowers Jeni Cat RN  Outcome: Progressing  9/26/2022 0600 by Jennifer Batres, RN  Outcome: Progressing     Problem: ABCDS Injury Assessment  Goal: Absence of physical injury  9/26/2022 0603 by Jennifer Batres, RN  Outcome: Progressing  9/26/2022 0602 by Jennifer Batres, RN  Outcome: Progressing  9/26/2022 0602 by Jennifer Batres, RN  Outcome: Progressing  9/26/2022 0601 by Jennifer Batres, RN  Outcome: Progressing  9/26/2022 0600 by Jenniefr Batres, RN  Outcome: Progressing  9/26/2022 0600 by Jennifer Batres, RN  Outcome: Progressing     Problem: Nutrition Deficit:  Goal: Optimize nutritional status  9/26/2022 0603 by Jennifer Batres, RN  Outcome: Progressing  9/26/2022 0602 by Jennifer Batres, RN  Outcome: Progressing  9/26/2022 0602 by Jennifer Batres, RN  Outcome: Progressing  9/26/2022 0601 by Jennifer Batres, RN  Outcome: Progressing  9/26/2022 0600 by Jennifer Batres, RN  Outcome: Progressing  9/26/2022 0600 by Jennifer Batres, RN  Outcome: Progressing     Problem: Discharge Planning  Goal: Discharge to home or other facility with appropriate resources  9/26/2022 0603 by Jennifer Batres, RN  Outcome: Not Progressing  9/26/2022 0602 by Jennifer Batres, RN  Outcome: Not Progressing  9/26/2022 0602 by Jennifer Batres, RN  Outcome: Not Progressing  9/26/2022 0601 by Jennifer Batres, RN  Outcome: Not Progressing  9/26/2022 0600 by Jennifer Batres, RN  Outcome: Not Progressing  9/26/2022 0600 by Jennifer Batres, RN  Outcome: Not Progressing

## 2022-09-26 NOTE — PROGRESS NOTES
[address] [facility]  Department of Internal Medicine   Internal Medicine Residency   MICU Progress Note    Patient:  Michael Sandoval 56 y.o. female  MRN: [id number]     Date of Service: 9/26/2022    Allergy: Patient has no known allergies. Subjective     Patient was seen at bedside this morning. She was resting comfortably in bed watching TV. She stated that she is having some nose discomfort due to needing high flow oxygen for so long. She denied any other acute complaints. 24h change: no overnight events reported   ROS: Denies Fever/chills/CP/SOB/N/V/D/C/Dysuria/Blood in stool or urine  Objective     VS: BP (!) 134/90   Pulse 58   Temp (!) 96.5 °F (35.8 °C) (Axillary)   Resp 15   Ht 5' 2\" (1.575 m)   Wt 98 lb 3.2 oz (44.5 kg)   LMP 05/20/2012   SpO2 97%   BMI 17.96 kg/m²           I & O - 24hr:   Intake/Output Summary (Last 24 hours) at 9/26/2022 1637  Last data filed at 9/26/2022 1200  Gross per 24 hour   Intake 1060 ml   Output 2200 ml   Net -1140 ml       Physical Exam:  General Appearance: alert, appears stated age, and cooperative  Neck: supple, symmetrical, trachea midline and thyroid not enlarged, symmetric, no tenderness/mass/nodules  Lung: clear to auscultation bilaterally  Heart: regular rate and rhythm, S1, S2 normal, no murmur, click, rub or gallop  Abdomen: soft, non-tender; bowel sounds normal; no masses,  no organomegaly  Extremities:  extremities normal, atraumatic, no cyanosis or edema  Musculoskeletal: No joint swelling, no muscle tenderness. ROM normal in all joints of extremities.    Neurologic: Mental status: Alert, oriented, thought content appropriate    Lines     site day    Art line   None    TLC None    PICC None    Hemoaccess None    Oxygen:    nasal canula at 15L/min/    ABG:     Lab Results   Component Value Date/Time    PH 7.492 09/23/2022 03:37 PM    PCO2 41.6 09/23/2022 03:37 PM    PO2 76.6 09/23/2022 03:37 PM    HCO3 31.1 09/23/2022 03:37 PM    HCO3 23.8 09/20/2022 04:18 PM    BE 7.2 09/23/2022 03:37 PM    THB 13.3 09/23/2022 03:37 PM    O2SAT 96.2 09/23/2022 03:37 PM        Medications     Infusions: (Fluid, Sedation, Vasopressors)  No current infusions    Nutrition:   Regular adult diet with oral supplements    ATB:   Antibiotics  Days   rocephin 7             Skin issues: no current skin issues  Patient currently has   Isolation  DVT prophylaxis/ GI prophylaxis,    PT/OT    Labs   CBC:   Lab Results   Component Value Date/Time    WBC 16.6 09/26/2022 04:36 AM    RBC 3.46 09/26/2022 04:36 AM    HGB 11.6 09/26/2022 04:36 AM    HCT 32.2 09/26/2022 04:36 AM    MCV 93.1 09/26/2022 04:36 AM    MCH 33.5 09/26/2022 04:36 AM    MCHC 36.0 09/26/2022 04:36 AM    RDW 11.8 09/26/2022 04:36 AM     09/26/2022 04:36 AM    MPV 9.2 09/26/2022 04:36 AM     CMP:    Lab Results   Component Value Date/Time     09/26/2022 04:36 AM    K 3.7 09/26/2022 04:36 AM    K 3.5 09/21/2022 05:55 AM    CL 87 09/26/2022 04:36 AM    CO2 29 09/26/2022 04:36 AM    BUN 11 09/26/2022 04:36 AM    CREATININE 0.4 09/26/2022 04:36 AM    GFRAA >60 09/26/2022 04:36 AM    LABGLOM >60 09/26/2022 04:36 AM    GLUCOSE 116 09/26/2022 04:36 AM    GLUCOSE 99 05/27/2012 05:43 PM    PROT 5.4 09/26/2022 04:36 AM    LABALBU 2.9 09/26/2022 04:36 AM    LABALBU 4.6 05/23/2012 03:15 PM    CALCIUM 8.2 09/26/2022 04:36 AM    BILITOT 0.3 09/26/2022 04:36 AM    ALKPHOS 96 09/26/2022 04:36 AM    AST 32 09/26/2022 04:36 AM    ALT 67 09/26/2022 04:36 AM       Imaging Studies:  CXR:   9/26/22  Small patchy infiltrate seen within right lung base; left lower lobe pneumonia; small left pleural effusion    Resident's Assessment and Plan     Cardiology:   Elevated pro-BNP, likely 2/2 acute infectious pulmonary process   - on presentation was 2,185  PLAN  - continue clonidine, coreg, Cardizem    2. Hx ascending aortic aneursym    3. Hx HTN  - continue clonidine, coreg, Cardizem    4.  Hx mitral valve prolapse    Pulmonary: Acute hypoxic respiratory failure 2/2 COVID pneumonia with superimposed Strep pneumonia - resolving  - patient's oxygen requirement has decreased to 15 L NC  - repeat COVID test negative today  - cough has improved  - pt completed 5 day course of decadron 20 mg  PLAN  - continue rocephin  - continue decadron 6 mg  - continue breathing treatments   - continue to wean oxygen as tolerated     2. Small left pleural effusion  - improved on CXR today     3. Hx COPD    4. Hx Pulmonary lung nodule    Nephrology (Fluids/ Electrolytes & Nutrition):   Chronic hyponatremia  - Na today 125  - patient takes home sodium supplements  PLAN  - continue fluid restriction  - avoid IV fluids/IV electrolyte replacement  - continue sodium chloride tablets 1g BID    2. Hypokalemia  - 3.7 today, replaced  PLAN  - continue to replace to keep > 4.0    Gastroenterology:   Elevated AST, AST- resolving  - AST 32 ALT 67  - drinks 2-3 times weekly, 3-4 beers  PLAN  - continue thiamine, folic acid    Hematology/ Oncology:   Leukocytosis 2/2 CAP, COVID  - WBC 16.6 today  - steroids decreased from 20 mg to 6 mg today   PLAN  - continue rocephin      Next of Kin/ POA:   Sister April Moya ([phone])  Code Status:   Full code     PT/OT: following  DVT ppx: lovenox  GI ppx: protonix        Lillian Quinones MD, PGY-1    Attending physician: Dr. Jia Hillman      I personally saw, examined and provided care for the patient. Radiographs, labs and medication list were reviewed by me independently. Review of Residents documentation was conducted and revisions were made as appropriate. I agree with the above documented exam, problem list and plan of care.         CCT excluding procedures 34 minutes    Clayton Muir DO

## 2022-09-26 NOTE — PLAN OF CARE
Problem: Discharge Planning  Goal: Discharge to home or other facility with appropriate resources  9/26/2022 1015 by Veta Osler, RN  Outcome: Progressing  9/26/2022 1013 by Veta Osler, RN  Outcome: Progressing  9/26/2022 0603 by Jennifer Batres RN  Outcome: Not Progressing  9/26/2022 0602 by Jennifer Batres RN  Outcome: Not Progressing  9/26/2022 0602 by Jennifer Batres RN  Outcome: Not Progressing  9/26/2022 0601 by Jennifer Batres RN  Outcome: Not Progressing  9/26/2022 0600 by Jennifer Batres RN  Outcome: Not Progressing  9/26/2022 0600 by Jennifer Batres RN  Outcome: Not Progressing     Problem: Skin/Tissue Integrity  Goal: Absence of new skin breakdown  Description: 1. Monitor for areas of redness and/or skin breakdown  2. Assess vascular access sites hourly  3. Every 4-6 hours minimum:  Change oxygen saturation probe site  4. Every 4-6 hours:  If on nasal continuous positive airway pressure, respiratory therapy assess nares and determine need for appliance change or resting period.   9/26/2022 1015 by Veta Osler, RN  Outcome: Progressing  9/26/2022 1013 by Veta Osler, RN  Outcome: Progressing  9/26/2022 0603 by Jennifer Batres RN  Outcome: Progressing  9/26/2022 0602 by Jennifer Batres RN  Outcome: Progressing  9/26/2022 0602 by Jennifer Batres RN  Outcome: Progressing  9/26/2022 0601 by Jennifer Batres RN  Outcome: Progressing  9/26/2022 0600 by Jennifer Batres RN  Outcome: Progressing  9/26/2022 0600 by Jennifer Batres RN  Outcome: Progressing     Problem: Safety - Adult  Goal: Free from fall injury  9/26/2022 1015 by Veta Osler, RN  Outcome: Progressing  9/26/2022 1013 by Veta Osler, RN  Outcome: Progressing  9/26/2022 0603 by Jennifer Batres RN  Outcome: Progressing  9/26/2022 0602 by Jennifer Batres RN  Outcome: Progressing  9/26/2022 0602 by Jennifer Batres RN  Outcome: Progressing  9/26/2022 0601 by Jennifer Batres RN  Outcome: Progressing  9/26/2022 0600 by Jennifer Batres RN  Outcome: Progressing  9/26/2022 0600 by Jennifer Batres RN  Outcome: Progressing     Problem: Pain  Goal: Verbalizes/displays adequate comfort level or baseline comfort level  9/26/2022 1015 by Imtiaz Johnson RN  Outcome: Progressing  9/26/2022 1013 by Imtiaz Johnson RN  Outcome: Progressing  9/26/2022 0603 by Betty Renteria RN  Outcome: Progressing  9/26/2022 0602 by Betty Renteria RN  Outcome: Progressing  9/26/2022 0602 by Betty Renteria, RN  Outcome: Progressing  9/26/2022 0601 by Betty Renteria RN  Outcome: Progressing  9/26/2022 0600 by Betty Renteria RN  Outcome: Progressing  9/26/2022 0600 by Betty Renteria, RN  Outcome: Progressing     Problem: ABCDS Injury Assessment  Goal: Absence of physical injury  9/26/2022 1015 by Imtiaz Johnson RN  Outcome: Progressing  9/26/2022 1013 by Imtiaz Johnson RN  Outcome: Progressing  9/26/2022 0603 by Betty Renteria, RN  Outcome: Progressing  9/26/2022 0602 by Betty Renteria, RN  Outcome: Progressing  9/26/2022 0602 by Betty Renteria, RN  Outcome: Progressing  9/26/2022 0601 by Betty Renteria, RN  Outcome: Progressing  9/26/2022 0600 by Betty Renteria, RN  Outcome: Progressing  9/26/2022 0600 by Betty Renteria, RN  Outcome: Progressing     Problem: Nutrition Deficit:  Goal: Optimize nutritional status  9/26/2022 1015 by Imtiaz Johnson, JARRETT  Outcome: Progressing  9/26/2022 1013 by Imtiaz Johnson RN  Outcome: Progressing  9/26/2022 0603 by Betty Renteria, RN  Outcome: Progressing  9/26/2022 0602 by Betty Renteria, RN  Outcome: Progressing  9/26/2022 0602 by Betty Renteria, RN  Outcome: Progressing  9/26/2022 0601 by Betty Renteria, RN  Outcome: Progressing  9/26/2022 0600 by Btety Renteria, RN  Outcome: Progressing  9/26/2022 0600 by Betty Renteria, RN  Outcome: Progressing

## 2022-09-26 NOTE — PROGRESS NOTES
Sridhar Dye MD FACP  Internal medicine  Progress Notes      CHIEF COMPLAINT: Shortness of breath      HISTORY OF PRESENT ILLNESS:    9/21/2022  Patient was seen in medical ICU earlier this morning  Spoke with the ER physician at the time of admission  Data reviewed in detail with the patient  82-year-old  woman unvaccinated for COVID presents with 1 week history of progressive dyspnea associated with cough and fever  She was exposed to someone with COVID a week ago  She tested positive for COVID  Admitted to ICU due to acute hypoxic respiratory failure  She feels better this morning  9/22/2022  Patient was seen on the medical ICU earlier this morning  She is in isolation room due to [place]  She states she feels better  Remains on high flow oxygen  9/23/2022  Patient was seen in the ICU earlier this morning  Remains on high flow oxygen  Overall she feels better  9/26/2022  Patient was seen in ICU earlier this morning  Spoke with the registered nurse  Patient states he feels better  But she remains on high flow oxygen    Past Medical History:    Past Medical History:   Diagnosis Date    Acid reflux     Fracture of left shoulder 10/2018    Frozen shoulder     left    Hypertension     Hyponatremia     MVP (mitral valve prolapse)     no issues       Past Surgical History:    Past Surgical History:   Procedure Laterality Date    CLOSED MANIPULATION SHOULDER Left 3/5/2019    LEFT SHOULDER MANIPULATION UNDER ANTESTHESIA, STERIOD INJECTION TO FOLLOW performed by Savage Soler MD at [address] [place] [address] Right 1980's    bone alignment    OPEN TREATMENT PROX HUMERAL FRACTURE Left 10/23/2018    LEFT SHOULDER OPEN REDUCTION INTERNAL FIXATION GREATER TUBEROSITY ++ARTHREX++ INTERSCALINE BLOCK++ performed by Savage Soler MD at [facility] 2018    left forearm       Medications Prior to Admission:    Medications Prior to Admission: carvedilol (COREG) 12.5 MG tablet, Take 1 tablet by mouth 2 times daily (Patient taking differently: Take 25 mg by mouth 2 times daily)  tolvaptan (SAMSCA) 15 MG TABS tablet, Take 0.5 tablets by mouth daily (Patient not taking: Reported on 9/20/2022)  cefdinir (OMNICEF) 300 MG capsule,   brompheniramine-pseudoephedrine-DM 2-30-10 MG/5ML syrup,   diltiazem (DILACOR XR) 120 MG extended release capsule, Take 120 mg by mouth 2 times daily Instructed to take morning of surgery with a sip of water  sodium chloride 1 g tablet, Take 1 g by mouth 2 times daily Instructed to take morning of surgery with a sip of water per Dr. Ephraim Diaz  cloNIDine (CATAPRES) 0.2 MG tablet, Take 0.2 mg by mouth 3 times daily Instructed to take morning of surgery with a sip of water    Allergies:    Patient has no known allergies. Social History:    reports that she has been smoking cigarettes. She has a 30.00 pack-year smoking history. She has never used smokeless tobacco. She reports current alcohol use. She reports that she does not use drugs. Family History:   family history is not on file. REVIEW OF SYSTEMS:  As above in the HPI, otherwise negative    PHYSICAL EXAM:    Vitals:  BP (!) 170/101   Pulse 58   Temp (!) 96.5 °F (35.8 °C) (Axillary)   Resp 19   Ht 5' 2\" (1.575 m)   Wt 98 lb 3.2 oz (44.5 kg)   LMP 05/20/2012   SpO2 99%   BMI 17.96 kg/m²     General:  Awake, alert, oriented X 3. Thin built somewhat tired emaciated  On high flow oxygen through nasal cannula  HEENT:  Normocephalic, atraumatic. Pupils equal, round, reactive to light. No scleral icterus. No conjunctival injection   No nasal discharge. Neck:  Supple no adenopathy  Heart:  RRR, no murmurs, gallops, rubs  Lungs: Breath sounds diminished especially right lower base  Abdomen:   Bowel sounds present, soft, nontender, no masses, no organomegaly, no peritoneal signs  Extremities:  No clubbing, cyanosis, or edema  Skin:  Warm and dry, no open lesions or rash  Neuro:  Cranial nerves 2-12 intact, no focal deficits  Generalized muscle atrophy noted  Breast: deferred  Rectal: deferred  Genitalia:  deferred    LABS:  Data reviewed      ASSESSMENT:      Principal Problem:    Acute respiratory failure ([address]  COVID infection  Superimposed bacterial pneumonia  Comorbidities present and past as listed below  Patient Active Problem List   Diagnosis    Hyponatremia    Acute hyponatremia    Traumatic closed displaced fracture of left shoulder with anterior dislocation    Acute respiratory failure (HCC)           PLAN:  Chest x-ray showing improvement  Oxygen supplementation/wean as tolerated  Steroids intravenously  Bronchodilators  Empiric antibiotics with Rocephin and doxycycline  ICU care as outlined  Questions answered to her Marly Ramos MD  10:23 AM  9/26/2022

## 2022-09-27 LAB
ALBUMIN SERPL-MCNC: 3.1 G/DL (ref 3.5–5.2)
ALP BLD-CCNC: 107 U/L (ref 35–104)
ALT SERPL-CCNC: 74 U/L (ref 0–32)
ANION GAP SERPL CALCULATED.3IONS-SCNC: 11 MMOL/L (ref 7–16)
ANISOCYTOSIS: ABNORMAL
AST SERPL-CCNC: 33 U/L (ref 0–31)
BASOPHILS ABSOLUTE: 0 E9/L (ref 0–0.2)
BASOPHILS RELATIVE PERCENT: 0.1 % (ref 0–2)
BILIRUB SERPL-MCNC: 0.4 MG/DL (ref 0–1.2)
BLOOD CULTURE, ROUTINE: NORMAL
BUN BLDV-MCNC: 7 MG/DL (ref 6–20)
CALCIUM SERPL-MCNC: 8.5 MG/DL (ref 8.6–10.2)
CHLORIDE BLD-SCNC: 91 MMOL/L (ref 98–107)
CO2: 30 MMOL/L (ref 22–29)
CREAT SERPL-MCNC: 0.4 MG/DL (ref 0.5–1)
CULTURE, BLOOD 2: NORMAL
EOSINOPHILS ABSOLUTE: 0 E9/L (ref 0.05–0.5)
EOSINOPHILS RELATIVE PERCENT: 0.2 % (ref 0–6)
GFR AFRICAN AMERICAN: >60
GFR NON-AFRICAN AMERICAN: >60 ML/MIN/1.73
GLUCOSE BLD-MCNC: 105 MG/DL (ref 74–99)
HCT VFR BLD CALC: 36.1 % (ref 34–48)
HEMOGLOBIN: 12.8 G/DL (ref 11.5–15.5)
HISTOPLASMA ABS, ID: NOT DETECTED
LYMPHOCYTES ABSOLUTE: 0 E9/L (ref 1.5–4)
LYMPHOCYTES RELATIVE PERCENT: 2.9 % (ref 20–42)
MCH RBC QN AUTO: 33.3 PG (ref 26–35)
MCHC RBC AUTO-ENTMCNC: 35.5 % (ref 32–34.5)
MCV RBC AUTO: 94 FL (ref 80–99.9)
MONOCYTES ABSOLUTE: 0.51 E9/L (ref 0.1–0.95)
MONOCYTES RELATIVE PERCENT: 2.6 % (ref 2–12)
NEUTROPHILS ABSOLUTE: 16.39 E9/L (ref 1.8–7.3)
NEUTROPHILS RELATIVE PERCENT: 97.4 % (ref 43–80)
PDW BLD-RTO: 11.7 FL (ref 11.5–15)
PLATELET # BLD: 445 E9/L (ref 130–450)
PMV BLD AUTO: 9.1 FL (ref 7–12)
POTASSIUM SERPL-SCNC: 3.9 MMOL/L (ref 3.5–5)
RBC # BLD: 3.84 E12/L (ref 3.5–5.5)
SODIUM BLD-SCNC: 132 MMOL/L (ref 132–146)
TOTAL PROTEIN: 5.8 G/DL (ref 6.4–8.3)
WBC # BLD: 16.9 E9/L (ref 4.5–11.5)

## 2022-09-27 PROCEDURE — 6370000000 HC RX 637 (ALT 250 FOR IP): Performed by: INTERNAL MEDICINE

## 2022-09-27 PROCEDURE — 2140000000 HC CCU INTERMEDIATE R&B

## 2022-09-27 PROCEDURE — 2580000003 HC RX 258: Performed by: INTERNAL MEDICINE

## 2022-09-27 PROCEDURE — 85025 COMPLETE CBC W/AUTO DIFF WBC: CPT

## 2022-09-27 PROCEDURE — 80053 COMPREHEN METABOLIC PANEL: CPT

## 2022-09-27 PROCEDURE — 6360000002 HC RX W HCPCS: Performed by: INTERNAL MEDICINE

## 2022-09-27 PROCEDURE — 94640 AIRWAY INHALATION TREATMENT: CPT

## 2022-09-27 PROCEDURE — 6360000002 HC RX W HCPCS

## 2022-09-27 PROCEDURE — 2700000000 HC OXYGEN THERAPY PER DAY

## 2022-09-27 RX ADMIN — CLONIDINE HYDROCHLORIDE 0.2 MG: 0.2 TABLET ORAL at 10:19

## 2022-09-27 RX ADMIN — IPRATROPIUM BROMIDE AND ALBUTEROL SULFATE 1 AMPULE: .5; 2.5 SOLUTION RESPIRATORY (INHALATION) at 11:42

## 2022-09-27 RX ADMIN — IPRATROPIUM BROMIDE AND ALBUTEROL SULFATE 1 AMPULE: .5; 2.5 SOLUTION RESPIRATORY (INHALATION) at 15:51

## 2022-09-27 RX ADMIN — DILTIAZEM HYDROCHLORIDE 120 MG: 60 CAPSULE, EXTENDED RELEASE ORAL at 21:04

## 2022-09-27 RX ADMIN — DILTIAZEM HYDROCHLORIDE 120 MG: 60 CAPSULE, EXTENDED RELEASE ORAL at 10:31

## 2022-09-27 RX ADMIN — CLONIDINE HYDROCHLORIDE 0.2 MG: 0.2 TABLET ORAL at 21:04

## 2022-09-27 RX ADMIN — Medication 10 ML: at 21:32

## 2022-09-27 RX ADMIN — SODIUM CHLORIDE 1 G: 1 TABLET ORAL at 21:03

## 2022-09-27 RX ADMIN — CEFTRIAXONE 1000 MG: 1 INJECTION, POWDER, FOR SOLUTION INTRAMUSCULAR; INTRAVENOUS at 17:19

## 2022-09-27 RX ADMIN — IPRATROPIUM BROMIDE AND ALBUTEROL SULFATE 1 AMPULE: .5; 2.5 SOLUTION RESPIRATORY (INHALATION) at 07:52

## 2022-09-27 RX ADMIN — SODIUM CHLORIDE SOLN NEBU 3% 4 ML: 3 NEBU SOLN at 11:42

## 2022-09-27 RX ADMIN — ENOXAPARIN SODIUM 40 MG: 100 INJECTION SUBCUTANEOUS at 10:21

## 2022-09-27 RX ADMIN — FOLIC ACID 1 MG: 1 TABLET ORAL at 10:22

## 2022-09-27 RX ADMIN — BUDESONIDE 500 MCG: 0.5 SUSPENSION RESPIRATORY (INHALATION) at 07:52

## 2022-09-27 RX ADMIN — DEXAMETHASONE SODIUM PHOSPHATE 6 MG: 10 INJECTION, SOLUTION INTRAMUSCULAR; INTRAVENOUS at 10:20

## 2022-09-27 RX ADMIN — ARFORMOTEROL TARTRATE 15 MCG: 15 SOLUTION RESPIRATORY (INHALATION) at 07:52

## 2022-09-27 RX ADMIN — CLONIDINE HYDROCHLORIDE 0.2 MG: 0.2 TABLET ORAL at 13:32

## 2022-09-27 RX ADMIN — SODIUM CHLORIDE 1 G: 1 TABLET ORAL at 10:31

## 2022-09-27 RX ADMIN — Medication 10 ML: at 10:22

## 2022-09-27 RX ADMIN — CARVEDILOL 12.5 MG: 6.25 TABLET, FILM COATED ORAL at 21:03

## 2022-09-27 RX ADMIN — CARVEDILOL 12.5 MG: 6.25 TABLET, FILM COATED ORAL at 10:22

## 2022-09-27 RX ADMIN — PANTOPRAZOLE SODIUM 40 MG: 40 TABLET, DELAYED RELEASE ORAL at 05:20

## 2022-09-27 ASSESSMENT — PAIN SCALES - GENERAL
PAINLEVEL_OUTOF10: 0

## 2022-09-27 NOTE — PROGRESS NOTES
Darlene Baugh MD FACP  Internal medicine  Progress Notes      CHIEF COMPLAINT: Shortness of breath      HISTORY OF PRESENT ILLNESS:    9/21/2022  Patient was seen in medical ICU earlier this morning  Spoke with the ER physician at the time of admission  Data reviewed in detail with the patient  16-year-old  woman unvaccinated for COVID presents with 1 week history of progressive dyspnea associated with cough and fever  She was exposed to someone with COVID a week ago  She tested positive for COVID  Admitted to ICU due to acute hypoxic respiratory failure  She feels better this morning  9/22/2022  Patient was seen on the medical ICU earlier this morning  She is in isolation room due to [place]  She states she feels better  Remains on high flow oxygen  9/23/2022  Patient was seen in the ICU earlier this morning  Remains on high flow oxygen  Overall she feels better  9/26/2022  Patient was seen in ICU earlier this morning  Spoke with the registered nurse  Patient states he feels better  But she remains on high flow oxygen  9/27/2022  Patient was seen on the floor earlier this morning  Transferred out of ICU earlier  She feels much better today  Down to 4 L of oxygen through nasal cannula  Tolerating medications well  Currently on Rocephin and Decadron    Past Medical History:    Past Medical History:   Diagnosis Date    Acid reflux     Fracture of left shoulder 10/2018    Frozen shoulder     left    Hypertension     Hyponatremia     MVP (mitral valve prolapse)     no issues       Past Surgical History:    Past Surgical History:   Procedure Laterality Date    CLOSED MANIPULATION SHOULDER Left 3/5/2019    LEFT SHOULDER MANIPULATION UNDER ANTESTHESIA, STERIOD INJECTION TO FOLLOW performed by Emi Lane MD at [address] Right 1980's    bone alignment    OPEN TREATMENT PROX HUMERAL FRACTURE Left 10/23/2018    LEFT SHOULDER OPEN REDUCTION INTERNAL FIXATION GREATER TUBEROSITY ++ARTHREX++ INTERSCALINE BLOCK++ [facility]innie [facility]ks[facility] [facility]    left forearm       Medications Prior to Admission:    Medications Prior to Admission: carvedilol (COREG) 12.5 MG tablet, Take 1 tablet by mouth 2 times daily (Patient taking differently: Take 25 mg by mouth 2 times daily)  tolvaptan (SAMSCA) 15 MG TABS tablet, Take 0.5 tablets by mouth daily (Patient not taking: Reported on 9/20/2022)  cefdinir (OMNICEF) 300 MG capsule,   brompheniramine-pseudoephedrine-DM 2-30-10 MG/5ML syrup,   diltiazem (DILACOR XR) 120 MG extended release capsule, Take 120 mg by mouth 2 times daily Instructed to take morning of surgery with a sip of water  sodium chloride 1 g tablet, Take 1 g by mouth 2 times daily Instructed to take morning of surgery with a sip of water per Dr. Harmeet Horta  cloNIDine (CATAPRES) 0.2 MG tablet, Take 0.2 mg by mouth 3 times daily Instructed to take morning of surgery with a sip of water    Allergies:    Patient has no known allergies. Social History:    reports that she has been smoking cigarettes. She has a 30.00 pack-year smoking history. She has never used smokeless tobacco. She reports current alcohol use. She reports that she does not use drugs. Family History:   family history is not on file. REVIEW OF SYSTEMS:  As above in the HPI, otherwise negative    PHYSICAL EXAM:    Vitals:  BP (!) 149/89   Pulse 58   Temp 97.6 °F (36.4 °C) (Oral)   Resp 20   Ht 5' 2\" (1.575 m)   Wt 107 lb 3 oz (48.6 kg)   LMP 05/20/2012   SpO2 98%   BMI 19.60 kg/m²     General:  Awake, alert, oriented X 3. Thin built somewhat tired emaciated  On high flow oxygen through nasal cannula  HEENT:  Normocephalic, atraumatic. Pupils equal, round, reactive to light. No scleral icterus. No conjunctival injection   No nasal discharge. Neck:  Supple no adenopathy  Heart:  RRR, no murmurs, gallops, rubs  Lungs: Breath sounds diminished especially right lower base  Abdomen:   Bowel sounds present, soft, nontender, no masses, no organomegaly, no peritoneal signs  Extremities:  No clubbing, cyanosis, or edema  Skin:  Warm and dry, no open lesions or rash  Neuro:  Cranial nerves 2-12 intact, no focal deficits  Generalized muscle atrophy noted  Breast: deferred  Rectal: deferred  Genitalia:  deferred    LABS:  Data reviewed      ASSESSMENT:      Principal Problem:    Acute respiratory failure ([address]  COVID infection  Superimposed bacterial pneumonia  Comorbidities present and past as listed below  Patient Active Problem List   Diagnosis    Hyponatremia    Acute hyponatremia    Traumatic closed displaced fracture of left shoulder with anterior dislocation    Acute respiratory failure ([address]           PLAN:  Chest x-ray showing improvement  Oxygen supplementation/wean as tolerated  Steroids intravenously  Bronchodilators  Empiric antibiotics with Rocephin   Questions answered to her satisfaction    Saundra Ponce MD  10:56 AM  9/27/2022

## 2022-09-27 NOTE — PROGRESS NOTES
Respiratory tried to explain to patient the importance of keeping the respigard in her mouth the whole treatment. Patient still takes respigard out for most of treatment to talk. Patient states that \"she can breath\". Patient does not do respigard breathing treamtents well.

## 2022-09-27 NOTE — ACP (ADVANCE CARE PLANNING)
Advance Care Planning     Advance Care Planning Activator (Inpatient)  Conversation Note      Date of ACP Conversation: 9/27/2022     Conversation Conducted with: Patient with Decision Making Capacity    ACP Activator: Chantal Martinez RN      Health Care Decision Maker:     Current Designated Health Care Decision Maker:     Primary Decision Maker: Aleisha Meyers Brother/Sister - [phone]      Care Preferences    Ventilation: \"If you were in your present state of health and suddenly became very ill and were unable to breathe on your own, what would your preference be about the use of a ventilator (breathing machine) if it were available to you? \"      Would the patient desire the use of ventilator (breathing machine)?: yes    \"If your health worsens and it becomes clear that your chance of recovery is unlikely, what would your preference be about the use of a ventilator (breathing machine) if it were available to you? \"     Would the patient desire the use of ventilator (breathing machine)?: Yes      Resuscitation  \"CPR works best to restart the heart when there is a sudden event, like a heart attack, in someone who is otherwise healthy. Unfortunately, CPR does not typically restart the heart for people who have serious health conditions or who are very sick. \"    \"In the event your heart stopped as a result of an underlying serious health condition, would you want attempts to be made to restart your heart (answer \"yes\" for attempt to resuscitate) or would you prefer a natural death (answer \"no\" for do not attempt to resuscitate)? \" yes       [x] Yes   [] No   Educated Patient / Marilynn Agee regarding differences between Advance Directives and portable DNR orders.     Length of ACP Conversation in minutes:      Conversation Outcomes:  [x] ACP discussion completed  [] Existing advance directive reviewed with patient; no changes to patient's previously recorded wishes  [] New Advance Directive completed  [] Portable Do Not Rescitate prepared for Provider review and signature  [] POLST/POST/MOLST/MOST prepared for Provider review and signature      Follow-up plan:    [] Schedule follow-up conversation to continue planning  [] Referred individual to Provider for additional questions/concerns   [] Advised patient/agent/surrogate to review completed ACP document and update if needed with changes in condition, patient preferences or care setting    [x] This note routed to one or more involved healthcare providers    CM spoke with patient direct over the phone. Patient is a full code & wants her sister Talon Abraham to be her decision maker. She does have two other brother Kieran Nguyen.     Electronically signed by Cesilia Bocanegra RN on 9/27/2022 at 10:07 AM

## 2022-09-27 NOTE — PROGRESS NOTES
Janina Erazo M.D.,[facility]  Hernesto Mcgee D.O., F.A.C.O.I., Saman Chandler M.D. Shelly Garland M.D. Alisa Davidson D.O. Daily Pulmonary Progress Note    Patient:  Kaylie Allison 56 y.o. female MRN: [id number]     Date of Service: 9/27/2022      Synopsis     We are following patient for acute hypoxic respiratory failure    \"CC\" shortness of breath    Code status: Full      Subjective      Patient was seen and examined. Sitting up in bed 95% on 3 L nasal cannula no acute distress. Patient complains of nasal dryness although her oxygen is humidified, will add saline nasal spray. Discussed patient possibly needing to go home with oxygen but can follow-up in our office to be rechecked. Patient to have ambulatory pulse ox testing prior to discharge. Review of Systems:  Constitutional: Denies fever, weight loss, night sweats, and fatigue  Skin: Denies pigmentation, dark lesions, and rashes   HEENT: Denies hearing loss, tinnitus, ear drainage, epistaxis, sore throat, and hoarseness.  + Nasal dryness  Cardiovascular: Denies palpitations, chest pain, and chest pressure. Respiratory: Denies cough, dyspnea at rest, hemoptysis, apnea, and choking.   Gastrointestinal: Denies nausea, vomiting, poor appetite, diarrhea, heartburn or reflux  Genitourinary: Denies dysuria, frequency, urgency or hematuria  Musculoskeletal: Denies myalgias, muscle weakness, and bone pain  Neurological: Denies dizziness, vertigo, headache, and focal weakness  Psychological: Denies anxiety and depression  Endocrine: Denies heat intolerance and cold intolerance  Hematopoietic/Lymphatic: Denies bleeding problems and blood transfusions    24-hour events:  Out of ICU    Objective   Vitals: BP (!) 149/89   Pulse 58   Temp 97.6 °F (36.4 °C) (Oral)   Resp 20   Ht 5' 2\" (1.575 m)   Wt 107 lb 3 oz (48.6 kg)   LMP 05/20/2012   SpO2 98%   BMI 19.60 kg/m²     I/O:    Intake/Output Summary (Last 24 hours) at 9/27/2022 1426  Last data filed at 9/26/2022 1800  Gross per 24 hour   Intake --   Output 550 ml   Net -550 ml       Vent Information  Equipment Changed: (S) Humidification     CURRENT MEDS :  Scheduled Meds:   dexamethasone  6 mg IntraVENous Q24H    sodium chloride (Inhalant)  4 mL Nebulization BID    sodium chloride  1 g Oral BID    cefTRIAXone (ROCEPHIN) IV  1,000 mg IntraVENous Q24H    cloNIDine  0.2 mg Oral TID    dilTIAZem  120 mg Oral BID    carvedilol  12.5 mg Oral BID    enoxaparin  40 mg SubCUTAneous Daily    folic acid  1 mg Oral Daily    sodium chloride flush  5-40 mL IntraVENous 2 times per day    pantoprazole  40 mg Oral QAM AC    Arformoterol Tartrate  15 mcg Nebulization BID    ipratropium-albuterol  1 ampule Inhalation Q4H WA    budesonide  0.5 mg Nebulization BID       Physical Exam:  General Appearance: appears comfortable in no acute distress. HEENT: Normocephalic atraumatic without obvious abnormality   Neck: Lips, mucosa, and tongue normal. Supple, symmetrical, trachea midline, no adenopathy;thyroid: no enlargement/tenderness/nodules or JVD. Lung: Breath sounds CTA/diminished. Respirations unlabored. Symmetrical expansion. Heart: RRR, normal S1, S2. No MRG  Abdomen: Soft, NT, ND. BS present x 4 quadrants. No bruit or organomegaly. Extremities: Pedal pulses 2+ symmetric b/l. Extremities normal, no cyanosis, clubbing, or edema. Musculokeletal: No joint swelling, no muscle tenderness. ROM normal in all joints of extremities.    Neurologic: Mental status: Alert and Oriented X3    Pertinent/ New Labs and Imaging Studies     Imaging Personally Reviewed:  No new imaging    ECHO none on file    Labs:  Lab Results   Component Value Date/Time    WBC 16.9 09/27/2022 04:05 AM    HGB 12.8 09/27/2022 04:05 AM    HCT 36.1 09/27/2022 04:05 AM    MCV 94.0 09/27/2022 04:05 AM    MCH 33.3 09/27/2022 04:05 AM    MCHC 35.5 09/27/2022 04:05 AM    RDW 11.7 09/27/2022 04:05 AM     09/27/2022 04:05 AM    MPV 9.1 09/27/2022 04:05 AM     Lab Results   Component Value Date/Time     09/27/2022 04:05 AM    K 3.9 09/27/2022 04:05 AM    K 3.5 09/21/2022 05:55 AM    CL 91 09/27/2022 04:05 AM    CO2 30 09/27/2022 04:05 AM    BUN 7 09/27/2022 04:05 AM    CREATININE 0.4 09/27/2022 04:05 AM    LABALBU 3.1 09/27/2022 04:05 AM    LABALBU 4.6 05/23/2012 03:15 PM    CALCIUM 8.5 09/27/2022 04:05 AM    GFRAA >60 09/27/2022 04:05 AM    LABGLOM >60 09/27/2022 04:05 AM     Lab Results   Component Value Date/Time    PROTIME 11.6 05/27/2012 05:43 PM    INR 1.1 05/27/2012 05:43 PM     No results for input(s): PROBNP in the last 72 hours. No results for input(s): PROCAL in the last 72 hours. This SmartLink has not been configured with any valid records. Micro:  9/26 COVID (-)       Assessment:    Acute respiratory failure with hypoxia  COVID-pneumonia with superimposed bacterial pneumonia  Small left pleural effusion  COPD  History of hypertension  Chronic hyponatremia      Plan:   Wean oxygen as tolerated to keep SPO2 greater than 90%, on 3 L nasal cannula  Bronchodilators: Brovana/Pulmicort twice daily, DuoNebs every 4 hours while awake. Stop 3% saline  Add saline nasal spray  Ambulatory pulse ox testing prior to discharge  Dexamethasone 6 mg daily  Rocephin for CAP coverage x7 days to complete tomorrow  Recommend outpatient PFTs once recovered      This plan of care was reviewed in collaboration with Dr. Chanell Castillo  Electronically signed by YULISA Luther CNP on 9/27/2022 at 2:26 PM      I personally saw, examined, and cared for the patient. Labs, medications, radiographs reviewed. I agree with history exam and plans detailed in NP note. Significantly improved from a resp standpoint. Wean O2 and check ambulatory pulse ox. Will need outpatient f/u for COPD.     Maile Ballesteros,

## 2022-09-27 NOTE — PROGRESS NOTES
Pulse ox was __92____% on room air at rest.   Ambulated patient on room air. Oxygen saturation was __83___% on room air while ambulating. Oxygen applied. Recovery pulse ox was __92____% on ____3___ liters of oxygen while ambulating.

## 2022-09-27 NOTE — CARE COORDINATION
9/27:  Update CM Note:  Pt presented to the [place] [facility] for SOB. Pt was transferred on 9/20 to [facility]. Pt is in [facility] for Covid + 9/20. Pt is on 6L/NC at 98%, Iv Decadron & Iv Rocephin. CM spoke with pt's sister Isabela Chiang at [phone]. Pt's PCP is Dr. Robbi Gamez in [facility]. Pt lives alone with 1 step to enter. The bed/bathroom are on the 1st floor. PTA pt was independent & has no DME at home. Pt has no hx of SNF/HHC. Cm spoke with pt via the phone this am.  Pt's dc plan is to return home & family transport. Pt states that she may go home or to her sister. WIll need to verify address on dc. Pt has no preferences for HHC/DME. CM sent referrals to [facility]. Pt will need 02 testing. Per Nader Body from [facility], 1st available day Friday on 9/30. [facility] is following. Dk/GURDEEP will continue to follow for dc planning. Electronically signed by Melissa Christian RN on 9/27/2022 at 9:59 AM      The Plan for Transition of Care is related to the following treatment goals: HHC/DME    The Patient and/or patient representative  was provided with a choice of provider and agrees   with the discharge plan. [x] Yes [] No    Freedom of choice list was provided with basic dialogue that supports the patient's individualized plan of care/goals, treatment preferences and shares the quality data associated with the providers. [x] Yes [] No      Pulse ox was ______% on room air at rest.  Ambulated patient on room air. Oxygen saturation was ______% on room air while ambulating. Oxygen applied. Recovery pulse ox was ______% on _______ liters of oxygen while ambulating.

## 2022-09-28 ENCOUNTER — APPOINTMENT (OUTPATIENT)
Dept: GENERAL RADIOLOGY | Age: 57
DRG: 177 | End: 2022-09-28
Payer: COMMERCIAL

## 2022-09-28 VITALS
OXYGEN SATURATION: 94 % | SYSTOLIC BLOOD PRESSURE: 99 MMHG | HEART RATE: 68 BPM | WEIGHT: 107.19 LBS | RESPIRATION RATE: 16 BRPM | HEIGHT: 62 IN | BODY MASS INDEX: 19.72 KG/M2 | TEMPERATURE: 98.6 F | DIASTOLIC BLOOD PRESSURE: 88 MMHG

## 2022-09-28 LAB
ANISOCYTOSIS: ABNORMAL
ATYPICAL LYMPHOCYTE RELATIVE PERCENT: 2.6 % (ref 0–4)
BASOPHILS ABSOLUTE: 0 E9/L (ref 0–0.2)
BASOPHILS RELATIVE PERCENT: 0.1 % (ref 0–2)
EOSINOPHILS ABSOLUTE: 0 E9/L (ref 0.05–0.5)
EOSINOPHILS RELATIVE PERCENT: 0 % (ref 0–6)
HCT VFR BLD CALC: 33.3 % (ref 34–48)
HEMOGLOBIN: 12 G/DL (ref 11.5–15.5)
LYMPHOCYTES ABSOLUTE: 0.54 E9/L (ref 1.5–4)
LYMPHOCYTES RELATIVE PERCENT: 0.9 % (ref 20–42)
MCH RBC QN AUTO: 34.6 PG (ref 26–35)
MCHC RBC AUTO-ENTMCNC: 36 % (ref 32–34.5)
MCV RBC AUTO: 96 FL (ref 80–99.9)
MONOCYTES ABSOLUTE: 0.4 E9/L (ref 0.1–0.95)
MONOCYTES RELATIVE PERCENT: 2.6 % (ref 2–12)
NEUTROPHILS ABSOLUTE: 12.69 E9/L (ref 1.8–7.3)
NEUTROPHILS RELATIVE PERCENT: 93.9 % (ref 43–80)
PDW BLD-RTO: 11.8 FL (ref 11.5–15)
PLATELET # BLD: 429 E9/L (ref 130–450)
PMV BLD AUTO: 9.3 FL (ref 7–12)
RBC # BLD: 3.47 E12/L (ref 3.5–5.5)
VACUOLATED NEUTROPHILS: ABNORMAL
WBC # BLD: 13.5 E9/L (ref 4.5–11.5)

## 2022-09-28 PROCEDURE — 71045 X-RAY EXAM CHEST 1 VIEW: CPT

## 2022-09-28 PROCEDURE — 6360000002 HC RX W HCPCS: Performed by: INTERNAL MEDICINE

## 2022-09-28 PROCEDURE — 2700000000 HC OXYGEN THERAPY PER DAY

## 2022-09-28 PROCEDURE — 85025 COMPLETE CBC W/AUTO DIFF WBC: CPT

## 2022-09-28 PROCEDURE — 6370000000 HC RX 637 (ALT 250 FOR IP): Performed by: INTERNAL MEDICINE

## 2022-09-28 PROCEDURE — 6360000002 HC RX W HCPCS

## 2022-09-28 PROCEDURE — 36415 COLL VENOUS BLD VENIPUNCTURE: CPT

## 2022-09-28 PROCEDURE — 94640 AIRWAY INHALATION TREATMENT: CPT

## 2022-09-28 PROCEDURE — 2580000003 HC RX 258: Performed by: INTERNAL MEDICINE

## 2022-09-28 RX ORDER — FOLIC ACID 1 MG/1
1 TABLET ORAL DAILY
Qty: 30 TABLET | Refills: 3 | Status: SHIPPED | OUTPATIENT
Start: 2022-09-29

## 2022-09-28 RX ORDER — DEXAMETHASONE 4 MG/1
4 TABLET ORAL DAILY
Qty: 3 TABLET | Refills: 0 | Status: SHIPPED | OUTPATIENT
Start: 2022-09-29 | End: 2022-10-02

## 2022-09-28 RX ORDER — DEXAMETHASONE 4 MG/1
4 TABLET ORAL DAILY
Status: DISCONTINUED | OUTPATIENT
Start: 2022-09-29 | End: 2022-09-28 | Stop reason: HOSPADM

## 2022-09-28 RX ADMIN — PANTOPRAZOLE SODIUM 40 MG: 40 TABLET, DELAYED RELEASE ORAL at 06:10

## 2022-09-28 RX ADMIN — CLONIDINE HYDROCHLORIDE 0.2 MG: 0.2 TABLET ORAL at 13:11

## 2022-09-28 RX ADMIN — ARFORMOTEROL TARTRATE 15 MCG: 15 SOLUTION RESPIRATORY (INHALATION) at 08:20

## 2022-09-28 RX ADMIN — BUDESONIDE 500 MCG: 0.5 SUSPENSION RESPIRATORY (INHALATION) at 08:20

## 2022-09-28 RX ADMIN — CARVEDILOL 12.5 MG: 6.25 TABLET, FILM COATED ORAL at 09:28

## 2022-09-28 RX ADMIN — CLONIDINE HYDROCHLORIDE 0.2 MG: 0.2 TABLET ORAL at 09:27

## 2022-09-28 RX ADMIN — IPRATROPIUM BROMIDE AND ALBUTEROL SULFATE 1 AMPULE: .5; 2.5 SOLUTION RESPIRATORY (INHALATION) at 08:20

## 2022-09-28 RX ADMIN — IPRATROPIUM BROMIDE AND ALBUTEROL SULFATE 1 AMPULE: .5; 2.5 SOLUTION RESPIRATORY (INHALATION) at 11:47

## 2022-09-28 RX ADMIN — SODIUM CHLORIDE 1 G: 1 TABLET ORAL at 09:27

## 2022-09-28 RX ADMIN — FOLIC ACID 1 MG: 1 TABLET ORAL at 09:27

## 2022-09-28 RX ADMIN — DEXAMETHASONE SODIUM PHOSPHATE 6 MG: 10 INJECTION, SOLUTION INTRAMUSCULAR; INTRAVENOUS at 09:27

## 2022-09-28 RX ADMIN — Medication 10 ML: at 09:26

## 2022-09-28 RX ADMIN — DILTIAZEM HYDROCHLORIDE 120 MG: 60 CAPSULE, EXTENDED RELEASE ORAL at 09:27

## 2022-09-28 RX ADMIN — ENOXAPARIN SODIUM 40 MG: 100 INJECTION SUBCUTANEOUS at 09:26

## 2022-09-28 RX ADMIN — IPRATROPIUM BROMIDE AND ALBUTEROL SULFATE 1 AMPULE: .5; 2.5 SOLUTION RESPIRATORY (INHALATION) at 16:29

## 2022-09-28 ASSESSMENT — PAIN SCALES - GENERAL: PAINLEVEL_OUTOF10: 0

## 2022-09-28 NOTE — PROGRESS NOTES
Pt given and explained all discharge instructions. O2 tank and instructions on use. Pt in understanding stressed the importance of all follow up doctor appts and medication compliance.

## 2022-09-28 NOTE — PROGRESS NOTES
CLINICAL PHARMACY NOTE: MEDS TO BEDS    Total # of Prescriptions Filled: 2   The following medications were delivered to the patient:  Folic acid 1 mg  Dexamethasone 4 mg    Additional Documentation:     Delivered to Trevon Josue RN at 2:36

## 2022-09-28 NOTE — PLAN OF CARE

## 2022-09-28 NOTE — PROGRESS NOTES
Tyron Dodson M.D.,[facility]  Desi Mann D.O., F.A.C.O.I., Alex Peraza M.D. Kamila Nicole M.D. Mel Pate D.O. Daily Pulmonary Progress Note    Patient:  Eduardo Rai 56 y.o. female MRN: [id number]     Date of Service: 9/28/2022      Synopsis     We are following patient for acute hypoxic respiratory failure    \"CC\" shortness of breath    Code status: Full      Subjective      Patient was seen and examined. Sitting up in bed 94% on 2 L nasal cannula. Nasal dryness has improved with nasal saline. Oxygen ordered for home. Patient has a pulse ox at home. States she is ready to go today. Reports she is done smoking. Will follow up in our office. PULSE OX ON ROOM AIR SITTING 90%   PULSE OX ON ROOM AIR AMBULATING 85%   PULSE OX ON 2 LITERS SITTING RECOVERY 88%   PULSE OX ON 2 LITERS AMBULATING RECOVERY 90%    Review of Systems:  Constitutional: Denies fever, weight loss, night sweats, and fatigue  Skin: Denies pigmentation, dark lesions, and rashes   HEENT: Denies hearing loss, tinnitus, ear drainage, epistaxis, sore throat, and hoarseness. Cardiovascular: Denies palpitations, chest pain, and chest pressure. Respiratory: Denies cough, dyspnea at rest, hemoptysis, apnea, and choking.   Gastrointestinal: Denies nausea, vomiting, poor appetite, diarrhea, heartburn or reflux  Genitourinary: Denies dysuria, frequency, urgency or hematuria  Musculoskeletal: Denies myalgias, muscle weakness, and bone pain  Neurological: Denies dizziness, vertigo, headache, and focal weakness  Psychological: Denies anxiety and depression  Endocrine: Denies heat intolerance and cold intolerance  Hematopoietic/Lymphatic: Denies bleeding problems and blood transfusions    24-hour events:  None     Objective   Vitals: /82   Pulse 66   Temp 98.6 °F (37 °C) (Oral)   Resp 18   Ht 5' 2\" (1.575 m)   Wt 107 lb 3 oz (48.6 kg)   LMP 05/20/2012   SpO2 95%   BMI 19.60 kg/m²     I/O:  No intake or output data in the 24 hours ending 09/28/22 1132      Vent Information  Equipment Changed: (S) Humidification     CURRENT MEDS :  Scheduled Meds:   [START ON 9/29/2022] dexamethasone  4 mg Oral Daily    sodium chloride  1 g Oral BID    cefTRIAXone (ROCEPHIN) IV  1,000 mg IntraVENous Q24H    cloNIDine  0.2 mg Oral TID    dilTIAZem  120 mg Oral BID    carvedilol  12.5 mg Oral BID    enoxaparin  40 mg SubCUTAneous Daily    folic acid  1 mg Oral Daily    sodium chloride flush  5-40 mL IntraVENous 2 times per day    pantoprazole  40 mg Oral QAM AC    Arformoterol Tartrate  15 mcg Nebulization BID    ipratropium-albuterol  1 ampule Inhalation Q4H WA    budesonide  0.5 mg Nebulization BID       Physical Exam:  General Appearance: appears comfortable in no acute distress. HEENT: Normocephalic atraumatic without obvious abnormality   Neck: Lips, mucosa, and tongue normal. Supple, symmetrical, trachea midline, no adenopathy;thyroid: no enlargement/tenderness/nodules or JVD. Lung: Breath sounds CTA/diminished BUL. Respirations unlabored. Symmetrical expansion. Heart: RRR, normal S1, S2. No MRG  Abdomen: Soft, NT, ND. BS present x 4 quadrants. No bruit or organomegaly. Extremities: Pedal pulses 2+ symmetric b/l. Extremities normal, no cyanosis, clubbing, or edema. Musculokeletal: No joint swelling, no muscle tenderness. ROM normal in all joints of extremities. Neurologic: Mental status: Alert and Oriented X3    Pertinent/ New Labs and Imaging Studies     Imaging Personally Reviewed:  CXR 9/28  Bibasilar infiltrates and some collapse in left lower lobe are not   significantly changed. Upper lobes are normal.  Heart size unremarkable.            ECHO none on file    Labs:  Lab Results   Component Value Date/Time    WBC 13.5 09/28/2022 06:49 AM    HGB 12.0 09/28/2022 06:49 AM    HCT 33.3 09/28/2022 06:49 AM    MCV 96.0 09/28/2022 06:49 AM    MCH 34.6 09/28/2022 06:49 AM    MCHC 36.0 09/28/2022 06:49 AM    RDW 11.8 09/28/2022 06:49 AM     09/28/2022 06:49 AM    MPV 9.3 09/28/2022 06:49 AM     Lab Results   Component Value Date/Time     09/27/2022 04:05 AM    K 3.9 09/27/2022 04:05 AM    K 3.5 09/21/2022 05:55 AM    CL 91 09/27/2022 04:05 AM    CO2 30 09/27/2022 04:05 AM    BUN 7 09/27/2022 04:05 AM    CREATININE 0.4 09/27/2022 04:05 AM    LABALBU 3.1 09/27/2022 04:05 AM    LABALBU 4.6 05/23/2012 03:15 PM    CALCIUM 8.5 09/27/2022 04:05 AM    GFRAA >60 09/27/2022 04:05 AM    LABGLOM >60 09/27/2022 04:05 AM     Lab Results   Component Value Date/Time    PROTIME 11.6 05/27/2012 05:43 PM    INR 1.1 05/27/2012 05:43 PM     No results for input(s): PROBNP in the last 72 hours. No results for input(s): PROCAL in the last 72 hours. This SmartLink has not been configured with any valid records. Micro:  9/26 COVID (-)       Assessment:    Acute respiratory failure with hypoxia  COVID-pneumonia with superimposed bacterial pneumonia  Small left pleural effusion  COPD  History of hypertension  Chronic hyponatremia      Plan:   Wean oxygen as tolerated to keep SPO2 greater than 90%, on 2L nasal cannula  Per ambulatory pulse ox testing, she requires 2L- ordered  Bronchodilators: Brovana/Pulmicort twice daily, DuoNebs every 4 hours while awake. CXR stable  Dexamethasone 4mg PO x3 days  Rocephin for CAP coverage x7 days to completes today  Recommend outpatient PFTs once recovered. Follow up message routed to office      This plan of care was reviewed in collaboration with Dr. Mitesh Pulido  Electronically signed by YULISA Roche CNP on 9/28/2022 at 11:32 AM    I personally saw, examined, and cared for the patient. Labs, medications, radiographs reviewed. I agree with history exam and plans detailed in NP note.         Reyna Cullen DO

## 2022-09-28 NOTE — PROGRESS NOTES
Ilya Vidal MD FACP  Internal medicine  Progress Notes      CHIEF COMPLAINT: Shortness of breath      HISTORY OF PRESENT ILLNESS:    9/21/2022  Patient was seen in medical ICU earlier this morning  Spoke with the ER physician at the time of admission  Data reviewed in detail with the patient  72-year-old  woman unvaccinated for COVID presents with 1 week history of progressive dyspnea associated with cough and fever  She was exposed to someone with COVID a week ago  She tested positive for COVID  Admitted to ICU due to acute hypoxic respiratory failure  She feels better this morning  9/22/2022  Patient was seen on the medical ICU earlier this morning  She is in isolation room due to [place]  She states she feels better  Remains on high flow oxygen  9/23/2022  Patient was seen in the ICU earlier this morning  Remains on high flow oxygen  Overall she feels better  9/26/2022  Patient was seen in ICU earlier this morning  Spoke with the registered nurse  Patient states he feels better  But she remains on high flow oxygen  9/27/2022  Patient was seen on the floor earlier this morning  Transferred out of ICU earlier  She feels much better today  Down to 4 L of oxygen through nasal cannula  Tolerating medications well  Currently on Rocephin and Decadron  9/28/2022  Patient was seen on the floor earlier this morning  Down to 2 L of oxygen through nasal cannula  Overall she feels much better    Past Medical History:    Past Medical History:   Diagnosis Date    Acid reflux     Fracture of left shoulder 10/2018    Frozen shoulder     left    Hypertension     Hyponatremia     MVP (mitral valve prolapse)     no issues       Past Surgical History:    Past Surgical History:   Procedure Laterality Date    CLOSED MANIPULATION SHOULDER Left 3/5/2019    LEFT SHOULDER MANIPULATION UNDER ANTESTHESIA, STERIOD INJECTION TO FOLLOW performed by Ros Rosenberg MD at [place] [address] Right 1980's    bone alignment adenopathy  Heart:  RRR, no murmurs, gallops, rubs  Lungs: Breath sounds diminished especially right lower base  Abdomen:   Bowel sounds present, soft, nontender, no masses, no organomegaly, no peritoneal signs  Extremities:  No clubbing, cyanosis, or edema  Skin:  Warm and dry, no open lesions or rash  Neuro:  Cranial nerves 2-12 intact, no focal deficits  Generalized muscle atrophy noted  Breast: deferred  Rectal: deferred  Genitalia:  deferred    LABS:  Data reviewed      ASSESSMENT:    Abdominal aortic aneurysm with recent increase in size/patient is aware of the diagnosis/she is aware that she needs follow-up on a regular basis with vascular and PCP  Principal Problem:    Acute respiratory failure ([address]  COVID infection  Superimposed bacterial pneumonia  Comorbidities present and past as listed below  Patient Active Problem List   Diagnosis    Hyponatremia    Acute hyponatremia    Traumatic closed displaced fracture of left shoulder with anterior dislocation    Acute respiratory failure ([address]           PLAN:  Home at the discretion of pulmonary  She will need home supplemental oxygen therapy  Questions answered to her Donna Granado MD  11:32 AM  9/28/2022

## 2022-09-28 NOTE — CARE COORDINATION
[facility], home 02 order.  WILL NEED C ORDERS for [facility] prior to [address] [facility]

## 2022-09-28 NOTE — PROGRESS NOTES
PULSE OX ON ROOM AIR SITTING 90%   PULSE OX ON ROOM AIR AMBULATING 85%   PULSE OX ON 2 LITERS SITTING RECOVERY 88%   PULSE OX ON 2 LITERS AMBULATING RECOVERY 90%

## 2022-09-29 ENCOUNTER — CARE COORDINATION (OUTPATIENT)
Dept: CARE COORDINATION | Age: 57
End: 2022-09-29

## 2022-09-29 NOTE — CARE COORDINATION
Care Transitions Outreach Attempt    Call within 2 business days of discharge: Yes   Attempted to reach patient for transitions of care follow up. Unable to reach patient. Left hipaa compliant message requesting call back will attempt again at later time and date     Patient: Joselyn Gutierrez Patient : 1965 MRN: [id number]    Last Discharge [address]       Date Complaint Diagnosis Description Type Department Provider    22 Shortness of Breath Acute respiratory failure with hypoxia ([address] . .. ED to Hosp-Admission (Discharged) (ADMIT) [facility] [address] [place] [address] Colton Mai MD; Cristobal Brito. .. Was this an external facility discharge? No Discharge Facility: [facility]    Noted following upcoming appointments from discharge chart review:   [facility] follow up appointment(s): No future appointments.   Non-[facility] follow up appointment(s): NA

## 2022-09-30 ENCOUNTER — CARE COORDINATION (OUTPATIENT)
Dept: CARE COORDINATION | Age: 57
End: 2022-09-30

## 2022-09-30 NOTE — CARE COORDINATION
[facility] Care Transitions Initial Follow Up Call    Call within 2 business days of discharge: Yes    Care Transition Nurse contacted the patient by telephone to perform post hospital discharge assessment. Verified name and  with patient as identifiers. Provided introduction to self, and explanation of the Care Transition Nurse role. Patient: Leonides Joshi Patient : 1965   MRN: [id number]  Reason for Admission: [facility] -2022 COVID   Discharge Date: 22 RARS: Readmission Risk Score: 9.5      Last Discharge  Street       Date Complaint Diagnosis Description Type Department Provider    22 Shortness of Breath Acute respiratory failure with hypoxia ([address] . .. ED to Hosp-Admission (Discharged) (ADMIT) [facility] Trung Carlton MD; Eleonora Bella. .. Was this an external facility discharge? No Discharge Facility: [facility]    Challenges to be reviewed by the provider   Additional needs identified to be addressed with provider: No  none               Method of communication with provider: none. spoke with pt, states she is feeling better. Denies cough, shortness of breath, chest pain or fever. States no abd pain or edema. States she is on 2l of o2 and is at 98%. Sister is bringing pt a pulse ox. States she is eating and drinking normal, states she feels like her taste is returning. Reports no bowel or bladder issues. Reviewed meds and pt is taking all per order. Reviewed cdc guidelines and covid s/s. Reviewed dc instructions and pt is to see pcp on 10/3. Home health is to visit pt for pt/ot. Denies home, med or transportation needs at this time. Ctn info given for future needs. Ctn will sign off     Care Transition Nurse reviewed discharge instructions with patient who verbalized understanding. The patient was given an opportunity to ask questions and does not have any further questions or concerns at this time. Were discharge instructions available to patient? Yes.  Reviewed appropriate site of care based on symptoms and resources available to patient including: PCP  Home health  When to call 911. The patient agrees to contact the PCP office for questions related to their healthcare. Advance Care Planning:   Does patient have an Advance Directive:  NOT REVIEWED . Medication reconciliation was performed with patient, who verbalizes understanding of administration of home medications. Medications reviewed, 1111F entered: N/A    Was patient discharged with a pulse oximeter? yes, discussed and confirmed pulse oximeter discharge instructions and when to notify provider or seek emergency care. Non-face-to-face services provided:  Obtained and reviewed discharge summary and/or continuity of care documents    Offered patient enrollment in the Remote Patient Monitoring (RPM) program for in-home monitoring: Patient is not eligible for RPM program.    Care Transitions 24 Hour Call    Do you have a copy of your discharge instructions?: Yes  Do you have all of your prescriptions and are they filled?: Yes  Have you been contacted by a [facility] Pharmacist?: No  Have you scheduled your follow up appointment?: Yes  How are you going to get to your appointment?: Car - family or friend to transport  Do you feel like you have everything you need to keep you well at home?: Yes  Care Transitions Interventions         Follow Up  Future Appointments   Date Time Provider Elizabeth Izaguirre   10/12/2022 10:40 AM YULISA Mancia - CNP [facility]       Care Transition Nurse provided contact information. No further follow-up call indicated based on severity of symptoms and risk factors.   Plan for next call:     Krystian Pham LPN

## 2022-10-12 PROBLEM — I10 PRIMARY HYPERTENSION: Status: ACTIVE | Noted: 2018-06-22

## 2022-10-12 PROBLEM — R79.89 LFTS ABNORMAL: Status: ACTIVE | Noted: 2018-06-22

## 2022-10-14 NOTE — DISCHARGE SUMMARY
Physician Discharge Summary     Patient ID:  Nolan Nuñez  [id number]  56 y.o.  1965    Admit date: 9/20/2022    Discharge date and time: 9/28/2022  6:13 PM     Admission Diagnoses: Dehydration [E86.0]  Hypokalemia [E87.6]  Acute respiratory failure with hypoxia (HCC) [J96.01]  Abnormal CT scan, lung [R91.8]  Pneumonia due to infectious organism, unspecified laterality, unspecified part of lung [J18.9]  COVID [U07.1]  Acute respiratory failure ([address] [J96.00]    Discharge Diagnoses:   COVID infection  Acute hypoxic respiratory failure  Superimposed bacterial pneumonia  Hypokalemia  Chronically hyponatremic  Patient Active Problem List   Diagnosis    Hyponatremia    Acute hyponatremia    Traumatic closed displaced fracture of left shoulder with anterior dislocation    Acute respiratory failure (HCC)    LFTs abnormal    Primary hypertension       Consults: Pulmonary    Procedures:     Hospital Course:   17-year-old woman with COPD and chronic hyponatremia  Admitted through emergency room due to acute hypoxic respiratory failure  Found to be COVID-positive  Admitted to ICU for superimposed bacterial pneumonia  Required broad-spectrum antibiotics and high flow oxygenation  Transferred out of the ICU when she was a stable  She continued to improve on the floor  Discharged home in stable condition    Discharge Exam:  See progress note from today    Disposition: Stable  Discharge to home    Patient Instructions:   Discharge Medication List as of 9/28/2022  4:46 PM        START taking these medications    Details   dexamethasone (DECADRON) 4 MG tablet Take 1 tablet by mouth daily for 3 doses, Disp-3 tablet, [id number]      folic acid (FOLVITE) 1 MG tablet Take 1 tablet by mouth daily, Disp-30 tablet, R-3Normal           CONTINUE these medications which have NOT CHANGED    Details   carvedilol (COREG) 12.5 MG tablet Take 1 tablet by mouth 2 times daily, Disp-60 tablet, R-3Normal      tolvaptan (SAMSCA) 15 MG TABS tablet

## 2023-01-19 NOTE — PLAN OF CARE
Infectious disease at  Bedside.    Problem: Discharge Planning  Goal: Discharge to home or other facility with appropriate resources  9/23/2022 2106 by Prabha High RN  Outcome: Progressing  Flowsheets (Taken 9/23/2022 2000)  Discharge to home or other facility with appropriate resources: Identify barriers to discharge with patient and caregiver  9/23/2022 1809 by Merly Benton RN  Outcome: Progressing  Flowsheets  Taken 9/23/2022 1600  Discharge to home or other facility with appropriate resources: Identify barriers to discharge with patient and caregiver  Taken 9/23/2022 1400  Discharge to home or other facility with appropriate resources: Identify barriers to discharge with patient and caregiver  Taken 9/23/2022 1200  Discharge to home or other facility with appropriate resources: Identify barriers to discharge with patient and caregiver  Taken 9/23/2022 1000  Discharge to home or other facility with appropriate resources: Identify barriers to discharge with patient and caregiver  Taken 9/23/2022 0800  Discharge to home or other facility with appropriate resources: Identify barriers to discharge with patient and caregiver     Problem: Skin/Tissue Integrity  Goal: Absence of new skin breakdown  Description: 1. Monitor for areas of redness and/or skin breakdown  2. Assess vascular access sites hourly  3. Every 4-6 hours minimum:  Change oxygen saturation probe site  4. Every 4-6 hours:  If on nasal continuous positive airway pressure, respiratory therapy assess nares and determine need for appliance change or resting period.   9/23/2022 2106 by Prabha High RN  Outcome: Progressing  9/23/2022 1809 by Merly Benton RN  Outcome: Progressing     Problem: Safety - Adult  Goal: Free from fall injury  9/23/2022 2106 by Prabha High RN  Outcome: Jolly Cross (Taken 9/23/2022 2103)  Free From Fall Injury: Instruct family/caregiver on patient safety  9/23/2022 1809 by Merly Benton RN  Outcome: Progressing  Flowsheets (Taken 9/23/2022 0800)  Free From Fall Injury: Instruct family/caregiver on patient safety     Problem: Pain  Goal: Verbalizes/displays adequate comfort level or baseline comfort level  9/23/2022 2106 by Lizz Weaver RN  Outcome: Progressing  9/23/2022 1809 by Mali Soto RN  Outcome: Progressing  Flowsheets  Taken 9/23/2022 1600  Verbalizes/displays adequate comfort level or baseline comfort level: Encourage patient to monitor pain and request assistance  Taken 9/23/2022 1500  Verbalizes/displays adequate comfort level or baseline comfort level: Encourage patient to monitor pain and request assistance  Taken 9/23/2022 1400  Verbalizes/displays adequate comfort level or baseline comfort level: Encourage patient to monitor pain and request assistance  Taken 9/23/2022 1300  Verbalizes/displays adequate comfort level or baseline comfort level: Encourage patient to monitor pain and request assistance  Taken 9/23/2022 0800  Verbalizes/displays adequate comfort level or baseline comfort level: Encourage patient to monitor pain and request assistance

## 2023-07-26 ENCOUNTER — EVALUATION (OUTPATIENT)
Dept: PHYSICAL THERAPY | Age: 58
End: 2023-07-26
Payer: COMMERCIAL

## 2023-07-26 DIAGNOSIS — M60.9 MYOSITIS OF MULTIPLE SITES, UNSPECIFIED MYOSITIS TYPE: Primary | ICD-10-CM

## 2023-07-26 PROCEDURE — 97110 THERAPEUTIC EXERCISES: CPT | Performed by: PHYSICAL THERAPIST

## 2023-07-26 PROCEDURE — 97161 PT EVAL LOW COMPLEX 20 MIN: CPT | Performed by: PHYSICAL THERAPIST

## 2023-07-26 NOTE — PROGRESS NOTES
[facility]   Phone: [phone]   Fax: [phone]    Date:  2023   Patient: Reyes Bernard  : 1965  MRN: [id number]  Referring Provider: YULISA Mendoza - CNP  [address] [facility],  [address] [place] [address]     Medical Diagnosis:      Diagnosis Orders   1. Myositis of multiple sites, unspecified myositis type             SUBJECTIVE:     History: Patient reports decreased functional mobility since having Covid in Sept/Oct 2022. Pt reports that she was using a walker when she was discharged from the hospital. Pt reports that she returned to work in Dec, but that she continues to experience weakness in her legs and multiple falls. Previous PT: none    Related impairments: mobility and safety    Chief complaint: inability / limited ability to use leg, difficulty walking, decreased balance, falls    Behavior: condition is staying the same    Pain: none reported    Imaging results: No results found.     Past Medical History:  Past Medical History:   Diagnosis Date    Acid reflux     Fracture of left shoulder 10/2018    Frozen shoulder     left    Hypertension     Hyponatremia     MVP (mitral valve prolapse)     no issues     Past Surgical History:   Procedure Laterality Date    CLOSED MANIPULATION SHOULDER Left 3/5/2019    LEFT SHOULDER MANIPULATION UNDER ANTESTHESIA, STERIOD INJECTION TO FOLLOW performed by Eduardo Warren MD at [facility]    bone alignment    OPEN TREATMENT PROX HUMERAL FRACTURE Left 10/23/2018    LEFT SHOULDER OPEN REDUCTION INTERNAL FIXATION GREATER TUBEROSITY ++ARTHREX++ INTERSCALINE BLOCK++ performed by Eduardo Warren MD at [address] [place] [address]  2018    left forearm       Medications:   Current Outpatient Medications   Medication Sig Dispense Refill    carvedilol (COREG) 12.5 MG tablet Take 1 tablet by mouth 2 times daily 60 tablet 3    diltiazem (DILACOR XR) 120 MG extended release capsule Take 120 mg by

## 2023-07-26 NOTE — PROGRESS NOTES
[facility]          Phone: [phone] Fax: [phone]    Physical Therapy Daily Treatment Note  Date:  2023    Patient Name:  Elena Paul    :  1965  MRN: [id number]    Evaluating Therapist:  Jesse Smith, PT [id number]    Restrictions/Precautions:    Diagnosis:     Diagnosis Orders   1. Myositis of multiple sites, unspecified myositis type          Treatment Diagnosis:    Insurance/Certification information:  Medical [place]  Referring Physician:   FRIDA Koehler  Plan of care signed (Y/N):    Visit# / total visits:  -10  Pain level: N/A   Time In:  1416  Time Out:  1500    Subjective:  See evaluation    Exercises:  Exercise/Equipment Resistance/Repetitions Other comments     Bike/stepper       LAQ 1.5# x 10 reps      Seated hip flex 1.5# x 10 reps      Seated hip abd RTB x 10 reps      Seated hip add With ball x 10 reps      Seated knee flex RTB x 10 reps      Sit to stand       Tandem gait       Standing hip 3-way                                                                                Other Therapeutic Activities:  PT evaluation completed    Home Exercise Program:  N/A    Manual Treatments:  N/A    Modalities:  N/A     Time-in Time-out Total Time   15541  Evaluation Low Complexity 1416 1445 29   17770  Evaluation Med Complexity      67936  Evaluation High Complexity      21480  Ther Ex 1445 1500 15   18395  Neuro Re-ed        97553  Ther Activities        73052  Manual Therapy       64149  E-stim       38438  Ultrasound            Session 1416 1500 44       Treatment/Activity Tolerance:  [x] Patient tolerated treatment well [] Patient limited by fatigue  [] Patient limited by pain  [] Patient limited by other medical complications  [] Other:     Prognosis: [x] Good [] Fair  [] Poor    Patient Requires Follow-up: [x] Yes  [] No    Plan:   [] Continue per plan of care [] Alter current plan (see comments)  [x] Plan of care initiated [] Hold

## 2023-07-31 ENCOUNTER — TREATMENT (OUTPATIENT)
Dept: PHYSICAL THERAPY | Age: 58
End: 2023-07-31
Payer: COMMERCIAL

## 2023-07-31 DIAGNOSIS — M60.9 MYOSITIS OF MULTIPLE SITES, UNSPECIFIED MYOSITIS TYPE: Primary | ICD-10-CM

## 2023-07-31 PROCEDURE — 97110 THERAPEUTIC EXERCISES: CPT

## 2023-07-31 NOTE — PROGRESS NOTES
[facility]          Phone: [phone] Fax: [phone]    Physical Therapy Daily Treatment Note  Date:  2023    Patient Name:  Baylee Paiz    :  1965  MRN: [id number]    Evaluating Therapist:  Afsaneh Gardner PT [id number]    Restrictions/Precautions:    Diagnosis:     Diagnosis Orders   1. Myositis of multiple sites, unspecified myositis type          Treatment Diagnosis:    Insurance/Certification information:  Medical [place]  Referring Physician: FRIDA Del Rio  Plan of care signed (Y/N):    Visit# / total visits:  2/8-10  Pain level: N/A   Time In:    Time Out:  1446    Subjective:  pt has no new reports    Exercises:  Exercise/Equipment Resistance/Repetitions Other comments     /stepper X 8 mins       LAQ 1.5# x 10 reps      Seated hip flex 1.5# x 10 reps      Seated hip abd RTB x 10 reps      Seated hip add With ball x 10 reps      Seated knee flex RTB x 10 reps      Sit to stand 2 x 5 reps  See below     Tandem gait II bars x 4 laps   Lite touch for balance     Standing hip 3-way X 10 reps each B  Lite touch for balance              Marching on blue foam pad       Gait around Obstacles                                                         Other  while performing sit <> stand pt could stand without using UE support, but required the use of L hand and arm rest for eccentric control and safety when sitting. Pt fatigues easily and rested PRN . Will progress as tolerated.      Home Exercise Program:  N/A    Manual Treatments:  N/A    Modalities:  N/A     Time-in Time-out Total Time   47898  Evaluation Low Complexity      48639  Evaluation Med Complexity      52479  Evaluation High Complexity      28100  Ther Ex 1412 1446 34   98382  Neuro Re-ed        44769  Ther Activities        72309  Manual Therapy       23527  E-stim       81619  Ultrasound            Session 7691 0144 86       Treatment/Activity Tolerance:  [x] Patient tolerated treatment

## 2023-08-02 ENCOUNTER — TREATMENT (OUTPATIENT)
Dept: PHYSICAL THERAPY | Age: 58
End: 2023-08-02
Payer: COMMERCIAL

## 2023-08-02 DIAGNOSIS — M60.9 MYOSITIS OF MULTIPLE SITES, UNSPECIFIED MYOSITIS TYPE: Primary | ICD-10-CM

## 2023-08-02 PROCEDURE — 97110 THERAPEUTIC EXERCISES: CPT

## 2023-08-02 NOTE — PROGRESS NOTES
Ultrasound            Session 9566 4866 87       Treatment/Activity Tolerance:  [x] Patient tolerated treatment well [] Patient limited by fatigue  [] Patient limited by pain  [] Patient limited by other medical complications  [] Other:     Prognosis: [x] Good [] Fair  [] Poor    Patient Requires Follow-up: [x] Yes  [] No    Plan:   [x] Continue per plan of care [] Alter current plan (see comments)  [] Plan of care initiated [] Hold pending MD visit [] Discharge  Plan for Next Session:        Electronically signed by:  Lolly Naranjo, PTA [id number]

## 2023-08-14 ENCOUNTER — TREATMENT (OUTPATIENT)
Dept: PHYSICAL THERAPY | Age: 58
End: 2023-08-14
Payer: COMMERCIAL

## 2023-08-14 DIAGNOSIS — M60.9 MYOSITIS OF MULTIPLE SITES, UNSPECIFIED MYOSITIS TYPE: Primary | ICD-10-CM

## 2023-08-14 PROCEDURE — 97110 THERAPEUTIC EXERCISES: CPT

## 2023-08-14 NOTE — PROGRESS NOTES
[facility]          Phone: [phone] Fax: [phone]    Physical Therapy Daily Treatment Note  Date:  2023    Patient Name:  Reyes Bernard    :  1965  MRN: [id number]    Evaluating Therapist:  Shavonne Durán PT [id number]    Restrictions/Precautions:    Diagnosis:     Diagnosis Orders   1. Myositis of multiple sites, unspecified myositis type          Treatment Diagnosis:    Insurance/Certification information:  Medical [place]  Referring Physician: FRIDA Velasquez  Plan of care signed (Y/N):    Visit# / total visits:  -10  Pain level: N/A   Time In:  1415  Time Out:  1445    Subjective:  pt has no new reports. Pt returned this week after not feeling well, reports not 100 % yet but has no fever. Exercises:  Exercise/Equipment Resistance/Repetitions Other comments     /stepper X 10  mins  As tolerated     LAQ 1.5# x 10 reps / HEP     Seated hip flex 1.5# x 10 reps  HEP     Seated hip abd RTB x 10 reps  HEP     Seated hip add With ball x 10 reps / HEP     Seated knee flex RTB x 10 reps      Sit to stand 2 x 5 reps  See below     Tandem gait II bars x 4 laps   Lite touch for balance     Standing hip 3-way X 10 reps each B   HEP Lite touch for balance     Step ups 6 \" X 10 reps B fwd      Marching on blue foam pad       Gait around Obstacles                                                         Other  while performing sit <> stand pt could stand without using UE support, but required the use of L hand and arm rest for eccentric control and safety when sitting. Pt fatigues easily and rested PRN . Will progress as tolerated. Home Exercise Program:   HEP provided and reviewed with pt.   RTb provided    Manual Treatments:  N/A    Modalities:  N/A     Time-in Time-out Total Time   67380  Evaluation Low Complexity      23244  Evaluation Med Complexity      88974  Evaluation High Complexity      85278  Ther Ex 6095 7275 30   [id number]  Neuro

## 2023-08-23 ENCOUNTER — TREATMENT (OUTPATIENT)
Dept: PHYSICAL THERAPY | Age: 58
End: 2023-08-23
Payer: COMMERCIAL

## 2023-08-23 DIAGNOSIS — M60.9 MYOSITIS OF MULTIPLE SITES, UNSPECIFIED MYOSITIS TYPE: Primary | ICD-10-CM

## 2023-08-23 PROCEDURE — 97110 THERAPEUTIC EXERCISES: CPT

## 2023-08-23 NOTE — PROGRESS NOTES
[facility]          Phone: [phone] Fax: [phone]    Physical Therapy Daily Treatment Note  Date:  2023    Patient Name:  Filippo Gates    :  1965  MRN: [id number]    Evaluating Therapist:  Matthew Jansen PT [id number]    Restrictions/Precautions:    Diagnosis:     Diagnosis Orders   1. Myositis of multiple sites, unspecified myositis type          Treatment Diagnosis:    Insurance/Certification information:  Medical [place]  Referring Physician: FRIDA Hayes  Plan of care signed (Y/N):    Visit# / total visits:  -10  Pain level: N/A   Time In:  7  Time Out:  1448    Subjective:  pt has no new reports. Exercises:  Exercise/Equipment Resistance/Repetitions Other comments     /stepper X 10  mins  As tolerated     LAQ 1.5# x 10 reps 8/2 HEP     Seated hip flex 1.5# x 10 reps 8/ HEP     Seated hip abd RTB x 10 reps 8/ HEP     Seated hip add With ball x 10 reps 8/ HEP     Seated knee flex RTB x 10 reps      Sit to stand 2 x 5 reps  See below     Tandem gait II bars x 4 laps   Lite touch for balance     Standing hip 3-way X 10 reps each B  8/2 HEP Lite touch for balance     Step ups 6 \" X 10 reps B fwd      Marching on blue foam pad X 1 min      Gait around Obstacles                                                         Other  while performing stand to sit pt did not require UE support for eccentric control to the chair. Pt fatigues easily and rested PRN . Will progress as tolerated. Home Exercise Program:   HEP provided and reviewed with pt.   RTb provided    Manual Treatments:  N/A    Modalities:  N/A     Time-in Time-out Total Time   62660  Evaluation Low Complexity      99596  Evaluation Med Complexity      27053  Evaluation High Complexity      60155  Ther Ex 1414 1448 34   29011  Neuro Re-ed        40933  Ther Activities        31144  Manual Therapy       73790  E-stim       81708  Ultrasound            Session 5491 4373 25

## 2023-08-28 ENCOUNTER — TREATMENT (OUTPATIENT)
Dept: PHYSICAL THERAPY | Age: 58
End: 2023-08-28
Payer: COMMERCIAL

## 2023-08-28 DIAGNOSIS — M60.9 MYOSITIS OF MULTIPLE SITES, UNSPECIFIED MYOSITIS TYPE: Primary | ICD-10-CM

## 2023-08-28 PROCEDURE — 97110 THERAPEUTIC EXERCISES: CPT

## 2023-08-28 NOTE — PROGRESS NOTES
[facility]          Phone: [phone] Fax: [phone]    Physical Therapy Daily Treatment Note  Date:  2023    Patient Name:  Esteban Machado    :  1965  MRN: [id number]    Evaluating Therapist:  Remigio Woodruff PT [id number]    Restrictions/Precautions:    Diagnosis:     Diagnosis Orders   1. Myositis of multiple sites, unspecified myositis type          Treatment Diagnosis:    Insurance/Certification information:  Medical [place]  Referring Physician: FRIDA Lincoln  Plan of care signed (Y/N):    Visit# / total visits:  -10  Pain level: N/A   Time In:  0  Time Out:  1446    Subjective:  pt has no new reports. Exercises:  Exercise/Equipment Resistance/Repetitions Other comments     /stepper X 10  mins  As tolerated     LAQ 1.5# x 10 reps 8/2 HEP     Seated hip flex 1.5# x 10 reps 8/2 HEP     Seated hip abd RTB x 10 reps 8/2 HEP     Seated hip add With ball x 10 reps 8/2 HEP     Seated knee flex RTB x 10 reps      Sit to stand 2 x 5 reps  See below     Tandem gait II bars x 4 laps   Lite touch for balance     Standing hip 3-way X 10 reps each B  8/2 HEP Lite touch for balance     Step ups 6 \" X 10 reps B fwd      Marching on blue foam pad X 1 min      Gait around Obstacles                                                         Other  while performing stand to sit pt did not require UE support for eccentric control to the chair. Pt fatigues easily and rested PRN . Will progress as tolerated. Home Exercise Program:  / HEP provided and reviewed with pt.   RTb provided    Manual Treatments:  N/A    Modalities:  N/A     Time-in Time-out Total Time   22165  Evaluation Low Complexity      98401  Evaluation Med Complexity      60340  Evaluation High Complexity      44592  Ther Ex 1410 1446 31   77515  Neuro Re-ed        91791  Ther Activities        04911  Manual Therapy       18096  E-stim       96338  Ultrasound            Session 4817 5226 93

## 2023-08-30 ENCOUNTER — TREATMENT (OUTPATIENT)
Dept: PHYSICAL THERAPY | Age: 58
End: 2023-08-30
Payer: COMMERCIAL

## 2023-08-30 DIAGNOSIS — M60.9 MYOSITIS OF MULTIPLE SITES, UNSPECIFIED MYOSITIS TYPE: Primary | ICD-10-CM

## 2023-08-30 PROCEDURE — 97110 THERAPEUTIC EXERCISES: CPT

## 2023-08-30 NOTE — PROGRESS NOTES
[facility]          Phone: [phone] Fax: [phone]    Physical Therapy Daily Treatment Note  Date:  2023    Patient Name:  Renell Boeck    :  1965  MRN: [id number]    Evaluating Therapist:  Tristen Vanessa PT [id number]    Restrictions/Precautions:    Diagnosis:     Diagnosis Orders   1. Myositis of multiple sites, unspecified myositis type          Treatment Diagnosis:    Insurance/Certification information:  Medical [place]  Referring Physician: FRIDA Koenig  Plan of care signed (Y/N):    Visit# / total visits:  7/8-10  Pain level: N/A   Time In:  8266  Time Out:  1448    Subjective:  pt has no new reports. Exercises:  Exercise/Equipment Resistance/Repetitions Other comments     /stepper X 10  mins  As tolerated     LAQ 1.5# x 10 reps 8/2 HEP     Seated hip flex 1.5# x 10 reps 8/2 HEP     Seated hip abd RTB x 10 reps 8/ HEP     Seated hip add With ball x 10 reps 8/2 HEP     Seated knee flex RTB x 10 reps      Sit to stand x 10 reps       Tandem gait II bars x 4 laps   Lite touch for balance     Standing hip 3-way X 10 reps each B  8/2 HEP Lite touch for balance     Step ups 6 \" X 15 reps B fwd / SW x 10 reps B       Marching on blue foam pad X 1 min      Gait around Obstacles                                                         Other  while performing stand to sit pt did not require UE support for eccentric control to the chair. Pt fatigues easily and rested PRN . Home Exercise Program:   HEP provided and reviewed with pt.   RTb provided    Manual Treatments:  N/A    Modalities:  N/A     Time-in Time-out Total Time   61375  Evaluation Low Complexity      51553  Evaluation Med Complexity      60076  Evaluation High Complexity      68374  Ther Ex 1414 1448 34   [id number]  Neuro Re-ed        76406  Ther Activities        17854  Manual Therapy       87315  E-stim       36037  Ultrasound            Session 9445 8454 20       Treatment/Activity

## 2023-09-11 ENCOUNTER — TREATMENT (OUTPATIENT)
Dept: PHYSICAL THERAPY | Age: 58
End: 2023-09-11
Payer: COMMERCIAL

## 2023-09-11 DIAGNOSIS — M60.9 MYOSITIS OF MULTIPLE SITES, UNSPECIFIED MYOSITIS TYPE: Primary | ICD-10-CM

## 2023-09-11 PROCEDURE — 97110 THERAPEUTIC EXERCISES: CPT

## 2023-09-11 NOTE — PROGRESS NOTES
[facility]          Phone: [phone] Fax: [phone]    Physical Therapy Daily Treatment Note  Date:  2023    Patient Name:  Salbador Hackett    :  1965  MRN: [id number]    Evaluating Therapist:  Jerman Miller PT [id number]    Restrictions/Precautions:    Diagnosis:     Diagnosis Orders   1. Myositis of multiple sites, unspecified myositis type          Treatment Diagnosis:    Insurance/Certification information:  Medical [place]  Referring Physician: FRIDA Shah  Plan of care signed (Y/N):    Visit# / total visits:  -10  Pain level: N/A   Time In:  1413  Time Out:  1448    Subjective:  pt has no new reports. Exercises:  Exercise/Equipment Resistance/Repetitions Other comments     /stepper X 10  mins  As tolerated     LAQ 1.5# x 10 reps 8/2 HEP     Seated hip flex 1.5# x 10 reps 8/ HEP     Seated hip abd RTB 2 x 10 reps / HEP     Seated hip add With ball 2 x 10 reps 8/ HEP     Seated knee flex RTB x 10 reps      Sit to stand x 10 reps       Tandem gait II bars x 4 laps   Lite touch for balance     Standing hip 3-way X 10 reps each B  8/ HEP Lite touch for balance     Step ups 6 \" X 15 reps B fwd / SW x 10 reps B       Marching on blue foam pad X 1 min      Gait around Obstacles  X 2 laps                                                        Other  while performing stand to sit pt did not require UE support for eccentric control to the chair. Pt fatigues easily and rested PRN . Home Exercise Program:   HEP provided and reviewed with pt.   RTb provided    Manual Treatments:  N/A    Modalities:  N/A     Time-in Time-out Total Time   96132  Evaluation Low Complexity      48236  Evaluation Med Complexity      02941  Evaluation High Complexity      11542  Ther Ex 1413 1448 35   [id number]  Neuro Re-ed        31431  Ther Activities        89218  Manual Therapy       14080  E-stim       65526  Ultrasound            Session 2187 4927 98

## 2023-09-13 ENCOUNTER — TREATMENT (OUTPATIENT)
Dept: PHYSICAL THERAPY | Age: 58
End: 2023-09-13
Payer: COMMERCIAL

## 2023-09-13 DIAGNOSIS — M60.9 MYOSITIS OF MULTIPLE SITES, UNSPECIFIED MYOSITIS TYPE: Primary | ICD-10-CM

## 2023-09-13 PROCEDURE — 97110 THERAPEUTIC EXERCISES: CPT

## 2023-09-13 NOTE — PROGRESS NOTES
[facility]          Phone: [phone] Fax: [phone]    Physical Therapy Daily Treatment Note  Date:  2023    Patient Name:  Taylor Bullard    :  1965  MRN: [id number]    Evaluating Therapist:  Sebastian Love PT [id number]    Restrictions/Precautions:    Diagnosis:     Diagnosis Orders   1. Myositis of multiple sites, unspecified myositis type          Treatment Diagnosis:    Insurance/Certification information:  Medical [place]  Referring Physician: FRIDA Curiel  Plan of care signed (Y/N):    Visit# / total visits:  9/8-10  Pain level: N/A   Time In:  1412  Time Out:  1450    Subjective:  pt has no new reports. Exercises:  Exercise/Equipment Resistance/Repetitions Other comments     /stepper X 10  mins  As tolerated     LAQ 2# x 10 reps 8/2 HEP     Seated hip flex 2# x 10 reps 8/2 HEP     Seated hip abd RTB 2 x 10 reps 8/2 HEP     Seated hip add With ball 2 x 10 reps 8/2 HEP     Seated knee flex RTB x 15 reps      Sit to stand x 10 reps       Tandem gait II bars x 4 laps   Lite touch for balance     Standing hip 3-way  On blue balance pad X 10 reps each B  8/2 HEP Lite touch for balance     Step ups 6 \" X 15 reps B fwd / SW x 10 reps B       Marching on blue balance pad X 1 min      Gait around Obstacles  X 2 laps                                                        Other  while performing stand to sit pt did not require UE support for eccentric control to the chair. Pt fatigues easily and rested PRN . Home Exercise Program:  8/2 HEP provided and reviewed with pt.   RTb provided    Manual Treatments:  N/A    Modalities:  N/A     Time-in Time-out Total Time   83872  Evaluation Low Complexity      08180  Evaluation Med Complexity      41704  Evaluation High Complexity      09739  Ther Ex 1412 1450 38   [id number]  Neuro Re-ed        74477  Ther Activities        48137  Manual Therapy       91897  E-stim       45292  Ultrasound            Session 0565

## 2023-09-18 ENCOUNTER — TREATMENT (OUTPATIENT)
Dept: PHYSICAL THERAPY | Age: 58
End: 2023-09-18
Payer: COMMERCIAL

## 2023-09-18 DIAGNOSIS — M60.9 MYOSITIS OF MULTIPLE SITES, UNSPECIFIED MYOSITIS TYPE: Primary | ICD-10-CM

## 2023-09-18 PROCEDURE — 97110 THERAPEUTIC EXERCISES: CPT

## 2023-09-19 NOTE — PROGRESS NOTES
[facility]                Phone: [phone] Fax: [phone]    Physical Therapy  Outpatient Discharge Summary     Date:  2023    Patient Name:  Charlaine Buerger    :  1965  MRN: [id number]    DIAGNOSIS:     Diagnosis Orders   1. Myositis of multiple sites, unspecified myositis type          REFERRING PHYSICIAN:  FRIDA Culver    ATTENDANCE:  Pt has attended 10 of 10 scheduled treatments from 23 to 23. TREATMENTS RECEIVED:  endurance training, strength training, balance training, education in a HEP    INITIAL STATUS:  Strength: trunk 4-/5, B LEs 4-/5  Dynamic Gait Index   Gait: unsteady, wide-based, ataxic  LEFS:     FINAL STATUS:  Strength 5/5 throughout except right hip flex 4+/5  Dynamic Gait Index   Gait: linear, reciprocating, fluid, without LOB  LEFS:   Pt is independent with HEP    GOALS:  5 out of 5 Long Term Goals were obtained. LONG TERM GOALS NOT OBTAINED/REASON:  N/A    PATIENT GOALS:  improved balance, decrease falls, improve walking    REASON FOR DISCHARGE:  Pt has met goals and is able to manage condition independently with HEP. PATIENT EDUCATION/INSTRUCTIONS:  Pt instructed in strength training and balance activities for HEP. RECOMMENDATIONS:  Discharge to HEP. Thank you for the opportunity to work with your patient. If you have questions or comments, please feel free to contact me by phone or fax.       Electronically Signed by: Kenny Gonzales, [address] [place] [address], 486658  2023
well [] Patient limited by fatigue  [] Patient limited by pain  [] Patient limited by other medical complications  [] Other:     Prognosis: [x] Good [] Fair  [] Poor    Patient Requires Follow-up: [] Yes  [x] No    Plan:   [] Continue per plan of care [] Alter current plan (see comments)  [] Plan of care initiated [] Hold pending MD visit [x] Discharge  Plan for Next Session:        Electronically signed by:  Mercedes Torres, PTA [id number]

## 2023-12-04 ENCOUNTER — APPOINTMENT (OUTPATIENT)
Dept: GENERAL RADIOLOGY | Age: 58
End: 2023-12-04
Payer: COMMERCIAL

## 2023-12-04 ENCOUNTER — HOSPITAL ENCOUNTER (INPATIENT)
Age: 58
LOS: 2 days | Discharge: HOME | End: 2023-12-07
Attending: INTERNAL MEDICINE | Admitting: INTERNAL MEDICINE
Payer: COMMERCIAL

## 2023-12-04 ENCOUNTER — APPOINTMENT (OUTPATIENT)
Dept: CT IMAGING | Age: 58
End: 2023-12-04
Payer: COMMERCIAL

## 2023-12-04 DIAGNOSIS — S22.42XA CLOSED FRACTURE OF MULTIPLE RIBS OF LEFT SIDE, INITIAL ENCOUNTER: Primary | ICD-10-CM

## 2023-12-04 DIAGNOSIS — R09.02 HYPOXIA: ICD-10-CM

## 2023-12-04 DIAGNOSIS — T17.500A MUCUS PLUGGING OF BRONCHI: ICD-10-CM

## 2023-12-04 DIAGNOSIS — R74.01 ELEVATED ASPARTATE AMINOTRANSFERASE LEVEL: ICD-10-CM

## 2023-12-04 DIAGNOSIS — S09.90XA CLOSED HEAD INJURY, INITIAL ENCOUNTER: ICD-10-CM
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
## 2023-12-04 DIAGNOSIS — S00.81XA ABRASION OF CHIN, INITIAL ENCOUNTER: ICD-10-CM

## 2023-12-04 DIAGNOSIS — E87.6 HYPOKALEMIA: ICD-10-CM

## 2023-12-04 LAB
ALBUMIN SERPL-MCNC: 4.6 G/DL (ref 3.5–5.2)
ALP SERPL-CCNC: 115 U/L (ref 35–104)
ALT SERPL-CCNC: 18 U/L (ref 0–32)
ANION GAP SERPL CALCULATED.3IONS-SCNC: 15 MMOL/L (ref 7–16)
AST SERPL-CCNC: 43 U/L (ref 0–31)
BASOPHILS # BLD: 0.03 K/UL (ref 0–0.2)
BASOPHILS NFR BLD: 0 % (ref 0–2)
BILIRUB SERPL-MCNC: 1 MG/DL (ref 0–1.2)
BUN SERPL-MCNC: 7 MG/DL (ref 6–20)
CALCIUM SERPL-MCNC: 9.4 MG/DL (ref 8.6–10.2)
CHLORIDE SERPL-SCNC: 91 MMOL/L (ref 98–107)
CO2 SERPL-SCNC: 29 MMOL/L (ref 22–29)
CREAT SERPL-MCNC: 0.6 MG/DL (ref 0.5–1)
EOSINOPHIL # BLD: 0.04 K/UL (ref 0.05–0.5)
EOSINOPHILS RELATIVE PERCENT: 1 % (ref 0–6)
ERYTHROCYTE [DISTWIDTH] IN BLOOD BY AUTOMATED COUNT: 12.5 % (ref 11.5–15)
FLUAV RNA RESP QL NAA+PROBE: NOT DETECTED
FLUBV RNA RESP QL NAA+PROBE: NOT DETECTED
GFR SERPL CREATININE-BSD FRML MDRD: >60 ML/MIN/1.73M2
GLUCOSE SERPL-MCNC: 118 MG/DL (ref 74–99)
HCT VFR BLD AUTO: 42.6 % (ref 34–48)
HGB BLD-MCNC: 15.5 G/DL (ref 11.5–15.5)
IMM GRANULOCYTES # BLD AUTO: <0.03 K/UL (ref 0–0.58)
IMM GRANULOCYTES NFR BLD: 0 % (ref 0–5)
LACTATE BLDV-SCNC: 1.7 MMOL/L (ref 0.5–2.2)
LYMPHOCYTES NFR BLD: 1.84 K/UL (ref 1.5–4)
LYMPHOCYTES RELATIVE PERCENT: 26 % (ref 20–42)
MAGNESIUM SERPL-MCNC: 1.7 MG/DL (ref 1.6–2.6)
MCH RBC QN AUTO: 34.9 PG (ref 26–35)
MCHC RBC AUTO-ENTMCNC: 36.4 G/DL (ref 32–34.5)
MCV RBC AUTO: 95.9 FL (ref 80–99.9)
MONOCYTES NFR BLD: 0.56 K/UL (ref 0.1–0.95)
MONOCYTES NFR BLD: 8 % (ref 2–12)
NEUTROPHILS NFR BLD: 64 % (ref 43–80)
NEUTS SEG NFR BLD: 4.49 K/UL (ref 1.8–7.3)
PLATELET # BLD AUTO: 194 K/UL (ref 130–450)
PMV BLD AUTO: 8.9 FL (ref 7–12)
POTASSIUM SERPL-SCNC: 2.7 MMOL/L (ref 3.5–5)
PROT SERPL-MCNC: 7.3 G/DL (ref 6.4–8.3)
RBC # BLD AUTO: 4.44 M/UL (ref 3.5–5.5)
SARS-COV-2 RNA RESP QL NAA+PROBE: NOT DETECTED
SODIUM SERPL-SCNC: 135 MMOL/L (ref 132–146)
SOURCE: NORMAL
SPECIMEN DESCRIPTION: NORMAL
TROPONIN I SERPL HS-MCNC: 7 NG/L (ref 0–9)
WBC OTHER # BLD: 7 K/UL (ref 4.5–11.5)

## 2023-12-04 PROCEDURE — 93005 ELECTROCARDIOGRAM TRACING: CPT

## 2023-12-04 PROCEDURE — 87636 SARSCOV2 & INF A&B AMP PRB: CPT

## 2023-12-04 PROCEDURE — 90714 TD VACC NO PRESV 7 YRS+ IM: CPT

## 2023-12-04 PROCEDURE — 90471 IMMUNIZATION ADMIN: CPT

## 2023-12-04 PROCEDURE — 96366 THER/PROPH/DIAG IV INF ADDON: CPT

## 2023-12-04 PROCEDURE — 99285 EMERGENCY DEPT VISIT HI MDM: CPT

## 2023-12-04 PROCEDURE — 71260 CT THORAX DX C+: CPT

## 2023-12-04 PROCEDURE — 6360000004 HC RX CONTRAST MEDICATION: Performed by: RADIOLOGY

## 2023-12-04 PROCEDURE — 6370000000 HC RX 637 (ALT 250 FOR IP)

## 2023-12-04 PROCEDURE — 83735 ASSAY OF MAGNESIUM: CPT

## 2023-12-04 PROCEDURE — 84132 ASSAY OF SERUM POTASSIUM: CPT

## 2023-12-04 PROCEDURE — 96375 TX/PRO/DX INJ NEW DRUG ADDON: CPT

## 2023-12-04 PROCEDURE — 85025 COMPLETE CBC W/AUTO DIFF WBC: CPT

## 2023-12-04 PROCEDURE — 84484 ASSAY OF TROPONIN QUANT: CPT

## 2023-12-04 PROCEDURE — 83605 ASSAY OF LACTIC ACID: CPT

## 2023-12-04 PROCEDURE — 71101 X-RAY EXAM UNILAT RIBS/CHEST: CPT

## 2023-12-04 PROCEDURE — 96365 THER/PROPH/DIAG IV INF INIT: CPT

## 2023-12-04 PROCEDURE — 80053 COMPREHEN METABOLIC PANEL: CPT

## 2023-12-04 PROCEDURE — 70450 CT HEAD/BRAIN W/O DYE: CPT

## 2023-12-04 PROCEDURE — 6360000002 HC RX W HCPCS

## 2023-12-04 RX ORDER — BACITRACIN ZINC 500 [USP'U]/G
OINTMENT TOPICAL ONCE
Status: COMPLETED | OUTPATIENT
Start: 2023-12-04 | End: 2023-12-04

## 2023-12-04 RX ORDER — HYDROCODONE BITARTRATE AND ACETAMINOPHEN 5; 325 MG/1; MG/1
1 TABLET ORAL ONCE
Status: COMPLETED | OUTPATIENT
Start: 2023-12-04 | End: 2023-12-04

## 2023-12-04 RX ORDER — POTASSIUM CHLORIDE 7.45 MG/ML
10 INJECTION INTRAVENOUS
Status: COMPLETED | OUTPATIENT
Start: 2023-12-04 | End: 2023-12-04

## 2023-12-04 RX ORDER — OXYCODONE HYDROCHLORIDE AND ACETAMINOPHEN 5; 325 MG/1; MG/1
1 TABLET ORAL ONCE
Status: DISCONTINUED | OUTPATIENT
Start: 2023-12-04 | End: 2023-12-04

## 2023-12-04 RX ORDER — POTASSIUM CHLORIDE 20 MEQ/1
40 TABLET, EXTENDED RELEASE ORAL ONCE
Status: COMPLETED | OUTPATIENT
Start: 2023-12-04 | End: 2023-12-04

## 2023-12-04 RX ORDER — CLONIDINE HYDROCHLORIDE 0.1 MG/1
0.2 TABLET ORAL ONCE
Status: COMPLETED | OUTPATIENT
Start: 2023-12-04 | End: 2023-12-04

## 2023-12-04 RX ORDER — TETANUS AND DIPHTHERIA TOXOIDS ADSORBED 2; 2 [LF]/.5ML; [LF]/.5ML
0.5 INJECTION INTRAMUSCULAR ONCE
Status: COMPLETED | OUTPATIENT
Start: 2023-12-04 | End: 2023-12-04

## 2023-12-04 RX ORDER — KETOROLAC TROMETHAMINE 30 MG/ML
30 INJECTION, SOLUTION INTRAMUSCULAR; INTRAVENOUS ONCE
Status: COMPLETED | OUTPATIENT
Start: 2023-12-04 | End: 2023-12-04

## 2023-12-04 RX ADMIN — KETOROLAC TROMETHAMINE 30 MG: 30 INJECTION, SOLUTION INTRAMUSCULAR; INTRAVENOUS at 17:57

## 2023-12-04 RX ADMIN — POTASSIUM CHLORIDE 10 MEQ: 7.46 INJECTION, SOLUTION INTRAVENOUS at 19:41

## 2023-12-04 RX ADMIN — IOPAMIDOL 75 ML: 755 INJECTION, SOLUTION INTRAVENOUS at 19:09

## 2023-12-04 RX ADMIN — POTASSIUM CHLORIDE 10 MEQ: 7.46 INJECTION, SOLUTION INTRAVENOUS at 20:57

## 2023-12-04 RX ADMIN — CLONIDINE HYDROCHLORIDE 0.2 MG: 0.1 TABLET ORAL at 21:35

## 2023-12-04 RX ADMIN — BACITRACIN ZINC: 500 OINTMENT TOPICAL at 17:59

## 2023-12-04 RX ADMIN — HYDROCODONE BITARTRATE AND ACETAMINOPHEN 1 TABLET: 5; 325 TABLET ORAL at 18:00

## 2023-12-04 RX ADMIN — TETANUS AND DIPHTHERIA TOXOIDS ADSORBED 0.5 ML: 2; 2 INJECTION INTRAMUSCULAR at 18:02

## 2023-12-04 RX ADMIN — POTASSIUM CHLORIDE 40 MEQ: 1500 TABLET, EXTENDED RELEASE ORAL at 19:38

## 2023-12-04 ASSESSMENT — PAIN - FUNCTIONAL ASSESSMENT
PAIN_FUNCTIONAL_ASSESSMENT: 0-10
PAIN_FUNCTIONAL_ASSESSMENT: PREVENTS OR INTERFERES SOME ACTIVE ACTIVITIES AND ADLS

## 2023-12-04 ASSESSMENT — PAIN DESCRIPTION - DESCRIPTORS: DESCRIPTORS: ACHING;TENDER;SORE;DISCOMFORT

## 2023-12-04 ASSESSMENT — PAIN SCALES - GENERAL
PAINLEVEL_OUTOF10: 7
PAINLEVEL_OUTOF10: 10

## 2023-12-04 ASSESSMENT — PAIN DESCRIPTION - PAIN TYPE: TYPE: ACUTE PAIN

## 2023-12-04 ASSESSMENT — LIFESTYLE VARIABLES
HOW MANY STANDARD DRINKS CONTAINING ALCOHOL DO YOU HAVE ON A TYPICAL DAY: PATIENT DOES NOT DRINK
HOW OFTEN DO YOU HAVE A DRINK CONTAINING ALCOHOL: NEVER

## 2023-12-04 ASSESSMENT — PAIN DESCRIPTION - LOCATION
LOCATION: RIB CAGE
LOCATION: CHEST

## 2023-12-04 ASSESSMENT — PAIN DESCRIPTION - ORIENTATION: ORIENTATION: LEFT

## 2023-12-04 ASSESSMENT — PAIN DESCRIPTION - ONSET: ONSET: ON-GOING

## 2023-12-04 ASSESSMENT — PAIN DESCRIPTION - FREQUENCY: FREQUENCY: CONTINUOUS

## 2023-12-04 NOTE — ED PROVIDER NOTES
improved on 2 and half liters of oxygen. Pain is controlled. She is hypertensive. Clonidine 0.2 mg was given and blood pressure did improve to 150/100. Patient did miss her evening medications, but most of these medications are not available at this facility and we will plan to provide as needed antihypertensive medications as needed. At this time, patient is with an acute process requiring inpatient admission and further evaluation as she has consecutive left rib fractures x4 and is hypoxic. She will need continuation of nasal cannula oxygen, telemetry monitoring, serial monitoring of labs including electrolytes, and may need repeat imaging of her chest to evaluate for development of pneumonia. [facility] was contacted to facilitate transfer. Call was placed to [address] and he excepted the admission after personal chart review. I was not connected with physician. Patient is pending room assignment and transportation arrangements by [facility]. Plan of Care/Counseling:  YULISA Geller CNP and EM Attending Physician reviewed today's visit with the patient in addition to providing specific details for the plan of care and counseling regarding the diagnosis and prognosis. Questions are answered at this time and are agreeable with the plan. Assessment     1. Closed fracture of multiple ribs of left side, initial encounter    2. Hypoxia    3. Mucus plugging of bronchi    4. Fall, initial encounter    5. Abrasion of chin, initial encounter    6. Closed head injury, initial encounter    7. Hypokalemia    8. Elevated aspartate aminotransferase level      Plan   Transferred to [facility]. Patient condition is stable    New Medications     New Prescriptions    No medications on file     Electronically signed by YULISA Geller CNP   DD: 12/4/23  **This report was transcribed using voice recognition software.  Every effort was made to ensure accuracy; however, inadvertent computerized transcription errors may be present.   END OF ED PROVIDER NOTE       Aquilino Peters, YULISA - KAYDEN  12/04/23 6008

## 2023-12-05 PROBLEM — J96.01 ACUTE RESPIRATORY FAILURE WITH HYPOXIA (HCC): Status: ACTIVE | Noted: 2022-09-21

## 2023-12-05 PROBLEM — J96.01 ACUTE HYPOXIC RESPIRATORY FAILURE (HCC): Status: ACTIVE | Noted: 2023-12-05
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
## 2023-12-05 PROBLEM — S22.42XA MULTIPLE CLOSED FRACTURES OF RIBS OF LEFT SIDE: Status: ACTIVE | Noted: 2023-12-05

## 2023-12-05 PROBLEM — E44.0 MODERATE PROTEIN-CALORIE MALNUTRITION (HCC): Status: ACTIVE | Noted: 2023-12-05

## 2023-12-05 LAB
EKG ATRIAL RATE: 70 BPM
EKG P AXIS: 43 DEGREES
EKG P-R INTERVAL: 158 MS
EKG Q-T INTERVAL: 410 MS
EKG QRS DURATION: 76 MS
EKG QTC CALCULATION (BAZETT): 442 MS
EKG T AXIS: 49 DEGREES
EKG VENTRICULAR RATE: 70 BPM
POTASSIUM SERPL-SCNC: 4.2 MMOL/L (ref 3.5–5)

## 2023-12-05 PROCEDURE — 2140000000 HC CCU INTERMEDIATE R&B

## 2023-12-05 PROCEDURE — 6370000000 HC RX 637 (ALT 250 FOR IP): Performed by: HOSPITALIST

## 2023-12-05 PROCEDURE — 2580000003 HC RX 258: Performed by: HOSPITALIST

## 2023-12-05 PROCEDURE — 6360000002 HC RX W HCPCS

## 2023-12-05 PROCEDURE — 93010 ELECTROCARDIOGRAM REPORT: CPT | Performed by: INTERNAL MEDICINE

## 2023-12-05 RX ORDER — ONDANSETRON 2 MG/ML
4 INJECTION INTRAMUSCULAR; INTRAVENOUS EVERY 6 HOURS PRN
Status: DISCONTINUED | OUTPATIENT
Start: 2023-12-05 | End: 2023-12-07 | Stop reason: HOSPADM

## 2023-12-05 RX ORDER — CARVEDILOL 6.25 MG/1
12.5 TABLET ORAL 2 TIMES DAILY
Status: DISCONTINUED | OUTPATIENT
Start: 2023-12-05 | End: 2023-12-07 | Stop reason: HOSPADM

## 2023-12-05 RX ORDER — SODIUM CHLORIDE 9 MG/ML
INJECTION, SOLUTION INTRAVENOUS PRN
Status: DISCONTINUED | OUTPATIENT
Start: 2023-12-05 | End: 2023-12-07 | Stop reason: HOSPADM

## 2023-12-05 RX ORDER — CLONIDINE HYDROCHLORIDE 0.1 MG/1
0.2 TABLET ORAL 3 TIMES DAILY
Status: DISCONTINUED | OUTPATIENT
Start: 2023-12-05 | End: 2023-12-07 | Stop reason: HOSPADM

## 2023-12-05 RX ORDER — SODIUM CHLORIDE 0.9 % (FLUSH) 0.9 %
5-40 SYRINGE (ML) INJECTION EVERY 12 HOURS SCHEDULED
Status: DISCONTINUED | OUTPATIENT
Start: 2023-12-05 | End: 2023-12-07 | Stop reason: HOSPADM

## 2023-12-05 RX ORDER — SODIUM CHLORIDE 0.9 % (FLUSH) 0.9 %
5-40 SYRINGE (ML) INJECTION PRN
Status: DISCONTINUED | OUTPATIENT
Start: 2023-12-05 | End: 2023-12-07 | Stop reason: HOSPADM

## 2023-12-05 RX ORDER — FENTANYL CITRATE 50 UG/ML
INJECTION, SOLUTION INTRAMUSCULAR; INTRAVENOUS
Status: COMPLETED
Start: 2023-12-05 | End: 2023-12-05

## 2023-12-05 RX ORDER — SODIUM CHLORIDE 1 G/1
1 TABLET ORAL 2 TIMES DAILY
Status: DISCONTINUED | OUTPATIENT
Start: 2023-12-05 | End: 2023-12-07 | Stop reason: HOSPADM

## 2023-12-05 RX ORDER — ONDANSETRON 4 MG/1
4 TABLET, ORALLY DISINTEGRATING ORAL EVERY 8 HOURS PRN
Status: DISCONTINUED | OUTPATIENT
Start: 2023-12-05 | End: 2023-12-07 | Stop reason: HOSPADM

## 2023-12-05 RX ORDER — POTASSIUM CHLORIDE 7.45 MG/ML
10 INJECTION INTRAVENOUS PRN
Status: DISCONTINUED | OUTPATIENT
Start: 2023-12-05 | End: 2023-12-07 | Stop reason: HOSPADM

## 2023-12-05 RX ORDER — DILTIAZEM HYDROCHLORIDE 60 MG/1
120 CAPSULE, EXTENDED RELEASE ORAL 2 TIMES DAILY
Status: DISCONTINUED | OUTPATIENT
Start: 2023-12-05 | End: 2023-12-07 | Stop reason: HOSPADM

## 2023-12-05 RX ORDER — POTASSIUM CHLORIDE 20 MEQ/1
40 TABLET, EXTENDED RELEASE ORAL PRN
Status: DISCONTINUED | OUTPATIENT
Start: 2023-12-05 | End: 2023-12-07 | Stop reason: HOSPADM

## 2023-12-05 RX ORDER — HYDROMORPHONE HYDROCHLORIDE 1 MG/ML
1 INJECTION, SOLUTION INTRAMUSCULAR; INTRAVENOUS; SUBCUTANEOUS EVERY 4 HOURS PRN
Status: DISCONTINUED | OUTPATIENT
Start: 2023-12-05 | End: 2023-12-07 | Stop reason: HOSPADM

## 2023-12-05 RX ORDER — OXYCODONE HYDROCHLORIDE AND ACETAMINOPHEN 5; 325 MG/1; MG/1
1 TABLET ORAL EVERY 4 HOURS PRN
Status: DISCONTINUED | OUTPATIENT
Start: 2023-12-05 | End: 2023-12-07 | Stop reason: HOSPADM

## 2023-12-05 RX ORDER — ACETAMINOPHEN 325 MG/1
650 TABLET ORAL EVERY 6 HOURS PRN
Status: DISCONTINUED | OUTPATIENT
Start: 2023-12-05 | End: 2023-12-07 | Stop reason: HOSPADM

## 2023-12-05 RX ORDER — POLYETHYLENE GLYCOL 3350 17 G/17G
17 POWDER, FOR SOLUTION ORAL DAILY PRN
Status: DISCONTINUED | OUTPATIENT
Start: 2023-12-05 | End: 2023-12-07 | Stop reason: HOSPADM

## 2023-12-05 RX ORDER — ACETAMINOPHEN 650 MG/1
650 SUPPOSITORY RECTAL EVERY 6 HOURS PRN
Status: DISCONTINUED | OUTPATIENT
Start: 2023-12-05 | End: 2023-12-07 | Stop reason: HOSPADM

## 2023-12-05 RX ORDER — MAGNESIUM SULFATE IN WATER 40 MG/ML
2000 INJECTION, SOLUTION INTRAVENOUS PRN
Status: DISCONTINUED | OUTPATIENT
Start: 2023-12-05 | End: 2023-12-07 | Stop reason: HOSPADM

## 2023-12-05 RX ADMIN — CARVEDILOL 12.5 MG: 6.25 TABLET, FILM COATED ORAL at 13:33

## 2023-12-05 RX ADMIN — ACETAMINOPHEN 650 MG: 325 TABLET ORAL at 13:32

## 2023-12-05 RX ADMIN — CLONIDINE HYDROCHLORIDE 0.2 MG: 0.1 TABLET ORAL at 13:33

## 2023-12-05 RX ADMIN — FENTANYL CITRATE: 50 INJECTION INTRAMUSCULAR; INTRAVENOUS at 05:10

## 2023-12-05 RX ADMIN — POLYETHYLENE GLYCOL 3350 17 G: 17 POWDER, FOR SOLUTION ORAL at 21:52

## 2023-12-05 RX ADMIN — CARVEDILOL 12.5 MG: 6.25 TABLET, FILM COATED ORAL at 21:51

## 2023-12-05 RX ADMIN — DILTIAZEM HYDROCHLORIDE 120 MG: 60 CAPSULE, EXTENDED RELEASE ORAL at 14:18

## 2023-12-05 RX ADMIN — SODIUM CHLORIDE 1 G: 1 TABLET ORAL at 13:33

## 2023-12-05 RX ADMIN — OXYCODONE AND ACETAMINOPHEN 1 TABLET: 5; 325 TABLET ORAL at 21:51

## 2023-12-05 RX ADMIN — CLONIDINE HYDROCHLORIDE 0.2 MG: 0.1 TABLET ORAL at 21:51

## 2023-12-05 RX ADMIN — SODIUM CHLORIDE, PRESERVATIVE FREE 10 ML: 5 INJECTION INTRAVENOUS at 21:52

## 2023-12-05 RX ADMIN — DILTIAZEM HYDROCHLORIDE 120 MG: 60 CAPSULE, EXTENDED RELEASE ORAL at 21:51

## 2023-12-05 RX ADMIN — SODIUM CHLORIDE 1 G: 1 TABLET ORAL at 21:51

## 2023-12-05 RX ADMIN — OXYCODONE AND ACETAMINOPHEN 1 TABLET: 5; 325 TABLET ORAL at 17:10

## 2023-12-05 ASSESSMENT — PAIN DESCRIPTION - LOCATION
LOCATION: CHEST
LOCATION: RIB CAGE
LOCATION: CHEST
LOCATION: CHEST
LOCATION: OTHER (COMMENT);RIB CAGE
LOCATION: CHEST

## 2023-12-05 ASSESSMENT — PAIN DESCRIPTION - DESCRIPTORS
DESCRIPTORS: ACHING
DESCRIPTORS: ACHING;SPASM
DESCRIPTORS: CRUSHING;SHARP
DESCRIPTORS: ACHING;SPASM;SQUEEZING
DESCRIPTORS: ACHING;SPASM;SQUEEZING

## 2023-12-05 ASSESSMENT — PAIN SCALES - GENERAL
PAINLEVEL_OUTOF10: 7
PAINLEVEL_OUTOF10: 0
PAINLEVEL_OUTOF10: 8
PAINLEVEL_OUTOF10: 7
PAINLEVEL_OUTOF10: 5
PAINLEVEL_OUTOF10: 9

## 2023-12-05 ASSESSMENT — PAIN DESCRIPTION - PAIN TYPE
TYPE: ACUTE PAIN

## 2023-12-05 ASSESSMENT — PAIN - FUNCTIONAL ASSESSMENT
PAIN_FUNCTIONAL_ASSESSMENT: PREVENTS OR INTERFERES SOME ACTIVE ACTIVITIES AND ADLS

## 2023-12-05 ASSESSMENT — PAIN DESCRIPTION - ONSET
ONSET: ON-GOING
ONSET: ON-GOING

## 2023-12-05 ASSESSMENT — PAIN DESCRIPTION - FREQUENCY
FREQUENCY: CONTINUOUS
FREQUENCY: CONTINUOUS

## 2023-12-05 ASSESSMENT — PAIN DESCRIPTION - ORIENTATION
ORIENTATION: LEFT

## 2023-12-05 ASSESSMENT — PAIN SCALES - WONG BAKER: WONGBAKER_NUMERICALRESPONSE: 0

## 2023-12-05 NOTE — CARE COORDINATION
Transition of Care: Met with Patient at bedside and explained case management role. Patient lives in a Multi level home with a 1 floor bed and bath. 1 step to enter. No Hx SNF. Hx [facility] for [facility], and [employer] for O2. Patient no longer has oxygen at home. Patient primary care physician is Omayra Salas DO. Patient uses [facility] in [place]. Primary decision maker is Maty Beth (sister). Discharge plan is Home is no home health care needs. Sister will transport. Pt presented to the [place] ER for a fall at home. Found to have Closed fracture of multiple ribs of left side, and Mucus plugging of bronchi. CM/SW to follow. MT    Case Management Assessment  Initial Evaluation    Date/Time of Evaluation: 12/5/2023 12:53 PM  Assessment Completed by: Irma Carr RN    If patient is discharged prior to next notation, then this note serves as note for discharge by case management. Patient Name: Hawa Toussaint                   YOB: 1965  Diagnosis: Hypokalemia [E87.6]  Hypoxia [R09.02]  Elevated aspartate aminotransferase level [R74.01]  Abrasion of chin, initial encounter [S00.81XA]  Closed head injury, initial encounter [S09.90XA]  Fall, initial encounter [W19. XXXA]  Closed fracture of multiple ribs of left side, initial encounter [S22.42XA]  Mucus plugging of bronchi [T17.500A]                   Date / Time: 12/4/2023  4:50 PM    Patient Admission Status: Inpatient   Readmission Risk (Low < 19, Mod (19-27), High > 27): Readmission Risk Score: 6.6    Current PCP: Omayra Salas DO  PCP verified by CM? Yes    Chart Reviewed: Yes      History Provided by: Patient  Patient Orientation: Alert and Oriented    Patient Cognition: Alert    Hospitalization in the last 30 days (Readmission):  No    If yes, Readmission Assessment in CM Navigator will be completed.     Advance Directives:      Code Status: Prior   Patient's Primary Decision Maker is: Legal Next of Kin    Primary Decision Maker: bronchi [V77.988M]    IF APPLICABLE: The Patient and/or patient representative Jurgen Aleman and her family were provided with a choice of provider and agrees with the discharge plan. Freedom of choice list with basic dialogue that supports the patient's individualized plan of care/goals and shares the quality data associated with the providers was provided to:     Patient Representative Name:       The Patient and/or Patient Representative Agree with the Discharge Plan?       Glenys Wagner RN BSN  Case Management  [phone]

## 2023-12-05 NOTE — H&P
Hospital Medicine History & Physical      PCP: Erin Hernandez DO    Date of Admission: 12/4/2023    Date of Service: Pt seen/examined on 12/4/2023 and is      admitted to Inpatient with expected LOS greater than two midnights due to medical therapy. Chief Complaint:  had concerns including Fall (GOT UP SATURDAY AND FELL, DOESN'T KNOW IF PASSED OUT, LEFT RIB PAIN). History Of Present Illness:    Ms. Michael Girard, a 66y.o. year old female  with PMH of HTN, COPD who was initially evaluated at [facility] ED after a fall. Patient reports she had a fall episode over the past weekend, she was getting up at night and then fell she does not know was the cause of her fall, she did not lose consciousness completely, felt she lost her mind for 20- 30 seconds and got up herself. She hit her chin and also had some scratches on extremities, over the next 2 or 3 days she has been feeling more and more pain over her chest which brought her to the ED for evaluation. Patient was found to have multiple rib fracture on the left side. She is also desaturated to 86% on room air, she is being placed on nasal cannula oxygen. Patient was seen on nasal cannula oxygen well oriented, no acute complaints/distress, denies fever, chills, she avoid cough due to pain, denies nausea vomiting abdominal pain. Initial workups on first 2023 reviewed:   WBC normal normal, H/H normal, platelet normal  Renal function in normal range potassium was 2.7, repeat has improved to 4.2  Hepatic function   normal AST/ALT, mildly elevated alkaline phosphatase, with normal total bilirubin    CXR  reviewed,IMPRESSION:  Left lateral 4th, 5th, 6th and possible 7th rib nondisplaced fractures. No  pneumothorax or acute lung process. EKG reviewed normal sinus rhythm with no acute ST/T wave ischemic change.     Past Medical History:        Diagnosis Date    Acid reflux     Fracture of left shoulder 10/2018    Frozen shoulder     left

## 2023-12-05 NOTE — ED NOTES
CNP at bedside. Patient stated she did not take her BP meds this morning. O2 sat 86% on room air. Cardiac monitor, PO, BP, and O2 applied 2L NC. Sat increased to 93% after O2 applied.   /100            Turner Maninng RN  12/04/23 1769

## 2023-12-05 NOTE — PROGRESS NOTES
4 Eyes Skin Assessment     NAME:  Bharti Lucas OF BIRTH:  1965  MEDICAL RECORD NUMBER:  [id number]    The patient is being assessed for  Admission    I agree that at least one RN has performed a thorough Head to Toe Skin Assessment on the patient. ALL assessment sites listed below have been assessed. Areas assessed by both nurses:    Head, Face, Ears, Shoulders, Back, Chest, Arms, Elbows, Hands, Sacrum. Buttock, Coccyx, Ischium, and Legs. Feet and Heels        Does the Patient have a Wound?  No noted wound(s)       Micky Prevention initiated by RN: No abrasion chin left hand left forearm and left upper arm      Wound Care Orders initiated by RN: No    Pressure Injury (Stage 3,4, Unstageable, DTI, NWPT, and Complex wounds) if present, place Wound referral order by RN under : No    New Ostomies, if present place, Ostomy referral order under : No     Nurse 1 eSignature: Electronically signed by Danielito Coats RN on 12/5/23 at 1:57 PM EST    **SHARE this note so that the co-signing nurse can place an eSignature**    Nurse 2 eSignature: {Esignature:[id number]}

## 2023-12-06 LAB
ANION GAP SERPL CALCULATED.3IONS-SCNC: 10 MMOL/L (ref 7–16)
ANION GAP SERPL CALCULATED.3IONS-SCNC: 12 MMOL/L (ref 7–16)
BASOPHILS # BLD: 0.03 K/UL (ref 0–0.2)
BASOPHILS NFR BLD: 1 % (ref 0–2)
BUN SERPL-MCNC: 4 MG/DL (ref 6–20)
BUN SERPL-MCNC: 7 MG/DL (ref 6–20)
CALCIUM SERPL-MCNC: 8.6 MG/DL (ref 8.6–10.2)
CALCIUM SERPL-MCNC: 8.7 MG/DL (ref 8.6–10.2)
CHLORIDE SERPL-SCNC: 95 MMOL/L (ref 98–107)
CHLORIDE SERPL-SCNC: 98 MMOL/L (ref 98–107)
CO2 SERPL-SCNC: 28 MMOL/L (ref 22–29)
CO2 SERPL-SCNC: 28 MMOL/L (ref 22–29)
CREAT SERPL-MCNC: 0.5 MG/DL (ref 0.5–1)
CREAT SERPL-MCNC: 0.6 MG/DL (ref 0.5–1)
EOSINOPHIL # BLD: 0.12 K/UL (ref 0.05–0.5)
EOSINOPHILS RELATIVE PERCENT: 2 % (ref 0–6)
ERYTHROCYTE [DISTWIDTH] IN BLOOD BY AUTOMATED COUNT: 12.7 % (ref 11.5–15)
GFR SERPL CREATININE-BSD FRML MDRD: >60 ML/MIN/1.73M2
GFR SERPL CREATININE-BSD FRML MDRD: >60 ML/MIN/1.73M2
GLUCOSE SERPL-MCNC: 123 MG/DL (ref 74–99)
GLUCOSE SERPL-MCNC: 97 MG/DL (ref 74–99)
HCT VFR BLD AUTO: 37.9 % (ref 34–48)
HGB BLD-MCNC: 13.2 G/DL (ref 11.5–15.5)
IMM GRANULOCYTES # BLD AUTO: <0.03 K/UL (ref 0–0.58)
IMM GRANULOCYTES NFR BLD: 0 % (ref 0–5)
LYMPHOCYTES NFR BLD: 2.01 K/UL (ref 1.5–4)
LYMPHOCYTES RELATIVE PERCENT: 32 % (ref 20–42)
MAGNESIUM SERPL-MCNC: 1.5 MG/DL (ref 1.6–2.6)
MAGNESIUM SERPL-MCNC: 1.8 MG/DL (ref 1.6–2.6)
MCH RBC QN AUTO: 35.8 PG (ref 26–35)
MCHC RBC AUTO-ENTMCNC: 34.8 G/DL (ref 32–34.5)
MCV RBC AUTO: 102.7 FL (ref 80–99.9)
MONOCYTES NFR BLD: 0.62 K/UL (ref 0.1–0.95)
MONOCYTES NFR BLD: 10 % (ref 2–12)
NEUTROPHILS NFR BLD: 56 % (ref 43–80)
NEUTS SEG NFR BLD: 3.54 K/UL (ref 1.8–7.3)
PLATELET # BLD AUTO: 192 K/UL (ref 130–450)
PMV BLD AUTO: 9.6 FL (ref 7–12)
POTASSIUM SERPL-SCNC: 3.2 MMOL/L (ref 3.5–5)
POTASSIUM SERPL-SCNC: 3.4 MMOL/L (ref 3.5–5)
PROCALCITONIN SERPL-MCNC: 0.15 NG/ML (ref 0–0.08)
RBC # BLD AUTO: 3.69 M/UL (ref 3.5–5.5)
SODIUM SERPL-SCNC: 133 MMOL/L (ref 132–146)
SODIUM SERPL-SCNC: 138 MMOL/L (ref 132–146)
WBC OTHER # BLD: 6.3 K/UL (ref 4.5–11.5)

## 2023-12-06 PROCEDURE — 99232 SBSQ HOSP IP/OBS MODERATE 35: CPT | Performed by: INTERNAL MEDICINE

## 2023-12-06 PROCEDURE — 84145 PROCALCITONIN (PCT): CPT

## 2023-12-06 PROCEDURE — 97530 THERAPEUTIC ACTIVITIES: CPT

## 2023-12-06 PROCEDURE — 80048 BASIC METABOLIC PNL TOTAL CA: CPT

## 2023-12-06 PROCEDURE — 83735 ASSAY OF MAGNESIUM: CPT

## 2023-12-06 PROCEDURE — 2580000003 HC RX 258: Performed by: HOSPITALIST

## 2023-12-06 PROCEDURE — 36415 COLL VENOUS BLD VENIPUNCTURE: CPT

## 2023-12-06 PROCEDURE — 97161 PT EVAL LOW COMPLEX 20 MIN: CPT

## 2023-12-06 PROCEDURE — 97165 OT EVAL LOW COMPLEX 30 MIN: CPT

## 2023-12-06 PROCEDURE — 6370000000 HC RX 637 (ALT 250 FOR IP): Performed by: HOSPITALIST

## 2023-12-06 PROCEDURE — 85025 COMPLETE CBC W/AUTO DIFF WBC: CPT

## 2023-12-06 PROCEDURE — 2140000000 HC CCU INTERMEDIATE R&B

## 2023-12-06 PROCEDURE — 6360000002 HC RX W HCPCS: Performed by: HOSPITALIST

## 2023-12-06 RX ORDER — POTASSIUM CHLORIDE 20 MEQ/1
40 TABLET, EXTENDED RELEASE ORAL DAILY
Status: DISCONTINUED | OUTPATIENT
Start: 2023-12-06 | End: 2023-12-07 | Stop reason: HOSPADM

## 2023-12-06 RX ADMIN — SODIUM CHLORIDE 1 G: 1 TABLET ORAL at 20:09

## 2023-12-06 RX ADMIN — DILTIAZEM HYDROCHLORIDE 120 MG: 60 CAPSULE, EXTENDED RELEASE ORAL at 09:06

## 2023-12-06 RX ADMIN — OXYCODONE AND ACETAMINOPHEN 1 TABLET: 5; 325 TABLET ORAL at 09:05

## 2023-12-06 RX ADMIN — OXYCODONE AND ACETAMINOPHEN 1 TABLET: 5; 325 TABLET ORAL at 02:22

## 2023-12-06 RX ADMIN — CLONIDINE HYDROCHLORIDE 0.2 MG: 0.1 TABLET ORAL at 09:06

## 2023-12-06 RX ADMIN — CLONIDINE HYDROCHLORIDE 0.2 MG: 0.1 TABLET ORAL at 20:09

## 2023-12-06 RX ADMIN — CARVEDILOL 12.5 MG: 6.25 TABLET, FILM COATED ORAL at 09:06

## 2023-12-06 RX ADMIN — SODIUM CHLORIDE 1 G: 1 TABLET ORAL at 09:06

## 2023-12-06 RX ADMIN — MAGNESIUM SULFATE HEPTAHYDRATE 2000 MG: 40 INJECTION, SOLUTION INTRAVENOUS at 09:21

## 2023-12-06 RX ADMIN — CLONIDINE HYDROCHLORIDE 0.2 MG: 0.1 TABLET ORAL at 14:30

## 2023-12-06 RX ADMIN — POTASSIUM CHLORIDE 40 MEQ: 1500 TABLET, EXTENDED RELEASE ORAL at 20:09

## 2023-12-06 RX ADMIN — CARVEDILOL 12.5 MG: 6.25 TABLET, FILM COATED ORAL at 20:09

## 2023-12-06 RX ADMIN — OXYCODONE AND ACETAMINOPHEN 1 TABLET: 5; 325 TABLET ORAL at 20:09

## 2023-12-06 RX ADMIN — ONDANSETRON 4 MG: 2 INJECTION INTRAMUSCULAR; INTRAVENOUS at 14:20

## 2023-12-06 RX ADMIN — SODIUM CHLORIDE, PRESERVATIVE FREE 10 ML: 5 INJECTION INTRAVENOUS at 20:10

## 2023-12-06 RX ADMIN — SODIUM CHLORIDE, PRESERVATIVE FREE 10 ML: 5 INJECTION INTRAVENOUS at 09:00

## 2023-12-06 RX ADMIN — POTASSIUM CHLORIDE 40 MEQ: 1500 TABLET, EXTENDED RELEASE ORAL at 09:15

## 2023-12-06 RX ADMIN — DILTIAZEM HYDROCHLORIDE 120 MG: 60 CAPSULE, EXTENDED RELEASE ORAL at 20:08

## 2023-12-06 ASSESSMENT — PAIN DESCRIPTION - ORIENTATION
ORIENTATION: LEFT

## 2023-12-06 ASSESSMENT — PAIN DESCRIPTION - DESCRIPTORS
DESCRIPTORS: ACHING
DESCRIPTORS: ACHING;DISCOMFORT;SHARP
DESCRIPTORS: STABBING;SHOOTING;SPASM

## 2023-12-06 ASSESSMENT — PAIN - FUNCTIONAL ASSESSMENT: PAIN_FUNCTIONAL_ASSESSMENT: PREVENTS OR INTERFERES SOME ACTIVE ACTIVITIES AND ADLS

## 2023-12-06 ASSESSMENT — PAIN DESCRIPTION - LOCATION
LOCATION: RIB CAGE
LOCATION: CHEST
LOCATION: RIB CAGE

## 2023-12-06 ASSESSMENT — PAIN DESCRIPTION - ONSET: ONSET: ON-GOING

## 2023-12-06 ASSESSMENT — PAIN SCALES - GENERAL
PAINLEVEL_OUTOF10: 9
PAINLEVEL_OUTOF10: 6
PAINLEVEL_OUTOF10: 5
PAINLEVEL_OUTOF10: 8

## 2023-12-06 ASSESSMENT — PAIN DESCRIPTION - PAIN TYPE: TYPE: ACUTE PAIN

## 2023-12-06 ASSESSMENT — PAIN DESCRIPTION - FREQUENCY: FREQUENCY: CONTINUOUS

## 2023-12-06 NOTE — PROGRESS NOTES
PULSE OX ON ROOM AIR SITTING__90__%   PULSE OX ON ROOM AIR AMBULATING __84__%   PULSE OX ON __2__LITERS SITTING RECOVERY __93__%   PULSE OX ON __2__LITERS AMBULATING RECOVERY _89__%

## 2023-12-06 NOTE — PROGRESS NOTES
Modified [place]    Bed Mobility  Supine to sit: Stand by Assist   Sit to supine: Stand by Assist   Supine to sit: Modified [place]   Sit to supine: Modified [place]    Functional Transfers Stand by Assist   Modified [place]    Functional Mobility Stand by Assist     Ambulated in room and hallway without AD  Modified [place]    Balance Sitting:     Static:  SBA    Dynamic:SBA  Standing: min A     Activity Tolerance F-  F+   Visual/  Perceptual wfl                  Hand Dominance right   Strength ROM Additional Info:    RUE   4-/5 wfl good  and wfl FMC/dexterity noted during ADL tasks     LUE 4-/5 wfl good  and wfl FMC/dexterity noted during ADL tasks     Hearing: wfl  Sensation:wfl  Tone: wfl  Edema:none noted     Comments: Upon arrival patient supine in bed and agreeable to OT Session. Therapist educated pt on role of OT. At end of session, patient semi-supine in bed with call light and phone within reach, all lines and tubes intact. Overall patient demonstrated decreased independence and safety during completion of ADL/functional transfer/mobility tasks. Pt would benefit from continued skilled OT to increase safety and independence with completion of ADL/IADL tasks for functional independence and quality of life. Treatment: OT treatment provided this date includes: Facilitation of bed mobility, sitting balance (impacting ADLs; addressing posture, weight shifting, dynamic reaching), functional transfers (various surfaces), standing tolerance tasks (addressing posture, balance and activity tolerance; impacting ADLs and functional activity) and functional ambulation tasks with / without w/w - skilled cuing on sequencing, hand placement, posture, body mechanics, energy conservation techniques and safety.   Therapist facilitated self-care retraining: UB/LB self-care tasks (robe, socks; figure 4 technique) and grooming tasks while educating pt on modified techniques due to rib fractures, posture, safety and energy conservation techniques. Skilled monitoring of HR, O2 sats and pts response to treatment. Rehab Potential: Good  for established goals     Patient / Family Goal: return home      Patient and/or family were instructed on functional diagnosis, prognosis/goals and OT plan of care. Demonstrated fair understanding. Eval Complexity: Low    Time In: 915  Time Out: 940  Total Treatment Time: 10 minutes    Min Units   OT Eval Low 97165  x  1   OT Eval Medium 09471      OT Eval High 57275      OT Re-Eval [id number]       Therapeutic Ex 84525       Therapeutic Activities 74940  8  1   ADL/Self Care 29362  2     Orthotic Management 72773       Manual 10141     Neuro Re-Ed 14327       Non-Billable Time          Evaluation Time additionally includes thorough review of current medical information, gathering information on past medical history/social history and prior level of function, interpretation of standardized testing/informal observation of tasks, assessment of data and development of plan of care and goals.           Annie Muir OTR/L #[id number]

## 2023-12-06 NOTE — PLAN OF CARE
Problem: Discharge Planning  Goal: Discharge to home or other facility with appropriate resources  Outcome: Progressing  Flowsheets (Taken 12/6/2023 0900)  Discharge to home or other facility with appropriate resources:   Identify barriers to discharge with patient and caregiver   Arrange for needed discharge resources and transportation as appropriate   Identify discharge learning needs (meds, wound care, etc)   Refer to discharge planning if patient needs post-hospital services based on physician order or complex needs related to functional status, cognitive ability or social support system     Problem: Pain  Goal: Verbalizes/displays adequate comfort level or baseline comfort level  Outcome: Progressing     Problem: Safety - Adult  Goal: Free from fall injury  Outcome: Progressing  Flowsheets (Taken 12/6/2023 1648)  Free From Fall Injury: Instruct family/caregiver on patient safety     Problem: ABCDS Injury Assessment  Goal: Absence of physical injury  Outcome: Progressing  Flowsheets (Taken 12/6/2023 1648)  Absence of Physical Injury: Implement safety measures based on patient assessment

## 2023-12-06 NOTE — PROGRESS NOTES
Patient became nauseous and threw up. IV Zofran was given, and patient is feeling better but has requested that she has sometime to rest before getting up and walking.

## 2023-12-06 NOTE — PROGRESS NOTES
Physical Therapy  Physical Therapy Initial Assessment     Name: Devonte Aleman  : 1965  MRN: [id number]      Date of Service: 2023    Evaluating PT:  Mora Howard, PT, DPT    Room #:  7147/9473-E  Diagnosis:  Hypokalemia [E87.6]  Hypoxia [R09.02]  Elevated aspartate aminotransferase level [R74.01]  Abrasion of chin, initial encounter [S00.81XA]  Closed head injury, initial encounter [S09.90XA]  Fall, initial encounter [W19. XXXA]  Closed fracture of multiple ribs of left side, initial encounter [S22.42XA]  Mucus plugging of bronchi [T17.500A]  Acute respiratory failure with hypoxia (HCC) [J96.01]  Acute hypoxic respiratory failure (HCC) [J96.01]  PMHx/PSHx:  HTN, COPD, hyponatremia  Procedure/Surgery:  N/A  Precautions:  fall risk, O2  Equipment Owned: ww  Equipment Needs:  TBD    SUBJECTIVE:    Pt lives alone in a multi level home with 1 steps to enter and no handrail. Bed/bath is on 1st floor. Pt ambulated with no AD PTA. OBJECTIVE:   Initial Evaluation  Date: 23 Treatment Short Term/ Long Term   Goals   AM-PAC 6 Clicks      Was pt agreeable to Eval/treatment? yes     Does pt have pain?  6/10 L rib     Bed Mobility  Rolling: SBA  Supine to sit: SBA  Sit to supine: NT  Scooting: SBA  Rolling: mod I  Supine to sit: mod I  Sit to supine: mod I  Scooting: mod I   Transfers Sit to stand: SBA  Stand to sit: SBA  Stand pivot: SBA with ww  Sit to stand: mod I  Stand to sit: mod I  Stand pivot: mod I with AAD   Ambulation    20'x2 with no AD CGA  20'x2 with ww SBA  150'+ with AAD mod I   Stair negotiation: ascended and descended  NT  1 steps with AAD and no handrail mod I     Strength/ROM:   BLE grossly 4/5  BLE AROM WFL    Balance:   Static Sitting: Supervision  Dynamic Sitting: Supervision  Static Standing: SBA with ww  Dynamic Standing: SBA with ww    Pt is A & O x 3  Sensation:  Pt denies numbness and tingling to extremities  Edema:  unremarkable    Vitals:  SpO2 and HR were stable during

## 2023-12-07 VITALS
DIASTOLIC BLOOD PRESSURE: 84 MMHG | HEIGHT: 62 IN | OXYGEN SATURATION: 92 % | SYSTOLIC BLOOD PRESSURE: 162 MMHG | RESPIRATION RATE: 16 BRPM | TEMPERATURE: 98.4 F | WEIGHT: 97 LBS | BODY MASS INDEX: 17.85 KG/M2 | HEART RATE: 60 BPM

## 2023-12-07 LAB
ANION GAP SERPL CALCULATED.3IONS-SCNC: 7 MMOL/L (ref 7–16)
BUN SERPL-MCNC: 2 MG/DL (ref 6–20)
CALCIUM SERPL-MCNC: 9.3 MG/DL (ref 8.6–10.2)
CHLORIDE SERPL-SCNC: 99 MMOL/L (ref 98–107)
CO2 SERPL-SCNC: 33 MMOL/L (ref 22–29)
CREAT SERPL-MCNC: 0.5 MG/DL (ref 0.5–1)
GFR SERPL CREATININE-BSD FRML MDRD: >60 ML/MIN/1.73M2
GLUCOSE SERPL-MCNC: 88 MG/DL (ref 74–99)
POTASSIUM SERPL-SCNC: 4.2 MMOL/L (ref 3.5–5)
SODIUM SERPL-SCNC: 139 MMOL/L (ref 132–146)

## 2023-12-07 PROCEDURE — 80048 BASIC METABOLIC PNL TOTAL CA: CPT

## 2023-12-07 PROCEDURE — 36415 COLL VENOUS BLD VENIPUNCTURE: CPT

## 2023-12-07 PROCEDURE — 6370000000 HC RX 637 (ALT 250 FOR IP): Performed by: HOSPITALIST

## 2023-12-07 PROCEDURE — 2700000000 HC OXYGEN THERAPY PER DAY

## 2023-12-07 PROCEDURE — 6370000000 HC RX 637 (ALT 250 FOR IP): Performed by: INTERNAL MEDICINE

## 2023-12-07 PROCEDURE — 2580000003 HC RX 258: Performed by: HOSPITALIST

## 2023-12-07 PROCEDURE — 99238 HOSP IP/OBS DSCHRG MGMT 30/<: CPT | Performed by: INTERNAL MEDICINE

## 2023-12-07 RX ORDER — BENZONATATE 100 MG/1
100 CAPSULE ORAL 3 TIMES DAILY PRN
Qty: 30 CAPSULE | Refills: 0 | Status: SHIPPED | OUTPATIENT
Start: 2023-12-07 | End: 2023-12-17

## 2023-12-07 RX ORDER — OXYCODONE HYDROCHLORIDE AND ACETAMINOPHEN 5; 325 MG/1; MG/1
1 TABLET ORAL EVERY 4 HOURS PRN
Qty: 12 TABLET | Refills: 0 | Status: SHIPPED | OUTPATIENT
Start: 2023-12-07 | End: 2023-12-10

## 2023-12-07 RX ADMIN — SODIUM CHLORIDE 1 G: 1 TABLET ORAL at 09:53

## 2023-12-07 RX ADMIN — OXYCODONE AND ACETAMINOPHEN 1 TABLET: 5; 325 TABLET ORAL at 04:46

## 2023-12-07 RX ADMIN — OXYCODONE AND ACETAMINOPHEN 1 TABLET: 5; 325 TABLET ORAL at 09:51

## 2023-12-07 RX ADMIN — POTASSIUM CHLORIDE 40 MEQ: 1500 TABLET, EXTENDED RELEASE ORAL at 09:53

## 2023-12-07 RX ADMIN — OXYCODONE AND ACETAMINOPHEN 1 TABLET: 5; 325 TABLET ORAL at 00:09

## 2023-12-07 RX ADMIN — SODIUM CHLORIDE, PRESERVATIVE FREE 10 ML: 5 INJECTION INTRAVENOUS at 09:30

## 2023-12-07 RX ADMIN — CLONIDINE HYDROCHLORIDE 0.2 MG: 0.1 TABLET ORAL at 09:53

## 2023-12-07 RX ADMIN — CLONIDINE HYDROCHLORIDE 0.2 MG: 0.1 TABLET ORAL at 14:56

## 2023-12-07 RX ADMIN — DILTIAZEM HYDROCHLORIDE 120 MG: 60 CAPSULE, EXTENDED RELEASE ORAL at 09:53

## 2023-12-07 RX ADMIN — CARVEDILOL 12.5 MG: 6.25 TABLET, FILM COATED ORAL at 09:53

## 2023-12-07 ASSESSMENT — PAIN DESCRIPTION - ORIENTATION
ORIENTATION: LEFT

## 2023-12-07 ASSESSMENT — PAIN DESCRIPTION - LOCATION
LOCATION: RIB CAGE

## 2023-12-07 ASSESSMENT — PAIN SCALES - GENERAL
PAINLEVEL_OUTOF10: 5
PAINLEVEL_OUTOF10: 8
PAINLEVEL_OUTOF10: 6
PAINLEVEL_OUTOF10: 3
PAINLEVEL_OUTOF10: 4
PAINLEVEL_OUTOF10: 8

## 2023-12-07 ASSESSMENT — PAIN DESCRIPTION - ONSET: ONSET: GRADUAL

## 2023-12-07 ASSESSMENT — PAIN - FUNCTIONAL ASSESSMENT: PAIN_FUNCTIONAL_ASSESSMENT: PREVENTS OR INTERFERES SOME ACTIVE ACTIVITIES AND ADLS

## 2023-12-07 ASSESSMENT — PAIN DESCRIPTION - FREQUENCY: FREQUENCY: CONTINUOUS

## 2023-12-07 ASSESSMENT — PAIN DESCRIPTION - DESCRIPTORS
DESCRIPTORS: SORE;STABBING;SHARP
DESCRIPTORS: DISCOMFORT;ACHING;SORE

## 2023-12-07 ASSESSMENT — PAIN DESCRIPTION - PAIN TYPE: TYPE: ACUTE PAIN

## 2023-12-07 NOTE — PROGRESS NOTES
PULSE OX ON ROOM AIR SITTING 92%   PULSE OX ON ROOM AIR AMBULATING  84%   PULSE OX ON 3 LITERS SITTING RECOVERY 92%   PULSE OX ON  3 LITERS AMBULATING RECOVERY 90%

## 2023-12-07 NOTE — PROGRESS NOTES
CLINICAL PHARMACY NOTE: MEDS TO BEDS    Total # of Prescriptions Filled: 2   The following medications were delivered to the patient:  Benzonatate 100mg  Percocet 5-325mg    Additional Documentation:  Delivered to patient 12-7-23

## 2023-12-07 NOTE — DISCHARGE INSTRUCTIONS
Your information:  Name: Claudell Scotts  : 1965    Your instructions: Take all medications as prescribed. Be sure to schedule and attend all follow up appointments. What to do after you leave the hospital:    Recommended diet: regular diet    Recommended activity: Per MD instructions. The following personal items were collected during your admission and were returned to you:    Belongings  Dental Appliances: Partials, Lowers (home)  Vision - Corrective Lenses: None  Hearing Aid: None  Clothing: Shirt, Socks, Undergarments, At bedside, Pajamas, Footwear  Jewelry: Necklace, Earrings, Ring, Watch  Body Piercings Removed: N/A  Electronic Devices: Cell Phone  Weapons (Notify Protective Services/Security): None  Other Valuables:  (none)  Home Medications: None  Valuables Given To: Other (Comment) (home)  Provide Name(s) of Who Valuable(s) Were Given To: none    Information obtained by:  By signing below, I understand that if any problems occur once I leave the hospital I am to contact Dr. Criss Maldonado or call 911 in case of an emergency. I understand and acknowledge receipt of the instructions indicated above.

## 2023-12-07 NOTE — PLAN OF CARE
Problem: Discharge Planning  Goal: Discharge to home or other facility with appropriate resources  12/6/2023 2233 by Malia Alcala RN  Outcome: Progressing  12/6/2023 1649 by Lisbeth Velasco RN  Outcome: Progressing  Flowsheets (Taken 12/6/2023 0900)  Discharge to home or other facility with appropriate resources:   Identify barriers to discharge with patient and caregiver   Arrange for needed discharge resources and transportation as appropriate   Identify discharge learning needs (meds, wound care, etc)   Refer to discharge planning if patient needs post-hospital services based on physician order or complex needs related to functional status, cognitive ability or social support system     Problem: Pain  Goal: Verbalizes/displays adequate comfort level or baseline comfort level  12/6/2023 2233 by Malia Alcala RN  Outcome: Progressing  12/6/2023 1649 by Lisbeth Velasco RN  Outcome: Progressing     Problem: ABCDS Injury Assessment  Goal: Absence of physical injury  12/6/2023 2233 by Malia Alcala RN  Outcome: Progressing  12/6/2023 1649 by Lisbeth Velasco RN  Outcome: Progressing  Flowsheets (Taken 12/6/2023 1648)  Absence of Physical Injury: Implement safety measures based on patient assessment     Problem: Safety - Adult  Goal: Free from fall injury  12/6/2023 2233 by Malia Alcala RN  Outcome: Progressing  12/6/2023 1649 by Lisbeth Velasco RN  Outcome: Progressing  Flowsheets (Taken 12/6/2023 1648)  Free From Fall Injury: Instruct family/caregiver on patient safety

## 2023-12-07 NOTE — CARE COORDINATION
12/7:  Update CM Note:  CM called referral to [facility].   Electronically signed by Keila Telles RN on 12/7/2023 at 3:02 PM

## 2023-12-07 NOTE — CARE COORDINATION
12/7:  Update CM Note:  Pt is for dc today. Pt's dc plan is home & her sister can transport. Pt will need 02 testing. Pt has hx of [employer] & would prefer to use them again. Will send referral to [employer] once 02 testing is done. Sw/GURDEEP will continue to follow.   Electronically signed by Adam Bush RN on 12/7/2023 at 2:12 PM
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
## 2024-01-05 NOTE — PROGRESS NOTES
[facility] PRE-ADMISSION TESTING INSTRUCTIONS    The Preadmission Testing patient is instructed accordingly using the following criteria (check applicable):    ARRIVAL INSTRUCTIONS:  [x] Parking the day of Surgery is located in the Main Entrance lot.  Upon entering the door, make an immediate right to the surgery reception desk    [x] Bring photo ID and insurance card    [x] Bring in a copy of Living will or Durable Power of  papers.    [x] Please be sure to arrange for responsible adult to provide transportation to and from the hospital    [x] Please arrange for responsible adult to be with you for the 24 hour period post procedure due to having anesthesia    [x] If you awake am of surgery not feeling well or have temperature >100 please call [phone]    GENERAL INSTRUCTIONS:    [x] Nothing by mouth after midnight, including gum, candy, mints or water    [x] You may brush your teeth, but do not swallow any water    [x] Take medications as instructed with 1-2 oz of water    [x] Stop herbal supplements and vitamins 5 days prior to procedure    [] Follow preop dosing of blood thinners per physician instructions    [] Take 1/2 dose of evening insulin, but no insulin after midnight    [] No oral diabetic medications after midnight    [] If diabetic and have low blood sugar or feel symptomatic, take 1-2oz apple juice only    [] Bring inhalers day of surgery    [] Bring C-PAP/ Bi-Pap day of surgery    [] Bring urine specimen day of surgery    [x] Shower or bath with soap, lather and rinse well, AM of Surgery, no lotion, powders or creams to surgical site    [] Follow bowel prep as instructed per surgeon    [x] No tobacco products within 24 hours of surgery     [x] No alcohol or illegal drug use within 24 hours of surgery.    [x] Jewelry, body piercing's, eyeglasses, contact lenses and dentures are not permitted into surgery (bring cases)      [x] Please do not wear any nail polish,

## 2024-01-08 ENCOUNTER — APPOINTMENT (OUTPATIENT)
Dept: GENERAL RADIOLOGY | Age: 59
End: 2024-01-08
Attending: INTERNAL MEDICINE
Payer: COMMERCIAL

## 2024-01-08 ENCOUNTER — ANESTHESIA (OUTPATIENT)
Dept: ENDOSCOPY | Age: 59
End: 2024-01-08
Payer: COMMERCIAL

## 2024-01-08 ENCOUNTER — ANESTHESIA EVENT (OUTPATIENT)
Dept: ENDOSCOPY | Age: 59
End: 2024-01-08
Payer: COMMERCIAL

## 2024-01-08 ENCOUNTER — HOSPITAL ENCOUNTER (OUTPATIENT)
Age: 59
Setting detail: OUTPATIENT SURGERY
Discharge: HOME | End: 2024-01-08
Attending: INTERNAL MEDICINE | Admitting: INTERNAL MEDICINE
Payer: COMMERCIAL

## 2024-01-08 VITALS
DIASTOLIC BLOOD PRESSURE: 95 MMHG | RESPIRATION RATE: 18 BRPM | HEIGHT: 62 IN | BODY MASS INDEX: 18.77 KG/M2 | SYSTOLIC BLOOD PRESSURE: 187 MMHG | HEART RATE: 88 BPM | OXYGEN SATURATION: 93 % | WEIGHT: 102 LBS | TEMPERATURE: 98.6 F

## 2024-01-08 PROCEDURE — 6370000000 HC RX 637 (ALT 250 FOR IP): Performed by: INTERNAL MEDICINE

## 2024-01-08 PROCEDURE — 7100000011 HC PHASE II RECOVERY - ADDTL 15 MIN: Performed by: INTERNAL MEDICINE

## 2024-01-08 PROCEDURE — 2709999900 HC NON-CHARGEABLE SUPPLY: Performed by: INTERNAL MEDICINE

## 2024-01-08 PROCEDURE — 3700000001 HC ADD 15 MINUTES (ANESTHESIA): Performed by: INTERNAL MEDICINE

## 2024-01-08 PROCEDURE — 3700000000 HC ANESTHESIA ATTENDED CARE: Performed by: INTERNAL MEDICINE

## 2024-01-08 PROCEDURE — 71046 X-RAY EXAM CHEST 2 VIEWS: CPT

## 2024-01-08 PROCEDURE — 2500000003 HC RX 250 WO HCPCS: Performed by: NURSE ANESTHETIST, CERTIFIED REGISTERED

## 2024-01-08 PROCEDURE — 6360000002 HC RX W HCPCS: Performed by: NURSE ANESTHETIST, CERTIFIED REGISTERED

## 2024-01-08 PROCEDURE — 2580000003 HC RX 258: Performed by: NURSE ANESTHETIST, CERTIFIED REGISTERED

## 2024-01-08 PROCEDURE — 7100000010 HC PHASE II RECOVERY - FIRST 15 MIN: Performed by: INTERNAL MEDICINE

## 2024-01-08 PROCEDURE — 3609027000 HC BRONCHOSCOPY: Performed by: INTERNAL MEDICINE

## 2024-01-08 RX ORDER — PROPOFOL 10 MG/ML
INJECTION, EMULSION INTRAVENOUS PRN
Status: DISCONTINUED | OUTPATIENT
Start: 2024-01-08 | End: 2024-01-08 | Stop reason: SDUPTHER

## 2024-01-08 RX ORDER — SODIUM CHLORIDE 9 MG/ML
INJECTION, SOLUTION INTRAVENOUS CONTINUOUS PRN
Status: DISCONTINUED | OUTPATIENT
Start: 2024-01-08 | End: 2024-01-08 | Stop reason: SDUPTHER

## 2024-01-08 RX ORDER — LIDOCAINE HYDROCHLORIDE 20 MG/ML
INJECTION, SOLUTION INFILTRATION; PERINEURAL PRN
Status: DISCONTINUED | OUTPATIENT
Start: 2024-01-08 | End: 2024-01-08 | Stop reason: SDUPTHER

## 2024-01-08 RX ADMIN — SODIUM CHLORIDE: 9 INJECTION, SOLUTION INTRAVENOUS at 13:08

## 2024-01-08 RX ADMIN — LIDOCAINE HYDROCHLORIDE 40 MG: 20 INJECTION, SOLUTION INFILTRATION; PERINEURAL at 13:27

## 2024-01-08 RX ADMIN — PROPOFOL 130 MG: 10 INJECTION, EMULSION INTRAVENOUS at 13:27

## 2024-01-08 ASSESSMENT — LIFESTYLE VARIABLES: SMOKING_STATUS: 1

## 2024-01-08 ASSESSMENT — PAIN - FUNCTIONAL ASSESSMENT
PAIN_FUNCTIONAL_ASSESSMENT: 0-10
PAIN_FUNCTIONAL_ASSESSMENT: 0-10
PAIN_FUNCTIONAL_ASSESSMENT: ACTIVITIES ARE NOT PREVENTED

## 2024-01-08 ASSESSMENT — ENCOUNTER SYMPTOMS: SHORTNESS OF BREATH: 0

## 2024-01-08 NOTE — ANESTHESIA PRE PROCEDURE
Department of Anesthesiology  Preprocedure Note       Name:  Maria T Romeo   Age:  58 y.o.  :  1965                                          MRN:  [id number]         Date:  2024      Surgeon: Surgeon(s):  Lenin Carney MD    Procedure: Procedure(s):  BRONCHOSCOPY DIAGNOSTIC OR CELL WASH ONLY    Medications prior to admission:   Prior to Admission medications    Medication Sig Start Date End Date Taking? Authorizing Provider   carvedilol (COREG) 12.5 MG tablet Take 1 tablet by mouth 2 times daily  Patient taking differently: Take 2 tablets by mouth 2 times daily 3/4/21   Jensen Pelayo DO   diltiazem (DILACOR XR) 120 MG extended release capsule Take 1 capsule by mouth 2 times daily Instructed to take morning of surgery with a sip of water    ProviderSiena MD   sodium chloride 1 g tablet Take 1 tablet by mouth 2 times daily 18   Siena Bernard MD   cloNIDine (CATAPRES) 0.2 MG tablet Take 1 tablet by mouth 3 times daily    ProviderSiena MD       Current medications:    No current facility-administered medications for this encounter.       Allergies:  No Known Allergies    Problem List:    Patient Active Problem List   Diagnosis Code   • Hyponatremia E87.1   • Acute hyponatremia E87.1   • Traumatic closed displaced fracture of left shoulder with anterior dislocation S42.92XA   • Acute respiratory failure with hypoxia (HCC) J96.01   • LFTs abnormal R79.89   • Primary hypertension I10   • Multiple closed fractures of ribs of left side S22.42XA   • Acute hypoxic respiratory failure (HCC) J96.01   • Moderate protein-calorie malnutrition (HCC) E44.0       Past Medical History:        Diagnosis Date   • Acid reflux    • Fracture of left shoulder 10/2018   • Frozen shoulder     left   • Hypertension    • Hyponatremia    • Lung abnormality    • MVP (mitral valve prolapse)     no issues   • Skin cancer        Past Surgical History:        Procedure Laterality Date   • CLOSED

## 2024-01-08 NOTE — ANESTHESIA POSTPROCEDURE EVALUATION
Department of Anesthesiology  Postprocedure Note    Patient: Maria T Romeo  MRN: [id number]  YOB: 1965  Date of evaluation: 1/8/2024    Procedure Summary       Date: 01/08/24 Room / Location: [address]    Anesthesia Start: 1322 Anesthesia Stop:     Procedure: BRONCHOSCOPY DIAGNOSTIC OR CELL WASH ONLY Diagnosis:       Collapse of lung      (Collapse of lung [J98.19])    Surgeons: Lenin Carney MD Responsible Provider: Gilberto Stevens Jr., MD    Anesthesia Type: MAC ASA Status: 3            Anesthesia Type: No value filed.    Magi Phase I: Magi Score: 10    Magi Phase II:      Anesthesia Post Evaluation    Patient location during evaluation: PACU  Patient participation: complete - patient participated  Level of consciousness: awake  Airway patency: patent  Nausea & Vomiting: no nausea and no vomiting  Cardiovascular status: hemodynamically stable  Respiratory status: acceptable  Hydration status: euvolemic  Pain management: adequate        No notable events documented.  
no

## 2024-01-08 NOTE — H&P
throat.  Cardiovascular: No chest pain, dyspnea on exertion, or palpitations.  Respiratory: See above  Gastrointestinal: No abdominal pain, nausea or emesis. No diarrhea or rectal bleeding or melena. No change in bowel habits.   Genitourinary: No dysuria, urinary frequency, or incontinence. No hematuria.  Musculoskeletal: No gait disturbance, weakness or joint complaints.  Integumentary: No rash or pruritis. No abnormal pigmentation, hair or nail changes.  Neurological: No headache, diplopia, dizziness, tremor, change in muscle strength, numbness or tingling. No change in gait, balance, coordination, mood, affect, memory, mentation, behavior.  Psychiatric: No anxiety or depression.  Endocrine: No temperature intolerance, excessive thirst, fluid intake, urinary frequency, excessive appetite, or recent weight change.   Hematologic/Lymphatic: No abnormal bruising or bleeding, blood clots or swollen lymph nodes. No anemia, fever, chills, night sweats, or swollen glands.  Allergic/Immunologic: No seasonal or perenial allergies. No history of hives or atopic dermatitis.    Social History:  Alcohol: No  Tobacco:   1 pack/day for 40 years  Employment:  no silica or asbestos exposure  Family:  No family history of lung disease    Medications:  No current facility-administered medications for this encounter.         Vitals:  Tmax:  VITALS:  BP (!) 163/99   Pulse 74   Temp 98.6 °F (37 °C) (Temporal)   Resp 16   Ht 1.575 m (5' 2\")   Wt 46.3 kg (102 lb)   LMP 04/03/2012   SpO2 90%   BMI 18.66 kg/m²   CURRENT PULSE OXIMETRY:  SpO2: 90 %      EXAM:  General: No distress. Alert.  Eyes: PERRL. No sclera icterus. No conjunctival injection.  ENT: No discharge. Pharynx clear.  Neck: Trachea midline. Normal thyroid. No jvd, no hjr.   Resp: No wheezing.  No accessory muscle use.  No rales.  Absent right anteriorly.  No rhonchi.   CV: Regular rate. Regular rhythm. No murmur No rub.    Abd: Non-tender. Non-distended. No masses. No

## 2024-01-08 NOTE — OP NOTE
Operative Note      Patient: Maria T Romeo  YOB: 1965  MRN: [id number]    Date of Procedure: 1/8/2024    Pre-Op Diagnosis Codes:     * Collapse of lung [J98.19]    Post-Op Diagnosis: RML collapse       Procedure(s):  BRONCHOSCOPY DIAGNOSTIC OR CELL WASH ONLY    Surgeon(s):  Lenin Carney MD    Assistant:   * No surgical staff found *    Anesthesia: Monitor Anesthesia Care    Estimated Blood Loss (mL): Minimal    Complications: None    Specimens:   * No specimens in log *    Implants:  * No implants in log *      Drains: * No LDAs found *    Findings: Mucus plugging in RML    Detailed Description of Procedure:   The patient was informed of procedure, consent was obtained.  A fiberoptic bronchoscope was passed through the oropharynx and the vocal cords were visualized.  They were normal.  The right and left lungs were then selectively inspected.  The right lung evidenced thick inspissated mucus extending from the right middle lobe right through the mainstem bronchus and up into the trachea.  While there is no evidence of purulence, the mucus itself was tenacious.  It was washed with saline.  No endobronchial lesions were identified distal to the mucous plugging.    The left lung was inspected evidencing no endobronchial abnormalities or mucosal changes.    The patient tolerated the procedure well and was sent to recovery in stable condition.    Electronically signed by Lenin Carney MD on 1/8/2024 at 1:37 PM

## 2024-08-02 ENCOUNTER — OFFICE VISIT (OUTPATIENT)
Dept: PRIMARY CARE CLINIC | Age: 59
End: 2024-08-02
Payer: COMMERCIAL

## 2024-08-02 VITALS
WEIGHT: 102.4 LBS | SYSTOLIC BLOOD PRESSURE: 177 MMHG | BODY MASS INDEX: 18.84 KG/M2 | HEART RATE: 83 BPM | DIASTOLIC BLOOD PRESSURE: 114 MMHG | TEMPERATURE: 98.1 F | OXYGEN SATURATION: 90 % | HEIGHT: 62 IN | RESPIRATION RATE: 18 BRPM
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
## 2024-08-02 DIAGNOSIS — S60.862A: ICD-10-CM

## 2024-08-02 DIAGNOSIS — L03.114 CELLULITIS OF LEFT WRIST: Primary | ICD-10-CM

## 2024-08-02 PROCEDURE — G8427 DOCREV CUR MEDS BY ELIG CLIN: HCPCS | Performed by: NURSE PRACTITIONER

## 2024-08-02 PROCEDURE — 3077F SYST BP >= 140 MM HG: CPT | Performed by: NURSE PRACTITIONER

## 2024-08-02 PROCEDURE — 3080F DIAST BP >= 90 MM HG: CPT | Performed by: NURSE PRACTITIONER

## 2024-08-02 PROCEDURE — G8420 CALC BMI NORM PARAMETERS: HCPCS | Performed by: NURSE PRACTITIONER

## 2024-08-02 PROCEDURE — 4004F PT TOBACCO SCREEN RCVD TLK: CPT | Performed by: NURSE PRACTITIONER

## 2024-08-02 PROCEDURE — 99213 OFFICE O/P EST LOW 20 MIN: CPT | Performed by: NURSE PRACTITIONER

## 2024-08-02 PROCEDURE — 3017F COLORECTAL CA SCREEN DOC REV: CPT | Performed by: NURSE PRACTITIONER

## 2024-08-02 RX ORDER — PREDNISONE 10 MG/1
10 TABLET ORAL 2 TIMES DAILY
Qty: 10 TABLET | Refills: 0 | Status: SHIPPED | OUTPATIENT
Start: 2024-08-02 | End: 2024-08-07

## 2024-08-02 RX ORDER — CEPHALEXIN 500 MG/1
500 CAPSULE ORAL 3 TIMES DAILY
Qty: 21 CAPSULE | Refills: 0 | Status: SHIPPED | OUTPATIENT
Start: 2024-08-02 | End: 2024-08-09

## 2024-08-02 NOTE — PROGRESS NOTES
Chief Complaint:   Cellulitis (Left wrist)      History of Present Illness   Source of history provided by:  patient.      Maria T Romeo is a 58 y.o. old female who has a past medical history of:   Past Medical History:   Diagnosis Date    Acid reflux     Fracture of left shoulder 10/2018    Frozen shoulder     left    Hypertension     Hyponatremia     Lung abnormality     MVP (mitral valve prolapse)     no issues    Skin cancer        [facility]  for complaint of redness/swelling to the left wrist, which began few days ago.  The symptoms were triggered by possible insect bite however, pt did not see insect.  Pt has not had similar symptoms in the past.  Pt states the area is warm to touch, mildly painful and swollen, and has no noted streaking.  Denies any fever, chills, HA, recent illness, myalgias, vomiting, or lethargy.       ROS    Unless otherwise stated in this report or unable to obtain because of the patient's clinical or mental status as evidenced by the medical record, this patients's positive and negative responses for Review of Systems, constitutional, psych, eyes, ENT, cardiovascular, respiratory, gastrointestinal, neurological, genitourinary, musculoskeletal, integument systems and systems related to the presenting problem are either stated in the preceding or were not pertinent or were negative for the symptoms and/or complaints related to the medical problem.    Past Surgical History:  has a past surgical history that includes Foot surgery (Right, 1980's); pr optx prox humeral fx w/int fixj rpr tuberosity (Left, 10/23/2018); CLOSED MANIPULATION SHOULDER (Left, 3/5/2019); Skin cancer excision (2018); and bronchoscopy (N/A, 1/8/2024).  Social History:  reports that she has been smoking cigarettes. She started smoking about 32 years ago. She has a 32.6 pack-year smoking history. She has never used smokeless tobacco. She reports current alcohol use. She reports that she does not

## 2024-11-11 ENCOUNTER — OFFICE VISIT (OUTPATIENT)
Dept: PRIMARY CARE CLINIC | Age: 59
End: 2024-11-11

## 2024-11-11 ENCOUNTER — HOSPITAL ENCOUNTER (INPATIENT)
Age: 59
LOS: 6 days | Discharge: HOME | DRG: 175 | End: 2024-11-17
Attending: EMERGENCY MEDICINE | Admitting: GENERAL PRACTICE
Payer: COMMERCIAL

## 2024-11-11 ENCOUNTER — APPOINTMENT (OUTPATIENT)
Dept: CT IMAGING | Age: 59
DRG: 175 | End: 2024-11-11
Payer: COMMERCIAL

## 2024-11-11 VITALS
BODY MASS INDEX: 18.77 KG/M2 | WEIGHT: 102 LBS | DIASTOLIC BLOOD PRESSURE: 60 MMHG | OXYGEN SATURATION: 89 % | HEIGHT: 62 IN | TEMPERATURE: 97.6 F | SYSTOLIC BLOOD PRESSURE: 80 MMHG | HEART RATE: 76 BPM

## 2024-11-11 DIAGNOSIS — R05.8 COUGH PRODUCTIVE OF YELLOW SPUTUM: ICD-10-CM

## 2024-11-11 DIAGNOSIS — R07.1 PAINFUL BREATHING: ICD-10-CM

## 2024-11-11 DIAGNOSIS — J44.1 COPD WITH ACUTE EXACERBATION (HCC): ICD-10-CM

## 2024-11-11 DIAGNOSIS — R06.02 SOB (SHORTNESS OF BREATH): ICD-10-CM

## 2024-11-11 DIAGNOSIS — J96.01 ACUTE RESPIRATORY FAILURE WITH HYPOXIA: Primary | ICD-10-CM

## 2024-11-11 DIAGNOSIS — J98.11 COLLAPSE OF RIGHT LUNG: ICD-10-CM

## 2024-11-11 DIAGNOSIS — I26.99 BILATERAL PULMONARY EMBOLISM (HCC): ICD-10-CM

## 2024-11-11 DIAGNOSIS — J98.11 ATELECTASIS OF RIGHT LUNG: ICD-10-CM

## 2024-11-11 LAB
ALBUMIN SERPL-MCNC: 3.6 G/DL (ref 3.5–5.2)
ALP SERPL-CCNC: 80 U/L (ref 35–104)
ALT SERPL-CCNC: 19 U/L (ref 0–32)
ANION GAP SERPL CALCULATED.3IONS-SCNC: 17 MMOL/L (ref 7–16)
AST SERPL-CCNC: 37 U/L (ref 0–31)
B PARAP IS1001 DNA NPH QL NAA+NON-PROBE: NOT DETECTED
B PERT DNA SPEC QL NAA+PROBE: NOT DETECTED
BASOPHILS # BLD: 0.02 K/UL (ref 0–0.2)
BASOPHILS NFR BLD: 0 % (ref 0–2)
BILIRUB SERPL-MCNC: 1.4 MG/DL (ref 0–1.2)
BNP SERPL-MCNC: 2217 PG/ML (ref 0–125)
BUN SERPL-MCNC: 19 MG/DL (ref 6–20)
C PNEUM DNA NPH QL NAA+NON-PROBE: NOT DETECTED
CALCIUM SERPL-MCNC: 8.4 MG/DL (ref 8.6–10.2)
CHLORIDE SERPL-SCNC: 91 MMOL/L (ref 98–107)
CO2 SERPL-SCNC: 25 MMOL/L (ref 22–29)
CREAT SERPL-MCNC: 1.3 MG/DL (ref 0.5–1)
EKG ATRIAL RATE: 77 BPM
EKG P AXIS: 33 DEGREES
EKG P-R INTERVAL: 176 MS
EKG Q-T INTERVAL: 412 MS
EKG QRS DURATION: 68 MS
EKG QTC CALCULATION (BAZETT): 466 MS
EKG R AXIS: 8 DEGREES
EKG T AXIS: 40 DEGREES
EKG VENTRICULAR RATE: 77 BPM
EOSINOPHIL # BLD: 0 K/UL (ref 0.05–0.5)
EOSINOPHILS RELATIVE PERCENT: 0 % (ref 0–6)
ERYTHROCYTE [DISTWIDTH] IN BLOOD BY AUTOMATED COUNT: 13.7 % (ref 11.5–15)
FLUAV RNA NPH QL NAA+NON-PROBE: NOT DETECTED
FLUBV RNA NPH QL NAA+NON-PROBE: NOT DETECTED
GFR, ESTIMATED: 47 ML/MIN/1.73M2
GLUCOSE SERPL-MCNC: 116 MG/DL (ref 74–99)
HADV DNA NPH QL NAA+NON-PROBE: NOT DETECTED
HCOV 229E RNA NPH QL NAA+NON-PROBE: NOT DETECTED
HCOV HKU1 RNA NPH QL NAA+NON-PROBE: NOT DETECTED
HCOV NL63 RNA NPH QL NAA+NON-PROBE: NOT DETECTED
HCOV OC43 RNA NPH QL NAA+NON-PROBE: NOT DETECTED
HCT VFR BLD AUTO: 35.6 % (ref 34–48)
HGB BLD-MCNC: 12.2 G/DL (ref 11.5–15.5)
HMPV RNA NPH QL NAA+NON-PROBE: NOT DETECTED
HPIV1 RNA NPH QL NAA+NON-PROBE: NOT DETECTED
HPIV2 RNA NPH QL NAA+NON-PROBE: NOT DETECTED
HPIV3 RNA NPH QL NAA+NON-PROBE: NOT DETECTED
HPIV4 RNA NPH QL NAA+NON-PROBE: NOT DETECTED
IMM GRANULOCYTES # BLD AUTO: 0.03 K/UL (ref 0–0.58)
IMM GRANULOCYTES NFR BLD: 1 % (ref 0–5)
INR PPP: 1.2
LYMPHOCYTES NFR BLD: 0.71 K/UL (ref 1.5–4)
LYMPHOCYTES RELATIVE PERCENT: 12 % (ref 20–42)
M PNEUMO DNA NPH QL NAA+NON-PROBE: NOT DETECTED
MAGNESIUM SERPL-MCNC: 1.2 MG/DL (ref 1.6–2.6)
MCH RBC QN AUTO: 36.2 PG (ref 26–35)
MCHC RBC AUTO-ENTMCNC: 34.3 G/DL (ref 32–34.5)
MCV RBC AUTO: 105.6 FL (ref 80–99.9)
MONOCYTES NFR BLD: 0.49 K/UL (ref 0.1–0.95)
MONOCYTES NFR BLD: 9 % (ref 2–12)
NEUTROPHILS NFR BLD: 78 % (ref 43–80)
NEUTS SEG NFR BLD: 4.54 K/UL (ref 1.8–7.3)
PLATELET # BLD AUTO: 206 K/UL (ref 130–450)
PMV BLD AUTO: 9.1 FL (ref 7–12)
POTASSIUM SERPL-SCNC: 3.3 MMOL/L (ref 3.5–5)
PROT SERPL-MCNC: 5.6 G/DL (ref 6.4–8.3)
PROTHROMBIN TIME: 12.9 SEC (ref 9.3–12.4)
RBC # BLD AUTO: 3.37 M/UL (ref 3.5–5.5)
RSV RNA NPH QL NAA+NON-PROBE: NOT DETECTED
RV+EV RNA NPH QL NAA+NON-PROBE: NOT DETECTED
SARS-COV-2 RNA NPH QL NAA+NON-PROBE: NOT DETECTED
SODIUM SERPL-SCNC: 133 MMOL/L (ref 132–146)
SPECIMEN DESCRIPTION: NORMAL
TROPONIN I SERPL HS-MCNC: 16 NG/L (ref 0–9)
TROPONIN I SERPL HS-MCNC: 19 NG/L (ref 0–9)
WBC OTHER # BLD: 5.8 K/UL (ref 4.5–11.5)

## 2024-11-11 PROCEDURE — 6370000000 HC RX 637 (ALT 250 FOR IP): Performed by: EMERGENCY MEDICINE

## 2024-11-11 PROCEDURE — 2580000003 HC RX 258: Performed by: EMERGENCY MEDICINE

## 2024-11-11 PROCEDURE — 80053 COMPREHEN METABOLIC PANEL: CPT

## 2024-11-11 PROCEDURE — 87077 CULTURE AEROBIC IDENTIFY: CPT

## 2024-11-11 PROCEDURE — 6370000000 HC RX 637 (ALT 250 FOR IP)

## 2024-11-11 PROCEDURE — 81240 F2 GENE: CPT

## 2024-11-11 PROCEDURE — 0202U NFCT DS 22 TRGT SARS-COV-2: CPT

## 2024-11-11 PROCEDURE — 87070 CULTURE OTHR SPECIMN AEROBIC: CPT

## 2024-11-11 PROCEDURE — 2060000000 HC ICU INTERMEDIATE R&B

## 2024-11-11 PROCEDURE — 6370000000 HC RX 637 (ALT 250 FOR IP): Performed by: GENERAL PRACTICE

## 2024-11-11 PROCEDURE — 99285 EMERGENCY DEPT VISIT HI MDM: CPT

## 2024-11-11 PROCEDURE — 85025 COMPLETE CBC W/AUTO DIFF WBC: CPT

## 2024-11-11 PROCEDURE — 96365 THER/PROPH/DIAG IV INF INIT: CPT

## 2024-11-11 PROCEDURE — 93010 ELECTROCARDIOGRAM REPORT: CPT | Performed by: INTERNAL MEDICINE

## 2024-11-11 PROCEDURE — 81291 MTHFR GENE: CPT

## 2024-11-11 PROCEDURE — 81400 MOPATH PROCEDURE LEVEL 1: CPT

## 2024-11-11 PROCEDURE — 96375 TX/PRO/DX INJ NEW DRUG ADDON: CPT

## 2024-11-11 PROCEDURE — 93005 ELECTROCARDIOGRAM TRACING: CPT | Performed by: EMERGENCY MEDICINE

## 2024-11-11 PROCEDURE — 6360000002 HC RX W HCPCS

## 2024-11-11 PROCEDURE — 84484 ASSAY OF TROPONIN QUANT: CPT

## 2024-11-11 PROCEDURE — 87205 SMEAR GRAM STAIN: CPT

## 2024-11-11 PROCEDURE — 71275 CT ANGIOGRAPHY CHEST: CPT

## 2024-11-11 PROCEDURE — 81241 F5 GENE: CPT

## 2024-11-11 PROCEDURE — 83880 ASSAY OF NATRIURETIC PEPTIDE: CPT

## 2024-11-11 PROCEDURE — 6360000002 HC RX W HCPCS: Performed by: EMERGENCY MEDICINE

## 2024-11-11 PROCEDURE — 83735 ASSAY OF MAGNESIUM: CPT

## 2024-11-11 PROCEDURE — 6360000004 HC RX CONTRAST MEDICATION: Performed by: RADIOLOGY

## 2024-11-11 PROCEDURE — 87040 BLOOD CULTURE FOR BACTERIA: CPT

## 2024-11-11 PROCEDURE — 85610 PROTHROMBIN TIME: CPT

## 2024-11-11 RX ORDER — LIDOCAINE 50 MG/G
1 PATCH TOPICAL EVERY 24 HOURS
Qty: 10 PATCH | Refills: 0 | Status: SHIPPED | OUTPATIENT
Start: 2024-11-11 | End: 2024-11-11

## 2024-11-11 RX ORDER — CARVEDILOL 25 MG/1
25 TABLET ORAL 2 TIMES DAILY WITH MEALS
Status: ON HOLD | COMMUNITY

## 2024-11-11 RX ORDER — GUAIFENESIN 400 MG/1
400 TABLET ORAL 4 TIMES DAILY PRN
Status: DISCONTINUED | OUTPATIENT
Start: 2024-11-11 | End: 2024-11-17 | Stop reason: HOSPADM

## 2024-11-11 RX ORDER — ENOXAPARIN SODIUM 100 MG/ML
1 INJECTION SUBCUTANEOUS ONCE
Status: COMPLETED | OUTPATIENT
Start: 2024-11-11 | End: 2024-11-11

## 2024-11-11 RX ORDER — SODIUM CHLORIDE 9 MG/ML
INJECTION, SOLUTION INTRAVENOUS ONCE
Status: COMPLETED | OUTPATIENT
Start: 2024-11-11 | End: 2024-11-11

## 2024-11-11 RX ORDER — IPRATROPIUM BROMIDE AND ALBUTEROL SULFATE 2.5; .5 MG/3ML; MG/3ML
1 SOLUTION RESPIRATORY (INHALATION)
Status: ACTIVE | OUTPATIENT
Start: 2024-11-11 | End: 2024-11-11

## 2024-11-11 RX ORDER — IOPAMIDOL 755 MG/ML
75 INJECTION, SOLUTION INTRAVASCULAR
Status: COMPLETED | OUTPATIENT
Start: 2024-11-11 | End: 2024-11-11

## 2024-11-11 RX ORDER — ACETAMINOPHEN 325 MG/1
650 TABLET ORAL EVERY 6 HOURS PRN
Status: DISCONTINUED | OUTPATIENT
Start: 2024-11-11 | End: 2024-11-17 | Stop reason: HOSPADM

## 2024-11-11 RX ORDER — CLONIDINE HYDROCHLORIDE 0.2 MG/1
0.2 TABLET ORAL 3 TIMES DAILY
Status: DISCONTINUED | OUTPATIENT
Start: 2024-11-11 | End: 2024-11-17 | Stop reason: HOSPADM

## 2024-11-11 RX ORDER — ACETAMINOPHEN 325 MG/1
650 TABLET ORAL EVERY 6 HOURS PRN
Status: ON HOLD | COMMUNITY

## 2024-11-11 RX ORDER — DEXAMETHASONE SODIUM PHOSPHATE 10 MG/ML
10 INJECTION INTRAMUSCULAR; INTRAVENOUS ONCE
Status: COMPLETED | OUTPATIENT
Start: 2024-11-11 | End: 2024-11-11

## 2024-11-11 RX ORDER — IPRATROPIUM BROMIDE AND ALBUTEROL SULFATE 2.5; .5 MG/3ML; MG/3ML
1 SOLUTION RESPIRATORY (INHALATION) ONCE
Status: COMPLETED | OUTPATIENT
Start: 2024-11-11 | End: 2024-11-11

## 2024-11-11 RX ORDER — MAGNESIUM SULFATE IN WATER 40 MG/ML
2000 INJECTION, SOLUTION INTRAVENOUS ONCE
Status: COMPLETED | OUTPATIENT
Start: 2024-11-11 | End: 2024-11-11

## 2024-11-11 RX ORDER — CARVEDILOL 25 MG/1
25 TABLET ORAL 2 TIMES DAILY WITH MEALS
Status: DISCONTINUED | OUTPATIENT
Start: 2024-11-11 | End: 2024-11-11

## 2024-11-11 RX ORDER — CARVEDILOL 25 MG/1
25 TABLET ORAL 2 TIMES DAILY WITH MEALS
Status: DISCONTINUED | OUTPATIENT
Start: 2024-11-11 | End: 2024-11-17 | Stop reason: HOSPADM

## 2024-11-11 RX ORDER — LIDOCAINE 4 G/G
1 PATCH TOPICAL DAILY
Status: DISCONTINUED | OUTPATIENT
Start: 2024-11-11 | End: 2024-11-17 | Stop reason: HOSPADM

## 2024-11-11 RX ORDER — SODIUM CHLORIDE 1 G/1
1 TABLET ORAL 2 TIMES DAILY
Status: DISCONTINUED | OUTPATIENT
Start: 2024-11-11 | End: 2024-11-17 | Stop reason: HOSPADM

## 2024-11-11 RX ORDER — DILTIAZEM HYDROCHLORIDE 120 MG/1
120 CAPSULE, EXTENDED RELEASE ORAL 2 TIMES DAILY
Status: DISCONTINUED | OUTPATIENT
Start: 2024-11-11 | End: 2024-11-12

## 2024-11-11 RX ORDER — KETOROLAC TROMETHAMINE 15 MG/ML
15 INJECTION, SOLUTION INTRAMUSCULAR; INTRAVENOUS ONCE
Status: COMPLETED | OUTPATIENT
Start: 2024-11-11 | End: 2024-11-11

## 2024-11-11 RX ADMIN — MAGNESIUM SULFATE HEPTAHYDRATE 2000 MG: 2 INJECTION, SOLUTION INTRAVENOUS at 15:11

## 2024-11-11 RX ADMIN — AZITHROMYCIN MONOHYDRATE 500 MG: 500 INJECTION, POWDER, LYOPHILIZED, FOR SOLUTION INTRAVENOUS at 15:19

## 2024-11-11 RX ADMIN — SODIUM CHLORIDE: 9 INJECTION, SOLUTION INTRAVENOUS at 16:01

## 2024-11-11 RX ADMIN — GUAIFENESIN 400 MG: 400 TABLET ORAL at 13:04

## 2024-11-11 RX ADMIN — DILTIAZEM HYDROCHLORIDE 120 MG: 120 CAPSULE, EXTENDED RELEASE ORAL at 21:08

## 2024-11-11 RX ADMIN — Medication 1 G: at 20:25

## 2024-11-11 RX ADMIN — DEXAMETHASONE SODIUM PHOSPHATE 10 MG: 10 INJECTION INTRAMUSCULAR; INTRAVENOUS at 15:18

## 2024-11-11 RX ADMIN — GUAIFENESIN 400 MG: 400 TABLET ORAL at 21:08

## 2024-11-11 RX ADMIN — ENOXAPARIN SODIUM 50 MG: 100 INJECTION SUBCUTANEOUS at 16:01

## 2024-11-11 RX ADMIN — CLONIDINE HYDROCHLORIDE 0.2 MG: 0.2 TABLET ORAL at 20:25

## 2024-11-11 RX ADMIN — WATER 2000 MG: 1 INJECTION INTRAMUSCULAR; INTRAVENOUS; SUBCUTANEOUS at 15:16

## 2024-11-11 RX ADMIN — KETOROLAC TROMETHAMINE 15 MG: 15 INJECTION, SOLUTION INTRAMUSCULAR; INTRAVENOUS at 15:00

## 2024-11-11 RX ADMIN — IOPAMIDOL 75 ML: 755 INJECTION, SOLUTION INTRAVENOUS at 14:21

## 2024-11-11 RX ADMIN — CARVEDILOL 25 MG: 25 TABLET, FILM COATED ORAL at 20:25

## 2024-11-11 RX ADMIN — IPRATROPIUM BROMIDE AND ALBUTEROL SULFATE 1 DOSE: 2.5; .5 SOLUTION RESPIRATORY (INHALATION) at 10:00

## 2024-11-11 ASSESSMENT — PAIN DESCRIPTION - PAIN TYPE: TYPE: ACUTE PAIN

## 2024-11-11 ASSESSMENT — PAIN DESCRIPTION - LOCATION
LOCATION: BACK
LOCATION: BACK

## 2024-11-11 ASSESSMENT — PAIN SCALES - GENERAL
PAINLEVEL_OUTOF10: 9
PAINLEVEL_OUTOF10: 9
PAINLEVEL_OUTOF10: 8

## 2024-11-11 ASSESSMENT — PAIN DESCRIPTION - FREQUENCY: FREQUENCY: CONTINUOUS

## 2024-11-11 ASSESSMENT — LIFESTYLE VARIABLES
HOW MANY STANDARD DRINKS CONTAINING ALCOHOL DO YOU HAVE ON A TYPICAL DAY: 3 OR 4
HOW OFTEN DO YOU HAVE A DRINK CONTAINING ALCOHOL: 2-3 TIMES A WEEK

## 2024-11-11 ASSESSMENT — PAIN DESCRIPTION - ONSET: ONSET: ON-GOING

## 2024-11-11 ASSESSMENT — PAIN DESCRIPTION - ORIENTATION: ORIENTATION: MID;LOWER;RIGHT

## 2024-11-11 ASSESSMENT — PAIN DESCRIPTION - DESCRIPTORS: DESCRIPTORS: ACHING;CRAMPING;CRUSHING;DISCOMFORT;DULL

## 2024-11-11 NOTE — ED PROVIDER NOTES
[facility] EMERGENCY DEPARTMENT  EMERGENCY DEPARTMENT ENCOUNTER        Pt Name: Maria T Romeo  MRN: [id number]  Birthdate 1965  Date of evaluation: 11/11/2024  Provider: Cherry Aldana MD  PCP: Jensen Pelayo DO  Note Started: 3:58 PM EST 11/11/24    CHIEF COMPLAINT       Chief Complaint   Patient presents with    Shortness of Breath     Was seen at [employer] and was told she had right sided collapsed lung. No recent falls/injuries       HISTORY OF PRESENT ILLNESS: 1 or more Elements   History From: Patient    Limitations to history : None    Maria T Romeo is a 59 y.o. female who presents with shortness of breath, right-sided back pain.  Patient states this started last night when she developed a productive cough says it has gotten worse overnight and has given her a lot of pain and difficulty in breathing this morning.  Patient states the pain is severe when she takes a deep breath and worsened on movement.  Done and also done and said she had a right-sided lung collapse of lung.    Patient has a pertinent past medical history of mucous plug plugging and atelectasis of the right lung.  Patient denies any history of clots, heart problems, strokes.    Patient denies fever, chills, headache, abdominal pain, nausea, vomiting, diarrhea, lightheadedness, dysuria, hematuria, hematochezia, and melena.    Nursing Notes were all reviewed and agreed with or any disagreements were addressed in the HPI.        REVIEW OF EXTERNAL NOTES :         None      REVIEW OF SYSTEMS :           Positives and Pertinent negatives as per HPI.     SURGICAL HISTORY     Past Surgical History:   Procedure Laterality Date    BRONCHOSCOPY N/A 1/8/2024    BRONCHOSCOPY DIAGNOSTIC OR CELL WASH ONLY performed by Lenin Carney MD at [facility] ENDOSCOPY    CLOSED MANIPULATION SHOULDER Left 3/5/2019    LEFT SHOULDER MANIPULATION UNDER ANTESTHESIA, STERIOD INJECTION TO FOLLOW performed by Reese KIDD

## 2024-11-11 NOTE — ED TRIAGE NOTES
Department of Emergency Medicine    FIRST PROVIDER TRIAGE NOTE             Independent MLP           11/11/24  12:22 PM EST    Date of Encounter: 11/11/24   MRN: [id number]    Vitals:    11/11/24 1222   BP: (!) 82/61   Resp: 16   Temp: 97.4 °F (36.3 °C)   SpO2: 91%      HPI: Maria T Romeo is a 59 y.o. female who presents to the ED for Shortness of Breath (Was seen at [place] and was told she had right sided collapsed lung. No recent falls/injuries)     No distress, 91% room air     ROS: Negative for abd pain or back pain.    Physical Exam:   Gen Appearance/Constitutional: alert  CV: regular rate  Pulm: slightly diminished      Initial Plan of Care: All treatment areas with department are currently occupied.     Plan to order/Initiate the following while awaiting opening in ED: Triage evaluation  imaging studies.    Provider-Patient relationship only established for Provider In Triage (PIT).  Full assessment, HPI, and examination not performed, therefore, it is not yet possible to state whether or not an emergency medical condition exists.  This provider not responsible to follow or interpret any labs or testing ordered in triage.  Supervisor request for LEEANNE to initiate contact and input an assessment note in triage during high volume surges.     Initial Plan of Care: Initiate Treatment-Testing, Proceed toTreatment Area When Bed Available for ED Attending/MLP to Continue Care  Secondary to high volume, low staffing, and/or boarding- patient to await bed availability.    This ends my PIT-Patient relationship.  Care of patient relinquished after triage    Electronically signed by Violette Bray PA-C   DD: 11/11/24

## 2024-11-11 NOTE — PROGRESS NOTES
Chief Complaint:   Cough and Congestion      History of Present Illness   Source of history provided by:  patient.      Maria T Romeo is a 59 y.o. old female with a past medical history of:   Past Medical History:   Diagnosis Date    Acid reflux     Fracture of left shoulder 10/2018    Frozen shoulder     left    Hypertension     Hyponatremia     Lung abnormality     MVP (mitral valve prolapse)     no issues    Skin cancer         Pt [facility] to [facility] with a cough/chest congestion/chills/painful breathing/SOB for the past several days.  States the cough is  productive with yellow sputum.  No  fever noted.  Denies any N/V/D, abdominal pain, CP, progressive SOB, dizziness, or lethargy.   Pt has a h/o COPD but not currently on inhalers. Chest xray 1/8/24 revealed right lung volume loss related to right middle lobe collapse- pt was hospitalized          ROS    Unless otherwise stated in this report or unable to obtain because of the patient's clinical or mental status as evidenced by the medical record, this patients's positive and negative responses for Review of Systems, constitutional, psych, eyes, ENT, cardiovascular, respiratory, gastrointestinal, neurological, genitourinary, musculoskeletal, integument systems and systems related to the presenting problem are either stated in the preceding or were not pertinent or were negative for the symptoms and/or complaints related to the medical problem.    Past Surgical History:  has a past surgical history that includes Foot surgery (Right, 1980's); pr optx prox humeral fx w/int fixj rpr tuberosity (Left, 10/23/2018); CLOSED MANIPULATION SHOULDER (Left, 3/5/2019); Skin cancer excision (2018); and bronchoscopy (N/A, 1/8/2024).  Social History:  reports that she has been smoking cigarettes. She started smoking about 32 years ago. She has a 32.9 pack-year smoking history. She has never used smokeless tobacco. She reports current alcohol use. She reports that she

## 2024-11-11 NOTE — ED NOTES
ED to Inpatient Handoff Report    Notified lauren that electronic handoff available and patient ready for transport to room 614.    Safety Risks: Difficulty with daily activities and Risk of falls    Patient in Restraints: no    Constant Observer or Patient : no    Telemetry Monitoring Ordered :Yes           Order to transfer to unit without monitor:NO    Last MEWS: 3 Time completed: 1618    Deterioration Index Score:   Predictive Model Details          40 (Caution)  Factor Value    Calculated 11/11/2024 16:20 27% Supplemental oxygen Nasal cannula    Deterioration Index Model 26% Respiratory rate 24     23% Age 59 years old     19% Systolic 90     2% Sodium 133 mmol/L     2% Potassium abnormal (3.3 mmol/L)     1% Pulse 84     0% Pulse oximetry 97 %     0% WBC count 5.8 k/uL     0% Hematocrit 35.6 %     0% Temperature 98.3 °F (36.8 °C)        Vitals:    11/11/24 1222 11/11/24 1234 11/11/24 1519 11/11/24 1618   BP: (!) 82/61 97/74 106/76 90/71   Pulse: 79  87 84   Resp: 16 28 24   Temp: 97.4 °F (36.3 °C)  98.3 °F (36.8 °C) 98.3 °F (36.8 °C)   TempSrc:   Oral Oral   SpO2: 91%  94% 97%   Weight: 46.3 kg (102 lb)      Height: 1.575 m (5' 2\")            Opportunity for questions and clarification was provided.

## 2024-11-12 ENCOUNTER — APPOINTMENT (OUTPATIENT)
Dept: ULTRASOUND IMAGING | Age: 59
DRG: 175 | End: 2024-11-12
Payer: COMMERCIAL

## 2024-11-12 LAB
ALBUMIN SERPL-MCNC: 3 G/DL (ref 3.5–5.2)
ALP SERPL-CCNC: 47 U/L (ref 35–104)
ALT SERPL-CCNC: 14 U/L (ref 0–32)
ANION GAP SERPL CALCULATED.3IONS-SCNC: 14 MMOL/L (ref 7–16)
AST SERPL-CCNC: 28 U/L (ref 0–31)
BILIRUB SERPL-MCNC: 0.5 MG/DL (ref 0–1.2)
BUN SERPL-MCNC: 21 MG/DL (ref 6–20)
CALCIUM SERPL-MCNC: 8.3 MG/DL (ref 8.6–10.2)
CHLORIDE SERPL-SCNC: 99 MMOL/L (ref 98–107)
CO2 SERPL-SCNC: 25 MMOL/L (ref 22–29)
CREAT SERPL-MCNC: 1.1 MG/DL (ref 0.5–1)
GFR, ESTIMATED: 61 ML/MIN/1.73M2
GLUCOSE SERPL-MCNC: 124 MG/DL (ref 74–99)
POTASSIUM SERPL-SCNC: 3.4 MMOL/L (ref 3.5–5)
PROT SERPL-MCNC: 5.3 G/DL (ref 6.4–8.3)
SODIUM SERPL-SCNC: 138 MMOL/L (ref 132–146)

## 2024-11-12 PROCEDURE — 6370000000 HC RX 637 (ALT 250 FOR IP)

## 2024-11-12 PROCEDURE — 80053 COMPREHEN METABOLIC PANEL: CPT

## 2024-11-12 PROCEDURE — 2060000000 HC ICU INTERMEDIATE R&B

## 2024-11-12 PROCEDURE — 93970 EXTREMITY STUDY: CPT

## 2024-11-12 PROCEDURE — 6370000000 HC RX 637 (ALT 250 FOR IP): Performed by: EMERGENCY MEDICINE

## 2024-11-12 PROCEDURE — 6370000000 HC RX 637 (ALT 250 FOR IP): Performed by: GENERAL PRACTICE

## 2024-11-12 RX ORDER — POTASSIUM CHLORIDE 1500 MG/1
20 TABLET, EXTENDED RELEASE ORAL ONCE
Status: COMPLETED | OUTPATIENT
Start: 2024-11-12 | End: 2024-11-12

## 2024-11-12 RX ADMIN — APIXABAN 10 MG: 5 TABLET, FILM COATED ORAL at 20:52

## 2024-11-12 RX ADMIN — POTASSIUM CHLORIDE 20 MEQ: 1500 TABLET, EXTENDED RELEASE ORAL at 09:15

## 2024-11-12 RX ADMIN — DILTIAZEM HYDROCHLORIDE 120 MG: 120 CAPSULE, EXTENDED RELEASE ORAL at 09:15

## 2024-11-12 RX ADMIN — Medication 1 G: at 09:15

## 2024-11-12 RX ADMIN — APIXABAN 10 MG: 5 TABLET, FILM COATED ORAL at 11:20

## 2024-11-12 RX ADMIN — CARVEDILOL 25 MG: 25 TABLET, FILM COATED ORAL at 16:34

## 2024-11-12 RX ADMIN — CARVEDILOL 25 MG: 25 TABLET, FILM COATED ORAL at 09:15

## 2024-11-12 RX ADMIN — CLONIDINE HYDROCHLORIDE 0.2 MG: 0.2 TABLET ORAL at 20:52

## 2024-11-12 RX ADMIN — Medication 1 G: at 20:52

## 2024-11-12 ASSESSMENT — PAIN SCALES - GENERAL: PAINLEVEL_OUTOF10: 5

## 2024-11-12 ASSESSMENT — PAIN DESCRIPTION - LOCATION: LOCATION: BACK;CHEST

## 2024-11-12 ASSESSMENT — PAIN DESCRIPTION - PAIN TYPE: TYPE: ACUTE PAIN

## 2024-11-12 ASSESSMENT — PAIN - FUNCTIONAL ASSESSMENT: PAIN_FUNCTIONAL_ASSESSMENT: PREVENTS OR INTERFERES SOME ACTIVE ACTIVITIES AND ADLS

## 2024-11-12 ASSESSMENT — PAIN DESCRIPTION - FREQUENCY: FREQUENCY: CONTINUOUS

## 2024-11-12 ASSESSMENT — PAIN DESCRIPTION - ONSET: ONSET: ON-GOING

## 2024-11-12 ASSESSMENT — PAIN DESCRIPTION - ORIENTATION: ORIENTATION: RIGHT

## 2024-11-12 NOTE — H&P
COVID-19 infection.    RECENT CURRENT MEDICATIONS:  Included Catapres 0.2 three times a day, diltiazem  mg b.i.d., sodium chloride tablets 1 g b.i.d., Coreg 25 b.i.d., and DuoNeb inhalers as needed.    SOCIAL HISTORY:  The patient is a smoker, continues to do so.  Also admits to alcohol use, although she states it has not been very much.    PAST SURGICAL HISTORY:  Closed manipulation and shoulder repair of fracture, skin cancer excision, and bronchoscopy.    FAMILY MEDICAL HISTORY:  Was noted and discussed.    REVIEW OF SYSTEMS:  CONSTITUTIONAL:  Negative for vertigo, cephalgia, ringing in the ears, hearing loss, difficulty swallowing, hoarseness in her voice, or bloody noses.  CARDIOPULMONARY:  Intermittent chest pain, pleuritic in nature.  No orthopnea.  No paroxysmal nocturnal dyspnea.  She does have cough.  She does have wheeze.  She is short of breath with exertion.  No hemoptysis.  GASTROINTESTINAL:  No nausea, vomiting, diarrhea, constipation, blood in her stool, or recent change in weight.  GENITOURINARY:  No vaginal bleeding.  No urgency, frequency, dysuria, or hematuria.  ENDOCRINE:  Negative for diabetes or thyroid disorder.  MUSCULOSKELETAL:  Positive for previous fractures.  SKIN:  Without rash, eruptions, urticaria, or pruritus.  PSYCHIATRIC:  Negative for anxiety or depression.  NEUROLOGIC:  Negative for CVA, seizures, tremors, tics, or TIAs.    PHYSICAL EXAMINATION:  GENERAL:  Shows this to be a 59-year-old white female, in moderate distress with the above complaints.  HEAD, EYES, EARS, NOSE AND THROAT:  Shows the head to be normocephalic and atraumatic.  Pupils are equal and reactive to light and accommodation.  Extraocular eye muscles are intact.  Tympanic membranes are clear.  Ear canals are patent.  Oral mucosa pink and moist.  NECK:  Veins are flat.  Nondistended.  No carotid bruits.  CHEST:  Symmetrical.  HEART:  Had a regular rate and rhythm without murmurs, gallops, or friction

## 2024-11-12 NOTE — PLAN OF CARE
Problem: ABCDS Injury Assessment  Goal: Absence of physical injury  11/12/2024 1303 by Priscilla Cash, RN  Outcome: Progressing  11/11/2024 2324 by Inga Babcock, RN  Outcome: Progressing  Flowsheets (Taken 11/11/2024 1700 by Marilou Licea RN)  Absence of Physical Injury: Implement safety measures based on patient assessment     Problem: Discharge Planning  Goal: Discharge to home or other facility with appropriate resources  11/12/2024 1303 by Priscilla Cash RN  Outcome: Progressing  Flowsheets (Taken 11/12/2024 0915)  Discharge to home or other facility with appropriate resources:   Identify barriers to discharge with patient and caregiver   Arrange for needed discharge resources and transportation as appropriate   Identify discharge learning needs (meds, wound care, etc)   Arrange for interpreters to assist at discharge as needed   Refer to discharge planning if patient needs post-hospital services based on physician order or complex needs related to functional status, cognitive ability or social support system  11/11/2024 2324 by Inga Babcock, RN  Outcome: Progressing     Problem: Pain  Goal: Verbalizes/displays adequate comfort level or baseline comfort level  11/12/2024 1303 by Priscilla Cash, RN  Outcome: Progressing  11/11/2024 2324 by Inga Babcock, RN  Outcome: Progressing     Problem: Safety - Adult  Goal: Free from fall injury  11/12/2024 1303 by Priscilla Cash, RN  Outcome: Progressing  11/11/2024 2324 by Inga Babcock, RN  Outcome: Progressing

## 2024-11-12 NOTE — PLAN OF CARE
Problem: ABCDS Injury Assessment  Goal: Absence of physical injury  Outcome: Progressing  Flowsheets (Taken 11/11/2024 1700 by Marilou Licea RN)  Absence of Physical Injury: Implement safety measures based on patient assessment     Problem: Discharge Planning  Goal: Discharge to home or other facility with appropriate resources  Outcome: Progressing     Problem: Pain  Goal: Verbalizes/displays adequate comfort level or baseline comfort level  Outcome: Progressing     Problem: Safety - Adult  Goal: Free from fall injury  Outcome: Progressing

## 2024-11-12 NOTE — DISCHARGE INSTRUCTIONS
Learning About Benefits of Quitting Smoking  Quitting smoking helps your body in many ways. Quitting lowers your risk for cancer, lung disease, heart attack, stroke, blood vessel disease, and blindness. You'll also get sick less often and heal faster. And after you quit, you may find that your mood is better and you are less stressed.  When and how will you feel healthier after quitting smoking?        In the first hours or days:   Your blood pressure and heart rate go down.  Your oxygen levels increase.        Within weeks or months:   You will cough less and breathe deeper. It may be easier to be active.  Your sense of taste and smell should return.        Over the years:   Your risks of heart disease, heart attack, and stroke are lower.  Your risk of lung cancer is cut by about half after about 10 years. And your risk for other cancers is lower too.  How would quitting help others in your life?    Their heart, lung, and cancer risks may drop, much like yours. They will also be sick less.   If you are or will be pregnant someday, quitting smoking means a healthier .   Where can you learn more?  Go to https://www.Skipjump.net/patientEd and enter O319 to learn more about \"Learning About Benefits of Quitting Smoking.\"  Current as of: November 15, 2023  Content Version: 14.2  2024 [employer].   Care instructions adapted under license by [employer]. If you have questions about a medical condition or this instruction, always ask your healthcare professional. [employer] disclaims any warranty or liability for your use of this information.

## 2024-11-12 NOTE — CONSULTS
Pulmonary Consultation    Admit Date: 11/11/2024  Requesting Physician: Jensen Pelayo DO        Reason for consultation:  Right middle lobe collapse        HPI:  Patient is a 59 year old female who presents to emergency room with complaints of right back pain and shortness of breath for one day. She admits that she got up to get ready for work and that was when symptoms onset. Patient had a CTA that showed right lower lobe pulmonary emboli. She received one dose of lovenox mg/kg. Patient CTA also showed right middle lobe collapse. When comparing to previous imaging from December 2023 it is increased in size. Patient was hypoxic and placed on supplemental oxygen. Respiratory panel was negative. She did receive a dose of azithromycin, ceftriaxone, and decadron.    Patient is a lifelong smoker. She was previously seen by Dr. Carney in January 2024 and a bronchoscopy was completed at that time for right middle lobe collapse. Patient does use oxygen only as needed at home. Patient works at [employer]. Patient admits there is clotting history in her family.     Patient is seen on 4 L nasal cannula. She admits to not being able to take a deep breath with inhalation. She has a productive cough with yellow/green secretions. She states her cough was so harsh yesterday she was vomiting. She denies any fevers or sick contacts. Patient denies any recent travel, surgery, or unintentional weight loss.       PMH:    Past Medical History:   Diagnosis Date    Acid reflux     Fracture of left shoulder 10/2018    Frozen shoulder     left    Hypertension     Hyponatremia     Lung abnormality     MVP (mitral valve prolapse)     no issues    Skin cancer      PSH:   Past Surgical History:   Procedure Laterality Date    BRONCHOSCOPY N/A 1/8/2024    BRONCHOSCOPY DIAGNOSTIC OR CELL WASH ONLY performed by Lenin Carney MD at [facility] ENDOSCOPY    CLOSED MANIPULATION SHOULDER Left 3/5/2019    LEFT SHOULDER MANIPULATION UNDER

## 2024-11-13 ENCOUNTER — ANESTHESIA (OUTPATIENT)
Dept: ENDOSCOPY | Age: 59
End: 2024-11-13
Payer: COMMERCIAL

## 2024-11-13 ENCOUNTER — ANESTHESIA EVENT (OUTPATIENT)
Dept: ENDOSCOPY | Age: 59
End: 2024-11-13
Payer: COMMERCIAL

## 2024-11-13 LAB
ALBUMIN SERPL-MCNC: 2.9 G/DL (ref 3.5–5.2)
ALP SERPL-CCNC: 71 U/L (ref 35–104)
ALT SERPL-CCNC: 14 U/L (ref 0–32)
ANION GAP SERPL CALCULATED.3IONS-SCNC: 10 MMOL/L (ref 7–16)
AST SERPL-CCNC: 26 U/L (ref 0–31)
BILIRUB SERPL-MCNC: 0.4 MG/DL (ref 0–1.2)
BUN SERPL-MCNC: 20 MG/DL (ref 6–20)
CALCIUM SERPL-MCNC: 8.8 MG/DL (ref 8.6–10.2)
CHLORIDE SERPL-SCNC: 93 MMOL/L (ref 98–107)
CO2 SERPL-SCNC: 26 MMOL/L (ref 22–29)
CREAT SERPL-MCNC: 0.8 MG/DL (ref 0.5–1)
ERYTHROCYTE [DISTWIDTH] IN BLOOD BY AUTOMATED COUNT: 13.4 % (ref 11.5–15)
GFR, ESTIMATED: 83 ML/MIN/1.73M2
GLUCOSE SERPL-MCNC: 96 MG/DL (ref 74–99)
HCT VFR BLD AUTO: 33.8 % (ref 34–48)
HGB BLD-MCNC: 11.7 G/DL (ref 11.5–15.5)
MCH RBC QN AUTO: 35.8 PG (ref 26–35)
MCHC RBC AUTO-ENTMCNC: 34.6 G/DL (ref 32–34.5)
MCV RBC AUTO: 103.4 FL (ref 80–99.9)
PLATELET # BLD AUTO: 180 K/UL (ref 130–450)
PMV BLD AUTO: 10 FL (ref 7–12)
POTASSIUM SERPL-SCNC: 3.7 MMOL/L (ref 3.5–5)
PROT SERPL-MCNC: 5.6 G/DL (ref 6.4–8.3)
RBC # BLD AUTO: 3.27 M/UL (ref 3.5–5.5)
SODIUM SERPL-SCNC: 129 MMOL/L (ref 132–146)
WBC OTHER # BLD: 10.6 K/UL (ref 4.5–11.5)

## 2024-11-13 PROCEDURE — 2580000003 HC RX 258: Performed by: NURSE ANESTHETIST, CERTIFIED REGISTERED

## 2024-11-13 PROCEDURE — 3609010800 HC BRONCHOSCOPY ALVEOLAR LAVAGE: Performed by: INTERNAL MEDICINE

## 2024-11-13 PROCEDURE — 3700000000 HC ANESTHESIA ATTENDED CARE: Performed by: INTERNAL MEDICINE

## 2024-11-13 PROCEDURE — 6370000000 HC RX 637 (ALT 250 FOR IP): Performed by: STUDENT IN AN ORGANIZED HEALTH CARE EDUCATION/TRAINING PROGRAM

## 2024-11-13 PROCEDURE — 94664 DEMO&/EVAL PT USE INHALER: CPT

## 2024-11-13 PROCEDURE — 80053 COMPREHEN METABOLIC PANEL: CPT

## 2024-11-13 PROCEDURE — 6370000000 HC RX 637 (ALT 250 FOR IP): Performed by: GENERAL PRACTICE

## 2024-11-13 PROCEDURE — 7100000010 HC PHASE II RECOVERY - FIRST 15 MIN: Performed by: INTERNAL MEDICINE

## 2024-11-13 PROCEDURE — 6370000000 HC RX 637 (ALT 250 FOR IP)

## 2024-11-13 PROCEDURE — 0BJ08ZZ INSPECTION OF TRACHEOBRONCHIAL TREE, VIA NATURAL OR ARTIFICIAL OPENING ENDOSCOPIC: ICD-10-PCS | Performed by: INTERNAL MEDICINE

## 2024-11-13 PROCEDURE — 6360000002 HC RX W HCPCS: Performed by: NURSE ANESTHETIST, CERTIFIED REGISTERED

## 2024-11-13 PROCEDURE — 2060000000 HC ICU INTERMEDIATE R&B

## 2024-11-13 PROCEDURE — 7100000011 HC PHASE II RECOVERY - ADDTL 15 MIN: Performed by: INTERNAL MEDICINE

## 2024-11-13 PROCEDURE — 6370000000 HC RX 637 (ALT 250 FOR IP): Performed by: INTERNAL MEDICINE

## 2024-11-13 PROCEDURE — 6370000000 HC RX 637 (ALT 250 FOR IP): Performed by: EMERGENCY MEDICINE

## 2024-11-13 PROCEDURE — 88112 CYTOPATH CELL ENHANCE TECH: CPT

## 2024-11-13 PROCEDURE — 85027 COMPLETE CBC AUTOMATED: CPT

## 2024-11-13 PROCEDURE — 2700000000 HC OXYGEN THERAPY PER DAY

## 2024-11-13 PROCEDURE — 3700000001 HC ADD 15 MINUTES (ANESTHESIA): Performed by: INTERNAL MEDICINE

## 2024-11-13 PROCEDURE — 2709999900 HC NON-CHARGEABLE SUPPLY: Performed by: INTERNAL MEDICINE

## 2024-11-13 RX ORDER — SODIUM CHLORIDE 9 MG/ML
INJECTION, SOLUTION INTRAVENOUS
Status: DISCONTINUED | OUTPATIENT
Start: 2024-11-13 | End: 2024-11-13 | Stop reason: SDUPTHER

## 2024-11-13 RX ORDER — LIDOCAINE HYDROCHLORIDE 20 MG/ML
SOLUTION OROPHARYNGEAL PRN
Status: DISCONTINUED | OUTPATIENT
Start: 2024-11-13 | End: 2024-11-13 | Stop reason: ALTCHOICE

## 2024-11-13 RX ORDER — IPRATROPIUM BROMIDE AND ALBUTEROL SULFATE 2.5; .5 MG/3ML; MG/3ML
1 SOLUTION RESPIRATORY (INHALATION)
Status: COMPLETED | OUTPATIENT
Start: 2024-11-13 | End: 2024-11-13

## 2024-11-13 RX ORDER — PROPOFOL 10 MG/ML
INJECTION, EMULSION INTRAVENOUS
Status: DISCONTINUED | OUTPATIENT
Start: 2024-11-13 | End: 2024-11-13 | Stop reason: SDUPTHER

## 2024-11-13 RX ADMIN — CLONIDINE HYDROCHLORIDE 0.2 MG: 0.2 TABLET ORAL at 10:33

## 2024-11-13 RX ADMIN — ACETAMINOPHEN 650 MG: 325 TABLET ORAL at 20:23

## 2024-11-13 RX ADMIN — GUAIFENESIN 400 MG: 400 TABLET ORAL at 10:39

## 2024-11-13 RX ADMIN — SODIUM CHLORIDE: 9 INJECTION, SOLUTION INTRAVENOUS at 13:45

## 2024-11-13 RX ADMIN — CARVEDILOL 25 MG: 25 TABLET, FILM COATED ORAL at 18:02

## 2024-11-13 RX ADMIN — Medication 1 G: at 20:22

## 2024-11-13 RX ADMIN — IPRATROPIUM BROMIDE AND ALBUTEROL SULFATE 1 DOSE: .5; 2.5 SOLUTION RESPIRATORY (INHALATION) at 14:34

## 2024-11-13 RX ADMIN — Medication 1 G: at 10:33

## 2024-11-13 RX ADMIN — APIXABAN 10 MG: 5 TABLET, FILM COATED ORAL at 20:22

## 2024-11-13 RX ADMIN — PROPOFOL 150 MG: 10 INJECTION, EMULSION INTRAVENOUS at 13:52

## 2024-11-13 RX ADMIN — CLONIDINE HYDROCHLORIDE 0.2 MG: 0.2 TABLET ORAL at 20:22

## 2024-11-13 ASSESSMENT — PAIN DESCRIPTION - DESCRIPTORS
DESCRIPTORS: ACHING;NAGGING;SORE;TENDER
DESCRIPTORS: ACHING

## 2024-11-13 ASSESSMENT — PAIN DESCRIPTION - LOCATION
LOCATION: CHEST
LOCATION: RIB CAGE

## 2024-11-13 ASSESSMENT — PAIN SCALES - GENERAL
PAINLEVEL_OUTOF10: 2
PAINLEVEL_OUTOF10: 7

## 2024-11-13 ASSESSMENT — PAIN - FUNCTIONAL ASSESSMENT: PAIN_FUNCTIONAL_ASSESSMENT: PREVENTS OR INTERFERES SOME ACTIVE ACTIVITIES AND ADLS

## 2024-11-13 ASSESSMENT — PAIN DESCRIPTION - ORIENTATION: ORIENTATION: RIGHT;LEFT;POSTERIOR

## 2024-11-13 ASSESSMENT — ENCOUNTER SYMPTOMS: SHORTNESS OF BREATH: 1

## 2024-11-13 ASSESSMENT — LIFESTYLE VARIABLES: SMOKING_STATUS: 1

## 2024-11-13 NOTE — ANESTHESIA PRE PROCEDURE
Department of Anesthesiology  Preprocedure Note       Name:  Maria T Romeo   Age:  59 y.o.  :  1965                                          MRN:  [id number]         Date:  2024      Surgeon: Surgeon(s):  Lenin Carney MD    Procedure: Procedure(s):  BRONCHOSCOPY DIAGNOSTIC OR CELL WASH ONLY    Medications prior to admission:   Prior to Admission medications    Medication Sig Start Date End Date Taking? Authorizing Provider   carvedilol (COREG) 25 MG tablet Take 1 tablet by mouth 2 times daily (with meals)   Yes Provider, MD Siena   acetaminophen (TYLENOL) 325 MG tablet Take 2 tablets by mouth every 6 hours as needed for Pain   Yes Provider, MD Siena   diltiazem (DILACOR XR) 120 MG extended release capsule Take 1 capsule by mouth 2 times daily Instructed to take morning of surgery with a sip of water   Yes ProviderSiena MD   sodium chloride 1 g tablet Take 1 tablet by mouth 2 times daily 18  Yes ProviderSiena MD   cloNIDine (CATAPRES) 0.2 MG tablet Take 1 tablet by mouth 3 times daily   Yes ProviderSiena MD       Current medications:    Current Facility-Administered Medications   Medication Dose Route Frequency Provider Last Rate Last Admin    apixaban (ELIQUIS) tablet 10 mg  10 mg Oral BID Valentine Clemente APRN - NP   10 mg at 24    Followed by    [START ON 2024] apixaban (ELIQUIS) tablet 5 mg  5 mg Oral BID Valentine Clemente APRN - NP        guaiFENesin tablet 400 mg  400 mg Oral 4x Daily PRN Cherry Aldana MD   400 mg at 24 1039    lidocaine 4 % external patch 1 patch  1 patch TransDERmal Daily Dennis Vazquez DO   1 patch at 24 1034    cloNIDine (CATAPRES) tablet 0.2 mg  0.2 mg Oral TID Jensen Pelayo DO   0.2 mg at 24 1033    sodium chloride tablet 1 g  1 g Oral BID Jensen Pelayo DO   1 g at 24 1033    acetaminophen (TYLENOL) tablet 650 mg  650 mg Oral Q6H PRN Jensen Pelayo DO        carvedilol

## 2024-11-13 NOTE — PLAN OF CARE
Problem: ABCDS Injury Assessment  Goal: Absence of physical injury  Outcome: Adequate for Discharge     Problem: Discharge Planning  Goal: Discharge to home or other facility with appropriate resources  Outcome: Progressing     Problem: Pain  Goal: Verbalizes/displays adequate comfort level or baseline comfort level  Outcome: Progressing

## 2024-11-13 NOTE — ACP (ADVANCE CARE PLANNING)
Advance Care Planning   Healthcare Decision Maker:    Primary Decision Maker: PetarJanelle - Brother/Sister - [phone]    Click here to complete Healthcare Decision Makers including selection of the Healthcare Decision Maker Relationship (ie \"Primary\").

## 2024-11-13 NOTE — PLAN OF CARE
Problem: ABCDS Injury Assessment  Goal: Absence of physical injury  11/12/2024 2119 by Inga Babcock RN  Outcome: Progressing  11/12/2024 1303 by Priscilla Cash RN  Outcome: Progressing     Problem: Discharge Planning  Goal: Discharge to home or other facility with appropriate resources  11/12/2024 2119 by Inga Babcock RN  Outcome: Progressing  11/12/2024 1303 by Priscilla Cash RN  Outcome: Progressing  Flowsheets (Taken 11/12/2024 0915)  Discharge to home or other facility with appropriate resources:   Identify barriers to discharge with patient and caregiver   Arrange for needed discharge resources and transportation as appropriate   Identify discharge learning needs (meds, wound care, etc)   Arrange for interpreters to assist at discharge as needed   Refer to discharge planning if patient needs post-hospital services based on physician order or complex needs related to functional status, cognitive ability or social support system     Problem: Pain  Goal: Verbalizes/displays adequate comfort level or baseline comfort level  11/12/2024 2119 by Inga Babcock RN  Outcome: Progressing  Flowsheets (Taken 11/12/2024 1447 by Priscilla Cash, RN)  Verbalizes/displays adequate comfort level or baseline comfort level:   Encourage patient to monitor pain and request assistance   Assess pain using appropriate pain scale   Administer analgesics based on type and severity of pain and evaluate response   Implement non-pharmacological measures as appropriate and evaluate response   Consider cultural and social influences on pain and pain management   Notify Licensed Independent Practitioner if interventions unsuccessful or patient reports new pain  11/12/2024 1303 by Priscilla Cash, RN  Outcome: Progressing     Problem: Safety - Adult  Goal: Free from fall injury  11/12/2024 2119 by Inga Babcock, RN  Outcome: Progressing  11/12/2024 1303 by Priscilla Cash, RN  Outcome: Progressing

## 2024-11-13 NOTE — CARE COORDINATION
Social work / Discharge planning:         Patient is independent from home.    She has home oxygen from [facility] that she uses as needed.     Plan is for bronchoscopy to day.     Requiring 5 liters of oxygen at this time.    Will need pulse ox testing when discharge is anticipated.   Electronically signed by ANGI Rodriguez on 11/13/2024 at 12:26 PM

## 2024-11-13 NOTE — OP NOTE
Operative Note      Patient: Maria T Romeo  YOB: 1965  MRN: [id number]    Date of Procedure: 11/13/2024    Pre-Op Diagnosis Codes:      * Acute respiratory failure with hypoxia [J96.01]    Post-Op Diagnosis:  Right middle lobe collapse with mucous plugging but no endobronchial lesions       Procedure(s):  BRONCHOSCOPY ALVEOLAR LAVAGE    Surgeon(s):  Lenin Carney MD    Assistant:   * No surgical staff found *    Anesthesia: Monitor Anesthesia Care    Estimated Blood Loss (mL): Minimal    Complications: None    Specimens:   ID Type Source Tests Collected by Time Destination   A : BAL RML, cytolyte added Respiratory BAL- Bronch. Lavage CYTOLOGY, NON-GYN Lenin Carney MD 11/13/2024 1358        Implants:  * No implants in log *      Drains: * No LDAs found *    Findings:  Infection Present At Time Of Surgery (PATOS) (choose all levels that have infection present):  No infection present  Other Findings: Widely patent airways without evidence of endobronchial lesion.  Significant mucus plugging.    Detailed Description of Procedure:   The patient was informed procedure, consent was obtained.  A fiberoptic bronchoscope was passed the oropharynx.  The vocal cords move promptly the midline.  The left and right lungs were inspected with out evidence of endobronchial lesion.  The right middle lobe was then selectively intubated after significant amounts of clear mucus were cleared.  There is no evidence of endobronchial lesion in 3 identified subsegments.  This was photographed.  A BAL was then conducted in the most medial 1.  Will be sent for cytology.    Electronically signed by Lenin Carney MD on 11/13/2024 at 2:15 PM

## 2024-11-13 NOTE — ANESTHESIA POSTPROCEDURE EVALUATION
Department of Anesthesiology  Postprocedure Note    Patient: Maria T Romeo  MRN: [id number]  YOB: 1965  Date of evaluation: 11/13/2024    Procedure Summary       Date: 11/13/24 Room / Location: [address]    Anesthesia Start: 1345 Anesthesia Stop: 1401    Procedure: BRONCHOSCOPY ALVEOLAR LAVAGE Diagnosis:       Acute respiratory failure with hypoxia      (Acute respiratory failure with hypoxia [J96.01])    Surgeons: Lenin Carney MD Responsible Provider: Dilcia Villafana DO    Anesthesia Type: MAC ASA Status: 3            Anesthesia Type: No value filed.    Magi Phase I:      Magi Phase II:      Anesthesia Post Evaluation    Patient location during evaluation: PACU  Patient participation: complete - patient participated  Level of consciousness: awake and alert  Nausea & Vomiting: no vomiting and no nausea  Cardiovascular status: hemodynamically stable  Respiratory status: acceptable and spontaneous ventilation  Hydration status: stable  Pain management: adequate    No notable events documented.

## 2024-11-14 ENCOUNTER — APPOINTMENT (OUTPATIENT)
Dept: GENERAL RADIOLOGY | Age: 59
DRG: 175 | End: 2024-11-14
Payer: COMMERCIAL

## 2024-11-14 LAB
ALBUMIN SERPL-MCNC: 2.6 G/DL (ref 3.5–5.2)
ALP SERPL-CCNC: 94 U/L (ref 35–104)
ALT SERPL-CCNC: 11 U/L (ref 0–32)
ANION GAP SERPL CALCULATED.3IONS-SCNC: 8 MMOL/L (ref 7–16)
AST SERPL-CCNC: 22 U/L (ref 0–31)
BILIRUB SERPL-MCNC: 0.5 MG/DL (ref 0–1.2)
BUN SERPL-MCNC: 12 MG/DL (ref 6–20)
CALCIUM SERPL-MCNC: 8.4 MG/DL (ref 8.6–10.2)
CHLORIDE SERPL-SCNC: 97 MMOL/L (ref 98–107)
CO2 SERPL-SCNC: 27 MMOL/L (ref 22–29)
CREAT SERPL-MCNC: 0.7 MG/DL (ref 0.5–1)
ERYTHROCYTE [DISTWIDTH] IN BLOOD BY AUTOMATED COUNT: 13.3 % (ref 11.5–15)
GFR, ESTIMATED: >90 ML/MIN/1.73M2
GLUCOSE SERPL-MCNC: 77 MG/DL (ref 74–99)
HCT VFR BLD AUTO: 36.4 % (ref 34–48)
HGB BLD-MCNC: 12.2 G/DL (ref 11.5–15.5)
MCH RBC QN AUTO: 35.9 PG (ref 26–35)
MCHC RBC AUTO-ENTMCNC: 33.5 G/DL (ref 32–34.5)
MCV RBC AUTO: 107.1 FL (ref 80–99.9)
PLATELET # BLD AUTO: 162 K/UL (ref 130–450)
PMV BLD AUTO: 10.1 FL (ref 7–12)
POTASSIUM SERPL-SCNC: 3 MMOL/L (ref 3.5–5)
PROCALCITONIN SERPL-MCNC: 0.88 NG/ML (ref 0–0.08)
PROT SERPL-MCNC: 5.2 G/DL (ref 6.4–8.3)
RBC # BLD AUTO: 3.4 M/UL (ref 3.5–5.5)
SODIUM SERPL-SCNC: 132 MMOL/L (ref 132–146)
WBC OTHER # BLD: 10.1 K/UL (ref 4.5–11.5)

## 2024-11-14 PROCEDURE — 85027 COMPLETE CBC AUTOMATED: CPT

## 2024-11-14 PROCEDURE — 2700000000 HC OXYGEN THERAPY PER DAY

## 2024-11-14 PROCEDURE — 6370000000 HC RX 637 (ALT 250 FOR IP)

## 2024-11-14 PROCEDURE — 80053 COMPREHEN METABOLIC PANEL: CPT

## 2024-11-14 PROCEDURE — 6360000002 HC RX W HCPCS: Performed by: GENERAL PRACTICE

## 2024-11-14 PROCEDURE — 94640 AIRWAY INHALATION TREATMENT: CPT

## 2024-11-14 PROCEDURE — 71046 X-RAY EXAM CHEST 2 VIEWS: CPT

## 2024-11-14 PROCEDURE — 6370000000 HC RX 637 (ALT 250 FOR IP): Performed by: GENERAL PRACTICE

## 2024-11-14 PROCEDURE — 84145 PROCALCITONIN (PCT): CPT

## 2024-11-14 PROCEDURE — 6370000000 HC RX 637 (ALT 250 FOR IP): Performed by: EMERGENCY MEDICINE

## 2024-11-14 PROCEDURE — 2060000000 HC ICU INTERMEDIATE R&B

## 2024-11-14 RX ORDER — IPRATROPIUM BROMIDE AND ALBUTEROL SULFATE 2.5; .5 MG/3ML; MG/3ML
1 SOLUTION RESPIRATORY (INHALATION)
Status: DISCONTINUED | OUTPATIENT
Start: 2024-11-14 | End: 2024-11-17 | Stop reason: HOSPADM

## 2024-11-14 RX ORDER — POTASSIUM CHLORIDE 1500 MG/1
40 TABLET, EXTENDED RELEASE ORAL
Status: DISCONTINUED | OUTPATIENT
Start: 2024-11-14 | End: 2024-11-17 | Stop reason: HOSPADM

## 2024-11-14 RX ORDER — ONDANSETRON 2 MG/ML
4 INJECTION INTRAMUSCULAR; INTRAVENOUS EVERY 6 HOURS PRN
Status: DISCONTINUED | OUTPATIENT
Start: 2024-11-14 | End: 2024-11-17 | Stop reason: HOSPADM

## 2024-11-14 RX ORDER — DILTIAZEM HYDROCHLORIDE 120 MG/1
120 CAPSULE, COATED, EXTENDED RELEASE ORAL DAILY
Status: DISCONTINUED | OUTPATIENT
Start: 2024-11-14 | End: 2024-11-17 | Stop reason: HOSPADM

## 2024-11-14 RX ADMIN — IPRATROPIUM BROMIDE AND ALBUTEROL SULFATE 1 DOSE: .5; 2.5 SOLUTION RESPIRATORY (INHALATION) at 16:33

## 2024-11-14 RX ADMIN — ONDANSETRON 4 MG: 2 INJECTION INTRAMUSCULAR; INTRAVENOUS at 17:41

## 2024-11-14 RX ADMIN — IPRATROPIUM BROMIDE AND ALBUTEROL SULFATE 1 DOSE: .5; 2.5 SOLUTION RESPIRATORY (INHALATION) at 12:43

## 2024-11-14 RX ADMIN — POTASSIUM CHLORIDE 40 MEQ: 1500 TABLET, EXTENDED RELEASE ORAL at 17:45

## 2024-11-14 RX ADMIN — GUAIFENESIN 400 MG: 400 TABLET ORAL at 09:54

## 2024-11-14 RX ADMIN — CLONIDINE HYDROCHLORIDE 0.2 MG: 0.2 TABLET ORAL at 09:49

## 2024-11-14 RX ADMIN — CLONIDINE HYDROCHLORIDE 0.2 MG: 0.2 TABLET ORAL at 13:25

## 2024-11-14 RX ADMIN — APIXABAN 10 MG: 5 TABLET, FILM COATED ORAL at 21:38

## 2024-11-14 RX ADMIN — POTASSIUM CHLORIDE 40 MEQ: 1500 TABLET, EXTENDED RELEASE ORAL at 09:49

## 2024-11-14 RX ADMIN — APIXABAN 10 MG: 5 TABLET, FILM COATED ORAL at 09:50

## 2024-11-14 RX ADMIN — Medication 1 G: at 09:49

## 2024-11-14 RX ADMIN — Medication 1 G: at 21:39

## 2024-11-14 RX ADMIN — CARVEDILOL 25 MG: 25 TABLET, FILM COATED ORAL at 09:51

## 2024-11-14 RX ADMIN — CLONIDINE HYDROCHLORIDE 0.2 MG: 0.2 TABLET ORAL at 21:38

## 2024-11-14 RX ADMIN — CARVEDILOL 25 MG: 25 TABLET, FILM COATED ORAL at 17:45

## 2024-11-14 RX ADMIN — DILTIAZEM HYDROCHLORIDE 120 MG: 120 CAPSULE, COATED, EXTENDED RELEASE ORAL at 13:25

## 2024-11-14 ASSESSMENT — PAIN SCALES - GENERAL
PAINLEVEL_OUTOF10: 3
PAINLEVEL_OUTOF10: 0
PAINLEVEL_OUTOF10: 3

## 2024-11-14 ASSESSMENT — PAIN - FUNCTIONAL ASSESSMENT: PAIN_FUNCTIONAL_ASSESSMENT: ACTIVITIES ARE NOT PREVENTED

## 2024-11-14 ASSESSMENT — PAIN DESCRIPTION - ORIENTATION: ORIENTATION: RIGHT;LEFT;POSTERIOR

## 2024-11-14 ASSESSMENT — PAIN DESCRIPTION - LOCATION: LOCATION: RIB CAGE

## 2024-11-14 NOTE — PLAN OF CARE
Problem: ABCDS Injury Assessment  Goal: Absence of physical injury  11/13/2024 2058 by Moraima Alonzo RN  Outcome: Progressing  11/13/2024 1859 by Huong Adamson RN  Outcome: Adequate for Discharge     Problem: Discharge Planning  Goal: Discharge to home or other facility with appropriate resources  11/13/2024 2058 by Moraima Alonzo RN  Outcome: Progressing  Flowsheets (Taken 11/13/2024 2026)  Discharge to home or other facility with appropriate resources: Identify barriers to discharge with patient and caregiver  11/13/2024 1859 by Huong Adamson RN  Outcome: Progressing     Problem: Pain  Goal: Verbalizes/displays adequate comfort level or baseline comfort level  11/13/2024 2058 by Moraima Alonzo RN  Outcome: Progressing  11/13/2024 1859 by Huong Adamson RN  Outcome: Progressing     Problem: Safety - Adult  Goal: Free from fall injury  Outcome: Progressing     Problem: Respiratory - Adult  Goal: Achieves optimal ventilation and oxygenation  Outcome: Progressing     Problem: ABCDS Injury Assessment  Goal: Absence of physical injury  11/13/2024 2058 by Moraima Alonzo RN  Outcome: Progressing  11/13/2024 1859 by Huong Adamson RN  Outcome: Adequate for Discharge     Problem: Discharge Planning  Goal: Discharge to home or other facility with appropriate resources  11/13/2024 2058 by Moraima Alonzo RN  Outcome: Progressing  Flowsheets (Taken 11/13/2024 2026)  Discharge to home or other facility with appropriate resources: Identify barriers to discharge with patient and caregiver  11/13/2024 1859 by Huong Adamson RN  Outcome: Progressing     Problem: Pain  Goal: Verbalizes/displays adequate comfort level or baseline comfort level  11/13/2024 2058 by Moraima Alonzo RN  Outcome: Progressing  11/13/2024 1859 by Huong Adamson RN  Outcome: Progressing     Problem: Safety - Adult  Goal: Free from fall injury  Outcome: Progressing     Problem: Respiratory - Adult  Goal:

## 2024-11-14 NOTE — PLAN OF CARE
Problem: ABCDS Injury Assessment  Goal: Absence of physical injury  Outcome: Adequate for Discharge     Problem: Discharge Planning  Goal: Discharge to home or other facility with appropriate resources  Outcome: Adequate for Discharge     Problem: Pain  Goal: Verbalizes/displays adequate comfort level or baseline comfort level  Outcome: Adequate for Discharge     Problem: Respiratory - Adult  Goal: Achieves optimal ventilation and oxygenation  Outcome: Progressing

## 2024-11-15 LAB
F5 P.R506Q BLD/T QL: NEGATIVE
F5 P.R506Q BLD/T QL: NEGATIVE
MICROORGANISM SPEC CULT: ABNORMAL
MICROORGANISM SPEC CULT: ABNORMAL
MICROORGANISM/AGENT SPEC: ABNORMAL
MTHFR INTERPRETATION: NORMAL
MTHFR INTERPRETATION: NORMAL
MTHFR MUTATION A1286C: NORMAL
MTHFR MUTATION A1286C: NORMAL
MTHFR MUTATION C665T: NEGATIVE
MTHFR MUTATION C665T: NEGATIVE
NON-GYN CYTOLOGY REPORT: NORMAL
PAI-1 INTERPRETATION: NORMAL
PAI-1 INTERPRETATION: NORMAL
PLASMINOGEN ACT. INHIBITOR-1 (PAI-1) SPECIMEN: NORMAL
PLASMINOGEN ACT. INHIBITOR-1 (PAI-1) SPECIMEN: NORMAL
SPECIMEN DESCRIPTION: ABNORMAL
SPECIMEN SOURCE: NORMAL
SPECIMEN SOURCE: NORMAL
SPECIMEN: NORMAL
SPECIMEN: NORMAL

## 2024-11-15 PROCEDURE — 94640 AIRWAY INHALATION TREATMENT: CPT

## 2024-11-15 PROCEDURE — 6360000002 HC RX W HCPCS: Performed by: GENERAL PRACTICE

## 2024-11-15 PROCEDURE — 6370000000 HC RX 637 (ALT 250 FOR IP)

## 2024-11-15 PROCEDURE — 2060000000 HC ICU INTERMEDIATE R&B

## 2024-11-15 PROCEDURE — 2700000000 HC OXYGEN THERAPY PER DAY

## 2024-11-15 PROCEDURE — 6370000000 HC RX 637 (ALT 250 FOR IP): Performed by: GENERAL PRACTICE

## 2024-11-15 PROCEDURE — 6370000000 HC RX 637 (ALT 250 FOR IP): Performed by: EMERGENCY MEDICINE

## 2024-11-15 RX ADMIN — IPRATROPIUM BROMIDE AND ALBUTEROL SULFATE 1 DOSE: .5; 2.5 SOLUTION RESPIRATORY (INHALATION) at 19:53

## 2024-11-15 RX ADMIN — POTASSIUM CHLORIDE 40 MEQ: 1500 TABLET, EXTENDED RELEASE ORAL at 20:35

## 2024-11-15 RX ADMIN — IPRATROPIUM BROMIDE AND ALBUTEROL SULFATE 1 DOSE: .5; 2.5 SOLUTION RESPIRATORY (INHALATION) at 15:50

## 2024-11-15 RX ADMIN — IPRATROPIUM BROMIDE AND ALBUTEROL SULFATE 1 DOSE: .5; 2.5 SOLUTION RESPIRATORY (INHALATION) at 08:36

## 2024-11-15 RX ADMIN — CLONIDINE HYDROCHLORIDE 0.2 MG: 0.2 TABLET ORAL at 20:35

## 2024-11-15 RX ADMIN — CLONIDINE HYDROCHLORIDE 0.2 MG: 0.2 TABLET ORAL at 15:07

## 2024-11-15 RX ADMIN — ONDANSETRON 4 MG: 2 INJECTION INTRAMUSCULAR; INTRAVENOUS at 12:10

## 2024-11-15 RX ADMIN — POTASSIUM CHLORIDE 40 MEQ: 1500 TABLET, EXTENDED RELEASE ORAL at 08:36

## 2024-11-15 RX ADMIN — IPRATROPIUM BROMIDE AND ALBUTEROL SULFATE 1 DOSE: .5; 2.5 SOLUTION RESPIRATORY (INHALATION) at 12:48

## 2024-11-15 RX ADMIN — CARVEDILOL 25 MG: 25 TABLET, FILM COATED ORAL at 08:36

## 2024-11-15 RX ADMIN — APIXABAN 10 MG: 5 TABLET, FILM COATED ORAL at 20:34

## 2024-11-15 RX ADMIN — CLONIDINE HYDROCHLORIDE 0.2 MG: 0.2 TABLET ORAL at 08:36

## 2024-11-15 RX ADMIN — APIXABAN 10 MG: 5 TABLET, FILM COATED ORAL at 08:36

## 2024-11-15 RX ADMIN — DILTIAZEM HYDROCHLORIDE 120 MG: 120 CAPSULE, COATED, EXTENDED RELEASE ORAL at 08:36

## 2024-11-15 RX ADMIN — Medication 1 G: at 20:35

## 2024-11-15 RX ADMIN — Medication 1 G: at 08:36

## 2024-11-15 RX ADMIN — CARVEDILOL 25 MG: 25 TABLET, FILM COATED ORAL at 16:56

## 2024-11-15 NOTE — PLAN OF CARE
Problem: ABCDS Injury Assessment  Goal: Absence of physical injury  11/14/2024 2000 by Compa Zavala RN  Outcome: Progressing  11/14/2024 1804 by Huong Adamson RN  Outcome: Adequate for Discharge     Problem: Discharge Planning  Goal: Discharge to home or other facility with appropriate resources  11/14/2024 2000 by Compa Zavala RN  Outcome: Progressing  11/14/2024 1804 by Huong Adamson RN  Outcome: Adequate for Discharge     Problem: Pain  Goal: Verbalizes/displays adequate comfort level or baseline comfort level  11/14/2024 2000 by Compa Zavala RN  Outcome: Progressing  11/14/2024 1804 by Huong Adamson RN  Outcome: Adequate for Discharge     Problem: Safety - Adult  Goal: Free from fall injury  Outcome: Progressing     Problem: Respiratory - Adult  Goal: Achieves optimal ventilation and oxygenation  11/14/2024 2000 by Compa Zavala RN  Outcome: Progressing  11/14/2024 1804 by Huong Adamson RN  Outcome: Progressing

## 2024-11-15 NOTE — CARE COORDINATION
Social work / Discharge planning:          Patient states that she has portable tanks from [facility] but has not needed them.     Social work confirmed with [facility] liaison that her orders are written for 3 liters continuously.     Patient will need updated pulse ox testing prior to discharge.     Social work provided Eliquis discount card.   Electronically signed by ANGI Rodriguez on 11/15/2024 at 1:26 PM

## 2024-11-15 NOTE — PLAN OF CARE
Problem: ABCDS Injury Assessment  Goal: Absence of physical injury  Outcome: Progressing     Problem: Discharge Planning  Goal: Discharge to home or other facility with appropriate resources  Outcome: Progressing     Problem: Pain  Goal: Verbalizes/displays adequate comfort level or baseline comfort level  Outcome: Progressing     Problem: Safety - Adult  Goal: Free from fall injury  Outcome: Progressing     Problem: Respiratory - Adult  Goal: Achieves optimal ventilation and oxygenation  Outcome: Progressing

## 2024-11-16 LAB
ANION GAP SERPL CALCULATED.3IONS-SCNC: 8 MMOL/L (ref 7–16)
BUN SERPL-MCNC: 3 MG/DL (ref 6–20)
CALCIUM SERPL-MCNC: 8.5 MG/DL (ref 8.6–10.2)
CHLORIDE SERPL-SCNC: 97 MMOL/L (ref 98–107)
CO2 SERPL-SCNC: 28 MMOL/L (ref 22–29)
CREAT SERPL-MCNC: 0.6 MG/DL (ref 0.5–1)
F2 C.20210G>A GENO BLD/T: NEGATIVE
F2 C.20210G>A GENO BLD/T: NEGATIVE
FACTOR XIII (F13A1) V34L VARIANT: NEGATIVE
FACTOR XIII (F13A1) V34L VARIANT: NEGATIVE
FACTOR XIII VARIANT SPECIMEN: NORMAL
FACTOR XIII VARIANT SPECIMEN: NORMAL
GFR, ESTIMATED: >90 ML/MIN/1.73M2
GLUCOSE SERPL-MCNC: 87 MG/DL (ref 74–99)
MICROORGANISM SPEC CULT: NORMAL
MICROORGANISM SPEC CULT: NORMAL
POTASSIUM SERPL-SCNC: 4.1 MMOL/L (ref 3.5–5)
SERVICE CMNT-IMP: NORMAL
SERVICE CMNT-IMP: NORMAL
SODIUM SERPL-SCNC: 133 MMOL/L (ref 132–146)
SPECIMEN DESCRIPTION: NORMAL
SPECIMEN DESCRIPTION: NORMAL
SPECIMEN SOURCE: NORMAL
SPECIMEN SOURCE: NORMAL

## 2024-11-16 PROCEDURE — 6370000000 HC RX 637 (ALT 250 FOR IP): Performed by: GENERAL PRACTICE

## 2024-11-16 PROCEDURE — 2700000000 HC OXYGEN THERAPY PER DAY

## 2024-11-16 PROCEDURE — 94640 AIRWAY INHALATION TREATMENT: CPT

## 2024-11-16 PROCEDURE — 6370000000 HC RX 637 (ALT 250 FOR IP)

## 2024-11-16 PROCEDURE — 6370000000 HC RX 637 (ALT 250 FOR IP): Performed by: EMERGENCY MEDICINE

## 2024-11-16 PROCEDURE — 2060000000 HC ICU INTERMEDIATE R&B

## 2024-11-16 PROCEDURE — 80048 BASIC METABOLIC PNL TOTAL CA: CPT

## 2024-11-16 PROCEDURE — 94669 MECHANICAL CHEST WALL OSCILL: CPT

## 2024-11-16 RX ADMIN — CLONIDINE HYDROCHLORIDE 0.2 MG: 0.2 TABLET ORAL at 16:07

## 2024-11-16 RX ADMIN — ACETAMINOPHEN 650 MG: 325 TABLET ORAL at 21:08

## 2024-11-16 RX ADMIN — APIXABAN 10 MG: 5 TABLET, FILM COATED ORAL at 21:08

## 2024-11-16 RX ADMIN — IPRATROPIUM BROMIDE AND ALBUTEROL SULFATE 1 DOSE: .5; 2.5 SOLUTION RESPIRATORY (INHALATION) at 12:35

## 2024-11-16 RX ADMIN — CARVEDILOL 25 MG: 25 TABLET, FILM COATED ORAL at 09:58

## 2024-11-16 RX ADMIN — POTASSIUM CHLORIDE 40 MEQ: 1500 TABLET, EXTENDED RELEASE ORAL at 21:07

## 2024-11-16 RX ADMIN — Medication 1 G: at 21:12

## 2024-11-16 RX ADMIN — DILTIAZEM HYDROCHLORIDE 120 MG: 120 CAPSULE, COATED, EXTENDED RELEASE ORAL at 09:58

## 2024-11-16 RX ADMIN — CLONIDINE HYDROCHLORIDE 0.2 MG: 0.2 TABLET ORAL at 10:00

## 2024-11-16 RX ADMIN — CARVEDILOL 25 MG: 25 TABLET, FILM COATED ORAL at 16:07

## 2024-11-16 RX ADMIN — GUAIFENESIN 400 MG: 400 TABLET ORAL at 21:08

## 2024-11-16 RX ADMIN — POTASSIUM CHLORIDE 40 MEQ: 1500 TABLET, EXTENDED RELEASE ORAL at 09:57

## 2024-11-16 RX ADMIN — APIXABAN 10 MG: 5 TABLET, FILM COATED ORAL at 09:58

## 2024-11-16 RX ADMIN — CLONIDINE HYDROCHLORIDE 0.2 MG: 0.2 TABLET ORAL at 21:08

## 2024-11-16 RX ADMIN — IPRATROPIUM BROMIDE AND ALBUTEROL SULFATE 1 DOSE: .5; 2.5 SOLUTION RESPIRATORY (INHALATION) at 08:24

## 2024-11-16 RX ADMIN — IPRATROPIUM BROMIDE AND ALBUTEROL SULFATE 1 DOSE: .5; 2.5 SOLUTION RESPIRATORY (INHALATION) at 21:05

## 2024-11-16 RX ADMIN — Medication 1 G: at 09:58

## 2024-11-16 ASSESSMENT — PAIN - FUNCTIONAL ASSESSMENT: PAIN_FUNCTIONAL_ASSESSMENT: ACTIVITIES ARE NOT PREVENTED

## 2024-11-16 ASSESSMENT — PAIN DESCRIPTION - ORIENTATION: ORIENTATION: RIGHT;LEFT

## 2024-11-16 ASSESSMENT — PAIN SCALES - WONG BAKER: WONGBAKER_NUMERICALRESPONSE: NO HURT

## 2024-11-16 ASSESSMENT — PAIN DESCRIPTION - DESCRIPTORS: DESCRIPTORS: ACHING;DISCOMFORT;SORE

## 2024-11-16 ASSESSMENT — PAIN DESCRIPTION - LOCATION: LOCATION: RIB CAGE

## 2024-11-16 ASSESSMENT — PAIN DESCRIPTION - PAIN TYPE: TYPE: ACUTE PAIN

## 2024-11-16 ASSESSMENT — PAIN DESCRIPTION - FREQUENCY: FREQUENCY: INTERMITTENT

## 2024-11-16 ASSESSMENT — PAIN SCALES - GENERAL
PAINLEVEL_OUTOF10: 6
PAINLEVEL_OUTOF10: 0
PAINLEVEL_OUTOF10: 3

## 2024-11-16 ASSESSMENT — PAIN DESCRIPTION - ONSET: ONSET: ON-GOING

## 2024-11-16 NOTE — PLAN OF CARE
Problem: ABCDS Injury Assessment  Goal: Absence of physical injury  11/16/2024 0104 by Compa Zavala RN  Outcome: Progressing  11/15/2024 1515 by Haily Ventura RN  Outcome: Progressing     Problem: Discharge Planning  Goal: Discharge to home or other facility with appropriate resources  11/16/2024 0104 by Compa Zavala RN  Outcome: Progressing  11/15/2024 1515 by Haily Ventura RN  Outcome: Progressing     Problem: Pain  Goal: Verbalizes/displays adequate comfort level or baseline comfort level  11/16/2024 0104 by Compa Zavala RN  Outcome: Progressing  11/15/2024 1515 by Haily Ventura RN  Outcome: Progressing     Problem: Safety - Adult  Goal: Free from fall injury  11/16/2024 0104 by Compa Zavala RN  Outcome: Progressing  11/15/2024 1515 by Haily Ventura RN  Outcome: Progressing     Problem: Respiratory - Adult  Goal: Achieves optimal ventilation and oxygenation  11/16/2024 0104 by Compa Zavala RN  Outcome: Progressing  11/15/2024 1515 by Haily Ventura RN  Outcome: Progressing

## 2024-11-17 VITALS
TEMPERATURE: 98.1 F | OXYGEN SATURATION: 95 % | HEIGHT: 62 IN | SYSTOLIC BLOOD PRESSURE: 132 MMHG | DIASTOLIC BLOOD PRESSURE: 92 MMHG | BODY MASS INDEX: 18.77 KG/M2 | RESPIRATION RATE: 17 BRPM | WEIGHT: 102 LBS | HEART RATE: 79 BPM

## 2024-11-17 PROCEDURE — 2700000000 HC OXYGEN THERAPY PER DAY

## 2024-11-17 PROCEDURE — 6370000000 HC RX 637 (ALT 250 FOR IP): Performed by: GENERAL PRACTICE

## 2024-11-17 PROCEDURE — 6370000000 HC RX 637 (ALT 250 FOR IP): Performed by: EMERGENCY MEDICINE

## 2024-11-17 PROCEDURE — 94640 AIRWAY INHALATION TREATMENT: CPT

## 2024-11-17 PROCEDURE — 6370000000 HC RX 637 (ALT 250 FOR IP)

## 2024-11-17 RX ORDER — POTASSIUM CHLORIDE 1500 MG/1
40 TABLET, EXTENDED RELEASE ORAL 2 TIMES DAILY
Qty: 60 TABLET | Refills: 3 | Status: ON HOLD | OUTPATIENT
Start: 2024-11-17

## 2024-11-17 RX ORDER — GUAIFENESIN 400 MG/1
400 TABLET ORAL 4 TIMES DAILY PRN
Qty: 56 TABLET | Refills: 0 | Status: ON HOLD | OUTPATIENT
Start: 2024-11-17

## 2024-11-17 RX ORDER — DILTIAZEM HYDROCHLORIDE 120 MG/1
120 CAPSULE, COATED, EXTENDED RELEASE ORAL DAILY
Qty: 30 CAPSULE | Refills: 3 | Status: ON HOLD | OUTPATIENT
Start: 2024-11-18

## 2024-11-17 RX ADMIN — CLONIDINE HYDROCHLORIDE 0.2 MG: 0.2 TABLET ORAL at 10:03

## 2024-11-17 RX ADMIN — CARVEDILOL 25 MG: 25 TABLET, FILM COATED ORAL at 10:03

## 2024-11-17 RX ADMIN — APIXABAN 10 MG: 5 TABLET, FILM COATED ORAL at 10:03

## 2024-11-17 RX ADMIN — Medication 1 G: at 10:03

## 2024-11-17 RX ADMIN — CLONIDINE HYDROCHLORIDE 0.2 MG: 0.2 TABLET ORAL at 13:36

## 2024-11-17 RX ADMIN — IPRATROPIUM BROMIDE AND ALBUTEROL SULFATE 1 DOSE: .5; 2.5 SOLUTION RESPIRATORY (INHALATION) at 12:18

## 2024-11-17 RX ADMIN — POTASSIUM CHLORIDE 40 MEQ: 1500 TABLET, EXTENDED RELEASE ORAL at 10:03

## 2024-11-17 RX ADMIN — DILTIAZEM HYDROCHLORIDE 120 MG: 120 CAPSULE, COATED, EXTENDED RELEASE ORAL at 10:03

## 2024-11-17 ASSESSMENT — PAIN SCALES - GENERAL: PAINLEVEL_OUTOF10: 0

## 2024-11-17 NOTE — PROGRESS NOTES
Pulmonary Progress Note    Admit Date: 2024  Hospital day                               PCP: Jensen Pelayo DO    Chief Complaint (s):  Patient Active Problem List   Diagnosis    Hyponatremia    Acute hyponatremia    Traumatic closed displaced fracture of left shoulder with anterior dislocation    Acute respiratory failure with hypoxia    LFTs abnormal    Primary hypertension    Multiple closed fractures of ribs of left side    Acute hypoxic respiratory failure    Moderate protein-calorie malnutrition (HCC)       Subjective:  Awake and alert this p.m.  Does not like the chest vest as it causes pain.  Also, does not like aerosol treatments.        Vitals:  VITALS:  BP (!) 141/88   Pulse 89   Temp 98.9 °F (37.2 °C) (Oral)   Resp 17   Ht 1.575 m (5' 2\")   Wt 46.3 kg (102 lb)   LMP 2012   SpO2 90%   BMI 18.66 kg/m²     24HR INTAKE/OUTPUT:      Intake/Output Summary (Last 24 hours) at 2024 1638  Last data filed at 2024 1148  Gross per 24 hour   Intake 570 ml   Output 1200 ml   Net -630 ml       24HR PULSE OXIMETRY RANGE:    SpO2  Av.9 %  Min: 83 %  Max: 95 %      Medications:  IV:      Scheduled Meds:   potassium chloride  40 mEq Oral BID PC    dilTIAZem  120 mg Oral Daily    ipratropium 0.5 mg-albuterol 2.5 mg  1 Dose Inhalation Q4H WA RT    apixaban  10 mg Oral BID    Followed by    [START ON 2024] apixaban  5 mg Oral BID    lidocaine  1 patch TransDERmal Daily    cloNIDine  0.2 mg Oral TID    sodium chloride  1 g Oral BID    carvedilol  25 mg Oral BID WC       Diet:   ADULT DIET; Regular     EXAM:  General: No distress. Alert.  Eyes: PERRL. No sclera icterus. No conjunctival injection.  ENT: No discharge. Pharynx clear.  Neck: Trachea midline. Normal thyroid.  Resp: No accessory muscle use. no rales. no wheezing. no rhonchi. Diminished.  CV: Regular rate. Regular rhythm. No murmur or rub.   Abd: Non-tender. Non-distended. No masses. No organomegaly. Normal 
                 Pulmonary Progress Note    Admit Date: 2024  Hospital day                               PCP: Jensen Pelayo DO    Chief Complaint (s):  Patient Active Problem List   Diagnosis    Hyponatremia    Acute hyponatremia    Traumatic closed displaced fracture of left shoulder with anterior dislocation    Acute respiratory failure with hypoxia    LFTs abnormal    Primary hypertension    Multiple closed fractures of ribs of left side    Acute hypoxic respiratory failure    Moderate protein-calorie malnutrition (HCC)       Subjective:  Patient seen on 4 L nasal cannula. She admits to feeling short of breath and a harsh cough. Yellow/green secretions. She has right back soreness.         Vitals:  VITALS:  BP (!) 135/98   Pulse 80   Temp 98.7 °F (37.1 °C) (Oral)   Resp 20   Ht 1.575 m (5' 2\")   Wt 46.3 kg (102 lb)   LMP 2012   SpO2 91%   BMI 18.66 kg/m²     24HR INTAKE/OUTPUT:    No intake or output data in the 24 hours ending 24 1039    24HR PULSE OXIMETRY RANGE:    SpO2  Av.8 %  Min: 90 %  Max: 95 %      Medications:  IV:      Scheduled Meds:   apixaban  10 mg Oral BID    Followed by    [START ON 2024] apixaban  5 mg Oral BID    lidocaine  1 patch TransDERmal Daily    cloNIDine  0.2 mg Oral TID    sodium chloride  1 g Oral BID    carvedilol  25 mg Oral BID WC       Diet:   Diet NPO Exceptions are: Sips of Water with Meds     EXAM:  General: No distress. Alert.  Eyes: PERRL. No sclera icterus. No conjunctival injection.  ENT: No discharge. Pharynx clear.  Neck: Trachea midline. Normal thyroid.  Resp: No accessory muscle use. no rales. no wheezing. no rhonchi. Diminished right lung.   CV: Regular rate. Regular rhythm. No murmur or rub.   Abd: Non-tender. Non-distended. No masses. No organomegaly. Normal bowel sounds.   Skin: Warm and dry. No nodule on exposed extremities. No rash on exposed extremities.  Ext: No cyanosis, clubbing, edema  Lymph: No cervical LAD. No 
                 Pulmonary Progress Note    Admit Date: 2024  Hospital day                               PCP: Jensen Pelayo DO    Chief Complaint (s):  Patient Active Problem List   Diagnosis    Hyponatremia    Acute hyponatremia    Traumatic closed displaced fracture of left shoulder with anterior dislocation    Acute respiratory failure with hypoxia    LFTs abnormal    Primary hypertension    Multiple closed fractures of ribs of left side    Acute hypoxic respiratory failure    Moderate protein-calorie malnutrition (HCC)       Subjective:  Seen this p.m., breathing is a little bit better.  Cytology was reviewed.        Vitals:  VITALS:  /84   Pulse 80   Temp 98.3 °F (36.8 °C) (Oral)   Resp 19   Ht 1.575 m (5' 2\")   Wt 46.3 kg (102 lb)   LMP 2012   SpO2 90%   BMI 18.66 kg/m²     24HR INTAKE/OUTPUT:      Intake/Output Summary (Last 24 hours) at 11/15/2024 1707  Last data filed at 11/15/2024 1239  Gross per 24 hour   Intake 400 ml   Output 700 ml   Net -300 ml       24HR PULSE OXIMETRY RANGE:    SpO2  Av %  Min: 90 %  Max: 95 %      Medications:  IV:      Scheduled Meds:   potassium chloride  40 mEq Oral BID PC    dilTIAZem  120 mg Oral Daily    ipratropium 0.5 mg-albuterol 2.5 mg  1 Dose Inhalation Q4H WA RT    apixaban  10 mg Oral BID    Followed by    [START ON 2024] apixaban  5 mg Oral BID    lidocaine  1 patch TransDERmal Daily    cloNIDine  0.2 mg Oral TID    sodium chloride  1 g Oral BID    carvedilol  25 mg Oral BID WC       Diet:   ADULT DIET; Regular     EXAM:  General: No distress. Alert.  Eyes: PERRL. No sclera icterus. No conjunctival injection.  ENT: No discharge. Pharynx clear.  Neck: Trachea midline. Normal thyroid.  Resp: No accessory muscle use. no rales. no wheezing. no rhonchi. Diminished.  CV: Regular rate. Regular rhythm. No murmur or rub.   Abd: Non-tender. Non-distended. No masses. No organomegaly. Normal bowel sounds.   Skin: Warm and dry. No nodule on 
4 Eyes Skin Assessment     NAME:  Maria T Romeo  YOB: 1965  MEDICAL RECORD NUMBER:  [id number]    The patient is being assessed for  Admission    I agree that at least one RN has performed a thorough Head to Toe Skin Assessment on the patient. ALL assessment sites listed below have been assessed.      Areas assessed by both nurses:    Head, Face, Ears, Shoulders, Back, Chest, Arms, Elbows, Hands, Sacrum. Buttock, Coccyx, Ischium, Legs. Feet and Heels, and Under Medical Devices         Does the Patient have a Wound? No noted wound(s)       Micky Prevention initiated by RN: No  Wound Care Orders initiated by RN: No    Pressure Injury (Stage 3,4, Unstageable, DTI, NWPT, and Complex wounds) if present, place Wound referral order by RN under : No    New Ostomies, if present place, Ostomy referral order under : No     Nurse 1 eSignature: Electronically signed by Marilou Licea RN on 11/11/24 at 5:17 PM EST    **SHARE this note so that the co-signing nurse can place an eSignature**    Nurse 2 eSignature: Electronically signed by Lindsay Mendez RN on 11/11/24 at 6:35 PM EST  
CNM notified of procedure for tomorrow  
Called report to RN on [address] west and reported that patient was on 5L high flow nasal cannula.  Her oxygen saturation is around 91-92% on that high flow nasal cannula.  
Database initiated pharmacy and medications verified with the patient. She is A&O comes in from home alone. She uses no assistive devices and wears 2 liters oxygen PRN through [employer].   
Dr. Pelayo called regarding BP medications and current pressure of 96/61. Orders received.   
New consult called to Dr Carney's answering service.  
Patient came out to PACU with simple mask on at 8L and she was her oxygen saturation was 88%.  Patient has a moist cough and sometimes is able to get something up. Cough is persistent.  
Patient provided with incentive spirometer and educated on use. Patient able to appropriately demonstrate, obtaining volume of 1000. Goal set at 1250.  
Patient seen bp ok. K still low. Follow labs. Likely home in am  
Patient seen comfortable with 02 and sats okay with 02 supplement. Now o eliquis. Likely for bronch today. Bp labile. Adjust bp meds. Watch sodium, a bit low today  
Patient seen doing okay . Labs stable. Bp controlled. Bronch negative for malignancy. Can be discharged with follow up  
Patient seen tolerating meds and 02. Bronch noted. Rml syndrome with mucous plugging , no endobronchial lesion. Cytology pending . Bp a little elevate , resume cardiazen  
Spiritual Health History and Assessment/Progress Note  [facility]     Encounter, Spiritual/Emotional Needs,  ,  ,      Name: Maria T Romeo MRN: [id number]    Age: 59 y.o.     Sex: female   Language: English   Latter day: Religion   Acute respiratory failure with hypoxia     Date: 11/12/2024                           Spiritual Assessment began in [address] MED SURG        Referral/Consult From: Rounding   Encounter Overview/Reason:  Encounter, Spiritual/Emotional Needs  Service Provided For: Patient    Kriss, Belief, Meaning:   Patient is connected with a kriss tradition or spiritual practice  Family/Friends No family/friends present      Importance and Influence:  Patient has no beliefs influential to healthcare decision-making identified during this visit  Family/Friends No family/friends present    Community:  Patient feels well-supported. Support system includes: Other: Sister  Family/Friends No family/friends present    Assessment and Plan of Care:     Patient Interventions include: Facilitated expression of thoughts and feelings and Other: Ministry of presence  Family/Friends Interventions include: No family/friends present    Patient Plan of Care: Spiritual Care available upon further referral  Family/Friends Plan of Care: Spiritual Care available upon further referral    Electronically signed by Chaplain Diane on 11/12/2024 at 4:10 PM   
Will need ambulatory pulse ox testing on Saturday morning with a potential for discharge. Patient is baseline 3L NC at home currently. Per Dr Carney's notes wean O2 to keep sats 88-92%. Currently 5L NC with POX @ 89-91% at rest.   
        XR CHEST (2 VW)    (Results Pending)       Assessment:  Pulmonary emboli. Unprovoked.   Right middle lung collapse. Bronchoscopy completed 1/2024. Worse on new CT scan. 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 Resolved.     Plan:  Add ipratropium-albuterol   Check procalcitonin   Continue eliquis  Wean supplemental oxygen with goal SpO2 88%-92%.       Time at the bedside, reviewing labs and radiographs, reviewing updated notes and consultations, discussing with staff and family was more than 50 minutes.    Please note that voice recognition technology was used in the preparation of this note and make therefore it may contain inadvertent transcription errors.  If the patient is a COVID 19 isolation patient, the above physical exam reflects that of the examining physician for the day.        YULISA Arauz-AVNI  Attestation    The patient was seen and personally examined.  History is obtained through the patient by myself.  Impression and plan are mine.    No complications from yesterday's bronchoscopy.  Pneumococcus in the sputum is not associated with PMNs.  So it is unlikely to be a true pathogen.    Documentation only provided per the nurse practitioner.    Additions and corrections are reflected in the signed note.    Lenin Carney MD,  M.D., F.C.C.P.    [facility]    [facility], [address] [place] [address], [address], [place] [address]  Office visits:  [address] [place] [address]

## 2024-11-21 ENCOUNTER — HOSPITAL ENCOUNTER (INPATIENT)
Age: 59
LOS: 15 days | Discharge: HOME HEALTH | End: 2024-12-06
Attending: EMERGENCY MEDICINE | Admitting: GENERAL PRACTICE
Payer: COMMERCIAL

## 2024-11-21 ENCOUNTER — APPOINTMENT (OUTPATIENT)
Dept: CT IMAGING | Age: 59
End: 2024-11-21
Payer: COMMERCIAL

## 2024-11-21 DIAGNOSIS — E86.1 HYPOTENSION DUE TO HYPOVOLEMIA: ICD-10-CM

## 2024-11-21 DIAGNOSIS — I50.20 SYSTOLIC CONGESTIVE HEART FAILURE, UNSPECIFIED HF CHRONICITY (HCC): ICD-10-CM

## 2024-11-21 DIAGNOSIS — E87.5 HYPERKALEMIA: ICD-10-CM

## 2024-11-21 DIAGNOSIS — E87.1 HYPONATREMIA: Primary | ICD-10-CM

## 2024-11-21 PROBLEM — E27.2 ADRENAL CRISIS (HCC): Status: ACTIVE | Noted: 2024-11-21

## 2024-11-21 LAB
ALBUMIN SERPL-MCNC: 3.5 G/DL (ref 3.5–5.2)
ALP SERPL-CCNC: 123 U/L (ref 35–104)
ALT SERPL-CCNC: 10 U/L (ref 0–32)
ANION GAP SERPL CALCULATED.3IONS-SCNC: 17 MMOL/L (ref 7–16)
AST SERPL-CCNC: 15 U/L (ref 0–31)
BASOPHILS # BLD: 0.03 K/UL (ref 0–0.2)
BASOPHILS NFR BLD: 0 % (ref 0–2)
BILIRUB SERPL-MCNC: 0.8 MG/DL (ref 0–1.2)
BNP SERPL-MCNC: 619 PG/ML (ref 0–125)
BUN SERPL-MCNC: 11 MG/DL (ref 6–20)
CALCIUM SERPL-MCNC: 8.6 MG/DL (ref 8.6–10.2)
CHLORIDE SERPL-SCNC: 88 MMOL/L (ref 98–107)
CHP ED QC CHECK: NORMAL
CO2 SERPL-SCNC: 21 MMOL/L (ref 22–29)
CREAT SERPL-MCNC: 0.7 MG/DL (ref 0.5–1)
EKG ATRIAL RATE: 76 BPM
EKG P AXIS: 61 DEGREES
EKG P-R INTERVAL: 168 MS
EKG Q-T INTERVAL: 396 MS
EKG QRS DURATION: 62 MS
EKG QTC CALCULATION (BAZETT): 445 MS
EKG R AXIS: 14 DEGREES
EKG T AXIS: 47 DEGREES
EKG VENTRICULAR RATE: 76 BPM
EOSINOPHIL # BLD: 0.03 K/UL (ref 0.05–0.5)
EOSINOPHILS RELATIVE PERCENT: 0 % (ref 0–6)
ERYTHROCYTE [DISTWIDTH] IN BLOOD BY AUTOMATED COUNT: 13 % (ref 11.5–15)
GFR, ESTIMATED: >90 ML/MIN/1.73M2
GLUCOSE BLD-MCNC: 107 MG/DL
GLUCOSE BLD-MCNC: 107 MG/DL (ref 74–99)
GLUCOSE BLD-MCNC: 176 MG/DL (ref 74–99)
GLUCOSE SERPL-MCNC: 108 MG/DL (ref 74–99)
HCT VFR BLD AUTO: 33.3 % (ref 34–48)
HGB BLD-MCNC: 11.5 G/DL (ref 11.5–15.5)
IMM GRANULOCYTES # BLD AUTO: 0.11 K/UL (ref 0–0.58)
IMM GRANULOCYTES NFR BLD: 1 % (ref 0–5)
LYMPHOCYTES NFR BLD: 1.24 K/UL (ref 1.5–4)
LYMPHOCYTES RELATIVE PERCENT: 9 % (ref 20–42)
MCH RBC QN AUTO: 35.5 PG (ref 26–35)
MCHC RBC AUTO-ENTMCNC: 34.5 G/DL (ref 32–34.5)
MCV RBC AUTO: 102.8 FL (ref 80–99.9)
MONOCYTES NFR BLD: 1.22 K/UL (ref 0.1–0.95)
MONOCYTES NFR BLD: 9 % (ref 2–12)
NEUTROPHILS NFR BLD: 80 % (ref 43–80)
NEUTS SEG NFR BLD: 10.75 K/UL (ref 1.8–7.3)
PLATELET # BLD AUTO: 594 K/UL (ref 130–450)
PMV BLD AUTO: 8.8 FL (ref 7–12)
POTASSIUM SERPL-SCNC: 5.7 MMOL/L (ref 3.5–5)
PROT SERPL-MCNC: 6.3 G/DL (ref 6.4–8.3)
RBC # BLD AUTO: 3.24 M/UL (ref 3.5–5.5)
SODIUM SERPL-SCNC: 126 MMOL/L (ref 132–146)
TROPONIN I SERPL HS-MCNC: 7 NG/L (ref 0–9)
WBC OTHER # BLD: 13.4 K/UL (ref 4.5–11.5)

## 2024-11-21 PROCEDURE — 96365 THER/PROPH/DIAG IV INF INIT: CPT

## 2024-11-21 PROCEDURE — 84484 ASSAY OF TROPONIN QUANT: CPT

## 2024-11-21 PROCEDURE — 93010 ELECTROCARDIOGRAM REPORT: CPT | Performed by: INTERNAL MEDICINE

## 2024-11-21 PROCEDURE — 80053 COMPREHEN METABOLIC PANEL: CPT

## 2024-11-21 PROCEDURE — 93005 ELECTROCARDIOGRAM TRACING: CPT

## 2024-11-21 PROCEDURE — 85025 COMPLETE CBC W/AUTO DIFF WBC: CPT

## 2024-11-21 PROCEDURE — 6370000000 HC RX 637 (ALT 250 FOR IP)

## 2024-11-21 PROCEDURE — 71275 CT ANGIOGRAPHY CHEST: CPT

## 2024-11-21 PROCEDURE — 96375 TX/PRO/DX INJ NEW DRUG ADDON: CPT

## 2024-11-21 PROCEDURE — 6360000004 HC RX CONTRAST MEDICATION: Performed by: RADIOLOGY

## 2024-11-21 PROCEDURE — 2060000000 HC ICU INTERMEDIATE R&B

## 2024-11-21 PROCEDURE — 99285 EMERGENCY DEPT VISIT HI MDM: CPT

## 2024-11-21 PROCEDURE — 2580000003 HC RX 258

## 2024-11-21 PROCEDURE — 6360000002 HC RX W HCPCS

## 2024-11-21 PROCEDURE — 82947 ASSAY GLUCOSE BLOOD QUANT: CPT

## 2024-11-21 PROCEDURE — 83880 ASSAY OF NATRIURETIC PEPTIDE: CPT

## 2024-11-21 RX ORDER — HYDROCODONE BITARTRATE AND ACETAMINOPHEN 5; 325 MG/1; MG/1
1 TABLET ORAL ONCE
Status: COMPLETED | OUTPATIENT
Start: 2024-11-21 | End: 2024-11-21

## 2024-11-21 RX ORDER — GLUCAGON 1 MG/ML
1 KIT INJECTION PRN
Status: DISCONTINUED | OUTPATIENT
Start: 2024-11-21 | End: 2024-12-06 | Stop reason: HOSPADM

## 2024-11-21 RX ORDER — DEXTROSE MONOHYDRATE 100 MG/ML
INJECTION, SOLUTION INTRAVENOUS CONTINUOUS PRN
Status: DISCONTINUED | OUTPATIENT
Start: 2024-11-21 | End: 2024-12-06 | Stop reason: HOSPADM

## 2024-11-21 RX ORDER — LIDOCAINE 4 G/G
1 PATCH TOPICAL ONCE
Status: COMPLETED | OUTPATIENT
Start: 2024-11-21 | End: 2024-11-22

## 2024-11-21 RX ORDER — CALCIUM GLUCONATE 20 MG/ML
1000 INJECTION, SOLUTION INTRAVENOUS ONCE
Status: COMPLETED | OUTPATIENT
Start: 2024-11-21 | End: 2024-11-21

## 2024-11-21 RX ORDER — SODIUM CHLORIDE 9 MG/ML
INJECTION, SOLUTION INTRAVENOUS CONTINUOUS
Status: DISCONTINUED | OUTPATIENT
Start: 2024-11-22 | End: 2024-11-30

## 2024-11-21 RX ORDER — IOPAMIDOL 755 MG/ML
75 INJECTION, SOLUTION INTRAVASCULAR
Status: COMPLETED | OUTPATIENT
Start: 2024-11-21 | End: 2024-11-21

## 2024-11-21 RX ADMIN — HYDROCODONE BITARTRATE AND ACETAMINOPHEN 1 TABLET: 5; 325 TABLET ORAL at 13:58

## 2024-11-21 RX ADMIN — IOPAMIDOL 75 ML: 755 INJECTION, SOLUTION INTRAVENOUS at 14:59

## 2024-11-21 RX ADMIN — CALCIUM GLUCONATE 1000 MG: 20 INJECTION, SOLUTION INTRAVENOUS at 15:39

## 2024-11-21 RX ADMIN — DEXTROSE MONOHYDRATE 250 ML: 100 INJECTION, SOLUTION INTRAVENOUS at 15:41

## 2024-11-21 RX ADMIN — INSULIN HUMAN 5 UNITS: 100 INJECTION, SOLUTION PARENTERAL at 15:39

## 2024-11-21 ASSESSMENT — LIFESTYLE VARIABLES: HOW OFTEN DO YOU HAVE A DRINK CONTAINING ALCOHOL: NEVER

## 2024-11-21 ASSESSMENT — PAIN DESCRIPTION - PAIN TYPE: TYPE: ACUTE PAIN

## 2024-11-21 ASSESSMENT — PAIN DESCRIPTION - ORIENTATION
ORIENTATION: RIGHT
ORIENTATION: RIGHT

## 2024-11-21 ASSESSMENT — PAIN SCALES - GENERAL
PAINLEVEL_OUTOF10: 7
PAINLEVEL_OUTOF10: 3
PAINLEVEL_OUTOF10: 7
PAINLEVEL_OUTOF10: 7

## 2024-11-21 ASSESSMENT — PAIN - FUNCTIONAL ASSESSMENT
PAIN_FUNCTIONAL_ASSESSMENT: ACTIVITIES ARE NOT PREVENTED
PAIN_FUNCTIONAL_ASSESSMENT: 0-10
PAIN_FUNCTIONAL_ASSESSMENT: 0-10

## 2024-11-21 ASSESSMENT — PAIN SCALES - WONG BAKER: WONGBAKER_NUMERICALRESPONSE: HURTS A LITTLE BIT

## 2024-11-21 ASSESSMENT — PAIN DESCRIPTION - ONSET: ONSET: ON-GOING

## 2024-11-21 ASSESSMENT — PAIN DESCRIPTION - FREQUENCY: FREQUENCY: CONTINUOUS

## 2024-11-21 ASSESSMENT — PAIN DESCRIPTION - LOCATION
LOCATION: BACK
LOCATION: SHOULDER

## 2024-11-21 ASSESSMENT — PAIN DESCRIPTION - DESCRIPTORS: DESCRIPTORS: ACHING

## 2024-11-21 NOTE — ED PROVIDER NOTES
[facility] EMERGENCY DEPARTMENT  EMERGENCY DEPARTMENT ENCOUNTER        Pt Name: Maria T Romeo  MRN: [id number]  Birthdate 1965  Date of evaluation: 11/21/2024  Provider: Dede Sidhu DO  PCP: Jensen Pelayo DO  Note Started: 1:10 PM EST 11/21/24    CHIEF COMPLAINT       Chief Complaint   Patient presents with    Shortness of Breath     Having pain in lungs. Discharged 4 days ago. Had bronchoscopy last wed with dr gill.        HISTORY OF PRESENT ILLNESS: 1 or more Elements   History From: Patient    Limitations to history : None  Social Determinants : None    Maria T Romeo is a 59 y.o. female who presents for back pain.  Patient states that she was recently discharged from the hospital she had bilateral PEs and COPD exacerbation as well as some atelectasis in the lungs.  She states that she had a bronchoscopy done while she was here.  She has been having back pain since she was admitted to the hospital.  She states that it is hurting slightly more today.  She denies any increased shortness of breath.  She states that she is always short of breath and is worse with exertion.  She denies any chest pain.  She denies any increased cough.  She denies any swelling in the legs.  She is currently on Eliquis for bilateral PEs that were found on her last admission.  Denies any fever, chills, n/v, headache, dizziness, vision changes, neck tenderness or stiffness, weakness, palpitations,abd pain, dysuria, hematuria, diarrhea, constipation, bloody stools.    Nursing Notes were all reviewed and agreed with or any disagreements were addressed in the HPI.    ROS:   Pertinent positives and negatives are stated within HPI, all other systems reviewed and are negative.      --------------------------------------------- PAST HISTORY ---------------------------------------------  Past Medical History:  has a past medical history of Acid reflux, Fracture of left shoulder, Frozen shoulder,

## 2024-11-22 ENCOUNTER — APPOINTMENT (OUTPATIENT)
Dept: ULTRASOUND IMAGING | Age: 59
End: 2024-11-22
Payer: COMMERCIAL

## 2024-11-22 ENCOUNTER — APPOINTMENT (OUTPATIENT)
Dept: GENERAL RADIOLOGY | Age: 59
End: 2024-11-22
Payer: COMMERCIAL

## 2024-11-22 LAB
ALBUMIN SERPL-MCNC: 3 G/DL (ref 3.5–5.2)
ALP SERPL-CCNC: 115 U/L (ref 35–104)
ALT SERPL-CCNC: 8 U/L (ref 0–32)
ANION GAP SERPL CALCULATED.3IONS-SCNC: 10 MMOL/L (ref 7–16)
ANION GAP SERPL CALCULATED.3IONS-SCNC: 9 MMOL/L (ref 7–16)
AST SERPL-CCNC: 13 U/L (ref 0–31)
BASOPHILS # BLD: 0.04 K/UL (ref 0–0.2)
BASOPHILS NFR BLD: 0 % (ref 0–2)
BILIRUB SERPL-MCNC: 0.5 MG/DL (ref 0–1.2)
BUN SERPL-MCNC: 10 MG/DL (ref 6–20)
BUN SERPL-MCNC: 6 MG/DL (ref 6–20)
CALCIUM SERPL-MCNC: 7.9 MG/DL (ref 8.6–10.2)
CALCIUM SERPL-MCNC: 8.7 MG/DL (ref 8.6–10.2)
CHLORIDE SERPL-SCNC: 91 MMOL/L (ref 98–107)
CHLORIDE SERPL-SCNC: 94 MMOL/L (ref 98–107)
CO2 SERPL-SCNC: 22 MMOL/L (ref 22–29)
CO2 SERPL-SCNC: 23 MMOL/L (ref 22–29)
CREAT SERPL-MCNC: 0.6 MG/DL (ref 0.5–1)
CREAT SERPL-MCNC: 0.7 MG/DL (ref 0.5–1)
EOSINOPHIL # BLD: 0.14 K/UL (ref 0.05–0.5)
EOSINOPHILS RELATIVE PERCENT: 1 % (ref 0–6)
ERYTHROCYTE [DISTWIDTH] IN BLOOD BY AUTOMATED COUNT: 13.2 % (ref 11.5–15)
GFR, ESTIMATED: >90 ML/MIN/1.73M2
GFR, ESTIMATED: >90 ML/MIN/1.73M2
GLUCOSE SERPL-MCNC: 121 MG/DL (ref 74–99)
GLUCOSE SERPL-MCNC: 92 MG/DL (ref 74–99)
HCT VFR BLD AUTO: 33.2 % (ref 34–48)
HGB BLD-MCNC: 11.5 G/DL (ref 11.5–15.5)
IMM GRANULOCYTES # BLD AUTO: 0.08 K/UL (ref 0–0.58)
IMM GRANULOCYTES NFR BLD: 1 % (ref 0–5)
LYMPHOCYTES NFR BLD: 1.49 K/UL (ref 1.5–4)
LYMPHOCYTES RELATIVE PERCENT: 14 % (ref 20–42)
MCH RBC QN AUTO: 35.4 PG (ref 26–35)
MCHC RBC AUTO-ENTMCNC: 34.6 G/DL (ref 32–34.5)
MCV RBC AUTO: 102.2 FL (ref 80–99.9)
MONOCYTES NFR BLD: 0.94 K/UL (ref 0.1–0.95)
MONOCYTES NFR BLD: 9 % (ref 2–12)
NEUTROPHILS NFR BLD: 75 % (ref 43–80)
NEUTS SEG NFR BLD: 7.86 K/UL (ref 1.8–7.3)
OSMOLALITY SERPL: 274 MOSM/KG (ref 285–310)
PLATELET # BLD AUTO: 602 K/UL (ref 130–450)
PMV BLD AUTO: 8.7 FL (ref 7–12)
POTASSIUM SERPL-SCNC: 4.7 MMOL/L (ref 3.5–5)
POTASSIUM SERPL-SCNC: 5.3 MMOL/L (ref 3.5–5)
PROT SERPL-MCNC: 6 G/DL (ref 6.4–8.3)
RBC # BLD AUTO: 3.25 M/UL (ref 3.5–5.5)
SODIUM SERPL-SCNC: 122 MMOL/L (ref 132–146)
SODIUM SERPL-SCNC: 127 MMOL/L (ref 132–146)
WBC OTHER # BLD: 10.6 K/UL (ref 4.5–11.5)

## 2024-11-22 PROCEDURE — 80048 BASIC METABOLIC PNL TOTAL CA: CPT

## 2024-11-22 PROCEDURE — 2060000000 HC ICU INTERMEDIATE R&B

## 2024-11-22 PROCEDURE — 85025 COMPLETE CBC W/AUTO DIFF WBC: CPT

## 2024-11-22 PROCEDURE — 80053 COMPREHEN METABOLIC PANEL: CPT

## 2024-11-22 PROCEDURE — 2580000003 HC RX 258: Performed by: GENERAL PRACTICE

## 2024-11-22 PROCEDURE — 6370000000 HC RX 637 (ALT 250 FOR IP): Performed by: GENERAL PRACTICE

## 2024-11-22 PROCEDURE — 71100 X-RAY EXAM RIBS UNI 2 VIEWS: CPT

## 2024-11-22 PROCEDURE — 6370000000 HC RX 637 (ALT 250 FOR IP): Performed by: INTERNAL MEDICINE

## 2024-11-22 PROCEDURE — 83930 ASSAY OF BLOOD OSMOLALITY: CPT

## 2024-11-22 PROCEDURE — 76770 US EXAM ABDO BACK WALL COMP: CPT

## 2024-11-22 PROCEDURE — 94640 AIRWAY INHALATION TREATMENT: CPT

## 2024-11-22 RX ORDER — SODIUM CHLORIDE 1 G/1
1 TABLET ORAL 2 TIMES DAILY WITH MEALS
Status: DISCONTINUED | OUTPATIENT
Start: 2024-11-22 | End: 2024-12-06 | Stop reason: HOSPADM

## 2024-11-22 RX ORDER — CARVEDILOL 6.25 MG/1
12.5 TABLET ORAL 2 TIMES DAILY WITH MEALS
Status: DISCONTINUED | OUTPATIENT
Start: 2024-11-22 | End: 2024-11-26

## 2024-11-22 RX ORDER — CLONIDINE HYDROCHLORIDE 0.2 MG/1
0.2 TABLET ORAL 3 TIMES DAILY
Status: DISCONTINUED | OUTPATIENT
Start: 2024-11-22 | End: 2024-11-24

## 2024-11-22 RX ORDER — IPRATROPIUM BROMIDE AND ALBUTEROL SULFATE 2.5; .5 MG/3ML; MG/3ML
1 SOLUTION RESPIRATORY (INHALATION)
Status: DISCONTINUED | OUTPATIENT
Start: 2024-11-22 | End: 2024-12-06

## 2024-11-22 RX ORDER — DILTIAZEM HYDROCHLORIDE 120 MG/1
120 CAPSULE, COATED, EXTENDED RELEASE ORAL DAILY
Status: DISCONTINUED | OUTPATIENT
Start: 2024-11-22 | End: 2024-11-26

## 2024-11-22 RX ORDER — HYDROCODONE BITARTRATE AND ACETAMINOPHEN 7.5; 325 MG/1; MG/1
1 TABLET ORAL EVERY 4 HOURS PRN
Status: DISCONTINUED | OUTPATIENT
Start: 2024-11-22 | End: 2024-11-28

## 2024-11-22 RX ADMIN — HYDROCODONE BITARTRATE AND ACETAMINOPHEN 1 TABLET: 7.5; 325 TABLET ORAL at 22:58

## 2024-11-22 RX ADMIN — CLONIDINE HYDROCHLORIDE 0.2 MG: 0.2 TABLET ORAL at 14:49

## 2024-11-22 RX ADMIN — IPRATROPIUM BROMIDE AND ALBUTEROL SULFATE 1 DOSE: .5; 2.5 SOLUTION RESPIRATORY (INHALATION) at 20:25

## 2024-11-22 RX ADMIN — APIXABAN 5 MG: 5 TABLET, FILM COATED ORAL at 13:11

## 2024-11-22 RX ADMIN — CARVEDILOL 12.5 MG: 6.25 TABLET, FILM COATED ORAL at 17:16

## 2024-11-22 RX ADMIN — Medication 15 G: at 13:11

## 2024-11-22 RX ADMIN — Medication 1 G: at 17:16

## 2024-11-22 RX ADMIN — DILTIAZEM HYDROCHLORIDE 120 MG: 120 CAPSULE, COATED, EXTENDED RELEASE ORAL at 13:11

## 2024-11-22 RX ADMIN — CARVEDILOL 12.5 MG: 6.25 TABLET, FILM COATED ORAL at 13:11

## 2024-11-22 RX ADMIN — Medication 1 G: at 13:11

## 2024-11-22 RX ADMIN — SODIUM CHLORIDE: 9 INJECTION, SOLUTION INTRAVENOUS at 00:20

## 2024-11-22 RX ADMIN — SODIUM CHLORIDE: 9 INJECTION, SOLUTION INTRAVENOUS at 17:19

## 2024-11-22 RX ADMIN — APIXABAN 5 MG: 5 TABLET, FILM COATED ORAL at 20:17

## 2024-11-22 RX ADMIN — IPRATROPIUM BROMIDE AND ALBUTEROL SULFATE 1 DOSE: .5; 2.5 SOLUTION RESPIRATORY (INHALATION) at 16:12

## 2024-11-22 ASSESSMENT — PAIN DESCRIPTION - ORIENTATION
ORIENTATION: RIGHT;LEFT;MID
ORIENTATION: RIGHT;LEFT;MID
ORIENTATION: MID;LEFT;RIGHT

## 2024-11-22 ASSESSMENT — PAIN DESCRIPTION - LOCATION
LOCATION: BACK

## 2024-11-22 ASSESSMENT — PAIN SCALES - GENERAL
PAINLEVEL_OUTOF10: 7
PAINLEVEL_OUTOF10: 7
PAINLEVEL_OUTOF10: 9
PAINLEVEL_OUTOF10: 6

## 2024-11-22 ASSESSMENT — PAIN - FUNCTIONAL ASSESSMENT
PAIN_FUNCTIONAL_ASSESSMENT: ACTIVITIES ARE NOT PREVENTED
PAIN_FUNCTIONAL_ASSESSMENT: ACTIVITIES ARE NOT PREVENTED

## 2024-11-22 ASSESSMENT — PAIN DESCRIPTION - ONSET
ONSET: ON-GOING
ONSET: ON-GOING

## 2024-11-22 ASSESSMENT — PAIN SCALES - WONG BAKER: WONGBAKER_NUMERICALRESPONSE: HURTS A LITTLE BIT

## 2024-11-22 ASSESSMENT — PAIN DESCRIPTION - FREQUENCY
FREQUENCY: CONTINUOUS
FREQUENCY: CONTINUOUS

## 2024-11-22 ASSESSMENT — PAIN DESCRIPTION - PAIN TYPE
TYPE: CHRONIC PAIN
TYPE: CHRONIC PAIN

## 2024-11-22 ASSESSMENT — PAIN DESCRIPTION - DESCRIPTORS
DESCRIPTORS: ACHING;DISCOMFORT;SORE
DESCRIPTORS: ACHING;DISCOMFORT;SORE

## 2024-11-22 NOTE — ED NOTES
ED to Inpatient Handoff Report    Notified 7S that electronic handoff available and patient ready for transport to room 728.    Safety Risks: None identified    Patient in Restraints: no    Constant Observer or Patient : no    Telemetry Monitoring Ordered: Yes     Cardiac Rhythm: Sinus rhythm    Order to transfer to unit without monitor: YES    Last MEWS: 1 Time completed: 2137    Deterioration Index: 34.74    Vitals:    11/21/24 1257 11/21/24 1850 11/21/24 1853 11/21/24 2110   BP: 108/78 127/87  116/82   Pulse: 87 79  99   Resp: 18 20  16   Temp: 98.1 °F (36.7 °C) 98.3 °F (36.8 °C)  97.5 °F (36.4 °C)   TempSrc: Oral Oral  Oral   SpO2: 93%  (!) 86% 91%   Weight: 46.3 kg (102 lb)      Height: 1.575 m (5' 2\")          Opportunity for questions and clarification was provided.

## 2024-11-22 NOTE — CARE COORDINATION
Met with patient about diagnosis and discharge plan of care. Pt admit for back/lung pain, hyperkalemia. CT of chest done, right rib x-rays ordered and abdominal us. Renal consult. Na-127. Po potassium stopped. Pt readmit. Last admit, pt had bronchoscopy with pulmonary. Pt lives alone in 2 story home. Bed and bath on 1st floor. Has o2 through [employer]-she stated only prn and adjusts liter flow according to her needs. PCP is dr Pelayo. Declining needs for home. Will follow-jailyn

## 2024-11-22 NOTE — PROGRESS NOTES
4 Eyes Skin Assessment     NAME:  Maria T Romeo  YOB: 1965  MEDICAL RECORD NUMBER:  [id number]    The patient is being assessed for  Admission    I agree that at least one RN has performed a thorough Head to Toe Skin Assessment on the patient. ALL assessment sites listed below have been assessed.      Areas assessed by both nurses:    Head, Face, Ears, Shoulders, Back, Chest, Arms, Elbows, Hands, Sacrum. Buttock, Coccyx, Ischium, Legs. Feet and Heels, and Under Medical Devices         Does the Patient have a Wound? No noted wound(s)       Micky Prevention initiated by RN: No  Wound Care Orders initiated by RN: No    Pressure Injury (Stage 3,4, Unstageable, DTI, NWPT, and Complex wounds) if present, place Wound referral order by RN under : No    New Ostomies, if present place, Ostomy referral order under : No     Nurse 1 eSignature: Electronically signed by Brandy Aguilera RN on 11/21/24 at 10:06 PM EST    **SHARE this note so that the co-signing nurse can place an eSignature**    Nurse 2 eSignature: Electronically signed by Magdy Mora RN on 11/21/24 at 11:18 PM EST

## 2024-11-22 NOTE — CONSULTS
[facility]  [facility]  [address], [address]  [place] [address]    Pulmonary Consultation      Reason for Consult:  back pain and cough    Requesting Physician:  Jensen Pelayo DO    CHIEF COMPLAINT:  back pain and cough    History Obtained From:  patient    HISTORY OF PRESENT ILLNESS:                The patient is a 59 y.o. female with significant past medical history of PE and RML atelectasis who presents with back pain. Was just discharged on 17th, had bronchoscopy done on 13th with suctioning of plugging/secretions and no clear endobronchial lesions, was doing ok then started having problems with back pain, couldn't tell me how many days this was going on. When asked about breathing, claims was sob but later said wasn't much of a problem, not clear if similar or better than when discharged, similar/less cough and min/mod sputum production. Claims was using her oxygen sometimes as wasn't told needed to be on it, resumed smoking about 5-8 cigarettes/day, doesn't appear to have any lung medications listed in home medications. Currently lying down in bed on 2.5 li NC, claims still with some back pain, similar with breathing/coughing with not much sputum production.    Past Medical History:        Diagnosis Date    Acid reflux     Fracture of left shoulder 10/2018    Frozen shoulder     left    Hypertension     Hyponatremia     Lung abnormality     MVP (mitral valve prolapse)     no issues    Skin cancer        Past Surgical History:        Procedure Laterality Date    BRONCHOSCOPY N/A 1/8/2024    BRONCHOSCOPY DIAGNOSTIC OR CELL WASH ONLY performed by Lenin aCrney MD at [facility] ENDOSCOPY    BRONCHOSCOPY N/A 11/13/2024    BRONCHOSCOPY ALVEOLAR LAVAGE performed by Lenin Carney MD at [facility] ENDOSCOPY    CLOSED MANIPULATION SHOULDER Left 3/5/2019    LEFT SHOULDER MANIPULATION UNDER ANTESTHESIA, STERIOD INJECTION TO FOLLOW performed by Reese KIDD  abnormality, atraumatic, sinuses nontender on palpation, external ears without lesions, oral pharynx with moist mucus membranes, tonsils without erythema or exudates, gums normal and good dentition.  LUNGS:  ronchi bilateral with cough, tenderness with palpation at right lower chest back and towards side  CARDIOVASCULAR:  Normal apical impulse, regular rate and rhythm, normal S1 and S2, no S3 or S4, and no murmur noted  ABDOMEN:  No scars, normal bowel sounds, soft, non-distended, non-tender, no masses palpated, no hepatosplenomegally  MUSCULOSKELETAL:  There is no redness, warmth, or swelling of the joints.  Full range of motion noted.  NEUROLOGIC:  Awake, alert, oriented to name, place and time.  Cranial nerves II-XII are grossly intact.    DATA:    CBC:   Recent Labs     11/21/24  1350 11/22/24  0325   WBC 13.4* 10.6   HGB 11.5 11.5   HCT 33.3* 33.2*   .8* 102.2*   * 602*       BMP:  Recent Labs     11/21/24  1350 11/22/24  0325   * 127*   K 5.7* 5.3*   CL 88* 94*   CO2 21* 23   BUN 11 10   CREATININE 0.7 0.7    ALB:3,BILIDIR:3,BILITOT:3,ALKPHOS:3)@    PT/INR: No results for input(s): \"PROTIME\", \"INR\" in the last 72 hours.    ABG:   No results for input(s): \"PH\", \"PO2\", \"PCO2\", \"HCO3\", \"BE\", \"O2SAT\", \"METHB\", \"O2HB\", \"COHB\", \"O2CON\", \"HHB\", \"THB\" in the last 72 hours.          Radiology Review:  CTA chest reviewed with (-) PE, smaller atelectasis right middle lobe, slightly increased right pleural effusion, and increased interstitial/parenchymal markings peripheral right upper lobe compared to previous    IMPRESSION/RECOMMENDATIONS:      PE on anticoagulation  RML atelectasis  Right pleural effusion  hypoxia  hyponatremia    Watch fluid balance, diurese as per PCP, observe if hyponatremia improves  Cont with oxygen, taper as tolerated, will need to qualify oxygen requirements prior to discharge  Small effusion, not easily amenable to thoracentesis, observe for now and reconsider if

## 2024-11-22 NOTE — PROGRESS NOTES
Spiritual Health History and Assessment/Progress Note  [facility]    Attempted Encounter,  ,  ,      Name: Maria T Romeo MRN: [id number]    Age: 59 y.o.     Sex: female   Language: English   Confucianist: Oriental orthodox   Adrenal crisis (HCC)     Date: 11/22/2024                           [facility]        Referral/Consult From: Rounding   Encounter Overview/Reason: Attempted Encounter  Service Provided For: Patient    Kriss, Belief, Meaning:   Patient unable to assess at this time  Family/Friends No family/friends present      Importance and Influence:  Patient unable to assess at this time  Family/Friends No family/friends present    Community:  Patient Patient declined  visit at this time.  Family/Friends No family/friends present    Assessment and Plan of Care:     Patient Interventions include: Patient declined  visit at this time.  Family/Friends Interventions include: No family/friends present    Patient Plan of Care: Spiritual Care available upon further referral  Family/Friends Plan of Care: No family/friends present    Electronically signed by Chaplain Bryon on 11/22/2024 at 6:32 PM

## 2024-11-22 NOTE — H&P
[place] [address]                           HISTORY & PHYSICAL      PATIENT NAME: KARL GARCIA               : 1965  MED REC NO: [id number]                        ROOM: 28  ACCOUNT NO: [id number]                       ADMIT DATE: 2024  PROVIDER: Jensen Pelayo DO      HISTORY OF PRESENT ILLNESS:  The patient is a 59-year-old white female, recently hospitalized for right middle lobe syndrome and pulmonary embolisms in the right lower lobe.  She also has a significant history of hypertension and suffers from hyponatremia in the past.  On discharge, she was properly anticoagulated.  She had a bronchoscopy that did not show any malignancy.  There was some resolution of the collapse of the right middle lobe.  She was treated for hypertension and it was under good control.  She did have some issues with sodium, but they were normal eventually.  She was subsequently discharged to home.  During her hospital stay, she had pleuritic type of pain on the right, did not seem to be getting any better, but it was improved when she was discharged.  However, at home, she states that the pain was getting worse and she came back into the emergency room for evaluation.  She had another CTA of the chest, which failed to demonstrate any pulmonary embolism, showed improvement of the right middle lobe collapse.  There is a stable 4.5 cm thoracic aneurysm, does not appear to be bleeding.  She also has a slightly increased amount of pleural fluid and effusion, which may be related to her pulmonary embolism.  Nevertheless, we are going to bring her in for her low sodium and further evaluation of this discomfort and pain that she is having.  Renal will see her and I will ask Pulmonology to see her as well.    RECENT CURRENT MEDICATIONS:  Include Eliquis 5 mg b.i.d., Cardizem  daily, clonidine 0.2 q.8 h., and carvedilol 25

## 2024-11-22 NOTE — PROGRESS NOTES
P Quality Flow/Interdisciplinary Rounds Progress Note        Quality Flow Rounds held on November 22, 2024    Disciplines Attending:  Bedside Nurse, , , and Nursing Unit Leadership    Maria T Romeo was admitted on 11/21/2024  1:00 PM    Anticipated Discharge Date:       Disposition:    Micky Score:  Micky Scale Score: 20    Readmission Risk              Risk of Unplanned Readmission:  12           Discussed patient goal for the day, patient clinical progression, and barriers to discharge.  The following Goal(s) of the Day/Commitment(s) have been identified:   discharge planning      Agustín Harris RN  November 22, 2024

## 2024-11-22 NOTE — PROGRESS NOTES
Spiritual Health History and Assessment/Progress Note  [facility]     Encounter, Spiritual/Emotional Needs,  ,  ,      Name: Maria T Romeo MRN: [id number]    Age: 59 y.o.     Sex: female   Language: English   Mandaen: Oriental orthodox   Adrenal crisis (HCC)     Date: 11/22/2024                           Spiritual Assessment began in [facility]        Referral/Consult From: Rounding   Encounter Overview/Reason:  Encounter, Spiritual/Emotional Needs  Service Provided For: Patient    Kriss, Belief, Meaning:   Patient declined prayer at this time  Family/Friends No family/friends present      Importance and Influence:  Patient Unable to assess at this time  Family/Friends No family/friends present    Community:  Patient Other: Unkown  Family/Friends No family/friends present    Assessment and Plan of Care:     Patient Interventions include: None  Family/Friends Interventions include: No family/friends present    Patient Plan of Care: Spiritual Care available upon further referral  Family/Friends Plan of Care: Spiritual Care available upon further referral    Electronically signed by Chaplain Diane on 11/22/2024 at 4:26 PM

## 2024-11-22 NOTE — CONSULTS
Consult Note      Reason for Consult: Hyponatremia  Date of Service: 11/22/2024   Chief Complaint: Back pain  History Obtained From:  patient electronic medical record       HISTORY OF PRESENT ILLNESS:    The patient is a 55-year-old  lady who has underlying history of chronic hyponatremia seen by Dr. Curly Lopez, COPD and bilateral PEs.  The last time she saw Dr. Lopez was in year 2018 and subsequently, the patient chose not to follow up.  The patient was initially seen in consultation for hyponatremia in 01/2018.  At that time, the hyponatremia was treated with oral salt tablets.     They presented on 11/21/2024 complaining of back pain.  She has had back pain since her prior admission that slightly worsened.  She chronically has shortness of breath at baseline.  On arrival there blood pressure 108/78 HR 87 RR 18 on room air and afebrile.  Initial labs were pertinent for sodium of 126, potassium of 5.7 bicarb of 21.  Imaging showed right middle lobe atelectasis, mild enlargement of moderate right pleural effusion and ascending thoracic aortic aneurysm of 4.7 cm.  Nephrology was consulted for hyponatremia.      Past Medical History:    Past Medical History:   Diagnosis Date    Acid reflux     Fracture of left shoulder 10/2018    Frozen shoulder     left    Hypertension     Hyponatremia     Lung abnormality     MVP (mitral valve prolapse)     no issues    Skin cancer        Past Surgical History:    Past Surgical History:   Procedure Laterality Date    BRONCHOSCOPY N/A 1/8/2024    BRONCHOSCOPY DIAGNOSTIC OR CELL WASH ONLY performed by Lenin Carney MD at [facility] ENDOSCOPY    BRONCHOSCOPY N/A 11/13/2024    BRONCHOSCOPY ALVEOLAR LAVAGE performed by Lenin Carney MD at [facility] ENDOSCOPY    CLOSED MANIPULATION SHOULDER Left 3/5/2019    LEFT SHOULDER MANIPULATION UNDER ANTESTHESIA, STERIOD INJECTION TO FOLLOW performed by Reese To MD at [facility] OR    FOOT SURGERY Right 1980's    bone

## 2024-11-22 NOTE — ED NOTES
Informed patient that spo2 was 87-88% and should have O2, patient stated that is her norm and she knows when she needs O2 and will let us know.

## 2024-11-22 NOTE — PLAN OF CARE
Problem: Discharge Planning  Goal: Discharge to home or other facility with appropriate resources  Outcome: Progressing  Flowsheets (Taken 11/21/2024 2203)  Discharge to home or other facility with appropriate resources: Identify barriers to discharge with patient and caregiver     Problem: Pain  Goal: Verbalizes/displays adequate comfort level or baseline comfort level  Outcome: Progressing     Problem: Safety - Adult  Goal: Free from fall injury  Outcome: Progressing

## 2024-11-22 NOTE — PLAN OF CARE
Problem: Discharge Planning  Goal: Discharge to home or other facility with appropriate resources  11/22/2024 0935 by Arnie Macias RN  Outcome: Progressing  11/21/2024 2206 by Brandy Aguilera RN  Outcome: Progressing  Flowsheets (Taken 11/21/2024 2203)  Discharge to home or other facility with appropriate resources: Identify barriers to discharge with patient and caregiver     Problem: Pain  Goal: Verbalizes/displays adequate comfort level or baseline comfort level  11/22/2024 0935 by Arnie Macias, RN  Outcome: Progressing  11/21/2024 2206 by Brandy Aguilera, RN  Outcome: Progressing     Problem: Safety - Adult  Goal: Free from fall injury  11/22/2024 0935 by Arnie Macias RN  Outcome: Progressing  11/21/2024 2206 by Brandy Aguilera, RN  Outcome: Progressing

## 2024-11-23 LAB
ANION GAP SERPL CALCULATED.3IONS-SCNC: 11 MMOL/L (ref 7–16)
ANION GAP SERPL CALCULATED.3IONS-SCNC: 7 MMOL/L (ref 7–16)
ANION GAP SERPL CALCULATED.3IONS-SCNC: 9 MMOL/L (ref 7–16)
BUN SERPL-MCNC: 22 MG/DL (ref 6–20)
BUN SERPL-MCNC: 5 MG/DL (ref 6–20)
BUN SERPL-MCNC: 9 MG/DL (ref 6–20)
CALCIUM SERPL-MCNC: 8.1 MG/DL (ref 8.6–10.2)
CALCIUM SERPL-MCNC: 8.3 MG/DL (ref 8.6–10.2)
CALCIUM SERPL-MCNC: 8.4 MG/DL (ref 8.6–10.2)
CHLORIDE SERPL-SCNC: 95 MMOL/L (ref 98–107)
CHLORIDE SERPL-SCNC: 96 MMOL/L (ref 98–107)
CHLORIDE SERPL-SCNC: 98 MMOL/L (ref 98–107)
CO2 SERPL-SCNC: 23 MMOL/L (ref 22–29)
CO2 SERPL-SCNC: 23 MMOL/L (ref 22–29)
CO2 SERPL-SCNC: 25 MMOL/L (ref 22–29)
CREAT SERPL-MCNC: 0.6 MG/DL (ref 0.5–1)
CREAT SERPL-MCNC: 0.6 MG/DL (ref 0.5–1)
CREAT SERPL-MCNC: 0.7 MG/DL (ref 0.5–1)
GFR, ESTIMATED: >90 ML/MIN/1.73M2
GLUCOSE SERPL-MCNC: 101 MG/DL (ref 74–99)
GLUCOSE SERPL-MCNC: 128 MG/DL (ref 74–99)
GLUCOSE SERPL-MCNC: 85 MG/DL (ref 74–99)
OSMOLALITY UR: 452 MOSM/KG (ref 300–900)
POTASSIUM SERPL-SCNC: 3.9 MMOL/L (ref 3.5–5)
POTASSIUM SERPL-SCNC: 4 MMOL/L (ref 3.5–5)
POTASSIUM SERPL-SCNC: 4.2 MMOL/L (ref 3.5–5)
SODIUM SERPL-SCNC: 128 MMOL/L (ref 132–146)
SODIUM SERPL-SCNC: 129 MMOL/L (ref 132–146)
SODIUM SERPL-SCNC: 130 MMOL/L (ref 132–146)
SODIUM UR-SCNC: 45 MMOL/L

## 2024-11-23 PROCEDURE — 83935 ASSAY OF URINE OSMOLALITY: CPT

## 2024-11-23 PROCEDURE — 2700000000 HC OXYGEN THERAPY PER DAY

## 2024-11-23 PROCEDURE — 6370000000 HC RX 637 (ALT 250 FOR IP): Performed by: INTERNAL MEDICINE

## 2024-11-23 PROCEDURE — 84300 ASSAY OF URINE SODIUM: CPT

## 2024-11-23 PROCEDURE — 80048 BASIC METABOLIC PNL TOTAL CA: CPT

## 2024-11-23 PROCEDURE — 2580000003 HC RX 258: Performed by: GENERAL PRACTICE

## 2024-11-23 PROCEDURE — 94640 AIRWAY INHALATION TREATMENT: CPT

## 2024-11-23 PROCEDURE — 2060000000 HC ICU INTERMEDIATE R&B

## 2024-11-23 PROCEDURE — 6370000000 HC RX 637 (ALT 250 FOR IP): Performed by: GENERAL PRACTICE

## 2024-11-23 PROCEDURE — 6360000002 HC RX W HCPCS: Performed by: GENERAL PRACTICE

## 2024-11-23 RX ORDER — MENTHOL 10%, METHYL SALICYLATE 15% 10; 15 G/100G; G/100G
1 CREAM TOPICAL 2 TIMES DAILY
Status: DISCONTINUED | OUTPATIENT
Start: 2024-11-23 | End: 2024-11-23 | Stop reason: SDUPTHER

## 2024-11-23 RX ORDER — ONDANSETRON 2 MG/ML
4 INJECTION INTRAMUSCULAR; INTRAVENOUS EVERY 4 HOURS PRN
Status: DISCONTINUED | OUTPATIENT
Start: 2024-11-23 | End: 2024-12-02 | Stop reason: SDUPTHER

## 2024-11-23 RX ORDER — IBUPROFEN 800 MG/1
800 TABLET, FILM COATED ORAL 3 TIMES DAILY
Status: DISCONTINUED | OUTPATIENT
Start: 2024-11-23 | End: 2024-11-27

## 2024-11-23 RX ORDER — MENTHOL AND METHYL SALICYLATE 7.6; 29 G/100G; G/100G
1 OINTMENT TOPICAL 2 TIMES DAILY
Status: DISCONTINUED | OUTPATIENT
Start: 2024-11-23 | End: 2024-12-06 | Stop reason: HOSPADM

## 2024-11-23 RX ORDER — LIDOCAINE 4 G/G
1 PATCH TOPICAL DAILY
Status: DISCONTINUED | OUTPATIENT
Start: 2024-11-23 | End: 2024-12-06 | Stop reason: HOSPADM

## 2024-11-23 RX ADMIN — CLONIDINE HYDROCHLORIDE 0.2 MG: 0.2 TABLET ORAL at 20:24

## 2024-11-23 RX ADMIN — SODIUM CHLORIDE: 9 INJECTION, SOLUTION INTRAVENOUS at 06:37

## 2024-11-23 RX ADMIN — Medication 15 G: at 09:03

## 2024-11-23 RX ADMIN — CLONIDINE HYDROCHLORIDE 0.2 MG: 0.2 TABLET ORAL at 09:03

## 2024-11-23 RX ADMIN — CARVEDILOL 12.5 MG: 6.25 TABLET, FILM COATED ORAL at 09:03

## 2024-11-23 RX ADMIN — DILTIAZEM HYDROCHLORIDE 120 MG: 120 CAPSULE, COATED, EXTENDED RELEASE ORAL at 09:03

## 2024-11-23 RX ADMIN — IPRATROPIUM BROMIDE AND ALBUTEROL SULFATE 1 DOSE: .5; 2.5 SOLUTION RESPIRATORY (INHALATION) at 13:40

## 2024-11-23 RX ADMIN — HYDROCODONE BITARTRATE AND ACETAMINOPHEN 1 TABLET: 7.5; 325 TABLET ORAL at 09:03

## 2024-11-23 RX ADMIN — MENTHOL AND METHYL SALICYLATE 1 APPLICATION: 7.6; 29 OINTMENT TOPICAL at 14:55

## 2024-11-23 RX ADMIN — Medication 1 G: at 09:03

## 2024-11-23 RX ADMIN — IBUPROFEN 800 MG: 800 TABLET, FILM COATED ORAL at 11:22

## 2024-11-23 RX ADMIN — APIXABAN 5 MG: 5 TABLET, FILM COATED ORAL at 09:03

## 2024-11-23 RX ADMIN — MENTHOL AND METHYL SALICYLATE 1 APPLICATION: 7.6; 29 OINTMENT TOPICAL at 20:25

## 2024-11-23 RX ADMIN — IBUPROFEN 800 MG: 800 TABLET, FILM COATED ORAL at 20:23

## 2024-11-23 RX ADMIN — Medication 1 G: at 18:04

## 2024-11-23 RX ADMIN — APIXABAN 5 MG: 5 TABLET, FILM COATED ORAL at 20:30

## 2024-11-23 RX ADMIN — HYDROCODONE BITARTRATE AND ACETAMINOPHEN 1 TABLET: 7.5; 325 TABLET ORAL at 03:29

## 2024-11-23 RX ADMIN — HYDROCODONE BITARTRATE AND ACETAMINOPHEN 1 TABLET: 7.5; 325 TABLET ORAL at 20:37

## 2024-11-23 RX ADMIN — ONDANSETRON 4 MG: 2 INJECTION INTRAMUSCULAR; INTRAVENOUS at 16:53

## 2024-11-23 ASSESSMENT — PAIN DESCRIPTION - DESCRIPTORS
DESCRIPTORS: ACHING;DISCOMFORT;SORE
DESCRIPTORS: ACHING;DISCOMFORT

## 2024-11-23 ASSESSMENT — PAIN - FUNCTIONAL ASSESSMENT
PAIN_FUNCTIONAL_ASSESSMENT: ACTIVITIES ARE NOT PREVENTED

## 2024-11-23 ASSESSMENT — PAIN DESCRIPTION - ORIENTATION
ORIENTATION: RIGHT;MID;LEFT
ORIENTATION: LOWER;MID
ORIENTATION: RIGHT;LEFT;MID
ORIENTATION: RIGHT;LEFT

## 2024-11-23 ASSESSMENT — PAIN DESCRIPTION - PAIN TYPE: TYPE: ACUTE PAIN;CHRONIC PAIN

## 2024-11-23 ASSESSMENT — PAIN SCALES - GENERAL
PAINLEVEL_OUTOF10: 6
PAINLEVEL_OUTOF10: 8
PAINLEVEL_OUTOF10: 6
PAINLEVEL_OUTOF10: 8
PAINLEVEL_OUTOF10: 6
PAINLEVEL_OUTOF10: 8

## 2024-11-23 ASSESSMENT — PAIN SCALES - WONG BAKER
WONGBAKER_NUMERICALRESPONSE: HURTS A LITTLE BIT
WONGBAKER_NUMERICALRESPONSE: HURTS A LITTLE BIT

## 2024-11-23 ASSESSMENT — PAIN DESCRIPTION - LOCATION
LOCATION: BACK

## 2024-11-23 NOTE — PROGRESS NOTES
[facility]  Nephrology Attending Progress Note          SUBJECTIVE:   From consult note 11/22/2024  The patient is a 55-year-old  lady who has underlying history of chronic hyponatremia seen by Dr. Curly Loepz, COPD and bilateral PEs.  The last time she saw Dr. Lopez was in year 2018 and subsequently, the patient chose not to follow up.  The patient was initially seen in consultation for hyponatremia in 01/2018.  At that time, the hyponatremia was treated with oral salt tablets.    They presented on 11/21/2024 complaining of back pain.  She has had back pain since her prior admission that slightly worsened.  She chronically has shortness of breath at baseline.  On arrival there blood pressure 108/78 HR 87 RR 18 on room air and afebrile.  Initial labs were pertinent for sodium of 126, potassium of 5.7 bicarb of 21.  Imaging showed right middle lobe atelectasis, mild enlargement of moderate right pleural effusion and ascending thoracic aortic aneurysm of 4.7 cm.  Nephrology was consulted for hyponatremia.      11/23/24-patient seen and examined.  She was resting comfortably in the bed at the time of my visit.  She has no complaints and states she is feeling fairly well today.      PROBLEM LIST:    Patient Active Problem List   Diagnosis    Hyponatremia    Acute hyponatremia    Traumatic closed displaced fracture of left shoulder with anterior dislocation    Acute respiratory failure with hypoxia    LFTs abnormal    Primary hypertension    Multiple closed fractures of ribs of left side    Acute hypoxic respiratory failure    Moderate protein-calorie malnutrition (HCC)    Adrenal crisis (HCC)        PAST MEDICAL HISTORY:    Past Medical History:   Diagnosis Date    Acid reflux     Fracture of left shoulder 10/2018    Frozen shoulder     left    Hypertension     Hyponatremia     Lung abnormality     MVP (mitral valve prolapse)     no issues    Skin cancer        DIET:    ADULT DIET; Regular     PHYSICAL  acidosis-  CO2 at presentation 21 mmol/L and is now within normal limits.  Not on supplementation  We will continue to follow    4.  Essential hypertension-  Blood pressure goal less than 130/80  Current blood pressure at goal      YULISA Blair - CNP    Patient examined doing well   NA+ better at 130   C/o rib pains , currently on nebulizer   Well other wise     Chart and labs reviewed     Dr WYNNE

## 2024-11-23 NOTE — PROGRESS NOTES
Call Made out to Dr. Pelayo in regards to patient feeling nauseous and in need of anti-nausea meds.  Awaiting call back

## 2024-11-23 NOTE — PROGRESS NOTES
[facility]  [address], [address]  [place] [address]      Pulmonary Progress Note      SUBJECTIVE:  claims still with problems with back pain, some sob together with pain, no cough/congestion, on 2 li NC    OBJECTIVE    Medications    Continuous Infusions:   dextrose      sodium chloride 75 mL/hr at 24 0637       Scheduled Meds:   ibuprofen  800 mg Oral TID    lidocaine  1 patch TransDERmal Daily    Icy Hot Balm Extra Strength  1 Application Apply externally BID    sodium chloride  1 g Oral BID WC    urea  15 g Oral Daily    dilTIAZem  120 mg Oral Daily    apixaban  5 mg Oral BID    cloNIDine  0.2 mg Oral TID    carvedilol  12.5 mg Oral BID WC    ipratropium 0.5 mg-albuterol 2.5 mg  1 Dose Inhalation TID RT       PRN Meds:HYDROcodone-acetaminophen, glucose, dextrose bolus **OR** dextrose bolus, glucagon (rDNA), dextrose    Physical    VITALS:  BP 98/69   Pulse 75   Temp 97.7 °F (36.5 °C) (Oral)   Resp 18   Ht 1.575 m (5' 2\")   Wt 46.3 kg (102 lb)   LMP 2012   SpO2 98%   BMI 18.66 kg/m²     24HR INTAKE/OUTPUT:      Intake/Output Summary (Last 24 hours) at 2024 1249  Last data filed at 2024 0850  Gross per 24 hour   Intake --   Output 500 ml   Net -500 ml       24HR PULSE OXIMETRY RANGE:    SpO2  Av.2 %  Min: 95 %  Max: 98 %    General appearance: alert, appears stated age, and cooperative  Lungs: ronchi bibasal minimal  Heart: regular rate and rhythm, S1, S2 normal, no murmur, click, rub or gallop  Abdomen: soft, non-tender; bowel sounds normal; no masses,  no organomegaly  Extremities: extremities normal, atraumatic, no cyanosis or edema  Neurologic: Mental status: Alert, oriented, thought content appropriate    Data    CBC:   Recent Labs     24  1350 24  0325   WBC 13.4* 10.6   HGB 11.5 11.5   HCT 33.3* 33.2*   .8* 102.2*   * 602*       BMP:  Recent Labs     24  5453

## 2024-11-23 NOTE — PROGRESS NOTES
Patient seen feeling better, still with pleuritic pain. Will try nsaids. Rib xray and us okay. Sodium upward

## 2024-11-23 NOTE — PROGRESS NOTES
The patient's Q6 BMP at 18:53 showed a sodium level of 122. I notified Dr. Pelayo via his answering service.

## 2024-11-23 NOTE — PLAN OF CARE
Problem: Discharge Planning  Goal: Discharge to home or other facility with appropriate resources  11/23/2024 1014 by Arnie Macias RN  Outcome: Progressing  11/22/2024 2354 by Whitney Lewis RN  Outcome: Progressing     Problem: Pain  Goal: Verbalizes/displays adequate comfort level or baseline comfort level  11/23/2024 1014 by Arnie Macias RN  Outcome: Progressing  11/22/2024 2354 by Whitney Lewis RN  Outcome: Progressing     Problem: Safety - Adult  Goal: Free from fall injury  11/23/2024 1014 by Arnie Macias RN  Outcome: Progressing  11/22/2024 2354 by Whitney Lewis RN  Outcome: Progressing

## 2024-11-24 ENCOUNTER — APPOINTMENT (OUTPATIENT)
Dept: GENERAL RADIOLOGY | Age: 59
End: 2024-11-24
Payer: COMMERCIAL

## 2024-11-24 LAB
ANION GAP SERPL CALCULATED.3IONS-SCNC: 11 MMOL/L (ref 7–16)
ANION GAP SERPL CALCULATED.3IONS-SCNC: 8 MMOL/L (ref 7–16)
ANION GAP SERPL CALCULATED.3IONS-SCNC: 9 MMOL/L (ref 7–16)
BUN SERPL-MCNC: 16 MG/DL (ref 6–20)
BUN SERPL-MCNC: 19 MG/DL (ref 6–20)
BUN SERPL-MCNC: 28 MG/DL (ref 6–20)
CALCIUM SERPL-MCNC: 7.8 MG/DL (ref 8.6–10.2)
CALCIUM SERPL-MCNC: 8 MG/DL (ref 8.6–10.2)
CALCIUM SERPL-MCNC: 8.1 MG/DL (ref 8.6–10.2)
CHLORIDE SERPL-SCNC: 95 MMOL/L (ref 98–107)
CHLORIDE SERPL-SCNC: 96 MMOL/L (ref 98–107)
CHLORIDE SERPL-SCNC: 97 MMOL/L (ref 98–107)
CO2 SERPL-SCNC: 21 MMOL/L (ref 22–29)
CO2 SERPL-SCNC: 24 MMOL/L (ref 22–29)
CO2 SERPL-SCNC: 24 MMOL/L (ref 22–29)
CREAT SERPL-MCNC: 0.5 MG/DL (ref 0.5–1)
CREAT SERPL-MCNC: 0.5 MG/DL (ref 0.5–1)
CREAT SERPL-MCNC: 0.6 MG/DL (ref 0.5–1)
ERYTHROCYTE [DISTWIDTH] IN BLOOD BY AUTOMATED COUNT: 12.8 % (ref 11.5–15)
GFR, ESTIMATED: >90 ML/MIN/1.73M2
GLUCOSE SERPL-MCNC: 101 MG/DL (ref 74–99)
GLUCOSE SERPL-MCNC: 76 MG/DL (ref 74–99)
GLUCOSE SERPL-MCNC: 99 MG/DL (ref 74–99)
HCT VFR BLD AUTO: 30.3 % (ref 34–48)
HGB BLD-MCNC: 9.9 G/DL (ref 11.5–15.5)
MCH RBC QN AUTO: 35.2 PG (ref 26–35)
MCHC RBC AUTO-ENTMCNC: 32.7 G/DL (ref 32–34.5)
MCV RBC AUTO: 107.8 FL (ref 80–99.9)
PARTIAL THROMBOPLASTIN TIME: 31.4 SEC (ref 24.5–35.1)
PARTIAL THROMBOPLASTIN TIME: 44.4 SEC (ref 24.5–35.1)
PLATELET # BLD AUTO: 566 K/UL (ref 130–450)
PMV BLD AUTO: 9 FL (ref 7–12)
POTASSIUM SERPL-SCNC: 3.7 MMOL/L (ref 3.5–5)
POTASSIUM SERPL-SCNC: 3.9 MMOL/L (ref 3.5–5)
POTASSIUM SERPL-SCNC: 4.5 MMOL/L (ref 3.5–5)
RBC # BLD AUTO: 2.81 M/UL (ref 3.5–5.5)
SODIUM SERPL-SCNC: 127 MMOL/L (ref 132–146)
SODIUM SERPL-SCNC: 129 MMOL/L (ref 132–146)
SODIUM SERPL-SCNC: 129 MMOL/L (ref 132–146)
WBC OTHER # BLD: 13.4 K/UL (ref 4.5–11.5)

## 2024-11-24 PROCEDURE — 6360000002 HC RX W HCPCS: Performed by: GENERAL PRACTICE

## 2024-11-24 PROCEDURE — 6370000000 HC RX 637 (ALT 250 FOR IP): Performed by: INTERNAL MEDICINE

## 2024-11-24 PROCEDURE — 6360000002 HC RX W HCPCS: Performed by: INTERNAL MEDICINE

## 2024-11-24 PROCEDURE — 71046 X-RAY EXAM CHEST 2 VIEWS: CPT

## 2024-11-24 PROCEDURE — 2700000000 HC OXYGEN THERAPY PER DAY

## 2024-11-24 PROCEDURE — 2060000000 HC ICU INTERMEDIATE R&B

## 2024-11-24 PROCEDURE — 36415 COLL VENOUS BLD VENIPUNCTURE: CPT

## 2024-11-24 PROCEDURE — 6370000000 HC RX 637 (ALT 250 FOR IP): Performed by: GENERAL PRACTICE

## 2024-11-24 PROCEDURE — 85027 COMPLETE CBC AUTOMATED: CPT

## 2024-11-24 PROCEDURE — 85730 THROMBOPLASTIN TIME PARTIAL: CPT

## 2024-11-24 PROCEDURE — 80048 BASIC METABOLIC PNL TOTAL CA: CPT

## 2024-11-24 RX ORDER — HEPARIN SODIUM 1000 [USP'U]/ML
40 INJECTION, SOLUTION INTRAVENOUS; SUBCUTANEOUS PRN
Status: DISPENSED | OUTPATIENT
Start: 2024-11-24 | End: 2024-11-25

## 2024-11-24 RX ORDER — HEPARIN SODIUM 1000 [USP'U]/ML
80 INJECTION, SOLUTION INTRAVENOUS; SUBCUTANEOUS ONCE
Status: COMPLETED | OUTPATIENT
Start: 2024-11-24 | End: 2024-11-24

## 2024-11-24 RX ORDER — CLONIDINE HYDROCHLORIDE 0.1 MG/1
0.1 TABLET ORAL 3 TIMES DAILY
Status: DISCONTINUED | OUTPATIENT
Start: 2024-11-24 | End: 2024-12-06 | Stop reason: HOSPADM

## 2024-11-24 RX ORDER — HEPARIN SODIUM 10000 [USP'U]/100ML
5-30 INJECTION, SOLUTION INTRAVENOUS CONTINUOUS
Status: DISCONTINUED | OUTPATIENT
Start: 2024-11-24 | End: 2024-11-26

## 2024-11-24 RX ORDER — HEPARIN SODIUM 1000 [USP'U]/ML
80 INJECTION, SOLUTION INTRAVENOUS; SUBCUTANEOUS PRN
Status: ACTIVE | OUTPATIENT
Start: 2024-11-24 | End: 2024-11-25

## 2024-11-24 RX ADMIN — HEPARIN SODIUM 3700 UNITS: 1000 INJECTION INTRAVENOUS; SUBCUTANEOUS at 14:19

## 2024-11-24 RX ADMIN — Medication 1 G: at 17:03

## 2024-11-24 RX ADMIN — CARVEDILOL 12.5 MG: 6.25 TABLET, FILM COATED ORAL at 17:03

## 2024-11-24 RX ADMIN — HEPARIN SODIUM 18 UNITS/KG/HR: 10000 INJECTION, SOLUTION INTRAVENOUS at 14:25

## 2024-11-24 RX ADMIN — MENTHOL AND METHYL SALICYLATE 1 APPLICATION: 7.6; 29 OINTMENT TOPICAL at 09:55

## 2024-11-24 RX ADMIN — APIXABAN 5 MG: 5 TABLET, FILM COATED ORAL at 09:54

## 2024-11-24 RX ADMIN — ONDANSETRON 4 MG: 2 INJECTION INTRAMUSCULAR; INTRAVENOUS at 09:55

## 2024-11-24 RX ADMIN — Medication 15 G: at 09:54

## 2024-11-24 RX ADMIN — Medication 15 G: at 22:09

## 2024-11-24 RX ADMIN — Medication 1 G: at 09:54

## 2024-11-24 RX ADMIN — ONDANSETRON 4 MG: 2 INJECTION INTRAMUSCULAR; INTRAVENOUS at 17:06

## 2024-11-24 RX ADMIN — HEPARIN SODIUM 1900 UNITS: 1000 INJECTION INTRAVENOUS; SUBCUTANEOUS at 21:21

## 2024-11-24 RX ADMIN — HYDROCODONE BITARTRATE AND ACETAMINOPHEN 1 TABLET: 7.5; 325 TABLET ORAL at 09:55

## 2024-11-24 RX ADMIN — MENTHOL AND METHYL SALICYLATE 1 APPLICATION: 7.6; 29 OINTMENT TOPICAL at 21:33

## 2024-11-24 ASSESSMENT — PAIN DESCRIPTION - DESCRIPTORS
DESCRIPTORS: ACHING;DISCOMFORT;SORE
DESCRIPTORS: ACHING;DISCOMFORT;SORE

## 2024-11-24 ASSESSMENT — PAIN DESCRIPTION - ORIENTATION
ORIENTATION: RIGHT;MID;LOWER
ORIENTATION: RIGHT;LOWER;MID

## 2024-11-24 ASSESSMENT — PAIN SCALES - GENERAL
PAINLEVEL_OUTOF10: 4
PAINLEVEL_OUTOF10: 4
PAINLEVEL_OUTOF10: 5
PAINLEVEL_OUTOF10: 7

## 2024-11-24 ASSESSMENT — PAIN SCALES - WONG BAKER
WONGBAKER_NUMERICALRESPONSE: HURTS A LITTLE BIT

## 2024-11-24 ASSESSMENT — PAIN DESCRIPTION - LOCATION
LOCATION: BACK
LOCATION: BACK

## 2024-11-24 ASSESSMENT — PAIN - FUNCTIONAL ASSESSMENT: PAIN_FUNCTIONAL_ASSESSMENT: ACTIVITIES ARE NOT PREVENTED

## 2024-11-24 NOTE — PROGRESS NOTES
Spoke with Dr. Michaud to hold eliquis dose tonight and start patient on heparin drip, low dose to run until 5 am tomorrow morning.  Patient scheduled for thoracentesis tomorrow 11/25.  Charge nurse RN assisted with entering orders.

## 2024-11-24 NOTE — PLAN OF CARE
Problem: Discharge Planning  Goal: Discharge to home or other facility with appropriate resources  11/24/2024 1037 by Arnie Macias, RN  Outcome: Progressing  11/23/2024 2048 by Magdy Villasenor, RN  Outcome: Progressing     Problem: Pain  Goal: Verbalizes/displays adequate comfort level or baseline comfort level  11/24/2024 1037 by Arnie Macias, RN  Outcome: Progressing  11/23/2024 2048 by Magdy Villasenor, RN  Outcome: Progressing     Problem: Safety - Adult  Goal: Free from fall injury  11/24/2024 1037 by Arnie Macias, RN  Outcome: Progressing  11/23/2024 2048 by Magdy Villasenor, RN  Outcome: Progressing

## 2024-11-24 NOTE — PROGRESS NOTES
Cxr suggests infiltrate in rul, favor recurrence of mucous plug with rml syndrome. Defer to pulmonary. Sodium still low but trending upward

## 2024-11-24 NOTE — PROGRESS NOTES
[facility]  Nephrology Attending Progress Note          SUBJECTIVE:   From consult note 11/22/2024  The patient is a 55-year-old  lady who has underlying history of chronic hyponatremia seen by Dr. Curly Lopez, COPD and bilateral PEs.  The last time she saw Dr. Lopez was in year 2018 and subsequently, the patient chose not to follow up.  The patient was initially seen in consultation for hyponatremia in 01/2018.  At that time, the hyponatremia was treated with oral salt tablets.    They presented on 11/21/2024 complaining of back pain.  She has had back pain since her prior admission that slightly worsened.  She chronically has shortness of breath at baseline.  On arrival there blood pressure 108/78 HR 87 RR 18 on room air and afebrile.  Initial labs were pertinent for sodium of 126, potassium of 5.7 bicarb of 21.  Imaging showed right middle lobe atelectasis, mild enlargement of moderate right pleural effusion and ascending thoracic aortic aneurysm of 4.7 cm.  Nephrology was consulted for hyponatremia.      11/24/24-patient seen and examined.  She was resting comfortably in the bed at the time of my visit.  She states she had N/V yesterday and her po intake was poor. Today she feels somewhat better and will try breakfast      PROBLEM LIST:    Patient Active Problem List   Diagnosis    Hyponatremia    Acute hyponatremia    Traumatic closed displaced fracture of left shoulder with anterior dislocation    Acute respiratory failure with hypoxia    LFTs abnormal    Primary hypertension    Multiple closed fractures of ribs of left side    Acute hypoxic respiratory failure    Moderate protein-calorie malnutrition (HCC)    Adrenal crisis (HCC)        PAST MEDICAL HISTORY:    Past Medical History:   Diagnosis Date    Acid reflux     Fracture of left shoulder 10/2018    Frozen shoulder     left    Hypertension     Hyponatremia     Lung abnormality     MVP (mitral valve prolapse)     no issues    Skin cancer

## 2024-11-24 NOTE — PROGRESS NOTES
[facility]  [address], [address]  [place] [address]      Pulmonary Progress Note      SUBJECTIVE:  claims similar with back pain, some sob together with pain, no cough/congestion, on 1-2 li NC    OBJECTIVE    Medications    Continuous Infusions:   heparin (PORCINE) Infusion      dextrose      sodium chloride 75 mL/hr at 24 0637       Scheduled Meds:   cloNIDine  0.1 mg Oral TID    heparin (porcine)  80 Units/kg IntraVENous Once    [Held by provider] ibuprofen  800 mg Oral TID    lidocaine  1 patch TransDERmal Daily    Icy Hot Balm Extra Strength  1 Application Apply externally BID    sodium chloride  1 g Oral BID WC    urea  15 g Oral Daily    [Held by provider] dilTIAZem  120 mg Oral Daily    [Held by provider] apixaban  5 mg Oral BID    carvedilol  12.5 mg Oral BID WC    ipratropium 0.5 mg-albuterol 2.5 mg  1 Dose Inhalation TID RT       PRN Meds:heparin (porcine), heparin (porcine), ondansetron, HYDROcodone-acetaminophen, glucose, dextrose bolus **OR** dextrose bolus, glucagon (rDNA), dextrose    Physical    VITALS:  /80   Pulse 69   Temp 98.2 °F (36.8 °C) (Oral)   Resp 16   Ht 1.575 m (5' 2\")   Wt 46.3 kg (102 lb)   LMP 2012   SpO2 94%   BMI 18.66 kg/m²     24HR INTAKE/OUTPUT:      Intake/Output Summary (Last 24 hours) at 2024 1248  Last data filed at 2024 1814  Gross per 24 hour   Intake --   Output 300 ml   Net -300 ml       24HR PULSE OXIMETRY RANGE:    SpO2  Av.5 %  Min: 90 %  Max: 98 %    General appearance: alert, appears stated age, and cooperative  Lungs: ronchi bibasal minimal  Heart: regular rate and rhythm, S1, S2 normal, no murmur, click, rub or gallop  Abdomen: soft, non-tender; bowel sounds normal; no masses,  no organomegaly  Extremities: extremities normal, atraumatic, no cyanosis or edema  Neurologic: Mental status: Alert, oriented, thought content appropriate    Data    CBC:    Recent Labs     11/21/24  1350 11/22/24  0325   WBC 13.4* 10.6   HGB 11.5 11.5   HCT 33.3* 33.2*   .8* 102.2*   * 602*       BMP:  Recent Labs     11/23/24  1000 11/23/24  1812 11/24/24  0420   * 129* 129*   K 3.9 4.0 4.5   CL 96* 95* 97*   CO2 23 25 24   BUN 9 22* 16   CREATININE 0.6 0.6 0.6    ALB:3,BILIDIR:3,BILITOT:3,ALKPHOS:3)@    PT/INR: No results for input(s): \"PROTIME\", \"INR\" in the last 72 hours.    ABG:   No results for input(s): \"PH\", \"PO2\", \"PCO2\", \"HCO3\", \"BE\", \"O2SAT\", \"METHB\", \"O2HB\", \"COHB\", \"O2CON\", \"HHB\", \"THB\" in the last 72 hours.          Radiology/Other tests reviewed: CXR reviewed with slightly increased right pleural effusion with some possible tracking into fissure compared to previous    Assessment:     Principal Problem:    Adrenal crisis (HCC)  Resolved Problems:    * No resolved hospital problems. *  PE on anticoagulation  RML atelectasis  Small right pleural effusion  Hypoxia  Hypernatremia      Plan:       Cont with oxygen, taper as tolerated  Discussed thoracentesis with pt, risks/benefits discussed, agreeable to procedure, hold eliquis, start IV heparin, will have this held by late morning and will plan for thoracentesis early afternoon if enough fluid to remove  Pain medications for back pain as per PCP  Watch fluid balance, hyponatremia as per Renal  OOB to chair if tolerates      Time at the bedside, reviewing labs and radiographs, reviewing notes and consultations, discussing with staff and family was more than 50 minutes.      Thanks for letting us see this patient in consultation.  Please contact us with any questions. Office [phone] or after hours through [email or web address], x [phone].

## 2024-11-25 ENCOUNTER — APPOINTMENT (OUTPATIENT)
Dept: ULTRASOUND IMAGING | Age: 59
End: 2024-11-25
Payer: COMMERCIAL

## 2024-11-25 LAB
ANION GAP SERPL CALCULATED.3IONS-SCNC: 9 MMOL/L (ref 7–16)
ANION GAP SERPL CALCULATED.3IONS-SCNC: 9 MMOL/L (ref 7–16)
BUN SERPL-MCNC: 23 MG/DL (ref 6–20)
BUN SERPL-MCNC: 36 MG/DL (ref 6–20)
CALCIUM SERPL-MCNC: 8.1 MG/DL (ref 8.6–10.2)
CALCIUM SERPL-MCNC: 8.1 MG/DL (ref 8.6–10.2)
CHLORIDE SERPL-SCNC: 97 MMOL/L (ref 98–107)
CHLORIDE SERPL-SCNC: 98 MMOL/L (ref 98–107)
CO2 SERPL-SCNC: 22 MMOL/L (ref 22–29)
CO2 SERPL-SCNC: 23 MMOL/L (ref 22–29)
CREAT SERPL-MCNC: 0.6 MG/DL (ref 0.5–1)
CREAT SERPL-MCNC: 0.6 MG/DL (ref 0.5–1)
GFR, ESTIMATED: >90 ML/MIN/1.73M2
GFR, ESTIMATED: >90 ML/MIN/1.73M2
GLUCOSE SERPL-MCNC: 97 MG/DL (ref 74–99)
GLUCOSE SERPL-MCNC: 99 MG/DL (ref 74–99)
PARTIAL THROMBOPLASTIN TIME: 32 SEC (ref 24.5–35.1)
PARTIAL THROMBOPLASTIN TIME: 46.3 SEC (ref 24.5–35.1)
PARTIAL THROMBOPLASTIN TIME: 53.5 SEC (ref 24.5–35.1)
POTASSIUM SERPL-SCNC: 3.4 MMOL/L (ref 3.5–5)
POTASSIUM SERPL-SCNC: 3.7 MMOL/L (ref 3.5–5)
SODIUM SERPL-SCNC: 128 MMOL/L (ref 132–146)
SODIUM SERPL-SCNC: 130 MMOL/L (ref 132–146)

## 2024-11-25 PROCEDURE — 89051 BODY FLUID CELL COUNT: CPT

## 2024-11-25 PROCEDURE — 6370000000 HC RX 637 (ALT 250 FOR IP): Performed by: GENERAL PRACTICE

## 2024-11-25 PROCEDURE — 83615 LACTATE (LD) (LDH) ENZYME: CPT

## 2024-11-25 PROCEDURE — 2700000000 HC OXYGEN THERAPY PER DAY

## 2024-11-25 PROCEDURE — 84157 ASSAY OF PROTEIN OTHER: CPT

## 2024-11-25 PROCEDURE — 6360000002 HC RX W HCPCS: Performed by: INTERNAL MEDICINE

## 2024-11-25 PROCEDURE — 85730 THROMBOPLASTIN TIME PARTIAL: CPT

## 2024-11-25 PROCEDURE — 87075 CULTR BACTERIA EXCEPT BLOOD: CPT

## 2024-11-25 PROCEDURE — 83986 ASSAY PH BODY FLUID NOS: CPT

## 2024-11-25 PROCEDURE — 2580000003 HC RX 258: Performed by: GENERAL PRACTICE

## 2024-11-25 PROCEDURE — 2060000000 HC ICU INTERMEDIATE R&B

## 2024-11-25 PROCEDURE — 87205 SMEAR GRAM STAIN: CPT

## 2024-11-25 PROCEDURE — 76604 US EXAM CHEST: CPT

## 2024-11-25 PROCEDURE — 80048 BASIC METABOLIC PNL TOTAL CA: CPT

## 2024-11-25 PROCEDURE — 87070 CULTURE OTHR SPECIMN AEROBIC: CPT

## 2024-11-25 PROCEDURE — 6370000000 HC RX 637 (ALT 250 FOR IP): Performed by: INTERNAL MEDICINE

## 2024-11-25 PROCEDURE — 84478 ASSAY OF TRIGLYCERIDES: CPT

## 2024-11-25 PROCEDURE — 6370000000 HC RX 637 (ALT 250 FOR IP): Performed by: NURSE PRACTITIONER

## 2024-11-25 PROCEDURE — 82945 GLUCOSE OTHER FLUID: CPT

## 2024-11-25 RX ORDER — HEPARIN SODIUM 1000 [USP'U]/ML
80 INJECTION, SOLUTION INTRAVENOUS; SUBCUTANEOUS PRN
Status: DISCONTINUED | OUTPATIENT
Start: 2024-11-25 | End: 2024-11-26

## 2024-11-25 RX ORDER — HEPARIN SODIUM 1000 [USP'U]/ML
40 INJECTION, SOLUTION INTRAVENOUS; SUBCUTANEOUS PRN
Status: DISCONTINUED | OUTPATIENT
Start: 2024-11-25 | End: 2024-11-26

## 2024-11-25 RX ORDER — POTASSIUM CHLORIDE 1500 MG/1
20 TABLET, EXTENDED RELEASE ORAL ONCE
Status: COMPLETED | OUTPATIENT
Start: 2024-11-25 | End: 2024-11-25

## 2024-11-25 RX ADMIN — Medication 1 G: at 09:08

## 2024-11-25 RX ADMIN — HEPARIN SODIUM 22 UNITS/KG/HR: 10000 INJECTION, SOLUTION INTRAVENOUS at 23:06

## 2024-11-25 RX ADMIN — Medication 15 G: at 09:07

## 2024-11-25 RX ADMIN — Medication 1 G: at 17:16

## 2024-11-25 RX ADMIN — POTASSIUM CHLORIDE 20 MEQ: 1500 TABLET, EXTENDED RELEASE ORAL at 10:08

## 2024-11-25 RX ADMIN — HEPARIN SODIUM 1900 UNITS: 1000 INJECTION INTRAVENOUS; SUBCUTANEOUS at 23:03

## 2024-11-25 RX ADMIN — HEPARIN SODIUM 20 UNITS/KG/HR: 10000 INJECTION, SOLUTION INTRAVENOUS at 22:47

## 2024-11-25 RX ADMIN — HYDROCODONE BITARTRATE AND ACETAMINOPHEN 1 TABLET: 7.5; 325 TABLET ORAL at 17:22

## 2024-11-25 RX ADMIN — MENTHOL AND METHYL SALICYLATE 1 APPLICATION: 7.6; 29 OINTMENT TOPICAL at 09:06

## 2024-11-25 RX ADMIN — MENTHOL AND METHYL SALICYLATE 1 APPLICATION: 7.6; 29 OINTMENT TOPICAL at 21:02

## 2024-11-25 RX ADMIN — SODIUM CHLORIDE: 9 INJECTION, SOLUTION INTRAVENOUS at 03:14

## 2024-11-25 RX ADMIN — CARVEDILOL 12.5 MG: 6.25 TABLET, FILM COATED ORAL at 09:07

## 2024-11-25 RX ADMIN — CARVEDILOL 12.5 MG: 6.25 TABLET, FILM COATED ORAL at 17:16

## 2024-11-25 RX ADMIN — CLONIDINE HYDROCHLORIDE 0.1 MG: 0.1 TABLET ORAL at 09:07

## 2024-11-25 ASSESSMENT — PAIN DESCRIPTION - LOCATION: LOCATION: RIB CAGE

## 2024-11-25 ASSESSMENT — PAIN DESCRIPTION - DESCRIPTORS: DESCRIPTORS: ACHING

## 2024-11-25 ASSESSMENT — PAIN SCALES - GENERAL
PAINLEVEL_OUTOF10: 6
PAINLEVEL_OUTOF10: 5

## 2024-11-25 ASSESSMENT — PAIN DESCRIPTION - ORIENTATION: ORIENTATION: RIGHT

## 2024-11-25 NOTE — PLAN OF CARE
Problem: Discharge Planning  Goal: Discharge to home or other facility with appropriate resources  11/25/2024 1019 by Jennifer Zavala, RN  Outcome: Progressing     Problem: Pain  Goal: Verbalizes/displays adequate comfort level or baseline comfort level  11/25/2024 1019 by Jennifer Zavala, RN  Outcome: Progressing     Problem: Safety - Adult  Goal: Free from fall injury  11/25/2024 1019 by Jennifer Zavala, RN  Outcome: Progressing

## 2024-11-25 NOTE — PLAN OF CARE
Problem: Discharge Planning  Goal: Discharge to home or other facility with appropriate resources  11/24/2024 2350 by Crystal Mejía, RN  Outcome: Progressing  11/24/2024 1037 by Arnie Macias RN  Outcome: Progressing     Problem: Pain  Goal: Verbalizes/displays adequate comfort level or baseline comfort level  11/24/2024 2350 by Crystal Mejía, RN  Outcome: Progressing  11/24/2024 1037 by Arnie Macias RN  Outcome: Progressing     Problem: Safety - Adult  Goal: Free from fall injury  11/24/2024 2350 by Crystal Mejía, RN  Outcome: Progressing  11/24/2024 1037 by Arnie Macias RN  Outcome: Progressing

## 2024-11-25 NOTE — PROGRESS NOTES
Spoke to Dr. Michaud, he is aware that IR unable to do procedure until tomorrow 1000. Restart heparin gtt now and OK to stop tomorrow, 11/26 at 0400 per IR Dr. saldana. KEITH @ [facility].

## 2024-11-25 NOTE — PROGRESS NOTES
IR request for right chest pigtail catheter insertion reviewed w/ Dr Zaman. Informed floor nurse (JARRETT Rodriguez) that the IR dept would tentatively put the patient on our schedule tomorrow morning at 10:00. Informed that orders to shut heparin drip off at 04:00 would need to come from Dr Michaud. Also requested that the patient be NPO except meds at midnight.

## 2024-11-25 NOTE — ACP (ADVANCE CARE PLANNING)
Advance Care Planning   Healthcare Decision Maker:    Primary Decision Maker: PetarJanelle - Brother/Sister - [phone]    Click here to complete Healthcare Decision Makers including selection of the Healthcare Decision Maker Relationship (ie \"Primary\").

## 2024-11-25 NOTE — PROGRESS NOTES
Patient seen for thoracentesis later for pleural effusion. Bp creeping up with reduction of bp meds, will follow

## 2024-11-25 NOTE — PROGRESS NOTES
[facility]  Nephrology Attending Progress Note          SUBJECTIVE:   From consult note 11/22/2024  The patient is a 55-year-old  lady who has underlying history of chronic hyponatremia seen by Dr. Curly Lopez, COPD and bilateral PEs.  The last time she saw Dr. Lopez was in year 2018 and subsequently, the patient chose not to follow up.  The patient was initially seen in consultation for hyponatremia in 01/2018.  At that time, the hyponatremia was treated with oral salt tablets.    They presented on 11/21/2024 complaining of back pain.  She has had back pain since her prior admission that slightly worsened.  She chronically has shortness of breath at baseline.  On arrival there blood pressure 108/78 HR 87 RR 18 on room air and afebrile.  Initial labs were pertinent for sodium of 126, potassium of 5.7 bicarb of 21.  Imaging showed right middle lobe atelectasis, mild enlargement of moderate right pleural effusion and ascending thoracic aortic aneurysm of 4.7 cm.  Nephrology was consulted for hyponatremia.      11/25/24-patient seen and examined.  She states she is having some pain issues this morning.  She is awaiting thoracentesis today.      PROBLEM LIST:    Patient Active Problem List   Diagnosis    Hyponatremia    Acute hyponatremia    Traumatic closed displaced fracture of left shoulder with anterior dislocation    Acute respiratory failure with hypoxia    LFTs abnormal    Primary hypertension    Multiple closed fractures of ribs of left side    Acute hypoxic respiratory failure    Moderate protein-calorie malnutrition (HCC)    Adrenal crisis (HCC)        PAST MEDICAL HISTORY:    Past Medical History:   Diagnosis Date    Acid reflux     Fracture of left shoulder 10/2018    Frozen shoulder     left    Hypertension     Hyponatremia     Lung abnormality     MVP (mitral valve prolapse)     no issues    Skin cancer        DIET:    ADULT DIET; Regular     PHYSICAL EXAM:     Patient Vitals for the past

## 2024-11-25 NOTE — CARE COORDINATION
Updated plan of care. Right effusion noted, plan for thoracentesis per pulmonary. Pt placed on heparin drip-on hold for procedure. Pt on eliquis previous for PE from 11/11. Pt has o2 via [employer]. Na-128, K-3.4. will follow. Pt is declining needs for home. Will follow-mjo

## 2024-11-25 NOTE — PROGRESS NOTES
[facility]  [address], [address]  [place] [address]      Pulmonary Progress Note      SUBJECTIVE:  claims similar with back pain, some sob together with pain, no cough/congestion, on 1-2 li NC. USG examination of right pleural effusions showing loculation    OBJECTIVE    Medications    Continuous Infusions:   [Held by provider] heparin (PORCINE) Infusion Stopped (24 1004)    dextrose      sodium chloride 75 mL/hr at 24 0314       Scheduled Meds:   cloNIDine  0.1 mg Oral TID    [Held by provider] ibuprofen  800 mg Oral TID    lidocaine  1 patch TransDERmal Daily    Icy Hot Balm Extra Strength  1 Application Apply externally BID    sodium chloride  1 g Oral BID WC    urea  15 g Oral Daily    [Held by provider] dilTIAZem  120 mg Oral Daily    [Held by provider] apixaban  5 mg Oral BID    carvedilol  12.5 mg Oral BID WC    ipratropium 0.5 mg-albuterol 2.5 mg  1 Dose Inhalation TID RT       PRN Meds:ondansetron, HYDROcodone-acetaminophen, glucose, dextrose bolus **OR** dextrose bolus, glucagon (rDNA), dextrose    Physical    VITALS:  /85   Pulse (!) 102   Temp 99 °F (37.2 °C) (Oral)   Resp 16   Ht 1.575 m (5' 2\")   Wt 46.3 kg (102 lb)   LMP 2012   SpO2 90%   BMI 18.66 kg/m²     24HR INTAKE/OUTPUT:      Intake/Output Summary (Last 24 hours) at 2024 1344  Last data filed at 2024 0325  Gross per 24 hour   Intake --   Output 200 ml   Net -200 ml       24HR PULSE OXIMETRY RANGE:    SpO2  Av.3 %  Min: 90 %  Max: 95 %    General appearance: alert, appears stated age, and cooperative  Lungs: ronchi bibasal minimal  Heart: regular rate and rhythm, S1, S2 normal, no murmur, click, rub or gallop  Abdomen: soft, non-tender; bowel sounds normal; no masses,  no organomegaly  Extremities: extremities normal, atraumatic, no cyanosis or edema  Neurologic: Mental status: Alert, oriented, thought content

## 2024-11-26 ENCOUNTER — APPOINTMENT (OUTPATIENT)
Dept: NUCLEAR MEDICINE | Age: 59
End: 2024-11-26
Payer: COMMERCIAL

## 2024-11-26 ENCOUNTER — APPOINTMENT (OUTPATIENT)
Dept: CT IMAGING | Age: 59
End: 2024-11-26
Payer: COMMERCIAL

## 2024-11-26 LAB
ANION GAP SERPL CALCULATED.3IONS-SCNC: 11 MMOL/L (ref 7–16)
BUN SERPL-MCNC: 12 MG/DL (ref 6–20)
CALCIUM SERPL-MCNC: 7.9 MG/DL (ref 8.6–10.2)
CHLORIDE SERPL-SCNC: 98 MMOL/L (ref 98–107)
CO2 SERPL-SCNC: 21 MMOL/L (ref 22–29)
CREAT SERPL-MCNC: 0.5 MG/DL (ref 0.5–1)
CRITICAL: NORMAL
DATE ANALYZED: NORMAL
DATE OF COLLECTION: NORMAL
ERYTHROCYTE [DISTWIDTH] IN BLOOD BY AUTOMATED COUNT: 12.9 % (ref 11.5–15)
GFR, ESTIMATED: >90 ML/MIN/1.73M2
GLUCOSE FLD-MCNC: 58 MG/DL
GLUCOSE SERPL-MCNC: 88 MG/DL (ref 74–99)
HCT VFR BLD AUTO: 26.3 % (ref 34–48)
HGB BLD-MCNC: 8.8 G/DL (ref 11.5–15.5)
LAB: NORMAL
LDH FLD L TO P-CCNC: 246 U/L
Lab: 1048
MCH RBC QN AUTO: 35.8 PG (ref 26–35)
MCHC RBC AUTO-ENTMCNC: 33.5 G/DL (ref 32–34.5)
MCV RBC AUTO: 106.9 FL (ref 80–99.9)
OPERATOR ID: 420
PARTIAL THROMBOPLASTIN TIME: 30.1 SEC (ref 24.5–35.1)
PARTIAL THROMBOPLASTIN TIME: 45.5 SEC (ref 24.5–35.1)
PH FLUID: 7.32
PLATELET # BLD AUTO: 514 K/UL (ref 130–450)
PMV BLD AUTO: 9 FL (ref 7–12)
POTASSIUM SERPL-SCNC: 3.7 MMOL/L (ref 3.5–5)
PROT FLD-MCNC: 3.3 G/DL
RBC # BLD AUTO: 2.46 M/UL (ref 3.5–5.5)
SODIUM SERPL-SCNC: 130 MMOL/L (ref 132–146)
SOURCE, BLOOD GAS: NORMAL
SPECIMEN TYPE: NORMAL
TIME ANALYZED: 1106
TRIGLYCERIDES FLUID: 56 MG/DL
WBC OTHER # BLD: 9.3 K/UL (ref 4.5–11.5)

## 2024-11-26 PROCEDURE — 2700000000 HC OXYGEN THERAPY PER DAY

## 2024-11-26 PROCEDURE — 78306 BONE IMAGING WHOLE BODY: CPT | Performed by: GENERAL PRACTICE

## 2024-11-26 PROCEDURE — 2709999900 CT GUIDED CHEST TUBE

## 2024-11-26 PROCEDURE — 2580000003 HC RX 258: Performed by: GENERAL PRACTICE

## 2024-11-26 PROCEDURE — 83986 ASSAY PH BODY FLUID NOS: CPT

## 2024-11-26 PROCEDURE — 85027 COMPLETE CBC AUTOMATED: CPT

## 2024-11-26 PROCEDURE — 6360000002 HC RX W HCPCS: Performed by: RADIOLOGY

## 2024-11-26 PROCEDURE — 0W9930Z DRAINAGE OF RIGHT PLEURAL CAVITY WITH DRAINAGE DEVICE, PERCUTANEOUS APPROACH: ICD-10-PCS | Performed by: GENERAL PRACTICE

## 2024-11-26 PROCEDURE — 85730 THROMBOPLASTIN TIME PARTIAL: CPT

## 2024-11-26 PROCEDURE — 6370000000 HC RX 637 (ALT 250 FOR IP): Performed by: INTERNAL MEDICINE

## 2024-11-26 PROCEDURE — 6360000002 HC RX W HCPCS: Performed by: GENERAL PRACTICE

## 2024-11-26 PROCEDURE — 6370000000 HC RX 637 (ALT 250 FOR IP): Performed by: GENERAL PRACTICE

## 2024-11-26 PROCEDURE — 80048 BASIC METABOLIC PNL TOTAL CA: CPT

## 2024-11-26 PROCEDURE — 88305 TISSUE EXAM BY PATHOLOGIST: CPT

## 2024-11-26 PROCEDURE — 88112 CYTOPATH CELL ENHANCE TECH: CPT

## 2024-11-26 PROCEDURE — 2060000000 HC ICU INTERMEDIATE R&B

## 2024-11-26 PROCEDURE — 6360000002 HC RX W HCPCS: Performed by: INTERNAL MEDICINE

## 2024-11-26 PROCEDURE — A9503 TC99M MEDRONATE: HCPCS | Performed by: RADIOLOGY

## 2024-11-26 PROCEDURE — 3430000000 HC RX DIAGNOSTIC RADIOPHARMACEUTICAL: Performed by: RADIOLOGY

## 2024-11-26 RX ORDER — CARVEDILOL 25 MG/1
25 TABLET ORAL 2 TIMES DAILY WITH MEALS
Status: DISCONTINUED | OUTPATIENT
Start: 2024-11-26 | End: 2024-12-06 | Stop reason: HOSPADM

## 2024-11-26 RX ORDER — MIDAZOLAM HYDROCHLORIDE 2 MG/2ML
INJECTION, SOLUTION INTRAMUSCULAR; INTRAVENOUS PRN
Status: COMPLETED | OUTPATIENT
Start: 2024-11-26 | End: 2024-11-26

## 2024-11-26 RX ORDER — TC 99M MEDRONATE 20 MG/10ML
25 INJECTION, POWDER, LYOPHILIZED, FOR SOLUTION INTRAVENOUS
Status: COMPLETED | OUTPATIENT
Start: 2024-11-26 | End: 2024-11-26

## 2024-11-26 RX ORDER — LOSARTAN POTASSIUM 50 MG/1
50 TABLET ORAL DAILY
Status: DISCONTINUED | OUTPATIENT
Start: 2024-11-26 | End: 2024-12-06 | Stop reason: HOSPADM

## 2024-11-26 RX ORDER — FENTANYL CITRATE 50 UG/ML
INJECTION, SOLUTION INTRAMUSCULAR; INTRAVENOUS PRN
Status: COMPLETED | OUTPATIENT
Start: 2024-11-26 | End: 2024-11-26

## 2024-11-26 RX ORDER — CARVEDILOL 25 MG/1
25 TABLET ORAL 2 TIMES DAILY WITH MEALS
Status: DISCONTINUED | OUTPATIENT
Start: 2024-11-27 | End: 2024-11-26

## 2024-11-26 RX ADMIN — TC 99M MEDRONATE 25 MILLICURIE: 20 INJECTION, POWDER, LYOPHILIZED, FOR SOLUTION INTRAVENOUS at 13:17

## 2024-11-26 RX ADMIN — CARVEDILOL 25 MG: 25 TABLET, FILM COATED ORAL at 18:20

## 2024-11-26 RX ADMIN — MENTHOL AND METHYL SALICYLATE 1 APPLICATION: 7.6; 29 OINTMENT TOPICAL at 12:05

## 2024-11-26 RX ADMIN — HYDROCODONE BITARTRATE AND ACETAMINOPHEN 1 TABLET: 7.5; 325 TABLET ORAL at 20:22

## 2024-11-26 RX ADMIN — MIDAZOLAM HYDROCHLORIDE 1 MG: 1 INJECTION, SOLUTION INTRAMUSCULAR; INTRAVENOUS at 10:42

## 2024-11-26 RX ADMIN — HEPARIN SODIUM 18 UNITS/KG/HR: 10000 INJECTION, SOLUTION INTRAVENOUS at 12:30

## 2024-11-26 RX ADMIN — CLONIDINE HYDROCHLORIDE 0.1 MG: 0.1 TABLET ORAL at 14:29

## 2024-11-26 RX ADMIN — Medication 15 G: at 12:05

## 2024-11-26 RX ADMIN — APIXABAN 5 MG: 5 TABLET, FILM COATED ORAL at 20:22

## 2024-11-26 RX ADMIN — FENTANYL CITRATE 50 MCG: 50 INJECTION, SOLUTION INTRAMUSCULAR; INTRAVENOUS at 10:42

## 2024-11-26 RX ADMIN — LOSARTAN POTASSIUM 50 MG: 50 TABLET, FILM COATED ORAL at 18:20

## 2024-11-26 RX ADMIN — ONDANSETRON 4 MG: 2 INJECTION INTRAMUSCULAR; INTRAVENOUS at 19:27

## 2024-11-26 RX ADMIN — SODIUM CHLORIDE: 9 INJECTION, SOLUTION INTRAVENOUS at 22:58

## 2024-11-26 RX ADMIN — MENTHOL AND METHYL SALICYLATE 1 APPLICATION: 7.6; 29 OINTMENT TOPICAL at 20:23

## 2024-11-26 RX ADMIN — Medication 1 G: at 18:20

## 2024-11-26 RX ADMIN — SODIUM CHLORIDE: 9 INJECTION, SOLUTION INTRAVENOUS at 08:51

## 2024-11-26 ASSESSMENT — PAIN DESCRIPTION - ORIENTATION: ORIENTATION: RIGHT

## 2024-11-26 ASSESSMENT — PAIN DESCRIPTION - LOCATION: LOCATION: BACK

## 2024-11-26 ASSESSMENT — PAIN - FUNCTIONAL ASSESSMENT: PAIN_FUNCTIONAL_ASSESSMENT: ACTIVITIES ARE NOT PREVENTED

## 2024-11-26 ASSESSMENT — PAIN DESCRIPTION - DESCRIPTORS: DESCRIPTORS: ACHING;SORE;THROBBING

## 2024-11-26 ASSESSMENT — PAIN SCALES - GENERAL
PAINLEVEL_OUTOF10: 6
PAINLEVEL_OUTOF10: 4

## 2024-11-26 NOTE — PROGRESS NOTES
Called and left called with pulmonology answering service regarding pt's aptt of 46.3 and requiring a heparin bolus but none ordered. Awaiting call back.

## 2024-11-26 NOTE — PROGRESS NOTES
P Quality Flow/Interdisciplinary Rounds Progress Note        Quality Flow Rounds held on November 26, 2024    Disciplines Attending:  Bedside Nurse, , , and Nursing Unit Leadership    Maria T Romeo was admitted on 11/21/2024  1:00 PM    Anticipated Discharge Date:       Disposition:    Micky Score:  Micky Scale Score: 21    Readmission Risk              Risk of Unplanned Readmission:  13           Discussed patient goal for the day, patient clinical progression, and barriers to discharge.  The following Goal(s) of the Day/Commitment(s) have been identified:   IR today for pigtail cath, discharge planning      Agustín Harris RN  November 26, 2024

## 2024-11-26 NOTE — OR NURSING
Patient taken to Cat Scan from room 728, positioned on table right side slightly up with O2 and monitoring equipment attached. Patient scanned and images reviewed by Dr. Zaman. Patient prepped and draped per protocol. 12 Micronesian chest tube placed right posterior/lateral chest with Cat Scan guidance by Dr. Zaman @ 1048. 20 ml hazy kristian pleural fluid aspirated and sent to lab per physician orders. 180 ml drained into vacutainer bottle of serosanguinous fluid. Patient re-scanned. Tube sutured in place with 2-0 silk. Puncture site cleansed, and dressing applied. Patient tolerated well. Chest tube connected to atrium system at water seal. Procedure completed @ 1056, patient transported back to room 728, and nurse handoff report called to floor.

## 2024-11-26 NOTE — PROGRESS NOTES
Spoke to Dr. Zaman in IR - OK to resume anticoag immediately. Call out to Dr. Michaud to inquire resuming heparin gtt vs restarting eliquis post chest tube insertion. Restart hep gtt now per

## 2024-11-26 NOTE — PROGRESS NOTES
Pulmonary Progress Note    Admit Date: 2024  Hospital day                               PCP: Jensen Pelayo DO    Chief Complaint (s):  Patient Active Problem List   Diagnosis    Hyponatremia    Acute hyponatremia    Traumatic closed displaced fracture of left shoulder with anterior dislocation    Acute respiratory failure with hypoxia    LFTs abnormal    Primary hypertension    Multiple closed fractures of ribs of left side    Acute hypoxic respiratory failure    Moderate protein-calorie malnutrition (HCC)    Adrenal crisis (HCC)       Subjective:  Awake and alert, complains of pain at the chest tube insertion site.      Vitals:  VITALS:  BP (!) 154/99   Pulse 100   Temp 98.3 °F (36.8 °C) (Oral)   Resp 16   Ht 1.575 m (5' 2\")   Wt 46.3 kg (102 lb)   LMP 2012   SpO2 96%   BMI 18.66 kg/m²     24HR INTAKE/OUTPUT:      Intake/Output Summary (Last 24 hours) at 2024 1640  Last data filed at 2024 1543  Gross per 24 hour   Intake 240 ml   Output 650 ml   Net -410 ml       24HR PULSE OXIMETRY RANGE:    SpO2  Av.5 %  Min: 90 %  Max: 99 %    Medications:  IV:   dextrose      sodium chloride 75 mL/hr at 24 0851       Scheduled Meds:   cloNIDine  0.1 mg Oral TID    [Held by provider] ibuprofen  800 mg Oral TID    lidocaine  1 patch TransDERmal Daily    Icy Hot Balm Extra Strength  1 Application Apply externally BID    sodium chloride  1 g Oral BID WC    urea  15 g Oral Daily    [Held by provider] dilTIAZem  120 mg Oral Daily    apixaban  5 mg Oral BID    carvedilol  12.5 mg Oral BID WC    ipratropium 0.5 mg-albuterol 2.5 mg  1 Dose Inhalation TID RT       Diet:   ADULT DIET; Regular     EXAM:  General: No distress. Alert.  Eyes: PERRL. No sclera icterus. No conjunctival injection.  ENT: No discharge. Pharynx clear.  Neck: Trachea midline. Normal thyroid.  Resp: No accessory muscle use.  No rales.  No wheezing.  No rhonchi.   CV: Regular rate. Regular rhythm. No murmur

## 2024-11-26 NOTE — CARE COORDINATION
Updated plan of care. Pt for insertion of pigtail cath chest tube insertion today. Heparin drip resumed post op. Eliquis on hold. Continue o2 3L/NC. Renal following. Spoke with pt. Did decide on home care and [facility]referral called. [facility] orders completed. Ok for start of care Monday 12/2. Pt is not ready for discharge-jailyn

## 2024-11-26 NOTE — PRE SEDATION
Sedation Pre-Procedure Note    Patient Name: Maria T Romeo   YOB: 1965  Room/Bed: [address] [place], [place] [address]-A  Medical Record Number: [id number]  Date: 11/26/2024   Time: 10:17 AM       Indication:  Image guided chest tube placement with possible conscious sedation.     Consent: IDr. Zaman have discussed with the patient and/or the patient representative the indication, alternatives, and the possible risks and/or complications of the planned procedure and the anesthesia methods. The patient and/or patient representative appear to understand and agree to proceed.    Vital Signs:   Vitals:    11/26/24 0722   BP: (!) 141/88   Pulse: 73   Resp: 16   Temp: 98.4 °F (36.9 °C)   SpO2: 95%       Past Medical History:   has a past medical history of Acid reflux, Fracture of left shoulder, Frozen shoulder, Hypertension, Hyponatremia, Lung abnormality, MVP (mitral valve prolapse), and Skin cancer.    Past Surgical History:   has a past surgical history that includes Foot surgery (Right, 1980's); pr optx prox humeral fx w/int fixj rpr tuberosity (Left, 10/23/2018); CLOSED MANIPULATION SHOULDER (Left, 3/5/2019); Skin cancer excision (2018); bronchoscopy (N/A, 1/8/2024); and bronchoscopy (N/A, 11/13/2024).    Medications:   Scheduled Meds:    cloNIDine  0.1 mg Oral TID    [Held by provider] ibuprofen  800 mg Oral TID    lidocaine  1 patch TransDERmal Daily    Icy Hot Balm Extra Strength  1 Application Apply externally BID    sodium chloride  1 g Oral BID WC    urea  15 g Oral Daily    [Held by provider] dilTIAZem  120 mg Oral Daily    [Held by provider] apixaban  5 mg Oral BID    carvedilol  12.5 mg Oral BID WC    ipratropium 0.5 mg-albuterol 2.5 mg  1 Dose Inhalation TID RT     Continuous Infusions:    heparin (PORCINE) Infusion Stopped (11/26/24 0400)    dextrose      sodium chloride 75 mL/hr at 11/26/24 0851     PRN Meds: heparin (porcine), heparin (porcine), ondansetron, HYDROcodone-acetaminophen, glucose, dextrose bolus **OR**

## 2024-11-26 NOTE — PROGRESS NOTES
[facility]  Nephrology Attending Progress Note          SUBJECTIVE:   From consult note 11/22/2024  The patient is a 55-year-old  lady who has underlying history of chronic hyponatremia seen by Dr. Curly Lopez, COPD and bilateral PEs.  The last time she saw Dr. Lopez was in year 2018 and subsequently, the patient chose not to follow up.  The patient was initially seen in consultation for hyponatremia in 01/2018.  At that time, the hyponatremia was treated with oral salt tablets.    They presented on 11/21/2024 complaining of back pain.  She has had back pain since her prior admission that slightly worsened.  She chronically has shortness of breath at baseline.  On arrival there blood pressure 108/78 HR 87 RR 18 on room air and afebrile.  Initial labs were pertinent for sodium of 126, potassium of 5.7 bicarb of 21.  Imaging showed right middle lobe atelectasis, mild enlargement of moderate right pleural effusion and ascending thoracic aortic aneurysm of 4.7 cm.  Nephrology was consulted for hyponatremia.      11/26/24-patient seen and examined.  Patient is just returned from having chest tube pigtail placed right side.  She is complaining of being uncomfortable and having some pain in that area.    PROBLEM LIST:    Patient Active Problem List   Diagnosis    Hyponatremia    Acute hyponatremia    Traumatic closed displaced fracture of left shoulder with anterior dislocation    Acute respiratory failure with hypoxia    LFTs abnormal    Primary hypertension    Multiple closed fractures of ribs of left side    Acute hypoxic respiratory failure    Moderate protein-calorie malnutrition (HCC)    Adrenal crisis (HCC)        PAST MEDICAL HISTORY:    Past Medical History:   Diagnosis Date    Acid reflux     Fracture of left shoulder 10/2018    Frozen shoulder     left    Hypertension     Hyponatremia     Lung abnormality     MVP (mitral valve prolapse)     no issues    Skin cancer        DIET:    ADULT DIET;  (scheduled):    cloNIDine  0.1 mg Oral TID    [Held by provider] ibuprofen  800 mg Oral TID    lidocaine  1 patch TransDERmal Daily    Icy Hot Balm Extra Strength  1 Application Apply externally BID    sodium chloride  1 g Oral BID WC    urea  15 g Oral Daily    [Held by provider] dilTIAZem  120 mg Oral Daily    [Held by provider] apixaban  5 mg Oral BID    carvedilol  12.5 mg Oral BID WC    ipratropium 0.5 mg-albuterol 2.5 mg  1 Dose Inhalation TID RT       MEDS (infusions):   heparin (PORCINE) Infusion 18 Units/kg/hr (11/26/24 1230)    dextrose      sodium chloride 75 mL/hr at 11/26/24 0851       MEDS (prn):  heparin (porcine), heparin (porcine), ondansetron, HYDROcodone-acetaminophen, glucose, dextrose bolus **OR** dextrose bolus, glucagon (rDNA), dextrose    DATA:    Recent Labs     11/24/24  1355 11/26/24  0318   WBC 13.4* 9.3   HGB 9.9* 8.8*   HCT 30.3* 26.3*   .8* 106.9*   * 514*     Recent Labs     11/25/24  0319 11/25/24  1120 11/26/24  0318   * 130* 130*   K 3.4* 3.7 3.7   CL 97* 98 98   CO2 22 23 21*   BUN 23* 36* 12   CREATININE 0.6 0.6 0.5   LABGLOM >90 >90 >90   GLUCOSE 99 97 88   CALCIUM 8.1* 8.1* 7.9*       No results found for: \"LABALBU\"  Lab Results   Component Value Date    TSH 0.970 02/28/2021       Iron Studies  Lab Results   Component Value Date    FERRITIN 2,289 09/21/2022     No results found for: \"MWCVVYJB49\"  No results found for: \"FOLATE\"    Vit D, 25-Hydroxy   Date Value Ref Range Status   09/21/2022 45 30 - 100 ng/mL Final     Comment:     <20 ng/mL.............Deficient  20-30 ng/mL...........Insufficient   ng/mL..........Sufficient  >100 ng/mL............Toxic       No results found for: \"PTH\"    No components found for: \"URIC\"    Lab Results   Component Value Date/Time    COLORU YELLOW 05/23/2012 03:15 PM    NITRU NEGATIVE 05/23/2012 03:15 PM    GLUCOSEU NEGATIVE 05/23/2012 03:15 PM    KETUA NEGATIVE 05/23/2012 03:15 PM    UROBILINOGEN 0.2 05/23/2012 03:15 PM

## 2024-11-27 ENCOUNTER — APPOINTMENT (OUTPATIENT)
Dept: GENERAL RADIOLOGY | Age: 59
End: 2024-11-27
Payer: COMMERCIAL

## 2024-11-27 LAB
ANION GAP SERPL CALCULATED.3IONS-SCNC: 11 MMOL/L (ref 7–16)
APPEARANCE FLD: NORMAL
BODY FLD TYPE: NORMAL
BUN SERPL-MCNC: 11 MG/DL (ref 6–20)
CALCIUM SERPL-MCNC: 7.9 MG/DL (ref 8.6–10.2)
CHLORIDE SERPL-SCNC: 99 MMOL/L (ref 98–107)
CLOT CHECK: NORMAL
CO2 SERPL-SCNC: 22 MMOL/L (ref 22–29)
COLOR FLD: YELLOW
CREAT SERPL-MCNC: 0.5 MG/DL (ref 0.5–1)
ERYTHROCYTE [DISTWIDTH] IN BLOOD BY AUTOMATED COUNT: 13.1 % (ref 11.5–15)
GFR, ESTIMATED: >90 ML/MIN/1.73M2
GLUCOSE SERPL-MCNC: 99 MG/DL (ref 74–99)
HCT VFR BLD AUTO: 25.8 % (ref 34–48)
HGB BLD-MCNC: 8.4 G/DL (ref 11.5–15.5)
MAGNESIUM SERPL-MCNC: 1.3 MG/DL (ref 1.6–2.6)
MANUAL DIF COMMENT FLD-IMP: NORMAL
MCH RBC QN AUTO: 34.4 PG (ref 26–35)
MCHC RBC AUTO-ENTMCNC: 32.6 G/DL (ref 32–34.5)
MCV RBC AUTO: 105.7 FL (ref 80–99.9)
MONOCYTES NFR FLD: 13 %
NEUTROPHILS NFR FLD: 87 %
PHOSPHATE SERPL-MCNC: 3.4 MG/DL (ref 2.5–4.5)
PLATELET # BLD AUTO: 455 K/UL (ref 130–450)
PMV BLD AUTO: 9 FL (ref 7–12)
POTASSIUM SERPL-SCNC: 2.8 MMOL/L (ref 3.5–5)
RBC # BLD AUTO: 2.44 M/UL (ref 3.5–5.5)
RBC # FLD: 6000 CELLS/UL
SODIUM SERPL-SCNC: 132 MMOL/L (ref 132–146)
WBC # FLD: 677 CELLS/UL
WBC OTHER # BLD: 7 K/UL (ref 4.5–11.5)

## 2024-11-27 PROCEDURE — 2060000000 HC ICU INTERMEDIATE R&B

## 2024-11-27 PROCEDURE — 2700000000 HC OXYGEN THERAPY PER DAY

## 2024-11-27 PROCEDURE — 85027 COMPLETE CBC AUTOMATED: CPT

## 2024-11-27 PROCEDURE — 6360000002 HC RX W HCPCS: Performed by: INTERNAL MEDICINE

## 2024-11-27 PROCEDURE — 6370000000 HC RX 637 (ALT 250 FOR IP): Performed by: INTERNAL MEDICINE

## 2024-11-27 PROCEDURE — 2580000003 HC RX 258: Performed by: GENERAL PRACTICE

## 2024-11-27 PROCEDURE — 2580000003 HC RX 258: Performed by: INTERNAL MEDICINE

## 2024-11-27 PROCEDURE — 6370000000 HC RX 637 (ALT 250 FOR IP): Performed by: GENERAL PRACTICE

## 2024-11-27 PROCEDURE — 6360000002 HC RX W HCPCS: Performed by: GENERAL PRACTICE

## 2024-11-27 PROCEDURE — 83735 ASSAY OF MAGNESIUM: CPT

## 2024-11-27 PROCEDURE — 36415 COLL VENOUS BLD VENIPUNCTURE: CPT

## 2024-11-27 PROCEDURE — 80048 BASIC METABOLIC PNL TOTAL CA: CPT

## 2024-11-27 PROCEDURE — 71045 X-RAY EXAM CHEST 1 VIEW: CPT

## 2024-11-27 PROCEDURE — 84100 ASSAY OF PHOSPHORUS: CPT

## 2024-11-27 RX ORDER — POTASSIUM CHLORIDE 7.45 MG/ML
10 INJECTION INTRAVENOUS
Status: COMPLETED | OUTPATIENT
Start: 2024-11-27 | End: 2024-11-27

## 2024-11-27 RX ORDER — POTASSIUM CHLORIDE 29.8 MG/ML
20 INJECTION INTRAVENOUS
Status: DISCONTINUED | OUTPATIENT
Start: 2024-11-27 | End: 2024-11-27 | Stop reason: DRUGHIGH

## 2024-11-27 RX ORDER — MAGNESIUM SULFATE IN WATER 40 MG/ML
2000 INJECTION, SOLUTION INTRAVENOUS ONCE
Status: COMPLETED | OUTPATIENT
Start: 2024-11-27 | End: 2024-11-27

## 2024-11-27 RX ADMIN — MENTHOL AND METHYL SALICYLATE 1 APPLICATION: 7.6; 29 OINTMENT TOPICAL at 21:11

## 2024-11-27 RX ADMIN — Medication 1 G: at 09:10

## 2024-11-27 RX ADMIN — HYDROCODONE BITARTRATE AND ACETAMINOPHEN 1 TABLET: 7.5; 325 TABLET ORAL at 13:20

## 2024-11-27 RX ADMIN — POTASSIUM CHLORIDE 10 MEQ: 7.46 INJECTION, SOLUTION INTRAVENOUS at 14:20

## 2024-11-27 RX ADMIN — MAGNESIUM SULFATE HEPTAHYDRATE 2000 MG: 40 INJECTION, SOLUTION INTRAVENOUS at 09:15

## 2024-11-27 RX ADMIN — CLONIDINE HYDROCHLORIDE 0.1 MG: 0.1 TABLET ORAL at 09:10

## 2024-11-27 RX ADMIN — APIXABAN 5 MG: 5 TABLET, FILM COATED ORAL at 21:11

## 2024-11-27 RX ADMIN — APIXABAN 5 MG: 5 TABLET, FILM COATED ORAL at 09:10

## 2024-11-27 RX ADMIN — MENTHOL AND METHYL SALICYLATE 1 APPLICATION: 7.6; 29 OINTMENT TOPICAL at 09:10

## 2024-11-27 RX ADMIN — POTASSIUM CHLORIDE 10 MEQ: 7.46 INJECTION, SOLUTION INTRAVENOUS at 10:17

## 2024-11-27 RX ADMIN — LOSARTAN POTASSIUM 50 MG: 50 TABLET, FILM COATED ORAL at 17:33

## 2024-11-27 RX ADMIN — Medication 1 G: at 17:33

## 2024-11-27 RX ADMIN — POTASSIUM CHLORIDE 10 MEQ: 7.46 INJECTION, SOLUTION INTRAVENOUS at 10:53

## 2024-11-27 RX ADMIN — SODIUM CHLORIDE: 9 INJECTION, SOLUTION INTRAVENOUS at 13:13

## 2024-11-27 RX ADMIN — POTASSIUM CHLORIDE 10 MEQ: 7.46 INJECTION, SOLUTION INTRAVENOUS at 15:30

## 2024-11-27 RX ADMIN — CLONIDINE HYDROCHLORIDE 0.1 MG: 0.1 TABLET ORAL at 15:19

## 2024-11-27 RX ADMIN — POTASSIUM CHLORIDE 10 MEQ: 7.46 INJECTION, SOLUTION INTRAVENOUS at 12:02

## 2024-11-27 RX ADMIN — HYDROCODONE BITARTRATE AND ACETAMINOPHEN 1 TABLET: 7.5; 325 TABLET ORAL at 06:57

## 2024-11-27 RX ADMIN — HYDROCODONE BITARTRATE AND ACETAMINOPHEN 1 TABLET: 7.5; 325 TABLET ORAL at 21:21

## 2024-11-27 RX ADMIN — POTASSIUM CHLORIDE 10 MEQ: 7.46 INJECTION, SOLUTION INTRAVENOUS at 13:16

## 2024-11-27 RX ADMIN — ALTEPLASE 10 MG: 2.2 INJECTION, POWDER, LYOPHILIZED, FOR SOLUTION INTRAVENOUS at 13:16

## 2024-11-27 RX ADMIN — Medication 15 G: at 09:21

## 2024-11-27 RX ADMIN — CARVEDILOL 25 MG: 25 TABLET, FILM COATED ORAL at 17:33

## 2024-11-27 RX ADMIN — CARVEDILOL 25 MG: 25 TABLET, FILM COATED ORAL at 09:10

## 2024-11-27 RX ADMIN — DORNASE ALFA 5 MG: 1 SOLUTION RESPIRATORY (INHALATION) at 13:16

## 2024-11-27 ASSESSMENT — PAIN DESCRIPTION - LOCATION
LOCATION: BACK

## 2024-11-27 ASSESSMENT — PAIN DESCRIPTION - DESCRIPTORS
DESCRIPTORS: ACHING;DISCOMFORT;SORE
DESCRIPTORS: ACHING;SPASM
DESCRIPTORS: SPASM;DISCOMFORT

## 2024-11-27 ASSESSMENT — PAIN - FUNCTIONAL ASSESSMENT: PAIN_FUNCTIONAL_ASSESSMENT: ACTIVITIES ARE NOT PREVENTED

## 2024-11-27 ASSESSMENT — PAIN SCALES - GENERAL
PAINLEVEL_OUTOF10: 7

## 2024-11-27 ASSESSMENT — PAIN DESCRIPTION - ORIENTATION
ORIENTATION: RIGHT
ORIENTATION: RIGHT;LEFT;LOWER;MID

## 2024-11-27 NOTE — PROGRESS NOTES
Spoke to Dr. Carney and received verbal orders to keep chest tube clamped for one hour after he administered alteplase and dornase alpha . Then at 2:10 to put the chest tube to negative 20 suction.

## 2024-11-27 NOTE — PROGRESS NOTES
Pulmonary Progress Note    Admit Date: 2024  Hospital day                               PCP: Jensen Pelayo DO    Chief Complaint (s):  Patient Active Problem List   Diagnosis    Hyponatremia    Acute hyponatremia    Traumatic closed displaced fracture of left shoulder with anterior dislocation    Acute respiratory failure with hypoxia    LFTs abnormal    Primary hypertension    Multiple closed fractures of ribs of left side    Acute hypoxic respiratory failure    Moderate protein-calorie malnutrition (HCC)    Adrenal crisis (HCC)       Subjective:  Resting comfortably.  Chest radiograph reviewed with ongoing effusion.      Vitals:  VITALS:  /73   Pulse 75   Temp 98.8 °F (37.1 °C) (Oral)   Resp 16   Ht 1.575 m (5' 2\")   Wt 46.3 kg (102 lb)   LMP 2012   SpO2 95%   BMI 18.66 kg/m²     24HR INTAKE/OUTPUT:      Intake/Output Summary (Last 24 hours) at 2024 1509  Last data filed at 2024 1230  Gross per 24 hour   Intake --   Output 1353 ml   Net -1353 ml       24HR PULSE OXIMETRY RANGE:    SpO2  Av.3 %  Min: 90 %  Max: 96 %    Medications:  IV:   dextrose      sodium chloride 75 mL/hr at 24 1313       Scheduled Meds:   potassium chloride  10 mEq IntraVENous Q1H    dornase alpha (PULMOZYME) 5 mg in sterile water 30 mL  5 mg IntraPLEUral Q12H    losartan  50 mg Oral Daily    carvedilol  25 mg Oral BID WC    cloNIDine  0.1 mg Oral TID    lidocaine  1 patch TransDERmal Daily    Icy Hot Balm Extra Strength  1 Application Apply externally BID    sodium chloride  1 g Oral BID WC    urea  15 g Oral Daily    apixaban  5 mg Oral BID    ipratropium 0.5 mg-albuterol 2.5 mg  1 Dose Inhalation TID RT       Diet:   ADULT DIET; Regular     EXAM:  General: No distress. Alert.  Eyes: PERRL. No sclera icterus. No conjunctival injection.  ENT: No discharge. Pharynx clear.  Neck: Trachea midline. Normal thyroid.  Resp: No accessory muscle use.  No rales.  No wheezing.  No

## 2024-11-27 NOTE — PROGRESS NOTES
Comprehensive Nutrition Assessment    Type and Reason for Visit:  Initial, LOS    Nutrition Recommendations/Plan:   Continue current diet  Start HC/HP ONS w/ meals  Will continue to monitor while inpatient     Malnutrition Assessment:  Malnutrition Status:  Moderate malnutrition (11/27/24 1524)    Context:  Chronic Illness     Findings of the 6 clinical characteristics of malnutrition:  Energy Intake:  75% or less estimated energy requirements for 1 month or longer  Weight Loss:  Unable to assess (UTO CBW)     Body Fat Loss:  Mild body fat loss Triceps, Buccal region   Muscle Mass Loss:  Mild muscle mass loss Temples (temporalis), Clavicles (pectoralis & deltoids)  Fluid Accumulation:  No fluid accumulation     Strength:  Not Performed    Nutrition Assessment:    Pt w/ hyponatremia; R pleural effusion. S/p R chest tube placement 11/26. Pt states she eats \"sporadically\" at home- usually does not eat 3 meals/day, but does eat at least 2 meals/day. Currently consuming >75% of meals per flowsheet. Pt's visitors also bringing food in for her. Will add HC/HP ONS w/ meals to promote protein/energy intake. Will continue to monitor.    Nutrition Related Findings:    A&O x4, abd WDL, no edema, K+ 2.8, Mg 1.3, good appetite Wound Type: None       Current Nutrition Intake & Therapies:    Average Meal Intake: %  Average Supplements Intake: None Ordered  ADULT DIET; Regular  ADULT ORAL NUTRITION SUPPLEMENT; Breakfast, Lunch, Dinner; Standard High Calorie/High Protein Oral Supplement    Anthropometric Measures:  Height: 157.5 cm (5' 2\")  Ideal Body Weight (IBW): 110 lbs (50 kg)       Current Body Weight: 46.3 kg (102 lb) (11/21 stated- UTO CBW d/t ? accuracy of bed wt (reading 120 #)), 92.7 % IBW. Weight Source: Stated  Current BMI (kg/m2): 18.7  Usual Body Weight: 46.4 kg (102 lb 6 oz) (8/2/24)     % Weight Change (Calculated): -0.4  Weight Adjustment For: No Adjustment     BMI Categories: Normal Weight (BMI  18.5-24.9)    Estimated Daily Nutrient Needs:  Energy Requirements Based On: Kcal/kg  Weight Used for Energy Requirements: Usual (UTO CBW)  Energy (kcal/day): 3787-4689  Weight Used for Protein Requirements: Usual  Protein (g/day): 65-75  Method Used for Fluid Requirements: 1 ml/kcal  Fluid (ml/day): 2935-3177 ml    Nutrition Diagnosis:   Moderate malnutrition, in context of chronic illness related to inadequate protein-energy intake as evidenced by criteria as identified in malnutrition assessment    Nutrition Interventions:   Food and/or Nutrient Delivery: Continue Current Diet, Start Oral Nutrition Supplement  Nutrition Education/Counseling: No recommendation at this time  Coordination of Nutrition Care: Continue to monitor while inpatient       Goals:  Goals: PO intake 75% or greater, by next RD assessment  Type of Goal: New goal  Previous Goal Met: New Goal    Nutrition Monitoring and Evaluation:   Behavioral-Environmental Outcomes: None Identified  Food/Nutrient Intake Outcomes: Food and Nutrient Intake, Supplement Intake  Physical Signs/Symptoms Outcomes: Biochemical Data, GI Status, Fluid Status or Edema, Nutrition Focused Physical Findings, Skin, Weight    Discharge Planning:    Continue current diet, Continue Oral Nutrition Supplement     MIKY MAURICE MPH, RD, LD  Contact: x [phone]

## 2024-11-27 NOTE — PATIENT CARE CONFERENCE
P Quality Flow/Interdisciplinary Rounds Progress Note        Quality Flow Rounds held on November 27, 2024    Disciplines Attending:  Bedside Nurse, , , and Nursing Unit Leadership    Maria T Romeo was admitted on 11/21/2024  1:00 PM    Anticipated Discharge Date:       Disposition:    Micky Score:  Micky Scale Score: 19    Readmission Risk              Risk of Unplanned Readmission:  19           Discussed patient goal for the day, patient clinical progression, and barriers to discharge.  The following Goal(s) of the Day/Commitment(s) have been identified:   Discharge planning.      aJel Casas RN  November 27, 2024

## 2024-11-28 ENCOUNTER — APPOINTMENT (OUTPATIENT)
Dept: GENERAL RADIOLOGY | Age: 59
End: 2024-11-28
Payer: COMMERCIAL

## 2024-11-28 LAB
ANION GAP SERPL CALCULATED.3IONS-SCNC: 7 MMOL/L (ref 7–16)
BUN SERPL-MCNC: 10 MG/DL (ref 6–20)
CALCIUM SERPL-MCNC: 8 MG/DL (ref 8.6–10.2)
CHLORIDE SERPL-SCNC: 101 MMOL/L (ref 98–107)
CO2 SERPL-SCNC: 25 MMOL/L (ref 22–29)
CREAT SERPL-MCNC: 0.6 MG/DL (ref 0.5–1)
ERYTHROCYTE [DISTWIDTH] IN BLOOD BY AUTOMATED COUNT: 13.3 % (ref 11.5–15)
GFR, ESTIMATED: >90 ML/MIN/1.73M2
GLUCOSE SERPL-MCNC: 95 MG/DL (ref 74–99)
HCT VFR BLD AUTO: 25.5 % (ref 34–48)
HGB BLD-MCNC: 8.5 G/DL (ref 11.5–15.5)
MAGNESIUM SERPL-MCNC: 1.6 MG/DL (ref 1.6–2.6)
MCH RBC QN AUTO: 35.3 PG (ref 26–35)
MCHC RBC AUTO-ENTMCNC: 33.3 G/DL (ref 32–34.5)
MCV RBC AUTO: 105.8 FL (ref 80–99.9)
MICROORGANISM SPEC CULT: NO GROWTH
MICROORGANISM/AGENT SPEC: NORMAL
PHOSPHATE SERPL-MCNC: 3.5 MG/DL (ref 2.5–4.5)
PLATELET # BLD AUTO: 423 K/UL (ref 130–450)
PMV BLD AUTO: 9 FL (ref 7–12)
POTASSIUM SERPL-SCNC: 3.6 MMOL/L (ref 3.5–5)
RBC # BLD AUTO: 2.41 M/UL (ref 3.5–5.5)
SERVICE CMNT-IMP: NORMAL
SODIUM SERPL-SCNC: 133 MMOL/L (ref 132–146)
SPECIMEN DESCRIPTION: NORMAL
WBC OTHER # BLD: 5.6 K/UL (ref 4.5–11.5)

## 2024-11-28 PROCEDURE — 2700000000 HC OXYGEN THERAPY PER DAY

## 2024-11-28 PROCEDURE — 6370000000 HC RX 637 (ALT 250 FOR IP): Performed by: GENERAL PRACTICE

## 2024-11-28 PROCEDURE — 85027 COMPLETE CBC AUTOMATED: CPT

## 2024-11-28 PROCEDURE — 2580000003 HC RX 258: Performed by: GENERAL PRACTICE

## 2024-11-28 PROCEDURE — 6360000002 HC RX W HCPCS: Performed by: INTERNAL MEDICINE

## 2024-11-28 PROCEDURE — 2060000000 HC ICU INTERMEDIATE R&B

## 2024-11-28 PROCEDURE — 6370000000 HC RX 637 (ALT 250 FOR IP): Performed by: INTERNAL MEDICINE

## 2024-11-28 PROCEDURE — 83735 ASSAY OF MAGNESIUM: CPT

## 2024-11-28 PROCEDURE — 84100 ASSAY OF PHOSPHORUS: CPT

## 2024-11-28 PROCEDURE — 80048 BASIC METABOLIC PNL TOTAL CA: CPT

## 2024-11-28 PROCEDURE — 71045 X-RAY EXAM CHEST 1 VIEW: CPT

## 2024-11-28 PROCEDURE — 2580000003 HC RX 258: Performed by: INTERNAL MEDICINE

## 2024-11-28 RX ORDER — HYDROCODONE BITARTRATE AND ACETAMINOPHEN 10; 325 MG/1; MG/1
1 TABLET ORAL EVERY 4 HOURS PRN
Status: DISCONTINUED | OUTPATIENT
Start: 2024-11-28 | End: 2024-12-06 | Stop reason: HOSPADM

## 2024-11-28 RX ADMIN — HYDROCODONE BITARTRATE AND ACETAMINOPHEN 1 TABLET: 7.5; 325 TABLET ORAL at 14:10

## 2024-11-28 RX ADMIN — HYDROCODONE BITARTRATE AND ACETAMINOPHEN 1 TABLET: 7.5; 325 TABLET ORAL at 03:46

## 2024-11-28 RX ADMIN — CLONIDINE HYDROCHLORIDE 0.1 MG: 0.1 TABLET ORAL at 14:10

## 2024-11-28 RX ADMIN — CARVEDILOL 25 MG: 25 TABLET, FILM COATED ORAL at 08:20

## 2024-11-28 RX ADMIN — HYDROCODONE BITARTRATE AND ACETAMINOPHEN 1 TABLET: 10; 325 TABLET ORAL at 16:51

## 2024-11-28 RX ADMIN — CARVEDILOL 25 MG: 25 TABLET, FILM COATED ORAL at 16:51

## 2024-11-28 RX ADMIN — SODIUM CHLORIDE: 9 INJECTION, SOLUTION INTRAVENOUS at 18:15

## 2024-11-28 RX ADMIN — APIXABAN 5 MG: 5 TABLET, FILM COATED ORAL at 08:20

## 2024-11-28 RX ADMIN — CLONIDINE HYDROCHLORIDE 0.1 MG: 0.1 TABLET ORAL at 08:20

## 2024-11-28 RX ADMIN — APIXABAN 5 MG: 5 TABLET, FILM COATED ORAL at 20:20

## 2024-11-28 RX ADMIN — Medication 1 G: at 16:51

## 2024-11-28 RX ADMIN — SODIUM CHLORIDE: 9 INJECTION, SOLUTION INTRAVENOUS at 03:12

## 2024-11-28 RX ADMIN — WATER 5 MG: 1 INJECTION INTRAMUSCULAR; INTRAVENOUS; SUBCUTANEOUS at 14:12

## 2024-11-28 RX ADMIN — Medication 1 G: at 08:20

## 2024-11-28 RX ADMIN — MENTHOL AND METHYL SALICYLATE 1 APPLICATION: 7.6; 29 OINTMENT TOPICAL at 08:21

## 2024-11-28 RX ADMIN — MENTHOL AND METHYL SALICYLATE 1 APPLICATION: 7.6; 29 OINTMENT TOPICAL at 20:07

## 2024-11-28 RX ADMIN — HYDROCODONE BITARTRATE AND ACETAMINOPHEN 1 TABLET: 7.5; 325 TABLET ORAL at 08:20

## 2024-11-28 RX ADMIN — LOSARTAN POTASSIUM 50 MG: 50 TABLET, FILM COATED ORAL at 16:51

## 2024-11-28 RX ADMIN — Medication 15 G: at 09:04

## 2024-11-28 RX ADMIN — ALTEPLASE 10 MG: 2.2 INJECTION, POWDER, LYOPHILIZED, FOR SOLUTION INTRAVENOUS at 14:12

## 2024-11-28 ASSESSMENT — PAIN DESCRIPTION - DESCRIPTORS
DESCRIPTORS: ACHING;DISCOMFORT;SHARP
DESCRIPTORS: ACHING;DISCOMFORT;DULL
DESCRIPTORS: ACHING;DISCOMFORT
DESCRIPTORS: ACHING;DISCOMFORT;SORE

## 2024-11-28 ASSESSMENT — PAIN SCALES - GENERAL
PAINLEVEL_OUTOF10: 7
PAINLEVEL_OUTOF10: 0
PAINLEVEL_OUTOF10: 7
PAINLEVEL_OUTOF10: 7
PAINLEVEL_OUTOF10: 9
PAINLEVEL_OUTOF10: 7

## 2024-11-28 ASSESSMENT — PAIN DESCRIPTION - LOCATION
LOCATION: BACK

## 2024-11-28 ASSESSMENT — PAIN DESCRIPTION - PAIN TYPE
TYPE: ACUTE PAIN
TYPE: ACUTE PAIN

## 2024-11-28 ASSESSMENT — PAIN DESCRIPTION - ORIENTATION
ORIENTATION: RIGHT;UPPER
ORIENTATION: RIGHT;LOWER
ORIENTATION: RIGHT;LEFT;MID;LOWER
ORIENTATION: RIGHT;LEFT;MID

## 2024-11-28 ASSESSMENT — PAIN SCALES - WONG BAKER
WONGBAKER_NUMERICALRESPONSE: NO HURT
WONGBAKER_NUMERICALRESPONSE: HURTS A LITTLE BIT

## 2024-11-28 NOTE — PROGRESS NOTES
Oral Supplement     PHYSICAL EXAM:     Patient Vitals for the past 24 hrs:   BP Temp Temp src Pulse Resp SpO2 Height   11/27/24 1905 103/75 98.4 °F (36.9 °C) Oral 71 18 96 % --   11/27/24 1726 122/83 98.7 °F (37.1 °C) Oral 71 17 -- --   11/27/24 1515 130/78 -- -- 64 -- -- --   11/27/24 1207 -- -- -- -- -- -- 1.575 m (5' 2\")   11/27/24 1203 131/73 98.8 °F (37.1 °C) Oral 75 16 95 % --   11/27/24 0900 134/80 -- -- 67 17 96 % --   11/27/24 0713 (!) 151/89 98.6 °F (37 °C) Oral 72 16 95 % --   11/27/24 0645 (!) 153/91 -- -- 75 18 94 % --   11/27/24 0413 (!) 163/97 97.6 °F (36.4 °C) Oral 76 16 -- --   11/26/24 2358 125/76 97.9 °F (36.6 °C) Oral 70 18 96 % --   @      Intake/Output Summary (Last 24 hours) at 11/27/2024 2116  Last data filed at 11/27/2024 1938  Gross per 24 hour   Intake --   Output 1240 ml   Net -1240 ml         Wt Readings from Last 3 Encounters:   11/21/24 46.3 kg (102 lb)   11/11/24 46.3 kg (102 lb)   11/11/24 46.3 kg (102 lb)       Constitutional:  Pt is in no acute distress  Head: normocephalic, atraumatic  Neck: no JVD  Cardiovascular:  S1-S2 no S3 or rub  Respiratory: Clear upper diminished bases chest tube noted right side with serosanguineous drainage  Gastrointestinal:  Soft, nontender, nondistended, bowel sounds x 4  Ext: No edema  Skin: dry, no rash  Neuro: Awake alert and oriented    MEDS (scheduled):    dornase alpha (PULMOZYME) 5 mg in sterile water 30 mL  5 mg IntraPLEUral Q12H    losartan  50 mg Oral Daily    carvedilol  25 mg Oral BID WC    cloNIDine  0.1 mg Oral TID    lidocaine  1 patch TransDERmal Daily    Icy Hot Balm Extra Strength  1 Application Apply externally BID    sodium chloride  1 g Oral BID WC    urea  15 g Oral Daily    apixaban  5 mg Oral BID    ipratropium 0.5 mg-albuterol 2.5 mg  1 Dose Inhalation TID RT       MEDS (infusions):   dextrose      sodium chloride 75 mL/hr at 11/27/24 1313       MEDS (prn):  ondansetron, HYDROcodone-acetaminophen, glucose, dextrose bolus

## 2024-11-28 NOTE — PLAN OF CARE
Problem: Discharge Planning  Goal: Discharge to home or other facility with appropriate resources  11/27/2024 2346 by Crystal Mejía RN  Outcome: Progressing  11/27/2024 1654 by Bettina Santos RN  Outcome: Progressing     Problem: Pain  Goal: Verbalizes/displays adequate comfort level or baseline comfort level  11/27/2024 2346 by Crystal Mejía RN  Outcome: Progressing  11/27/2024 1654 by Bettina Santos RN  Outcome: Progressing     Problem: Safety - Adult  Goal: Free from fall injury  11/27/2024 2346 by Crystal Mejía RN  Outcome: Progressing  11/27/2024 1654 by Bettina Santos RN  Outcome: Progressing     Problem: Nutrition Deficit:  Goal: Optimize nutritional status  11/27/2024 2346 by Crystal Mejía RN  Outcome: Progressing  11/27/2024 1654 by Bettina Santos RN  Outcome: Progressing

## 2024-11-28 NOTE — PROGRESS NOTES
Pulmonary Progress Note    Admit Date: 2024  Hospital day                               PCP: Jensen Pelayo DO    Chief Complaint (s):  Patient Active Problem List   Diagnosis    Hyponatremia    Acute hyponatremia    Traumatic closed displaced fracture of left shoulder with anterior dislocation    Acute respiratory failure with hypoxia    LFTs abnormal    Primary hypertension    Multiple closed fractures of ribs of left side    Acute hypoxic respiratory failure    Moderate protein-calorie malnutrition (HCC)    Adrenal crisis (HCC)       Subjective:  Resting comfortably.  Chest radiograph reviewed with ongoing effusion.      Vitals:  VITALS:  /82   Pulse 94   Temp 98.4 °F (36.9 °C)   Resp 18   Ht 1.575 m (5' 2\")   Wt 46.3 kg (102 lb)   LMP 2012   SpO2 (!) 88%   BMI 18.66 kg/m²     24HR INTAKE/OUTPUT:      Intake/Output Summary (Last 24 hours) at 2024 1455  Last data filed at 2024 1416  Gross per 24 hour   Intake 240 ml   Output 1450 ml   Net -1210 ml       24HR PULSE OXIMETRY RANGE:    SpO2  Av.6 %  Min: 88 %  Max: 97 %    Medications:  IV:   dextrose      sodium chloride 75 mL/hr at 24 0312       Scheduled Meds:   ALTEplase (CATHFLO) 10 mg in sodium chloride 0.9 % 30 mL  10 mg IntraPLEUral Q12H    And    dornase alpha (PULMOZYME) 5 mg in sterile water 30 mL  5 mg IntraPLEUral Q12H    losartan  50 mg Oral Daily    carvedilol  25 mg Oral BID WC    cloNIDine  0.1 mg Oral TID    lidocaine  1 patch TransDERmal Daily    Icy Hot Balm Extra Strength  1 Application Apply externally BID    sodium chloride  1 g Oral BID WC    urea  15 g Oral Daily    apixaban  5 mg Oral BID    ipratropium 0.5 mg-albuterol 2.5 mg  1 Dose Inhalation TID RT       Diet:   ADULT DIET; Regular  ADULT ORAL NUTRITION SUPPLEMENT; Breakfast, Lunch, Dinner; Standard High Calorie/High Protein Oral Supplement     EXAM:  General: No distress. Alert.  Eyes: PERRL. No sclera icterus. No

## 2024-11-28 NOTE — PROGRESS NOTES
[facility]  Nephrology Attending Progress Note          SUBJECTIVE:   From consult note 11/22/2024  The patient is a 55-year-old  lady who has underlying history of chronic hyponatremia seen by Dr. Curly Lopez, COPD and bilateral PEs.  The last time she saw Dr. Lopez was in year 2018 and subsequently, the patient chose not to follow up.  The patient was initially seen in consultation for hyponatremia in 01/2018.  At that time, the hyponatremia was treated with oral salt tablets.    They presented on 11/21/2024 complaining of back pain.  She has had back pain since her prior admission that slightly worsened.  She chronically has shortness of breath at baseline.  On arrival there blood pressure 108/78 HR 87 RR 18 on room air and afebrile.  Initial labs were pertinent for sodium of 126, potassium of 5.7 bicarb of 21.  Imaging showed right middle lobe atelectasis, mild enlargement of moderate right pleural effusion and ascending thoracic aortic aneurysm of 4.7 cm.  Nephrology was consulted for hyponatremia.      11/28/2024  -patient seen and examined. Feels better. No issues overnight.    PROBLEM LIST:    Patient Active Problem List   Diagnosis    Hyponatremia    Acute hyponatremia    Traumatic closed displaced fracture of left shoulder with anterior dislocation    Acute respiratory failure with hypoxia    LFTs abnormal    Primary hypertension    Multiple closed fractures of ribs of left side    Acute hypoxic respiratory failure    Moderate protein-calorie malnutrition (HCC)    Adrenal crisis (HCC)        PAST MEDICAL HISTORY:    Past Medical History:   Diagnosis Date    Acid reflux     Fracture of left shoulder 10/2018    Frozen shoulder     left    Hypertension     Hyponatremia     Lung abnormality     MVP (mitral valve prolapse)     no issues    Skin cancer        DIET:    ADULT DIET; Regular  ADULT ORAL NUTRITION SUPPLEMENT; Breakfast, Lunch, Dinner; Standard High Calorie/High Protein Oral  ibuprofen as it can exacerbate the hypernatremia  Follow Na+    2.  Hyperkalemia-Resolved now with hypokalemia  Likely in the setting of potassium supplementation  Potassium supplementation was discontinued  Potassium 3.6 mmol/L  PLAN:  Supplement as needed    3.  Mild metabolic acidosis-Resolved  CO2 at presentation 21 mmol/L>   Bicarbonate 25 mmol/L today.   Chloride 101 mmol/dL today.   AG 7 mmol/L today with an albumin on 3.0 g/dL.  Plan  No changes     4.  Essential hypertension-  Blood pressure goal less than 130/80(ACC/AHA Target)-  Blood pressure Near goal today  Will follow once she has been medicated  PLAN:  On losartan 50 mg daily, Caredizem 25 mg BID and clonidine 0.1 mg TID   Follow BP    Noah Bragg MD

## 2024-11-28 NOTE — PROGRESS NOTES
Call placed to Dr. Pelayo for patients requesting increase in pain medication. New orders received. May give now    Electronically signed by Rhea Dupont RN on 11/28/2024 at 4:43 PM

## 2024-11-28 NOTE — PLAN OF CARE
Problem: Discharge Planning  Goal: Discharge to home or other facility with appropriate resources  11/28/2024 1030 by Reha Dupont RN  Outcome: Progressing  11/27/2024 2346 by Crystal Mejía RN  Outcome: Progressing     Problem: Pain  Goal: Verbalizes/displays adequate comfort level or baseline comfort level  11/28/2024 1030 by Rhea Dupont RN  Outcome: Progressing  11/27/2024 2346 by Crystal Mejía RN  Outcome: Progressing     Problem: Safety - Adult  Goal: Free from fall injury  11/28/2024 1030 by Rhea Dupont RN  Outcome: Progressing  11/27/2024 2346 by Crystal Mejía, RN  Outcome: Progressing     Problem: Nutrition Deficit:  Goal: Optimize nutritional status  11/28/2024 1030 by Rhea Dupont RN  Outcome: Progressing  11/27/2024 2346 by Crystal Mejía, RN  Outcome: Progressing

## 2024-11-29 ENCOUNTER — APPOINTMENT (OUTPATIENT)
Dept: GENERAL RADIOLOGY | Age: 59
End: 2024-11-29
Payer: COMMERCIAL

## 2024-11-29 LAB
ANION GAP SERPL CALCULATED.3IONS-SCNC: 10 MMOL/L (ref 7–16)
BUN SERPL-MCNC: 12 MG/DL (ref 6–20)
CALCIUM SERPL-MCNC: 8.1 MG/DL (ref 8.6–10.2)
CHLORIDE SERPL-SCNC: 101 MMOL/L (ref 98–107)
CO2 SERPL-SCNC: 23 MMOL/L (ref 22–29)
CREAT SERPL-MCNC: 0.6 MG/DL (ref 0.5–1)
ERYTHROCYTE [DISTWIDTH] IN BLOOD BY AUTOMATED COUNT: 13.7 % (ref 11.5–15)
GFR, ESTIMATED: >90 ML/MIN/1.73M2
GLUCOSE SERPL-MCNC: 86 MG/DL (ref 74–99)
HCT VFR BLD AUTO: 24 % (ref 34–48)
HGB BLD-MCNC: 7.9 G/DL (ref 11.5–15.5)
MAGNESIUM SERPL-MCNC: 1.4 MG/DL (ref 1.6–2.6)
MCH RBC QN AUTO: 35.3 PG (ref 26–35)
MCHC RBC AUTO-ENTMCNC: 32.9 G/DL (ref 32–34.5)
MCV RBC AUTO: 107.1 FL (ref 80–99.9)
NON-GYN CYTOLOGY REPORT: NORMAL
PHOSPHATE SERPL-MCNC: 4.2 MG/DL (ref 2.5–4.5)
PLATELET # BLD AUTO: 477 K/UL (ref 130–450)
PMV BLD AUTO: 9 FL (ref 7–12)
POTASSIUM SERPL-SCNC: 3.6 MMOL/L (ref 3.5–5)
RBC # BLD AUTO: 2.24 M/UL (ref 3.5–5.5)
REPORT STATUS: NORMAL
SODIUM SERPL-SCNC: 134 MMOL/L (ref 132–146)
WBC OTHER # BLD: 6 K/UL (ref 4.5–11.5)

## 2024-11-29 PROCEDURE — 84100 ASSAY OF PHOSPHORUS: CPT

## 2024-11-29 PROCEDURE — 6370000000 HC RX 637 (ALT 250 FOR IP): Performed by: INTERNAL MEDICINE

## 2024-11-29 PROCEDURE — 2060000000 HC ICU INTERMEDIATE R&B

## 2024-11-29 PROCEDURE — 6360000002 HC RX W HCPCS: Performed by: GENERAL PRACTICE

## 2024-11-29 PROCEDURE — 83735 ASSAY OF MAGNESIUM: CPT

## 2024-11-29 PROCEDURE — 32551 INSERTION OF CHEST TUBE: CPT

## 2024-11-29 PROCEDURE — 6360000002 HC RX W HCPCS: Performed by: INTERNAL MEDICINE

## 2024-11-29 PROCEDURE — 2700000000 HC OXYGEN THERAPY PER DAY

## 2024-11-29 PROCEDURE — 85027 COMPLETE CBC AUTOMATED: CPT

## 2024-11-29 PROCEDURE — 71045 X-RAY EXAM CHEST 1 VIEW: CPT

## 2024-11-29 PROCEDURE — 2580000003 HC RX 258: Performed by: INTERNAL MEDICINE

## 2024-11-29 PROCEDURE — 80048 BASIC METABOLIC PNL TOTAL CA: CPT

## 2024-11-29 PROCEDURE — 6370000000 HC RX 637 (ALT 250 FOR IP): Performed by: GENERAL PRACTICE

## 2024-11-29 PROCEDURE — 2580000003 HC RX 258: Performed by: GENERAL PRACTICE

## 2024-11-29 RX ORDER — MAGNESIUM SULFATE IN WATER 40 MG/ML
2000 INJECTION, SOLUTION INTRAVENOUS ONCE
Status: COMPLETED | OUTPATIENT
Start: 2024-11-29 | End: 2024-11-29

## 2024-11-29 RX ADMIN — MENTHOL AND METHYL SALICYLATE 1 APPLICATION: 7.6; 29 OINTMENT TOPICAL at 20:34

## 2024-11-29 RX ADMIN — CARVEDILOL 25 MG: 25 TABLET, FILM COATED ORAL at 16:21

## 2024-11-29 RX ADMIN — ONDANSETRON 4 MG: 2 INJECTION INTRAMUSCULAR; INTRAVENOUS at 20:33

## 2024-11-29 RX ADMIN — SODIUM CHLORIDE: 9 INJECTION, SOLUTION INTRAVENOUS at 08:53

## 2024-11-29 RX ADMIN — CARVEDILOL 25 MG: 25 TABLET, FILM COATED ORAL at 08:54

## 2024-11-29 RX ADMIN — HYDROCODONE BITARTRATE AND ACETAMINOPHEN 1 TABLET: 10; 325 TABLET ORAL at 16:21

## 2024-11-29 RX ADMIN — WATER 5 MG: 1 INJECTION INTRAMUSCULAR; INTRAVENOUS; SUBCUTANEOUS at 14:50

## 2024-11-29 RX ADMIN — MAGNESIUM SULFATE HEPTAHYDRATE 2000 MG: 40 INJECTION, SOLUTION INTRAVENOUS at 13:02

## 2024-11-29 RX ADMIN — HYDROCODONE BITARTRATE AND ACETAMINOPHEN 1 TABLET: 10; 325 TABLET ORAL at 20:34

## 2024-11-29 RX ADMIN — APIXABAN 5 MG: 5 TABLET, FILM COATED ORAL at 20:33

## 2024-11-29 RX ADMIN — HYDROCODONE BITARTRATE AND ACETAMINOPHEN 1 TABLET: 10; 325 TABLET ORAL at 09:45

## 2024-11-29 RX ADMIN — HYDROCODONE BITARTRATE AND ACETAMINOPHEN 1 TABLET: 10; 325 TABLET ORAL at 00:58

## 2024-11-29 RX ADMIN — Medication 1 G: at 08:54

## 2024-11-29 RX ADMIN — SODIUM CHLORIDE: 9 INJECTION, SOLUTION INTRAVENOUS at 23:51

## 2024-11-29 RX ADMIN — Medication 1 G: at 16:21

## 2024-11-29 RX ADMIN — MENTHOL AND METHYL SALICYLATE 1 APPLICATION: 7.6; 29 OINTMENT TOPICAL at 09:00

## 2024-11-29 RX ADMIN — LOSARTAN POTASSIUM 50 MG: 50 TABLET, FILM COATED ORAL at 18:06

## 2024-11-29 RX ADMIN — HYDROCODONE BITARTRATE AND ACETAMINOPHEN 1 TABLET: 10; 325 TABLET ORAL at 05:03

## 2024-11-29 RX ADMIN — ALTEPLASE 10 MG: 2.2 INJECTION, POWDER, LYOPHILIZED, FOR SOLUTION INTRAVENOUS at 14:50

## 2024-11-29 RX ADMIN — Medication 15 G: at 08:54

## 2024-11-29 RX ADMIN — APIXABAN 5 MG: 5 TABLET, FILM COATED ORAL at 08:54

## 2024-11-29 ASSESSMENT — PAIN SCALES - GENERAL
PAINLEVEL_OUTOF10: 8
PAINLEVEL_OUTOF10: 5
PAINLEVEL_OUTOF10: 9
PAINLEVEL_OUTOF10: 3
PAINLEVEL_OUTOF10: 7
PAINLEVEL_OUTOF10: 3

## 2024-11-29 ASSESSMENT — PAIN DESCRIPTION - DESCRIPTORS
DESCRIPTORS: ACHING;DISCOMFORT;SORE
DESCRIPTORS: ACHING;DISCOMFORT;SHARP
DESCRIPTORS: ACHING;DISCOMFORT;SHARP

## 2024-11-29 ASSESSMENT — PAIN DESCRIPTION - ORIENTATION
ORIENTATION: RIGHT;UPPER
ORIENTATION: RIGHT;LOWER
ORIENTATION: RIGHT;UPPER

## 2024-11-29 ASSESSMENT — PAIN SCALES - WONG BAKER
WONGBAKER_NUMERICALRESPONSE: HURTS A LITTLE BIT

## 2024-11-29 ASSESSMENT — PAIN DESCRIPTION - LOCATION
LOCATION: BACK
LOCATION: BACK
LOCATION: ABDOMEN

## 2024-11-29 NOTE — PROGRESS NOTES
Pulmonary Progress Note    Admit Date: 2024  Hospital day                               PCP: Jensen Pelayo DO    Chief Complaint (s):  Patient Active Problem List   Diagnosis    Hyponatremia    Acute hyponatremia    Traumatic closed displaced fracture of left shoulder with anterior dislocation    Acute respiratory failure with hypoxia    LFTs abnormal    Primary hypertension    Multiple closed fractures of ribs of left side    Acute hypoxic respiratory failure    Moderate protein-calorie malnutrition (HCC)    Adrenal crisis (HCC)       Subjective:  Patient seen on room air. No breathing complaints. Discussed lytic instillation planned for today.       Vitals:  VITALS:  /76   Pulse 71   Temp 97.5 °F (36.4 °C) (Oral)   Resp 18   Ht 1.575 m (5' 2\")   Wt 46.3 kg (102 lb)   LMP 2012   SpO2 (!) 85%   BMI 18.66 kg/m²     24HR INTAKE/OUTPUT:      Intake/Output Summary (Last 24 hours) at 2024 1059  Last data filed at 2024 0853  Gross per 24 hour   Intake 5557.35 ml   Output 1500 ml   Net 4057.35 ml       24HR PULSE OXIMETRY RANGE:    SpO2  Av.7 %  Min: 78 %  Max: 96 %    Medications:  IV:   dextrose      sodium chloride 75 mL/hr at 24 0853       Scheduled Meds:   ALTEplase (CATHFLO) 10 mg in sodium chloride 0.9 % 30 mL  10 mg IntraPLEUral Q12H    And    dornase alpha (PULMOZYME) 5 mg in sterile water 30 mL  5 mg IntraPLEUral Q12H    losartan  50 mg Oral Daily    carvedilol  25 mg Oral BID WC    cloNIDine  0.1 mg Oral TID    lidocaine  1 patch TransDERmal Daily    Icy Hot Balm Extra Strength  1 Application Apply externally BID    sodium chloride  1 g Oral BID WC    urea  15 g Oral Daily    apixaban  5 mg Oral BID    ipratropium 0.5 mg-albuterol 2.5 mg  1 Dose Inhalation TID RT       Diet:   ADULT DIET; Regular  ADULT ORAL NUTRITION SUPPLEMENT; Breakfast, Lunch, Dinner; Standard High Calorie/High Protein Oral Supplement     EXAM:  General: No distress. Alert.  Eyes:  Bronchial wall thickening and interstitial coarsening. Some of this is   probably chronic or related to subsegmental atelectasis, though there could   be a superimposed component of edema, bronchitis or atypical infection.         XR CHEST PORTABLE   Final Result   1. Interval placement of pigtail catheter terminating at the right lung base   medial with decreased right loculated effusion demonstrating moderate   persistence. No pneumothorax evident.   2. Partially loculated increasing small to moderate left pleural effusion.         NM BONE SCAN WHOLE BODY   Final Result   Multifocal degenerative change, without a generalized scintigraphic pattern   of osseous metastatic disease.         CT GUIDED CHEST TUBE   Final Result   Successful CT guided placement of a 12 Serbian right chest tube.         US CHEST INCLUDING MEDIASTINUM   Final Result   Complicated right pleural effusion.  At least moderate.  Multiple areas of   loculation.         XR CHEST (2 VW)   Final Result   1. Right upper lobe pneumonia   2. Persistent loculated pleural effusion within the right lung base   3. The left lung is clear.         US RETROPERITONEAL COMPLETE   Final Result   1. Diffuse urinary bladder wall thickening up to 4.9 cm.   2. Unremarkable kidneys.         XR RIBS RIGHT (2 VIEWS)   Final Result   1. Small to moderate right pleural effusion similar to the prior examination.   2. No displaced right rib fractures.         CTA PULMONARY W CONTRAST   Final Result   There is no evidence of pulmonary arterial embolization.      Improving dense right middle lobe atelectasis, without complete resolution.      Mild interval enlargement of the small to moderate size right pleural   effusion.      Stable ascending thoracic aortic aneurysm with a maximal AP diameter of 4.7   cm.         XR CHEST PORTABLE    (Results Pending)           Assessment:  Right pleural effusion: Appears to be complicated and parapneumonic.  The fluid is an exudate.

## 2024-11-29 NOTE — PROGRESS NOTES
Patient seen comfortable. Sodium improved. Cultures and cytology pending on pleural effusion. Pig tail drainage intact. Vss afebrile. Continue with crrent care

## 2024-11-29 NOTE — PLAN OF CARE
Problem: Discharge Planning  Goal: Discharge to home or other facility with appropriate resources  11/28/2024 1950 by Magdy Villasenor, RN  Outcome: Progressing     Problem: Pain  Goal: Verbalizes/displays adequate comfort level or baseline comfort level  11/28/2024 1950 by Magdy Villasenor, RN  Outcome: Progressing     Problem: Safety - Adult  Goal: Free from fall injury  11/28/2024 1950 by Magdy Villasenor, RN  Outcome: Progressing     Problem: Nutrition Deficit:  Goal: Optimize nutritional status  11/28/2024 1950 by Magdy Villasenor, RN  Outcome: Progressing

## 2024-11-29 NOTE — PROGRESS NOTES
Supplement     PHYSICAL EXAM:     Patient Vitals for the past 24 hrs:   BP Temp Temp src Pulse Resp SpO2   11/29/24 1559 104/68 98 °F (36.7 °C) Oral 72 18 99 %   11/29/24 1158 110/72 97.9 °F (36.6 °C) Oral 84 20 91 %   11/29/24 0726 111/76 97.5 °F (36.4 °C) Oral 71 18 (!) 85 %   11/29/24 0533 -- -- -- -- 17 --   11/29/24 0427 130/87 98.5 °F (36.9 °C) Oral 66 18 93 %   11/29/24 0128 -- -- -- -- 17 --   11/28/24 2354 94/71 98.6 °F (37 °C) Oral 69 18 96 %   11/28/24 2019 -- -- -- -- -- 93 %   11/28/24 2015 -- -- -- -- -- (!) 78 %   11/28/24 2000 91/66 98.3 °F (36.8 °C) Oral 73 18 --   11/28/24 1721 -- -- -- -- 18 --   @      Intake/Output Summary (Last 24 hours) at 11/29/2024 1659  Last data filed at 11/29/2024 0853  Gross per 24 hour   Intake 5257.35 ml   Output 900 ml   Net 4357.35 ml         Wt Readings from Last 3 Encounters:   11/21/24 46.3 kg (102 lb)   11/11/24 46.3 kg (102 lb)   11/11/24 46.3 kg (102 lb)       Constitutional:  Pt is in no acute distress  Head: normocephalic, atraumatic  Neck: no JVD  Cardiovascular:  S1-S2 no S3 or rub  Respiratory: Clear upper diminished bases chest tube noted right side with serosanguineous drainage  Gastrointestinal:  Soft, nontender, nondistended, bowel sounds x 4  Ext: No edema  Skin: dry, no rash  Neuro: Awake alert and oriented    MEDS (scheduled):    [START ON 11/30/2024] ALTEplase (CATHFLO) 10 mg in sodium chloride 0.9 % 30 mL  10 mg IntraPLEUral Q12H    And    [START ON 11/30/2024] dornase alpha (PULMOZYME) 5 mg in sterile water 30 mL  5 mg IntraPLEUral Q12H    losartan  50 mg Oral Daily    carvedilol  25 mg Oral BID WC    cloNIDine  0.1 mg Oral TID    lidocaine  1 patch TransDERmal Daily    Icy Hot Balm Extra Strength  1 Application Apply externally BID    sodium chloride  1 g Oral BID WC    urea  15 g Oral Daily    apixaban  5 mg Oral BID    ipratropium 0.5 mg-albuterol 2.5 mg  1 Dose Inhalation TID RT       MEDS (infusions):   dextrose      sodium chloride 75 mL/hr  at 11/29/24 0853       MEDS (prn):  HYDROcodone-acetaminophen, ondansetron, glucose, dextrose bolus **OR** dextrose bolus, glucagon (rDNA), dextrose    DATA:    Recent Labs     11/27/24 0438 11/28/24 0323 11/29/24 0430   WBC 7.0 5.6 6.0   HGB 8.4* 8.5* 7.9*   HCT 25.8* 25.5* 24.0*   .7* 105.8* 107.1*   * 423 477*     Recent Labs     11/27/24 0438 11/28/24 0323 11/29/24  0430    133 134   K 2.8* 3.6 3.6   CL 99 101 101   CO2 22 25 23   BUN 11 10 12   CREATININE 0.5 0.6 0.6   LABGLOM >90 >90 >90   GLUCOSE 99 95 86   CALCIUM 7.9* 8.0* 8.1*   MG 1.3* 1.6 1.4*   PHOS 3.4 3.5 4.2       No results found for: \"LABALBU\"  Lab Results   Component Value Date    TSH 0.970 02/28/2021       Iron Studies  Lab Results   Component Value Date    FERRITIN 2,289 09/21/2022     No results found for: \"WLFLSXLI13\"  No results found for: \"FOLATE\"    Vit D, 25-Hydroxy   Date Value Ref Range Status   09/21/2022 45 30 - 100 ng/mL Final     Comment:     <20 ng/mL.............Deficient  20-30 ng/mL...........Insufficient   ng/mL..........Sufficient  >100 ng/mL............Toxic       No results found for: \"PTH\"    No components found for: \"URIC\"    Lab Results   Component Value Date/Time    COLORU YELLOW 05/23/2012 03:15 PM    NITRU NEGATIVE 05/23/2012 03:15 PM    GLUCOSEU NEGATIVE 05/23/2012 03:15 PM    KETUA NEGATIVE 05/23/2012 03:15 PM    UROBILINOGEN 0.2 05/23/2012 03:15 PM    BILIRUBINUR NEGATIVE 05/23/2012 03:15 PM       No results found for: \"LIPIDPAN\"      IMPRESSION/RECOMMENDATIONS:      Hyponatremia-  Patient with history of SIADH  Had been previously treated with salt tablets 1 g twice daily   11/22 & 11/23 Urine studies have been ordered with urine osmolality of 452 and serum osmolality of 274.  Sodium at presentation 126 mmol/L and trended down to 122 mmol/L  Sodium 134 mmol/L. BG 86 mg/dL.   PLAN:  Continue on salt tabs 1 g twice a day and Ure-na 15 g daily  Continue to hold the ibuprofen as it can

## 2024-11-30 ENCOUNTER — APPOINTMENT (OUTPATIENT)
Dept: GENERAL RADIOLOGY | Age: 59
End: 2024-11-30
Payer: COMMERCIAL

## 2024-11-30 LAB
ALBUMIN SERPL-MCNC: 1.9 G/DL (ref 3.5–5.2)
ALP SERPL-CCNC: 71 U/L (ref 35–104)
ALT SERPL-CCNC: <5 U/L (ref 0–32)
ANION GAP SERPL CALCULATED.3IONS-SCNC: 7 MMOL/L (ref 7–16)
AST SERPL-CCNC: 9 U/L (ref 0–31)
BACTERIA URNS QL MICRO: ABNORMAL
BASOPHILS # BLD: 0.03 K/UL (ref 0–0.2)
BASOPHILS NFR BLD: 0 % (ref 0–2)
BILIRUB SERPL-MCNC: 0.2 MG/DL (ref 0–1.2)
BILIRUB UR QL STRIP: NEGATIVE
BNP SERPL-MCNC: 650 PG/ML (ref 0–125)
BUN SERPL-MCNC: 18 MG/DL (ref 6–20)
CA-I BLD-SCNC: 1.17 MMOL/L (ref 1.15–1.33)
CALCIUM SERPL-MCNC: 7 MG/DL (ref 8.6–10.2)
CHLORIDE SERPL-SCNC: 105 MMOL/L (ref 98–107)
CLARITY UR: CLEAR
CO2 SERPL-SCNC: 25 MMOL/L (ref 22–29)
COLOR UR: YELLOW
CORTIS SERPL-MCNC: 27 UG/DL (ref 2.7–18.4)
CREAT SERPL-MCNC: 0.6 MG/DL (ref 0.5–1)
CRP SERPL HS-MCNC: 67 MG/L (ref 0–5)
EOSINOPHIL # BLD: 0.04 K/UL (ref 0.05–0.5)
EOSINOPHILS RELATIVE PERCENT: 1 % (ref 0–6)
EPI CELLS #/AREA URNS HPF: ABNORMAL /HPF
ERYTHROCYTE [DISTWIDTH] IN BLOOD BY AUTOMATED COUNT: 13.9 % (ref 11.5–15)
ERYTHROCYTE [SEDIMENTATION RATE] IN BLOOD BY WESTERGREN METHOD: 35 MM/HR (ref 0–20)
FIBRINOGEN PPP-MCNC: 242 MG/DL (ref 200–400)
GFR, ESTIMATED: >90 ML/MIN/1.73M2
GLUCOSE SERPL-MCNC: 140 MG/DL (ref 74–99)
GLUCOSE UR STRIP-MCNC: NEGATIVE MG/DL
HCT VFR BLD AUTO: 11.8 % (ref 34–48)
HCT VFR BLD AUTO: 27.1 % (ref 34–48)
HCT VFR BLD AUTO: 28.8 % (ref 34–48)
HCT VFR BLD AUTO: 29.1 % (ref 34–48)
HGB BLD-MCNC: 10.1 G/DL (ref 11.5–15.5)
HGB BLD-MCNC: 10.2 G/DL (ref 11.5–15.5)
HGB BLD-MCNC: 3.8 G/DL (ref 11.5–15.5)
HGB BLD-MCNC: 9.5 G/DL (ref 11.5–15.5)
HGB UR QL STRIP.AUTO: NEGATIVE
IMM GRANULOCYTES # BLD AUTO: 0.07 K/UL (ref 0–0.58)
IMM GRANULOCYTES NFR BLD: 1 % (ref 0–5)
INR PPP: 2.4
KETONES UR STRIP-MCNC: NEGATIVE MG/DL
L PNEUMO1 AG UR QL IA.RAPID: NEGATIVE
LACTATE BLDV-SCNC: 1.5 MMOL/L (ref 0.5–2.2)
LEUKOCYTE ESTERASE UR QL STRIP: ABNORMAL
LYMPHOCYTES NFR BLD: 0.87 K/UL (ref 1.5–4)
LYMPHOCYTES RELATIVE PERCENT: 11 % (ref 20–42)
MAGNESIUM SERPL-MCNC: 1.4 MG/DL (ref 1.6–2.6)
MAGNESIUM SERPL-MCNC: 2 MG/DL (ref 1.6–2.6)
MCH RBC QN AUTO: 34.2 PG (ref 26–35)
MCHC RBC AUTO-ENTMCNC: 32.2 G/DL (ref 32–34.5)
MCV RBC AUTO: 106.3 FL (ref 80–99.9)
MONOCYTES NFR BLD: 0.48 K/UL (ref 0.1–0.95)
MONOCYTES NFR BLD: 6 % (ref 2–12)
NEUTROPHILS NFR BLD: 81 % (ref 43–80)
NEUTS SEG NFR BLD: 6.33 K/UL (ref 1.8–7.3)
NITRITE UR QL STRIP: NEGATIVE
PARTIAL THROMBOPLASTIN TIME: 31.7 SEC (ref 24.5–35.1)
PH UR STRIP: 6 [PH] (ref 5–9)
PHOSPHATE SERPL-MCNC: 4.6 MG/DL (ref 2.5–4.5)
PLATELET # BLD AUTO: 381 K/UL (ref 130–450)
PMV BLD AUTO: 8.9 FL (ref 7–12)
POTASSIUM SERPL-SCNC: 4.6 MMOL/L (ref 3.5–5)
PROCALCITONIN SERPL-MCNC: 0.16 NG/ML (ref 0–0.08)
PROT SERPL-MCNC: 3.6 G/DL (ref 6.4–8.3)
PROT UR STRIP-MCNC: NEGATIVE MG/DL
PROTHROMBIN TIME: 25.7 SEC (ref 9.3–12.4)
RBC # BLD AUTO: 1.11 M/UL (ref 3.5–5.5)
RBC # BLD: ABNORMAL 10*6/UL
RBC #/AREA URNS HPF: ABNORMAL /HPF
S PNEUM AG SPEC QL: POSITIVE
SODIUM SERPL-SCNC: 137 MMOL/L (ref 132–146)
SP GR UR STRIP: 1.01 (ref 1–1.03)
SPECIMEN SOURCE: ABNORMAL
TROPONIN I SERPL HS-MCNC: 7 NG/L (ref 0–9)
UROBILINOGEN UR STRIP-ACNC: 0.2 EU/DL (ref 0–1)
WBC #/AREA URNS HPF: ABNORMAL /HPF
WBC OTHER # BLD: 7.8 K/UL (ref 4.5–11.5)

## 2024-11-30 PROCEDURE — 6360000002 HC RX W HCPCS: Performed by: INTERNAL MEDICINE

## 2024-11-30 PROCEDURE — 2060000000 HC ICU INTERMEDIATE R&B

## 2024-11-30 PROCEDURE — 87899 AGENT NOS ASSAY W/OPTIC: CPT

## 2024-11-30 PROCEDURE — 86901 BLOOD TYPING SEROLOGIC RH(D): CPT

## 2024-11-30 PROCEDURE — 83605 ASSAY OF LACTIC ACID: CPT

## 2024-11-30 PROCEDURE — 87449 NOS EACH ORGANISM AG IA: CPT

## 2024-11-30 PROCEDURE — 02HV33Z INSERTION OF INFUSION DEVICE INTO SUPERIOR VENA CAVA, PERCUTANEOUS APPROACH: ICD-10-PCS | Performed by: GENERAL PRACTICE

## 2024-11-30 PROCEDURE — 85730 THROMBOPLASTIN TIME PARTIAL: CPT

## 2024-11-30 PROCEDURE — P9047 ALBUMIN (HUMAN), 25%, 50ML: HCPCS | Performed by: NURSE PRACTITIONER

## 2024-11-30 PROCEDURE — 6370000000 HC RX 637 (ALT 250 FOR IP): Performed by: INTERNAL MEDICINE

## 2024-11-30 PROCEDURE — 85025 COMPLETE CBC W/AUTO DIFF WBC: CPT

## 2024-11-30 PROCEDURE — 71045 X-RAY EXAM CHEST 1 VIEW: CPT

## 2024-11-30 PROCEDURE — 86140 C-REACTIVE PROTEIN: CPT

## 2024-11-30 PROCEDURE — 85014 HEMATOCRIT: CPT

## 2024-11-30 PROCEDURE — 6370000000 HC RX 637 (ALT 250 FOR IP): Performed by: GENERAL PRACTICE

## 2024-11-30 PROCEDURE — 83735 ASSAY OF MAGNESIUM: CPT

## 2024-11-30 PROCEDURE — 85610 PROTHROMBIN TIME: CPT

## 2024-11-30 PROCEDURE — 85018 HEMOGLOBIN: CPT

## 2024-11-30 PROCEDURE — 83880 ASSAY OF NATRIURETIC PEPTIDE: CPT

## 2024-11-30 PROCEDURE — 86850 RBC ANTIBODY SCREEN: CPT

## 2024-11-30 PROCEDURE — 2580000003 HC RX 258: Performed by: INTERNAL MEDICINE

## 2024-11-30 PROCEDURE — 36556 INSERT NON-TUNNEL CV CATH: CPT | Performed by: NURSE PRACTITIONER

## 2024-11-30 PROCEDURE — 87040 BLOOD CULTURE FOR BACTERIA: CPT

## 2024-11-30 PROCEDURE — 82533 TOTAL CORTISOL: CPT

## 2024-11-30 PROCEDURE — 2700000000 HC OXYGEN THERAPY PER DAY

## 2024-11-30 PROCEDURE — 82330 ASSAY OF CALCIUM: CPT

## 2024-11-30 PROCEDURE — 94640 AIRWAY INHALATION TREATMENT: CPT

## 2024-11-30 PROCEDURE — 86900 BLOOD TYPING SEROLOGIC ABO: CPT

## 2024-11-30 PROCEDURE — C1751 CATH, INF, PER/CENT/MIDLINE: HCPCS

## 2024-11-30 PROCEDURE — 86923 COMPATIBILITY TEST ELECTRIC: CPT

## 2024-11-30 PROCEDURE — 84145 PROCALCITONIN (PCT): CPT

## 2024-11-30 PROCEDURE — 36430 TRANSFUSION BLD/BLD COMPNT: CPT

## 2024-11-30 PROCEDURE — 6360000002 HC RX W HCPCS: Performed by: NURSE PRACTITIONER

## 2024-11-30 PROCEDURE — 85652 RBC SED RATE AUTOMATED: CPT

## 2024-11-30 PROCEDURE — 80053 COMPREHEN METABOLIC PANEL: CPT

## 2024-11-30 PROCEDURE — 94664 DEMO&/EVAL PT USE INHALER: CPT

## 2024-11-30 PROCEDURE — 84100 ASSAY OF PHOSPHORUS: CPT

## 2024-11-30 PROCEDURE — 36556 INSERT NON-TUNNEL CV CATH: CPT

## 2024-11-30 PROCEDURE — 2580000003 HC RX 258: Performed by: NURSE PRACTITIONER

## 2024-11-30 PROCEDURE — 84484 ASSAY OF TROPONIN QUANT: CPT

## 2024-11-30 PROCEDURE — P9016 RBC LEUKOCYTES REDUCED: HCPCS

## 2024-11-30 PROCEDURE — 85384 FIBRINOGEN ACTIVITY: CPT

## 2024-11-30 PROCEDURE — 81001 URINALYSIS AUTO W/SCOPE: CPT

## 2024-11-30 RX ORDER — SODIUM CHLORIDE, SODIUM LACTATE, POTASSIUM CHLORIDE, AND CALCIUM CHLORIDE .6; .31; .03; .02 G/100ML; G/100ML; G/100ML; G/100ML
1000 INJECTION, SOLUTION INTRAVENOUS ONCE
Status: COMPLETED | OUTPATIENT
Start: 2024-11-30 | End: 2024-11-30

## 2024-11-30 RX ORDER — MIDAZOLAM HYDROCHLORIDE 2 MG/2ML
1 INJECTION, SOLUTION INTRAMUSCULAR; INTRAVENOUS ONCE
Status: COMPLETED | OUTPATIENT
Start: 2024-11-30 | End: 2024-11-30

## 2024-11-30 RX ORDER — MAGNESIUM SULFATE IN WATER 40 MG/ML
2000 INJECTION, SOLUTION INTRAVENOUS ONCE
Status: COMPLETED | OUTPATIENT
Start: 2024-11-30 | End: 2024-11-30

## 2024-11-30 RX ORDER — SODIUM CHLORIDE 9 MG/ML
INJECTION, SOLUTION INTRAVENOUS PRN
Status: DISCONTINUED | OUTPATIENT
Start: 2024-11-30 | End: 2024-12-06 | Stop reason: HOSPADM

## 2024-11-30 RX ORDER — 0.9 % SODIUM CHLORIDE 0.9 %
500 INTRAVENOUS SOLUTION INTRAVENOUS ONCE
Status: DISCONTINUED | OUTPATIENT
Start: 2024-11-30 | End: 2024-12-06 | Stop reason: HOSPADM

## 2024-11-30 RX ORDER — 0.9 % SODIUM CHLORIDE 0.9 %
250 INTRAVENOUS SOLUTION INTRAVENOUS ONCE
Status: DISCONTINUED | OUTPATIENT
Start: 2024-11-30 | End: 2024-11-30

## 2024-11-30 RX ORDER — ACETAMINOPHEN 650 MG/1
650 SUPPOSITORY RECTAL EVERY 6 HOURS PRN
Status: DISCONTINUED | OUTPATIENT
Start: 2024-11-30 | End: 2024-12-06 | Stop reason: HOSPADM

## 2024-11-30 RX ORDER — SODIUM CHLORIDE 9 MG/ML
50 INJECTION, SOLUTION INTRAVENOUS ONCE
Status: COMPLETED | OUTPATIENT
Start: 2024-11-30 | End: 2024-11-30

## 2024-11-30 RX ORDER — ACETAMINOPHEN 325 MG/1
650 TABLET ORAL EVERY 6 HOURS PRN
Status: DISCONTINUED | OUTPATIENT
Start: 2024-11-30 | End: 2024-12-06 | Stop reason: HOSPADM

## 2024-11-30 RX ORDER — ONDANSETRON 2 MG/ML
4 INJECTION INTRAMUSCULAR; INTRAVENOUS EVERY 6 HOURS PRN
Status: DISCONTINUED | OUTPATIENT
Start: 2024-11-30 | End: 2024-12-06 | Stop reason: HOSPADM

## 2024-11-30 RX ORDER — ONDANSETRON 4 MG/1
4 TABLET, ORALLY DISINTEGRATING ORAL EVERY 8 HOURS PRN
Status: DISCONTINUED | OUTPATIENT
Start: 2024-11-30 | End: 2024-12-06 | Stop reason: HOSPADM

## 2024-11-30 RX ORDER — ALBUMIN (HUMAN) 12.5 G/50ML
25 SOLUTION INTRAVENOUS ONCE
Status: COMPLETED | OUTPATIENT
Start: 2024-11-30 | End: 2024-11-30

## 2024-11-30 RX ORDER — PANTOPRAZOLE SODIUM 40 MG/1
40 TABLET, DELAYED RELEASE ORAL
Status: DISCONTINUED | OUTPATIENT
Start: 2024-11-30 | End: 2024-12-06 | Stop reason: HOSPADM

## 2024-11-30 RX ORDER — POLYETHYLENE GLYCOL 3350 17 G/17G
17 POWDER, FOR SOLUTION ORAL DAILY PRN
Status: DISCONTINUED | OUTPATIENT
Start: 2024-11-30 | End: 2024-12-06 | Stop reason: HOSPADM

## 2024-11-30 RX ADMIN — MENTHOL AND METHYL SALICYLATE 1 APPLICATION: 7.6; 29 OINTMENT TOPICAL at 11:46

## 2024-11-30 RX ADMIN — IPRATROPIUM BROMIDE AND ALBUTEROL SULFATE 1 DOSE: .5; 2.5 SOLUTION RESPIRATORY (INHALATION) at 20:15

## 2024-11-30 RX ADMIN — IPRATROPIUM BROMIDE AND ALBUTEROL SULFATE 1 DOSE: .5; 2.5 SOLUTION RESPIRATORY (INHALATION) at 08:59

## 2024-11-30 RX ADMIN — IPRATROPIUM BROMIDE AND ALBUTEROL SULFATE 1 DOSE: .5; 2.5 SOLUTION RESPIRATORY (INHALATION) at 12:51

## 2024-11-30 RX ADMIN — SODIUM CHLORIDE, POTASSIUM CHLORIDE, SODIUM LACTATE AND CALCIUM CHLORIDE 1000 ML: 600; 310; 30; 20 INJECTION, SOLUTION INTRAVENOUS at 02:59

## 2024-11-30 RX ADMIN — MIDAZOLAM HYDROCHLORIDE 1 MG: 1 INJECTION, SOLUTION INTRAMUSCULAR; INTRAVENOUS at 02:56

## 2024-11-30 RX ADMIN — PROTHROMBIN COMPLEX CONCENTRATE (HUMAN) 2000 UNITS: 25.5; 16.5; 24; 22; 22; 26 POWDER, FOR SOLUTION INTRAVENOUS at 04:37

## 2024-11-30 RX ADMIN — CEFEPIME 2000 MG: 2 INJECTION, POWDER, FOR SOLUTION INTRAVENOUS at 23:48

## 2024-11-30 RX ADMIN — CEFEPIME 2000 MG: 2 INJECTION, POWDER, FOR SOLUTION INTRAVENOUS at 11:45

## 2024-11-30 RX ADMIN — SODIUM CHLORIDE 50 ML: 9 INJECTION, SOLUTION INTRAVENOUS at 04:47

## 2024-11-30 RX ADMIN — MAGNESIUM SULFATE HEPTAHYDRATE 2000 MG: 40 INJECTION, SOLUTION INTRAVENOUS at 04:55

## 2024-11-30 RX ADMIN — ALBUMIN (HUMAN) 25 G: 0.25 INJECTION, SOLUTION INTRAVENOUS at 02:20

## 2024-11-30 RX ADMIN — MENTHOL AND METHYL SALICYLATE 1 APPLICATION: 7.6; 29 OINTMENT TOPICAL at 21:07

## 2024-11-30 RX ADMIN — HYDROCODONE BITARTRATE AND ACETAMINOPHEN 1 TABLET: 10; 325 TABLET ORAL at 16:29

## 2024-11-30 RX ADMIN — SODIUM CHLORIDE, POTASSIUM CHLORIDE, SODIUM LACTATE AND CALCIUM CHLORIDE 1000 ML: 600; 310; 30; 20 INJECTION, SOLUTION INTRAVENOUS at 02:58

## 2024-11-30 RX ADMIN — Medication 1 G: at 16:29

## 2024-11-30 ASSESSMENT — PAIN DESCRIPTION - PAIN TYPE
TYPE: ACUTE PAIN;SURGICAL PAIN
TYPE: ACUTE PAIN;SURGICAL PAIN

## 2024-11-30 ASSESSMENT — PAIN DESCRIPTION - FREQUENCY
FREQUENCY: INTERMITTENT
FREQUENCY: INTERMITTENT

## 2024-11-30 ASSESSMENT — PAIN DESCRIPTION - ONSET
ONSET: SUDDEN
ONSET: GRADUAL

## 2024-11-30 ASSESSMENT — PAIN - FUNCTIONAL ASSESSMENT: PAIN_FUNCTIONAL_ASSESSMENT: ACTIVITIES ARE NOT PREVENTED

## 2024-11-30 ASSESSMENT — PAIN DESCRIPTION - ORIENTATION
ORIENTATION: RIGHT;LOWER
ORIENTATION: RIGHT

## 2024-11-30 ASSESSMENT — PAIN DESCRIPTION - LOCATION
LOCATION: BACK
LOCATION: GENERALIZED
LOCATION: BACK
LOCATION: BACK

## 2024-11-30 ASSESSMENT — PAIN SCALES - GENERAL
PAINLEVEL_OUTOF10: 0
PAINLEVEL_OUTOF10: 7
PAINLEVEL_OUTOF10: 3
PAINLEVEL_OUTOF10: 4
PAINLEVEL_OUTOF10: 0
PAINLEVEL_OUTOF10: 3

## 2024-11-30 ASSESSMENT — PAIN DESCRIPTION - DESCRIPTORS
DESCRIPTORS: ACHING;SORE
DESCRIPTORS: ACHING;PRESSURE
DESCRIPTORS: ACHING;SORE;DISCOMFORT

## 2024-11-30 NOTE — PROGRESS NOTES
Dr. Carney notified that patient had large amount of bloody drainage around the chest tube site,  no new orders.

## 2024-11-30 NOTE — PROGRESS NOTES
Patient admitted from 728 to 210, with the following belongings; cell phone, rings X2, pajama pant, underwear, tshirts, shoes, bra, . placed on monitor, patient oriented to room and unit visiting hours.  Patient guide at bedside, reviewed patient rights and responsibilities  Bed alarm on.  Call light within reach.

## 2024-11-30 NOTE — PROGRESS NOTES
RN notified pulmonology of pt's change in status. New orders received for 500cc NS bolus, STAT chest xray and transfer orders to ICU.

## 2024-11-30 NOTE — PROCEDURES
PROCEDURE NOTE  Date: 11/30/2024   Name: Maria T Romeo  YOB: 1965    CENTRAL LINE    Date/Time: 11/30/2024 3:14 AM    Performed by: Ajay Newton APRN - CNP  Authorized by: Ajay Newton APRN - CNP  Consent: Written consent obtained.  Risks and benefits: risks, benefits and alternatives were discussed  Consent given by: patient  Patient understanding: patient states understanding of the procedure being performed  Patient consent: the patient's understanding of the procedure matches consent given  Procedure consent: procedure consent matches procedure scheduled  Relevant documents: relevant documents present and verified  Test results: test results available and properly labeled  Site marked: the operative site was marked  Imaging studies: imaging studies available  Required items: required blood products, implants, devices, and special equipment available  Patient identity confirmed: verbally with patient, arm band, provided demographic data and hospital-assigned identification number  Time out: Immediately prior to procedure a \"time out\" was called to verify the correct patient, procedure, equipment, support staff and site/side marked as required.  Indications: vascular access  Anesthesia: local infiltration    Anesthesia:  Anesthetic total: 3 mL    Sedation:  Patient sedated: yes  Sedation type: anxiolysis  Sedatives: midazolam  Vitals: Vital signs were monitored during sedation.    Preparation: skin prepped with ChloraPrep  Skin prep agent dried: skin prep agent completely dried prior to procedure  Sterile barriers: all five maximum sterile barriers used - cap, mask, sterile gown, sterile gloves, and large sterile sheet  Hand hygiene: hand hygiene performed prior to central venous catheter insertion  Location details: left internal jugular  Patient position: reverse Trendelenburg  Catheter type: triple lumen  Pre-procedure: landmarks identified  Ultrasound guidance: yes  Sterile

## 2024-11-30 NOTE — PROGRESS NOTES
Around 2350, pt began having multiple episodes of brown emesis. At that time, pt's BP was 72/48 and her HR was 112. Pt appeared pale but remained oriented x4. This RN called pt's attending and received orders for a 250cc NS bolus. Bolus running at this time.

## 2024-11-30 NOTE — PROGRESS NOTES
..  Intensive Care Daily Quality Rounding Checklist      ICU Team Members: Dr. oGnzalez, resident, charge nurse, bedside nurse, respiratory therapy     ICU Day #: NUMBER: 1    SOFA Score:    Intubation Date:      Ventilator Day #:     Central Line Insertion Date: November 30        Day #: NUMBER: 1        Indication: CVCIndication: Hemodynamically unstable requiring volume resuscitation     Arterial Line Insertion Date:        Day #:     Temporary Hemodialysis Catheter Insertion Date:  NA      Day #     DVT Prophylaxis: Eliquis on hold    GI Prophylaxis: Protonix     Pandey Catheter Insertion Date:  NA       Day #:       Indications:       Continued need (if yes, reason documented and discussed with physician): no     Skin Issues/ Wounds and ordered treatment discussed on rounds: N    Goals/ Plans for the Day: Monitor labs and vitals. Monitor H&H. Chest tube to water seal. Monitor output    Reviewed plan and goals for day with patient and/or representative: Yes    **SHARE this note so that the rounding nurse may edit**

## 2024-11-30 NOTE — CONSULTS
Critical Care Admit/Consult Note     Patient - Maria T Romeo   MRN -  [id number]   Acct # - [id number]   - 1965      Date of Admission -  2024  1:00 PM  Date of evaluation -  2024  07/07-A   Hospital Day - 9      ADMIT/CONSULT DETAILS     Reason for Admit/Consult   Hypotension    Consulting Service/Physician   Consulting - Jensen Pelyao DO  Primary Care Physician - Jensen Pelayo DO         HPI   Ms. Romeo first presented to the ED on  for evaluation of dyspnea and back pain. Medical history is significant for COPD still smoking and inconsistently using home oxygen, recurrent RML collapse requiring bronchoscopy,  hypertension, Hx hyponatremia 2/2 SIADH treated with salt tabs, and recently diagnosed PE on apixiban.  She was recently hospitalized from 24 - 24 for dyspnea and pleuritic chest pain found to have RML collapse and PE on CTA chest requiring supplemental oxygen. She was started on apixiban for her PE and underwent bronchoscopy with Dr. Carney on  showing significant mucous plugging - cytology sent and negative for malignancy. She discharged home.      She returned on  for evaluation of the above symptoms with SPO2 87-88% and initially refused oxygen therapy.  CTA chest showed resolution of her pulmonary embolism and improved atelectasis of her RML,  and right pleural effusion is noted to be increasing in size.  Labs were significant for hyponatremia (122) and hyperkalemia (5.3) - her salt tabs were ordered by Nephrology and received a dose of urea, hyperkalemia attributed to home supplementation.  She has been complaining of ongoing pleuritic pain and was started on ibuprofen/lidocaine to manage - though ibuprofen later held d/t her hyponatremia. On  she underwent IR guided thoracentesis with finding of right sided loculated effusion - analysis showed exudative pleural fluid, cultures are thus far negative, she did require instillation of altepase  coags, trop/bnp/EKG, cortisol, lactate    # Pulmonary embolism  - Resolution of PE on admitting CTA  - Hold AC for bloody effusion    Gastrointestinal   # No acute issues    Renal   # Hyponatremia - resolved  - Hx SIADH  - Continue salt tabs and urea  - Nephrology following     Infectious Disease   # Septic w/u  - Check blood cultures, strep/legionella ag, procal, crp/esr, UA    Hematology/Oncology   # No acute issues    Endocrine   # No acute issues    Social/Spiritual/DNR/Other   Code: Full  DVT ppx: Hold AC - start SCDs  GI ppx: protonix    \"Thank you for asking us to see this complex patient.\"  Discussed above with Dr. Gonzalez        I personally saw, examined and provided care for the patient. Radiographs, labs and medication list were reviewed by me independently. I performed the substantive portion of the visit. I spoke with bedside nursing, therapists and consultants. Critical care services and times documented are independent of procedures and multidisciplinary rounds with CNP. Additionally comprehensive, multidisciplinary rounds were conducted with the MICU team. The case was discussed in detail and plans for care were established. Review of CNP documentation was conducted and revisions were made as appropriate. I agree with the above documented exam, problem list and plan of care.    Hemorrhagic shock  Loculated pleural effusion s/p pigtail drain  Recent PE  COPD  Ongoing tobacco use    Patient is improved with resuscitation  Chest tube flushed at bedside with minimal bloody output compared to last night.  Continue to monitor  Cefepime started, positive pneumococcal antigen      CCT excluding procedures 38 minutes    Jorge Gonzalez DO

## 2024-11-30 NOTE — PROGRESS NOTES
[facility]  Nephrology Attending Progress Note          SUBJECTIVE:   From consult note 11/22/2024  The patient is a 55-year-old  lady who has underlying history of chronic hyponatremia seen by Dr. Curly Lopez, COPD and bilateral PEs.  The last time she saw Dr. Lopez was in year 2018 and subsequently, the patient chose not to follow up.  The patient was initially seen in consultation for hyponatremia in 01/2018.  At that time, the hyponatremia was treated with oral salt tablets.    They presented on 11/21/2024 complaining of back pain.  She has had back pain since her prior admission that slightly worsened.  She chronically has shortness of breath at baseline.  On arrival there blood pressure 108/78 HR 87 RR 18 on room air and afebrile.  Initial labs were pertinent for sodium of 126, potassium of 5.7 bicarb of 21.  Imaging showed right middle lobe atelectasis, mild enlargement of moderate right pleural effusion and ascending thoracic aortic aneurysm of 4.7 cm.  Nephrology was consulted for hyponatremia.      On 11/30/24  she had large amount of bloody drainage around the chest tube site followed by brown emesis followed by hypotension and transfer to the ICU.        11/30/2024- Overnight she had large amount of bloody drainage around the chest tube site followed by brown emesis followed by hypotension and transfer to the ICU.    PROBLEM LIST:    Patient Active Problem List   Diagnosis    Hyponatremia    Acute hyponatremia    Traumatic closed displaced fracture of left shoulder with anterior dislocation    Acute respiratory failure with hypoxia    LFTs abnormal    Primary hypertension    Multiple closed fractures of ribs of left side    Acute hypoxic respiratory failure    Moderate protein-calorie malnutrition (HCC)    Adrenal crisis (HCC)        PAST MEDICAL HISTORY:    Past Medical History:   Diagnosis Date    Acid reflux     Fracture of left shoulder 10/2018    Frozen shoulder     left     S1-S2 no S3 or rub  Respiratory: Clear upper diminished bases chest tube noted right side with serosanguineous drainage  Gastrointestinal:  Soft, nontender, nondistended, bowel sounds x 4  Ext: No edema  Skin: dry, no rash  Neuro: Awake alert and oriented    MEDS (scheduled):    sodium chloride  500 mL IntraVENous Once    pantoprazole  40 mg Oral QAM AC    [Held by provider] ALTEplase (CATHFLO) 10 mg in sodium chloride 0.9 % 30 mL  10 mg IntraPLEUral Q12H    And    [Held by provider] dornase alpha (PULMOZYME) 5 mg in sterile water 30 mL  5 mg IntraPLEUral Q12H    [Held by provider] losartan  50 mg Oral Daily    [Held by provider] carvedilol  25 mg Oral BID WC    [Held by provider] cloNIDine  0.1 mg Oral TID    lidocaine  1 patch TransDERmal Daily    Icy Hot Balm Extra Strength  1 Application Apply externally BID    sodium chloride  1 g Oral BID WC    [Held by provider] urea  15 g Oral Daily    [Held by provider] apixaban  5 mg Oral BID    ipratropium 0.5 mg-albuterol 2.5 mg  1 Dose Inhalation TID RT       MEDS (infusions):   sodium chloride      dextrose      sodium chloride 75 mL/hr at 11/29/24 2351       MEDS (prn):  ondansetron **OR** ondansetron, polyethylene glycol, acetaminophen **OR** acetaminophen, sodium chloride, HYDROcodone-acetaminophen, ondansetron, glucose, dextrose bolus **OR** dextrose bolus, glucagon (rDNA), dextrose    DATA:    Recent Labs     11/28/24 0323 11/29/24  0430 11/30/24  0140   WBC 5.6 6.0 7.8   HGB 8.5* 7.9* 3.8*   HCT 25.5* 24.0* 11.8*   .8* 107.1* 106.3*    477* 381     Recent Labs     11/28/24  0323 11/29/24  0430 11/30/24  0140    134 137   K 3.6 3.6 4.6    101 105   CO2 25 23 25   BUN 10 12 18   CREATININE 0.6 0.6 0.6   LABGLOM >90 >90 >90   GLUCOSE 95 86 140*   CALCIUM 8.0* 8.1* 7.0*   ALT  --   --  <5   AST  --   --  9   BILITOT  --   --  0.2   ALKPHOS  --   --  71   MG 1.6 1.4* 1.4*   PHOS 3.5 4.2 4.6*       No results found for: \"LABALBU\"  Lab Results

## 2024-11-30 NOTE — PROGRESS NOTES
4 Eyes Skin Assessment     NAME:  Maria T Romeo  YOB: 1965  MEDICAL RECORD NUMBER:  [id number]    The patient is being assessed for  Admission    I agree that at least one RN has performed a thorough Head to Toe Skin Assessment on the patient. ALL assessment sites listed below have been assessed.      Areas assessed by both nurses:    Head, Face, Ears, Shoulders, Back, Chest, Arms, Elbows, Hands, Sacrum. Buttock, Coccyx, Ischium, and Legs. Feet and Heels        Does the Patient have a Wound? No noted wound(s)       Micky Prevention initiated by RN: Yes, SOS  Wound Care Orders initiated by RN: No    Pressure Injury (Stage 3,4, Unstageable, DTI, NWPT, and Complex wounds) if present, place Wound referral order by RN under : No    New Ostomies, if present place, Ostomy referral order under : No     Nurse 1 eSignature: Electronically signed by Sandy Weir RN on 11/30/24 at 6:35 AM EST    **SHARE this note so that the co-signing nurse can place an eSignature**    Nurse 2 eSignature: Electronically signed by Pepito Alvarado RN on 11/30/24 at 7:22 AM EST

## 2024-11-30 NOTE — PROGRESS NOTES
Patient seen now in icu after hematemesis and fair amount of blood in pig tail chest tube. Significant drop in hemoglobin and being transfused currently. Vss afebrile. No major complaints. No chest pain no sob. Normal bm yesterday. Was on anticoagulation for pe . Continue to monitor

## 2024-11-30 NOTE — PLAN OF CARE
Problem: Pain  Goal: Verbalizes/displays adequate comfort level or baseline comfort level  Outcome: Progressing  Flowsheets  Taken 11/30/2024 0600 by Sandy Weir RN  Verbalizes/displays adequate comfort level or baseline comfort level: Assess pain using appropriate pain scale  Taken 11/30/2024 0500 by Sandy Weir, JARRETT  Verbalizes/displays adequate comfort level or baseline comfort level: Assess pain using appropriate pain scale  Taken 11/30/2024 0400 by Sandy Weir, JARRETT  Verbalizes/displays adequate comfort level or baseline comfort level: Assess pain using appropriate pain scale     Problem: Discharge Planning  Goal: Discharge to home or other facility with appropriate resources  Outcome: Progressing     Problem: Safety - Adult  Goal: Free from fall injury  Outcome: Progressing     Problem: Nutrition Deficit:  Goal: Optimize nutritional status  Outcome: Progressing

## 2024-11-30 NOTE — PROGRESS NOTES
SVI CI HR TFC MAP TPRI   Baseline 21 1.9 89  72 3020   Test 31 2.6 85  70 2565   % Change         Fluid responsive: SVI 59.1%

## 2024-11-30 NOTE — CONSENT
Informed Consent for Blood Component Transfusion Note    I have discussed with the patient the rationale for blood component transfusion; its benefits in treating or preventing fatigue, organ damage, or death; and its risk which includes mild transfusion reactions, rare risk of blood borne infection, or more serious but rare reactions. I have discussed the alternatives to transfusion, including the risk and consequences of not receiving transfusion. The patient had an opportunity to ask questions and had agreed to proceed with transfusion of blood components.    Electronically signed by YULISA Taylor CNP on 11/30/24 at 4:02 AM EST

## 2024-12-01 ENCOUNTER — APPOINTMENT (OUTPATIENT)
Dept: GENERAL RADIOLOGY | Age: 59
End: 2024-12-01
Payer: COMMERCIAL

## 2024-12-01 LAB
ALBUMIN SERPL-MCNC: 2.3 G/DL (ref 3.5–5.2)
ALP SERPL-CCNC: 79 U/L (ref 35–104)
ALT SERPL-CCNC: 5 U/L (ref 0–32)
ANION GAP SERPL CALCULATED.3IONS-SCNC: 6 MMOL/L (ref 7–16)
AST SERPL-CCNC: 11 U/L (ref 0–31)
BASOPHILS # BLD: 0.02 K/UL (ref 0–0.2)
BASOPHILS NFR BLD: 0 % (ref 0–2)
BILIRUB SERPL-MCNC: 0.6 MG/DL (ref 0–1.2)
BUN SERPL-MCNC: 11 MG/DL (ref 6–20)
CALCIUM SERPL-MCNC: 7.9 MG/DL (ref 8.6–10.2)
CHLORIDE SERPL-SCNC: 104 MMOL/L (ref 98–107)
CO2 SERPL-SCNC: 25 MMOL/L (ref 22–29)
CREAT SERPL-MCNC: 0.6 MG/DL (ref 0.5–1)
EOSINOPHIL # BLD: 0.21 K/UL (ref 0.05–0.5)
EOSINOPHILS RELATIVE PERCENT: 3 % (ref 0–6)
ERYTHROCYTE [DISTWIDTH] IN BLOOD BY AUTOMATED COUNT: 16.5 % (ref 11.5–15)
GFR, ESTIMATED: >90 ML/MIN/1.73M2
GLUCOSE SERPL-MCNC: 88 MG/DL (ref 74–99)
HCT VFR BLD AUTO: 26.8 % (ref 34–48)
HCT VFR BLD AUTO: 27.5 % (ref 34–48)
HGB BLD-MCNC: 9.4 G/DL (ref 11.5–15.5)
HGB BLD-MCNC: 9.5 G/DL (ref 11.5–15.5)
IMM GRANULOCYTES # BLD AUTO: 0.03 K/UL (ref 0–0.58)
IMM GRANULOCYTES NFR BLD: 0 % (ref 0–5)
LYMPHOCYTES NFR BLD: 1.58 K/UL (ref 1.5–4)
LYMPHOCYTES RELATIVE PERCENT: 21 % (ref 20–42)
MAGNESIUM SERPL-MCNC: 1.7 MG/DL (ref 1.6–2.6)
MCH RBC QN AUTO: 31.3 PG (ref 26–35)
MCHC RBC AUTO-ENTMCNC: 34.5 G/DL (ref 32–34.5)
MCV RBC AUTO: 90.5 FL (ref 80–99.9)
MONOCYTES NFR BLD: 0.75 K/UL (ref 0.1–0.95)
MONOCYTES NFR BLD: 10 % (ref 2–12)
NEUTROPHILS NFR BLD: 65 % (ref 43–80)
NEUTS SEG NFR BLD: 4.9 K/UL (ref 1.8–7.3)
PHOSPHATE SERPL-MCNC: 3.2 MG/DL (ref 2.5–4.5)
PLATELET # BLD AUTO: 332 K/UL (ref 130–450)
PMV BLD AUTO: 8.9 FL (ref 7–12)
POTASSIUM SERPL-SCNC: 3.4 MMOL/L (ref 3.5–5)
PROT SERPL-MCNC: 4.2 G/DL (ref 6.4–8.3)
RBC # BLD AUTO: 3.04 M/UL (ref 3.5–5.5)
SODIUM SERPL-SCNC: 135 MMOL/L (ref 132–146)
WBC OTHER # BLD: 7.5 K/UL (ref 4.5–11.5)

## 2024-12-01 PROCEDURE — 6370000000 HC RX 637 (ALT 250 FOR IP): Performed by: GENERAL PRACTICE

## 2024-12-01 PROCEDURE — 6370000000 HC RX 637 (ALT 250 FOR IP): Performed by: INTERNAL MEDICINE

## 2024-12-01 PROCEDURE — 85025 COMPLETE CBC W/AUTO DIFF WBC: CPT

## 2024-12-01 PROCEDURE — 6360000002 HC RX W HCPCS: Performed by: GENERAL PRACTICE

## 2024-12-01 PROCEDURE — 83735 ASSAY OF MAGNESIUM: CPT

## 2024-12-01 PROCEDURE — 6360000002 HC RX W HCPCS: Performed by: INTERNAL MEDICINE

## 2024-12-01 PROCEDURE — 80053 COMPREHEN METABOLIC PANEL: CPT

## 2024-12-01 PROCEDURE — 85014 HEMATOCRIT: CPT

## 2024-12-01 PROCEDURE — 2580000003 HC RX 258: Performed by: INTERNAL MEDICINE

## 2024-12-01 PROCEDURE — 71045 X-RAY EXAM CHEST 1 VIEW: CPT

## 2024-12-01 PROCEDURE — 2060000000 HC ICU INTERMEDIATE R&B

## 2024-12-01 PROCEDURE — 85018 HEMOGLOBIN: CPT

## 2024-12-01 PROCEDURE — 84100 ASSAY OF PHOSPHORUS: CPT

## 2024-12-01 PROCEDURE — 6370000000 HC RX 637 (ALT 250 FOR IP): Performed by: NURSE PRACTITIONER

## 2024-12-01 PROCEDURE — 2700000000 HC OXYGEN THERAPY PER DAY

## 2024-12-01 RX ORDER — FUROSEMIDE 10 MG/ML
20 INJECTION INTRAMUSCULAR; INTRAVENOUS ONCE
Status: COMPLETED | OUTPATIENT
Start: 2024-12-01 | End: 2024-12-01

## 2024-12-01 RX ADMIN — CEFEPIME 2000 MG: 2 INJECTION, POWDER, FOR SOLUTION INTRAVENOUS at 12:23

## 2024-12-01 RX ADMIN — HYDROCODONE BITARTRATE AND ACETAMINOPHEN 1 TABLET: 10; 325 TABLET ORAL at 05:35

## 2024-12-01 RX ADMIN — ONDANSETRON 4 MG: 2 INJECTION INTRAMUSCULAR; INTRAVENOUS at 05:29

## 2024-12-01 RX ADMIN — MENTHOL AND METHYL SALICYLATE 1 APPLICATION: 7.6; 29 OINTMENT TOPICAL at 08:46

## 2024-12-01 RX ADMIN — Medication 1 G: at 08:46

## 2024-12-01 RX ADMIN — Medication 1 G: at 17:40

## 2024-12-01 RX ADMIN — PANTOPRAZOLE SODIUM 40 MG: 40 TABLET, DELAYED RELEASE ORAL at 05:35

## 2024-12-01 RX ADMIN — FUROSEMIDE 20 MG: 10 INJECTION, SOLUTION INTRAMUSCULAR; INTRAVENOUS at 12:16

## 2024-12-01 ASSESSMENT — PAIN DESCRIPTION - ORIENTATION: ORIENTATION: RIGHT;UPPER;LOWER

## 2024-12-01 ASSESSMENT — PAIN - FUNCTIONAL ASSESSMENT: PAIN_FUNCTIONAL_ASSESSMENT: PREVENTS OR INTERFERES SOME ACTIVE ACTIVITIES AND ADLS

## 2024-12-01 ASSESSMENT — PAIN DESCRIPTION - LOCATION
LOCATION: BACK
LOCATION: BACK

## 2024-12-01 ASSESSMENT — PAIN SCALES - GENERAL
PAINLEVEL_OUTOF10: 6
PAINLEVEL_OUTOF10: 8
PAINLEVEL_OUTOF10: 4
PAINLEVEL_OUTOF10: 0
PAINLEVEL_OUTOF10: 0

## 2024-12-01 ASSESSMENT — PAIN DESCRIPTION - DESCRIPTORS: DESCRIPTORS: ACHING

## 2024-12-01 ASSESSMENT — PAIN SCALES - WONG BAKER: WONGBAKER_NUMERICALRESPONSE: NO HURT

## 2024-12-01 NOTE — PLAN OF CARE
Problem: Discharge Planning  Goal: Discharge to home or other facility with appropriate resources  12/1/2024 0049 by Vanessa Greene RN  Outcome: Progressing  Flowsheets (Taken 11/30/2024 2000)  Discharge to home or other facility with appropriate resources: Identify barriers to discharge with patient and caregiver  11/30/2024 1744 by Nadine Gunderson RN  Outcome: Progressing     Problem: Pain  Goal: Verbalizes/displays adequate comfort level or baseline comfort level  12/1/2024 0049 by Vanessa Greene RN  Outcome: Progressing  11/30/2024 1744 by Nadine Gunderson RN  Outcome: Progressing  Flowsheets  Taken 11/30/2024 0600 by Sandy Weir RN  Verbalizes/displays adequate comfort level or baseline comfort level: Assess pain using appropriate pain scale  Taken 11/30/2024 0500 by Sandy Weir RN  Verbalizes/displays adequate comfort level or baseline comfort level: Assess pain using appropriate pain scale  Taken 11/30/2024 0400 by Sandy Weir RN  Verbalizes/displays adequate comfort level or baseline comfort level: Assess pain using appropriate pain scale     Problem: Safety - Adult  Goal: Free from fall injury  12/1/2024 0049 by Vanessa Greene RN  Outcome: Progressing  11/30/2024 1744 by Nadine Gunderson RN  Outcome: Progressing     Problem: Nutrition Deficit:  Goal: Optimize nutritional status  12/1/2024 0049 by Vanessa Greene RN  Outcome: Progressing  11/30/2024 1744 by Nadine Gunderson RN  Outcome: Progressing

## 2024-12-01 NOTE — PROGRESS NOTES
Event Note    Discussed with ICU last night.  Anticoagulation held.  Chest x-ray actually looks better.  Transfused.  Strep pneumonia urinary antigen is positive but the patient really does not exhibit any symptoms or signs of an acute infection.  Empiric treatment is underway.  There may be an old result.  Will continue to follow peripherally while in the ICU.  Please advise of transfer.  Lenin Carney M.D., F.C.C.P.    [facility]    [facility], [address] [place] [address], [address], [place] [address]

## 2024-12-01 NOTE — PATIENT CARE CONFERENCE
Intensive Care Daily Quality Rounding Checklist        ICU Team Members:       ICU Day #: NUMBER: 2 Downgraded 11/30/2024     SOFA Score: 1     Intubation Date:  N/A     Ventilator Day #: N/A     Central Line Insertion Date: November 30                                                    Day #: NUMBER: 2                                                    Indication: CVCIndication: Hemodynamically unstable requiring volume resuscitation      Arterial Line Insertion Date:  N/A                           Day #: N/A     Temporary Hemodialysis Catheter Insertion Date:  NA                             Day # N/A     DVT Prophylaxis: PCDs    GI Prophylaxis: Protonix      Pandey Catheter Insertion Date:  NA                                        Day #: N/A                             Indications: N/A                             Continued need (if yes, reason documented and discussed with physician): no      Skin Issues/ Wounds and ordered treatment discussed on rounds: N     Goals/ Plans for the Day: Monitor labs and vitals. Monitor H&H. Chest tube to water seal. Monitor output     Reviewed plan and goals for day with patient and/or representative:      **SHARE this note so that the rounding nurse may edit**

## 2024-12-01 NOTE — PROGRESS NOTES
Patient seen in icu , being transferred out to floor. H/h stable. Less drainage from pg tail. Central line removed. No fever. Bp okay. Continue with current care

## 2024-12-01 NOTE — PROGRESS NOTES
[facility]  Nephrology Attending Progress Note          SUBJECTIVE:   From consult note 11/22/2024  The patient is a 55-year-old  lady who has underlying history of chronic hyponatremia seen by Dr. Curly Lopez, COPD and bilateral PEs.  The last time she saw Dr. Lopez was in year 2018 and subsequently, the patient chose not to follow up.  The patient was initially seen in consultation for hyponatremia in 01/2018.  At that time, the hyponatremia was treated with oral salt tablets.    They presented on 11/21/2024 complaining of back pain.  She has had back pain since her prior admission that slightly worsened.  She chronically has shortness of breath at baseline.  On arrival there blood pressure 108/78 HR 87 RR 18 on room air and afebrile.  Initial labs were pertinent for sodium of 126, potassium of 5.7 bicarb of 21.  Imaging showed right middle lobe atelectasis, mild enlargement of moderate right pleural effusion and ascending thoracic aortic aneurysm of 4.7 cm.  Nephrology was consulted for hyponatremia.      On 11/30/24  she had large amount of bloody drainage around the chest tube site followed by brown emesis followed by hypotension and transfer to the ICU.        12/1/2024- complains of worsening swelling. No issues overnight     PROBLEM LIST:    Patient Active Problem List   Diagnosis    Hyponatremia    Acute hyponatremia    Traumatic closed displaced fracture of left shoulder with anterior dislocation    Acute respiratory failure with hypoxia    LFTs abnormal    Primary hypertension    Multiple closed fractures of ribs of left side    Acute hypoxic respiratory failure    Moderate protein-calorie malnutrition (HCC)    Adrenal crisis (HCC)        PAST MEDICAL HISTORY:    Past Medical History:   Diagnosis Date    Acid reflux     Fracture of left shoulder 10/2018    Frozen shoulder     left    Hypertension     Hyponatremia     Lung abnormality     MVP (mitral valve prolapse)     no issues    Skin

## 2024-12-02 ENCOUNTER — APPOINTMENT (OUTPATIENT)
Dept: GENERAL RADIOLOGY | Age: 59
End: 2024-12-02
Payer: COMMERCIAL

## 2024-12-02 LAB
25(OH)D3 SERPL-MCNC: <6 NG/ML (ref 30–100)
ALBUMIN SERPL-MCNC: 2.4 G/DL (ref 3.5–5.2)
ALP SERPL-CCNC: 91 U/L (ref 35–104)
ALT SERPL-CCNC: 5 U/L (ref 0–32)
ANION GAP SERPL CALCULATED.3IONS-SCNC: 8 MMOL/L (ref 7–16)
AST SERPL-CCNC: 12 U/L (ref 0–31)
BASOPHILS # BLD: 0.02 K/UL (ref 0–0.2)
BASOPHILS NFR BLD: 0 % (ref 0–2)
BILIRUB SERPL-MCNC: 0.4 MG/DL (ref 0–1.2)
BUN SERPL-MCNC: 7 MG/DL (ref 6–20)
CALCIUM SERPL-MCNC: 7.7 MG/DL (ref 8.6–10.2)
CHLORIDE SERPL-SCNC: 102 MMOL/L (ref 98–107)
CO2 SERPL-SCNC: 27 MMOL/L (ref 22–29)
CREAT SERPL-MCNC: 0.6 MG/DL (ref 0.5–1)
EOSINOPHIL # BLD: 0.13 K/UL (ref 0.05–0.5)
EOSINOPHILS RELATIVE PERCENT: 2 % (ref 0–6)
ERYTHROCYTE [DISTWIDTH] IN BLOOD BY AUTOMATED COUNT: 15.4 % (ref 11.5–15)
GFR, ESTIMATED: >90 ML/MIN/1.73M2
GLUCOSE SERPL-MCNC: 83 MG/DL (ref 74–99)
HCT VFR BLD AUTO: 28.2 % (ref 34–48)
HGB BLD-MCNC: 9.6 G/DL (ref 11.5–15.5)
IMM GRANULOCYTES # BLD AUTO: 0.07 K/UL (ref 0–0.58)
IMM GRANULOCYTES NFR BLD: 1 % (ref 0–5)
LYMPHOCYTES NFR BLD: 1.36 K/UL (ref 1.5–4)
LYMPHOCYTES RELATIVE PERCENT: 19 % (ref 20–42)
MAGNESIUM SERPL-MCNC: 1.5 MG/DL (ref 1.6–2.6)
MCH RBC QN AUTO: 31.2 PG (ref 26–35)
MCHC RBC AUTO-ENTMCNC: 34 G/DL (ref 32–34.5)
MCV RBC AUTO: 91.6 FL (ref 80–99.9)
MONOCYTES NFR BLD: 0.65 K/UL (ref 0.1–0.95)
MONOCYTES NFR BLD: 9 % (ref 2–12)
NEUTROPHILS NFR BLD: 69 % (ref 43–80)
NEUTS SEG NFR BLD: 4.93 K/UL (ref 1.8–7.3)
PHOSPHATE SERPL-MCNC: 3.4 MG/DL (ref 2.5–4.5)
PLATELET # BLD AUTO: 366 K/UL (ref 130–450)
PMV BLD AUTO: 8.7 FL (ref 7–12)
POTASSIUM SERPL-SCNC: 2.8 MMOL/L (ref 3.5–5)
PROT SERPL-MCNC: 4.4 G/DL (ref 6.4–8.3)
PTH-INTACT SERPL-MCNC: 20.7 PG/ML (ref 15–65)
RBC # BLD AUTO: 3.08 M/UL (ref 3.5–5.5)
SODIUM SERPL-SCNC: 137 MMOL/L (ref 132–146)
WBC OTHER # BLD: 7.2 K/UL (ref 4.5–11.5)

## 2024-12-02 PROCEDURE — 6370000000 HC RX 637 (ALT 250 FOR IP): Performed by: NURSE PRACTITIONER

## 2024-12-02 PROCEDURE — 84100 ASSAY OF PHOSPHORUS: CPT

## 2024-12-02 PROCEDURE — 6360000002 HC RX W HCPCS: Performed by: INTERNAL MEDICINE

## 2024-12-02 PROCEDURE — 6370000000 HC RX 637 (ALT 250 FOR IP): Performed by: INTERNAL MEDICINE

## 2024-12-02 PROCEDURE — 2060000000 HC ICU INTERMEDIATE R&B

## 2024-12-02 PROCEDURE — 83970 ASSAY OF PARATHORMONE: CPT

## 2024-12-02 PROCEDURE — 6370000000 HC RX 637 (ALT 250 FOR IP): Performed by: GENERAL PRACTICE

## 2024-12-02 PROCEDURE — 2700000000 HC OXYGEN THERAPY PER DAY

## 2024-12-02 PROCEDURE — 80053 COMPREHEN METABOLIC PANEL: CPT

## 2024-12-02 PROCEDURE — 2580000003 HC RX 258: Performed by: INTERNAL MEDICINE

## 2024-12-02 PROCEDURE — 82306 VITAMIN D 25 HYDROXY: CPT

## 2024-12-02 PROCEDURE — 83735 ASSAY OF MAGNESIUM: CPT

## 2024-12-02 PROCEDURE — 85025 COMPLETE CBC W/AUTO DIFF WBC: CPT

## 2024-12-02 PROCEDURE — 36415 COLL VENOUS BLD VENIPUNCTURE: CPT

## 2024-12-02 PROCEDURE — 71045 X-RAY EXAM CHEST 1 VIEW: CPT

## 2024-12-02 RX ORDER — CALCIUM GLUCONATE 20 MG/ML
2000 INJECTION, SOLUTION INTRAVENOUS ONCE
Status: COMPLETED | OUTPATIENT
Start: 2024-12-02 | End: 2024-12-02

## 2024-12-02 RX ORDER — POTASSIUM CHLORIDE 1500 MG/1
40 TABLET, EXTENDED RELEASE ORAL ONCE
Status: COMPLETED | OUTPATIENT
Start: 2024-12-02 | End: 2024-12-02

## 2024-12-02 RX ADMIN — LOSARTAN POTASSIUM 50 MG: 50 TABLET, FILM COATED ORAL at 18:12

## 2024-12-02 RX ADMIN — HYDROCODONE BITARTRATE AND ACETAMINOPHEN 1 TABLET: 10; 325 TABLET ORAL at 19:25

## 2024-12-02 RX ADMIN — PANTOPRAZOLE SODIUM 40 MG: 40 TABLET, DELAYED RELEASE ORAL at 06:06

## 2024-12-02 RX ADMIN — CLONIDINE HYDROCHLORIDE 0.1 MG: 0.1 TABLET ORAL at 14:55

## 2024-12-02 RX ADMIN — CEFEPIME 2000 MG: 2 INJECTION, POWDER, FOR SOLUTION INTRAVENOUS at 00:08

## 2024-12-02 RX ADMIN — CLONIDINE HYDROCHLORIDE 0.1 MG: 0.1 TABLET ORAL at 21:02

## 2024-12-02 RX ADMIN — WATER 2000 MG: 1 INJECTION INTRAMUSCULAR; INTRAVENOUS; SUBCUTANEOUS at 10:30

## 2024-12-02 RX ADMIN — MENTHOL AND METHYL SALICYLATE 1 APPLICATION: 7.6; 29 OINTMENT TOPICAL at 19:26

## 2024-12-02 RX ADMIN — POTASSIUM CHLORIDE 40 MEQ: 1500 TABLET, EXTENDED RELEASE ORAL at 10:30

## 2024-12-02 RX ADMIN — HYDROCODONE BITARTRATE AND ACETAMINOPHEN 1 TABLET: 10; 325 TABLET ORAL at 06:14

## 2024-12-02 RX ADMIN — MENTHOL AND METHYL SALICYLATE 1 APPLICATION: 7.6; 29 OINTMENT TOPICAL at 06:17

## 2024-12-02 RX ADMIN — Medication 1 G: at 18:12

## 2024-12-02 RX ADMIN — CALCIUM GLUCONATE 2000 MG: 20 INJECTION, SOLUTION INTRAVENOUS at 10:34

## 2024-12-02 RX ADMIN — Medication 1 G: at 09:23

## 2024-12-02 RX ADMIN — MENTHOL AND METHYL SALICYLATE 1 APPLICATION: 7.6; 29 OINTMENT TOPICAL at 09:24

## 2024-12-02 ASSESSMENT — PAIN DESCRIPTION - ORIENTATION
ORIENTATION: LOWER
ORIENTATION: RIGHT;UPPER;LOWER

## 2024-12-02 ASSESSMENT — PAIN DESCRIPTION - DESCRIPTORS
DESCRIPTORS: ACHING
DESCRIPTORS: ACHING;DISCOMFORT

## 2024-12-02 ASSESSMENT — PAIN DESCRIPTION - LOCATION
LOCATION: BACK
LOCATION: BACK

## 2024-12-02 ASSESSMENT — PAIN SCALES - GENERAL
PAINLEVEL_OUTOF10: 6
PAINLEVEL_OUTOF10: 8
PAINLEVEL_OUTOF10: 5
PAINLEVEL_OUTOF10: 7
PAINLEVEL_OUTOF10: 4

## 2024-12-02 ASSESSMENT — PAIN SCALES - WONG BAKER
WONGBAKER_NUMERICALRESPONSE: NO HURT
WONGBAKER_NUMERICALRESPONSE: NO HURT

## 2024-12-02 ASSESSMENT — PAIN DESCRIPTION - PAIN TYPE: TYPE: ACUTE PAIN;SURGICAL PAIN

## 2024-12-02 NOTE — PROGRESS NOTES
[facility]  Nephrology Attending Progress Note  Cruly Lopez MD        SUBJECTIVE:     From consult note 11/22/2024  The patient is a 55-year-old  lady who has underlying history of chronic hyponatremia seen by Dr. Curly Lopez, COPD and bilateral PEs.  The last time she saw Dr. Lopez was in year 2018 and subsequently, the patient chose not to follow up.  The patient was initially seen in consultation for hyponatremia in 01/2018.  At that time, the hyponatremia was treated with oral salt tablets.    They presented on 11/21/2024 complaining of back pain.  She has had back pain since her prior admission that slightly worsened.  She chronically has shortness of breath at baseline.  On arrival there blood pressure 108/78 HR 87 RR 18 on room air and afebrile.  Initial labs were pertinent for sodium of 126, potassium of 5.7 bicarb of 21.  Imaging showed right middle lobe atelectasis, mild enlargement of moderate right pleural effusion and ascending thoracic aortic aneurysm of 4.7 cm.  Nephrology was consulted for hyponatremia.       On 11/30/24  she had large amount of bloody drainage around the chest tube site followed by brown emesis followed by hypotension and transfer to the ICU.           12/1/2024- complains of worsening swelling. No issues overnight     12/2: eating lunch, ct in place. Didn't eat well yesterday      PROBLEM LIST:    Patient Active Problem List   Diagnosis    Hyponatremia    Acute hyponatremia    Traumatic closed displaced fracture of left shoulder with anterior dislocation    Acute respiratory failure with hypoxia    LFTs abnormal    Primary hypertension    Multiple closed fractures of ribs of left side    Acute hypoxic respiratory failure    Moderate protein-calorie malnutrition (HCC)    Adrenal crisis (HCC)        PAST MEDICAL HISTORY:    Past Medical History:   Diagnosis Date    Acid reflux     Fracture of left shoulder 10/2018    Frozen shoulder     left    Hypertension      Oral TID    lidocaine  1 patch TransDERmal Daily    Icalber Hot Balm Extra Strength  1 Application Apply externally BID    sodium chloride  1 g Oral BID WC    [Held by provider] urea  15 g Oral Daily    [Held by provider] apixaban  5 mg Oral BID    ipratropium 0.5 mg-albuterol 2.5 mg  1 Dose Inhalation TID RT       MEDS (infusions):   sodium chloride      dextrose         MEDS (prn):  ondansetron **OR** ondansetron, polyethylene glycol, acetaminophen **OR** acetaminophen, sodium chloride, HYDROcodone-acetaminophen, glucose, dextrose bolus **OR** dextrose bolus, glucagon (rDNA), dextrose    DATA:    Recent Labs     11/30/24  0140 11/30/24  1027 12/01/24  0411 12/01/24  1020 12/02/24  0615   WBC 7.8  --  7.5  --  7.2   HGB 3.8*   < > 9.5* 9.4* 9.6*   HCT 11.8*   < > 27.5* 26.8* 28.2*   .3*  --  90.5  --  91.6     --  332  --  366    < > = values in this interval not displayed.     Recent Labs     11/30/24  0140 11/30/24  1027 12/01/24 0411 12/02/24  0615     --  135 137   K 4.6  --  3.4* 2.8*     --  104 102   CO2 25  --  25 27   BUN 18  --  11 7   CREATININE 0.6  --  0.6 0.6   LABGLOM >90  --  >90 >90   GLUCOSE 140*  --  88 83   CALCIUM 7.0*  --  7.9* 7.7*   ALT <5  --  5 5   AST 9  --  11 12   BILITOT 0.2  --  0.6 0.4   ALKPHOS 71  --  79 91   MG 1.4* 2.0 1.7 1.5*   PHOS 4.6*  --  3.2 3.4       No results found for: \"LABALBU\"  Lab Results   Component Value Date    TSH 0.970 02/28/2021       Iron Studies  Lab Results   Component Value Date    FERRITIN 2,289 09/21/2022     No results found for: \"[id number]\"  No results found for: \"FOLATE\"    Vit D, 25-Hydroxy   Date Value Ref Range Status   09/21/2022 45 30 - 100 ng/mL Final     Comment:     <20 ng/mL.............Deficient  20-30 ng/mL...........Insufficient   ng/mL..........Sufficient  >100 ng/mL............Toxic       No results found for: \"PTH\"    No components found for: \"URIC\"    Lab Results   Component Value Date/Time    COLORU

## 2024-12-02 NOTE — PLAN OF CARE
Problem: Discharge Planning  Goal: Discharge to home or other facility with appropriate resources  Outcome: Progressing     Problem: Pain  Goal: Verbalizes/displays adequate comfort level or baseline comfort level  12/2/2024 1116 by Mary Acuña RN  Outcome: Progressing  12/1/2024 2315 by Aspen Bhatti RN  Outcome: Progressing     Problem: Safety - Adult  Goal: Free from fall injury  Outcome: Progressing     Problem: Nutrition Deficit:  Goal: Optimize nutritional status  Outcome: Progressing

## 2024-12-02 NOTE — PROGRESS NOTES
[facility] Quality Flow/Interdisciplinary Rounds Progress Note        Quality Flow Rounds held on December 2, 2024    Disciplines Attending:  Bedside Nurse, , , and Nursing Unit Leadership    Maria T Romeo was admitted on 11/21/2024  1:00 PM    Anticipated Discharge Date:       Disposition:    Micky Score:  Micky Scale Score: 19    Readmission Risk              Risk of Unplanned Readmission:  22           Discussed patient goal for the day, patient clinical progression, and barriers to discharge.  The following Goal(s) of the Day/Commitment(s) have been identified:   replace lytes, monitor blood counts, iv abx, control BP, wean oxygen as able.       Ann Sepulveda RN  December 2, 2024

## 2024-12-02 NOTE — PROGRESS NOTES
US CHEST INCLUDING MEDIASTINUM   Final Result   Complicated right pleural effusion.  At least moderate.  Multiple areas of   loculation.         XR CHEST (2 VW)   Final Result   1. Right upper lobe pneumonia   2. Persistent loculated pleural effusion within the right lung base   3. The left lung is clear.         US RETROPERITONEAL COMPLETE   Final Result   1. Diffuse urinary bladder wall thickening up to 4.9 cm.   2. Unremarkable kidneys.         XR RIBS RIGHT (2 VIEWS)   Final Result   1. Small to moderate right pleural effusion similar to the prior examination.   2. No displaced right rib fractures.         CTA PULMONARY W CONTRAST   Final Result   There is no evidence of pulmonary arterial embolization.      Improving dense right middle lobe atelectasis, without complete resolution.      Mild interval enlargement of the small to moderate size right pleural   effusion.      Stable ascending thoracic aortic aneurysm with a maximal AP diameter of 4.7   cm.         XR CHEST PORTABLE    (Results Pending)   XR CHEST PORTABLE    (Results Pending)           Assessment:  Right pleural effusion: Appears to be complicated and parapneumonic.  The fluid is an exudate.  Cultures are pending.  If positive then it is an empyema.  So far, the cultures are negative.  Reasonable response to lytics.    Plan:  Pigtail catheter removed at the bedside without incident      Time at the bedside, reviewing labs and radiographs, reviewing updated notes and consultations, discussing with staff and family was more than 35 minutes.    Please note that voice recognition technology was used in the preparation of this note and make therefore it may contain inadvertent transcription errors.  If the patient is a COVID 19 isolation patient, the above physical exam reflects that of the examining physician for the day.      Lenin Carney MD,  M.KHOI., F.C.C.P.    [facility]    [facility]  Building, [address], [address], [place] [address]  Office visits:  [id number], [place] [phone]

## 2024-12-02 NOTE — PROGRESS NOTES
4 Eyes Skin Assessment     NAME:  Maria T Romeo  YOB: 1965  MEDICAL RECORD NUMBER:  [id number]    The patient is being assessed for  Admission    I agree that at least one RN has performed a thorough Head to Toe Skin Assessment on the patient. ALL assessment sites listed below have been assessed.      Areas assessed by both nurses:    Head, Face, Ears, Shoulders, Back, Chest, Arms, Elbows, Hands, Sacrum. Buttock, Coccyx, Ischium, Legs. Feet and Heels, and Under Medical Devices         Does the Patient have a Wound? No noted wound(s)       Micky Prevention initiated by RN: No  Wound Care Orders initiated by RN: No    Pressure Injury (Stage 3,4, Unstageable, DTI, NWPT, and Complex wounds) if present, place Wound referral order by RN under : No    New Ostomies, if present place, Ostomy referral order under : No     Nurse 1 eSignature: Electronically signed by Aspen Bhatti RN on 12/1/24 at 7:51 PM EST    **SHARE this note so that the co-signing nurse can place an eSignature**    Nurse 2 eSignature: Electronically signed by Lisette Somers RN on 12/1/24 at 7:53 PM EST

## 2024-12-03 ENCOUNTER — APPOINTMENT (OUTPATIENT)
Dept: GENERAL RADIOLOGY | Age: 59
End: 2024-12-03
Payer: COMMERCIAL

## 2024-12-03 ENCOUNTER — APPOINTMENT (OUTPATIENT)
Dept: CT IMAGING | Age: 59
End: 2024-12-03
Payer: COMMERCIAL

## 2024-12-03 LAB
ALBUMIN SERPL-MCNC: 2.2 G/DL (ref 3.5–5.2)
ALP SERPL-CCNC: 99 U/L (ref 35–104)
ALT SERPL-CCNC: 5 U/L (ref 0–32)
ANION GAP SERPL CALCULATED.3IONS-SCNC: 7 MMOL/L (ref 7–16)
AST SERPL-CCNC: 11 U/L (ref 0–31)
BASOPHILS # BLD: 0.02 K/UL (ref 0–0.2)
BASOPHILS NFR BLD: 0 % (ref 0–2)
BILIRUB SERPL-MCNC: 0.3 MG/DL (ref 0–1.2)
BNP SERPL-MCNC: 1535 PG/ML (ref 0–125)
BUN SERPL-MCNC: 6 MG/DL (ref 6–20)
CALCIUM SERPL-MCNC: 7.7 MG/DL (ref 8.6–10.2)
CHLORIDE SERPL-SCNC: 99 MMOL/L (ref 98–107)
CO2 SERPL-SCNC: 25 MMOL/L (ref 22–29)
CREAT SERPL-MCNC: 0.5 MG/DL (ref 0.5–1)
EOSINOPHIL # BLD: 0.22 K/UL (ref 0.05–0.5)
EOSINOPHILS RELATIVE PERCENT: 3 % (ref 0–6)
ERYTHROCYTE [DISTWIDTH] IN BLOOD BY AUTOMATED COUNT: 15.1 % (ref 11.5–15)
GFR, ESTIMATED: >90 ML/MIN/1.73M2
GLUCOSE SERPL-MCNC: 89 MG/DL (ref 74–99)
HCT VFR BLD AUTO: 27.3 % (ref 34–48)
HGB BLD-MCNC: 9.2 G/DL (ref 11.5–15.5)
IMM GRANULOCYTES # BLD AUTO: 0.07 K/UL (ref 0–0.58)
IMM GRANULOCYTES NFR BLD: 1 % (ref 0–5)
LYMPHOCYTES NFR BLD: 1.77 K/UL (ref 1.5–4)
LYMPHOCYTES RELATIVE PERCENT: 22 % (ref 20–42)
MAGNESIUM SERPL-MCNC: 1.4 MG/DL (ref 1.6–2.6)
MAGNESIUM SERPL-MCNC: 1.7 MG/DL (ref 1.6–2.6)
MCH RBC QN AUTO: 31.2 PG (ref 26–35)
MCHC RBC AUTO-ENTMCNC: 33.7 G/DL (ref 32–34.5)
MCV RBC AUTO: 92.5 FL (ref 80–99.9)
MONOCYTES NFR BLD: 0.76 K/UL (ref 0.1–0.95)
MONOCYTES NFR BLD: 9 % (ref 2–12)
NEUTROPHILS NFR BLD: 66 % (ref 43–80)
NEUTS SEG NFR BLD: 5.4 K/UL (ref 1.8–7.3)
PHOSPHATE SERPL-MCNC: 3 MG/DL (ref 2.5–4.5)
PLATELET # BLD AUTO: 373 K/UL (ref 130–450)
PMV BLD AUTO: 8.7 FL (ref 7–12)
POTASSIUM SERPL-SCNC: 2.9 MMOL/L (ref 3.5–5)
POTASSIUM SERPL-SCNC: 3.5 MMOL/L (ref 3.5–5)
PROT SERPL-MCNC: 4.2 G/DL (ref 6.4–8.3)
RBC # BLD AUTO: 2.95 M/UL (ref 3.5–5.5)
SODIUM SERPL-SCNC: 131 MMOL/L (ref 132–146)
WBC OTHER # BLD: 8.2 K/UL (ref 4.5–11.5)

## 2024-12-03 PROCEDURE — 6370000000 HC RX 637 (ALT 250 FOR IP): Performed by: INTERNAL MEDICINE

## 2024-12-03 PROCEDURE — 80053 COMPREHEN METABOLIC PANEL: CPT

## 2024-12-03 PROCEDURE — 2700000000 HC OXYGEN THERAPY PER DAY

## 2024-12-03 PROCEDURE — 6360000002 HC RX W HCPCS: Performed by: INTERNAL MEDICINE

## 2024-12-03 PROCEDURE — 74177 CT ABD & PELVIS W/CONTRAST: CPT

## 2024-12-03 PROCEDURE — 71045 X-RAY EXAM CHEST 1 VIEW: CPT

## 2024-12-03 PROCEDURE — 6360000004 HC RX CONTRAST MEDICATION: Performed by: RADIOLOGY

## 2024-12-03 PROCEDURE — 85025 COMPLETE CBC W/AUTO DIFF WBC: CPT

## 2024-12-03 PROCEDURE — 83880 ASSAY OF NATRIURETIC PEPTIDE: CPT

## 2024-12-03 PROCEDURE — 6360000002 HC RX W HCPCS: Performed by: GENERAL PRACTICE

## 2024-12-03 PROCEDURE — 2580000003 HC RX 258: Performed by: INTERNAL MEDICINE

## 2024-12-03 PROCEDURE — 83735 ASSAY OF MAGNESIUM: CPT

## 2024-12-03 PROCEDURE — 84100 ASSAY OF PHOSPHORUS: CPT

## 2024-12-03 PROCEDURE — 6370000000 HC RX 637 (ALT 250 FOR IP): Performed by: NURSE PRACTITIONER

## 2024-12-03 PROCEDURE — 2060000000 HC ICU INTERMEDIATE R&B

## 2024-12-03 PROCEDURE — 36415 COLL VENOUS BLD VENIPUNCTURE: CPT

## 2024-12-03 PROCEDURE — 84132 ASSAY OF SERUM POTASSIUM: CPT

## 2024-12-03 RX ORDER — MAGNESIUM SULFATE IN WATER 40 MG/ML
2000 INJECTION, SOLUTION INTRAVENOUS ONCE
Status: COMPLETED | OUTPATIENT
Start: 2024-12-03 | End: 2024-12-03

## 2024-12-03 RX ORDER — POTASSIUM CHLORIDE 1.5 G/1.58G
40 POWDER, FOR SOLUTION ORAL ONCE
Status: DISCONTINUED | OUTPATIENT
Start: 2024-12-03 | End: 2024-12-03 | Stop reason: CLARIF

## 2024-12-03 RX ORDER — POTASSIUM CHLORIDE 7.45 MG/ML
10 INJECTION INTRAVENOUS
Status: COMPLETED | OUTPATIENT
Start: 2024-12-03 | End: 2024-12-03

## 2024-12-03 RX ORDER — DILTIAZEM HYDROCHLORIDE 120 MG/1
120 CAPSULE, COATED, EXTENDED RELEASE ORAL DAILY
Status: DISCONTINUED | OUTPATIENT
Start: 2024-12-03 | End: 2024-12-06 | Stop reason: HOSPADM

## 2024-12-03 RX ORDER — FUROSEMIDE 10 MG/ML
20 INJECTION INTRAMUSCULAR; INTRAVENOUS ONCE
Status: COMPLETED | OUTPATIENT
Start: 2024-12-03 | End: 2024-12-04

## 2024-12-03 RX ORDER — IOPAMIDOL 755 MG/ML
75 INJECTION, SOLUTION INTRAVASCULAR
Status: COMPLETED | OUTPATIENT
Start: 2024-12-03 | End: 2024-12-03

## 2024-12-03 RX ORDER — POTASSIUM CHLORIDE 7.45 MG/ML
10 INJECTION INTRAVENOUS
Status: DISCONTINUED | OUTPATIENT
Start: 2024-12-03 | End: 2024-12-03

## 2024-12-03 RX ADMIN — WATER 2000 MG: 1 INJECTION INTRAMUSCULAR; INTRAVENOUS; SUBCUTANEOUS at 11:49

## 2024-12-03 RX ADMIN — POTASSIUM BICARBONATE 40 MEQ: 782 TABLET, EFFERVESCENT ORAL at 06:27

## 2024-12-03 RX ADMIN — IOPAMIDOL 75 ML: 755 INJECTION, SOLUTION INTRAVENOUS at 09:56

## 2024-12-03 RX ADMIN — MENTHOL AND METHYL SALICYLATE 1 APPLICATION: 7.6; 29 OINTMENT TOPICAL at 20:33

## 2024-12-03 RX ADMIN — POTASSIUM CHLORIDE 10 MEQ: 7.46 INJECTION, SOLUTION INTRAVENOUS at 11:44

## 2024-12-03 RX ADMIN — POTASSIUM CHLORIDE 10 MEQ: 7.46 INJECTION, SOLUTION INTRAVENOUS at 13:10

## 2024-12-03 RX ADMIN — Medication 1 G: at 17:28

## 2024-12-03 RX ADMIN — APIXABAN 5 MG: 5 TABLET, FILM COATED ORAL at 20:33

## 2024-12-03 RX ADMIN — MENTHOL AND METHYL SALICYLATE 1 APPLICATION: 7.6; 29 OINTMENT TOPICAL at 09:17

## 2024-12-03 RX ADMIN — Medication 1 G: at 09:09

## 2024-12-03 RX ADMIN — WATER 2000 MG: 1 INJECTION INTRAMUSCULAR; INTRAVENOUS; SUBCUTANEOUS at 23:56

## 2024-12-03 RX ADMIN — PANTOPRAZOLE SODIUM 40 MG: 40 TABLET, DELAYED RELEASE ORAL at 05:45

## 2024-12-03 RX ADMIN — MAGNESIUM SULFATE HEPTAHYDRATE 2000 MG: 40 INJECTION, SOLUTION INTRAVENOUS at 06:30

## 2024-12-03 RX ADMIN — CLONIDINE HYDROCHLORIDE 0.1 MG: 0.1 TABLET ORAL at 15:26

## 2024-12-03 RX ADMIN — LOSARTAN POTASSIUM 50 MG: 50 TABLET, FILM COATED ORAL at 17:28

## 2024-12-03 RX ADMIN — DILTIAZEM HYDROCHLORIDE 120 MG: 120 CAPSULE, COATED, EXTENDED RELEASE ORAL at 14:07

## 2024-12-03 RX ADMIN — WATER 2000 MG: 1 INJECTION INTRAMUSCULAR; INTRAVENOUS; SUBCUTANEOUS at 00:10

## 2024-12-03 RX ADMIN — CLONIDINE HYDROCHLORIDE 0.1 MG: 0.1 TABLET ORAL at 20:33

## 2024-12-03 RX ADMIN — POTASSIUM CHLORIDE 10 MEQ: 7.46 INJECTION, SOLUTION INTRAVENOUS at 10:39

## 2024-12-03 RX ADMIN — POTASSIUM CHLORIDE 10 MEQ: 7.46 INJECTION, SOLUTION INTRAVENOUS at 15:05

## 2024-12-03 RX ADMIN — CLONIDINE HYDROCHLORIDE 0.1 MG: 0.1 TABLET ORAL at 09:10

## 2024-12-03 ASSESSMENT — PAIN SCALES - WONG BAKER
WONGBAKER_NUMERICALRESPONSE: NO HURT
WONGBAKER_NUMERICALRESPONSE: NO HURT

## 2024-12-03 ASSESSMENT — PAIN SCALES - GENERAL
PAINLEVEL_OUTOF10: 0
PAINLEVEL_OUTOF10: 0
PAINLEVEL_OUTOF10: 3
PAINLEVEL_OUTOF10: 0

## 2024-12-03 ASSESSMENT — PAIN DESCRIPTION - DESCRIPTORS: DESCRIPTORS: SORE

## 2024-12-03 ASSESSMENT — PAIN DESCRIPTION - LOCATION: LOCATION: BACK

## 2024-12-03 NOTE — PROGRESS NOTES
Pulmonary Progress Note    Admit Date: 2024  Hospital day                               PCP: Jensen Pelayo DO    Chief Complaint (s):  Patient Active Problem List   Diagnosis    Hyponatremia    Acute hyponatremia    Traumatic closed displaced fracture of left shoulder with anterior dislocation    Acute respiratory failure with hypoxia    LFTs abnormal    Primary hypertension    Multiple closed fractures of ribs of left side    Acute hypoxic respiratory failure    Moderate protein-calorie malnutrition (HCC)    Adrenal crisis (HCC)       Subjective:  Patient seen on room air. She had CT done this morning. She denies dyspnea on exertion. No longer with chest discomfort at chest tube insertion sight.       Vitals:  VITALS:  BP (!) 160/108   Pulse 92   Temp 97.8 °F (36.6 °C) (Oral)   Resp 18   Ht 1.575 m (5' 2\")   Wt 55.8 kg (123 lb)   LMP 2012   SpO2 91%   BMI 22.50 kg/m²     24HR INTAKE/OUTPUT:      Intake/Output Summary (Last 24 hours) at 12/3/2024 1119  Last data filed at 12/3/2024 0607  Gross per 24 hour   Intake 600 ml   Output --   Net 600 ml       24HR PULSE OXIMETRY RANGE:    SpO2  Av.3 %  Min: 90 %  Max: 97 %    Medications:  IV:   sodium chloride      dextrose         Scheduled Meds:   potassium chloride  10 mEq IntraVENous Q1H    ceFEPIme (MAXIPIME) 2,000 mg in sterile water 20 mL IV syringe  2,000 mg IntraVENous Q12H    sodium chloride  500 mL IntraVENous Once    pantoprazole  40 mg Oral QAM AC    losartan  50 mg Oral Daily    [Held by provider] carvedilol  25 mg Oral BID WC    cloNIDine  0.1 mg Oral TID    lidocaine  1 patch TransDERmal Daily    Icy Hot Balm Extra Strength  1 Application Apply externally BID    sodium chloride  1 g Oral BID WC    [Held by provider] urea  15 g Oral Daily    [Held by provider] apixaban  5 mg Oral BID    ipratropium 0.5 mg-albuterol 2.5 mg  1 Dose Inhalation TID RT       Diet:   ADULT DIET; Regular  ADULT ORAL NUTRITION SUPPLEMENT;

## 2024-12-03 NOTE — PROGRESS NOTES
shoulder     left    Hypertension     Hyponatremia     Lung abnormality     MVP (mitral valve prolapse)     no issues    Skin cancer        DIET:    ADULT DIET; Regular  ADULT ORAL NUTRITION SUPPLEMENT; Breakfast, Lunch, Dinner; Standard High Calorie/High Protein Oral Supplement  ADULT ORAL NUTRITION SUPPLEMENT; Dinner; Frozen Oral Supplement     PHYSICAL EXAM:     Patient Vitals for the past 24 hrs:   BP Temp Temp src Pulse Resp SpO2 Weight   12/03/24 1030 (!) 160/108 97.8 °F (36.6 °C) Oral 92 18 91 % --   12/03/24 0730 (!) 163/107 98.3 °F (36.8 °C) Oral 87 18 97 % --   12/03/24 0545 -- -- -- -- -- -- 55.8 kg (123 lb)   12/03/24 0315 (!) 143/86 97.4 °F (36.3 °C) Oral 77 16 90 % --   12/02/24 2330 113/68 97.5 °F (36.4 °C) Oral 88 16 91 % --   12/02/24 2102 (!) 163/86 -- -- -- -- -- --   12/02/24 1955 -- -- -- -- 16 -- --   12/02/24 1925 -- -- -- -- 18 -- --   12/02/24 1905 (!) 163/86 97.3 °F (36.3 °C) Oral 77 16 -- --   12/02/24 1812 (!) 172/92 -- -- -- -- -- --   12/02/24 1614 135/79 98.3 °F (36.8 °C) Oral 81 16 -- --   12/02/24 1455 (!) 159/85 -- -- -- -- -- --   @      Intake/Output Summary (Last 24 hours) at 12/3/2024 1240  Last data filed at 12/3/2024 0607  Gross per 24 hour   Intake 600 ml   Output --   Net 600 ml         Wt Readings from Last 3 Encounters:   12/03/24 55.8 kg (123 lb)   11/11/24 46.3 kg (102 lb)   11/11/24 46.3 kg (102 lb)       Constitutional:  Pt is in no acute distress  Head: normocephalic, atraumatic  Neck: no JVD  Cardiovascular: regular rate and rhythm, no murmurs, gallops, or rubs  Respiratory:  No rales, rhochi, or wheezes  Gastrointestinal:  Soft, nontender, nondistended, bowel sounds x 4  Ext: no edema  Skin: dry, no rash  Neuro: aaox3    MEDS (scheduled):    potassium chloride  10 mEq IntraVENous Q1H    ceFEPIme (MAXIPIME) 2,000 mg in sterile water 20 mL IV syringe  2,000 mg IntraVENous Q12H    sodium chloride  500 mL IntraVENous Once    pantoprazole  40 mg Oral QAM AC    losartan

## 2024-12-03 NOTE — PROGRESS NOTES
[facility] Quality Flow/Interdisciplinary Rounds Progress Note        Quality Flow Rounds held on December 3, 2024    Disciplines Attending:  Bedside Nurse, , , and Nursing Unit Leadership    Maria T Romeo was admitted on 11/21/2024  1:00 PM    Anticipated Discharge Date:       Disposition:    Micky Score:  Micky Scale Score: 19    Readmission Risk              Risk of Unplanned Readmission:  23           Discussed patient goal for the day, patient clinical progression, and barriers to discharge.  The following Goal(s) of the Day/Commitment(s) have been identified:   iv abx, eliquis, monitor BP, replace felipe Sepulevda RN  December 3, 2024

## 2024-12-03 NOTE — PROGRESS NOTES
Patient seen bp up  a little. And potassium low. Will replace . Follow up cxr , still some drainage from chest tube.

## 2024-12-03 NOTE — PROGRESS NOTES
Comprehensive Nutrition Assessment    Type and Reason for Visit:  Reassess    Nutrition Recommendations/Plan:   Continue current diet and HC/HP ONS TID  Start frozen ONS daily  Monitor and replace lytes PRN  Will continue to monitor while inpatient     Malnutrition Assessment:  Malnutrition Status:  Moderate malnutrition (11/27/24 1524)    Context:  Chronic Illness     Findings of the 6 clinical characteristics of malnutrition:  Energy Intake:  75% or less estimated energy requirements for 1 month or longer  Weight Loss:  Unable to assess (UTO CBW)     Body Fat Loss:  Mild body fat loss Triceps, Buccal region   Muscle Mass Loss:  Mild muscle mass loss Temples (temporalis), Clavicles (pectoralis & deltoids)  Fluid Accumulation:  No fluid accumulation     Strength:  Not Performed    Nutrition Assessment:    Pt became hypotensive, transferred to ICU. Has been downgraded since. Was consuming >75% of meals, now consuming <50%. Continue current diet and ONS TID. Will add frozen ONS daily to promote protein/energy intake. Will continue to monitor.    Nutrition Related Findings:    A&O x4, abd soft/rounded/nontender, +BS, BLE +1 edema, +3 L, Na 131, K+ 2.9, Mg 1.4, decreased appetite Wound Type: None       Current Nutrition Intake & Therapies:    Average Meal Intake: 0%, 1-25%, 26-50%  Average Supplements Intake: Unable to assess  ADULT DIET; Regular  ADULT ORAL NUTRITION SUPPLEMENT; Breakfast, Lunch, Dinner; Standard High Calorie/High Protein Oral Supplement  ADULT ORAL NUTRITION SUPPLEMENT; Dinner; Frozen Oral Supplement    Anthropometric Measures:  Height: 157.5 cm (5' 2\")  Ideal Body Weight (IBW): 110 lbs (50 kg)    Admission Body Weight: 53.6 kg (118 lb 2.7 oz) (11/30 bed scale)  Current Body Weight: 55.8 kg (123 lb) (12/3), 92.7 % IBW. Weight Source: Bed scale  Current BMI (kg/m2): 22.5  Usual Body Weight: 46.4 kg (102 lb 6 oz) (8/2/24)     % Weight Change (Calculated): -0.4  Weight Adjustment For: No Adjustment

## 2024-12-03 NOTE — CARE COORDINATION
Social Work / Discharge Planning : SW followed up with patient briefly and explained role as discharge planner.. Several staff interacting with patient . Patient plan at discharge  is HOME. Patient verified Chest Tube has been pulled. [facility] has been following if  any nursing needs at discharge. AWait Pulmonary plan. Nephrology also consulted. SW to follow. Electronically signed by ANGI Muse on 12/3/24 at 11:05 AM EST

## 2024-12-04 LAB
ABO/RH: NORMAL
ALBUMIN SERPL-MCNC: 2.4 G/DL (ref 3.5–5.2)
ALP SERPL-CCNC: 104 U/L (ref 35–104)
ALT SERPL-CCNC: 5 U/L (ref 0–32)
ANION GAP SERPL CALCULATED.3IONS-SCNC: 6 MMOL/L (ref 7–16)
ANTIBODY SCREEN: NEGATIVE
ARM BAND NUMBER: NORMAL
AST SERPL-CCNC: 12 U/L (ref 0–31)
BASOPHILS # BLD: 0.03 K/UL (ref 0–0.2)
BASOPHILS NFR BLD: 0 % (ref 0–2)
BILIRUB SERPL-MCNC: 0.4 MG/DL (ref 0–1.2)
BLOOD BANK BLOOD PRODUCT EXPIRATION DATE: NORMAL
BLOOD BANK DISPENSE STATUS: NORMAL
BLOOD BANK ISBT PRODUCT BLOOD TYPE: 5100
BLOOD BANK ISBT PRODUCT BLOOD TYPE: 7300
BLOOD BANK ISBT PRODUCT BLOOD TYPE: 7300
BLOOD BANK PRODUCT CODE: NORMAL
BLOOD BANK SAMPLE EXPIRATION: NORMAL
BLOOD BANK UNIT TYPE AND RH: NORMAL
BPU ID: NORMAL
BUN SERPL-MCNC: 4 MG/DL (ref 6–20)
CALCIUM SERPL-MCNC: 7.8 MG/DL (ref 8.6–10.2)
CHLORIDE SERPL-SCNC: 98 MMOL/L (ref 98–107)
CO2 SERPL-SCNC: 30 MMOL/L (ref 22–29)
COMPONENT: NORMAL
CREAT SERPL-MCNC: 0.6 MG/DL (ref 0.5–1)
CROSSMATCH RESULT: NORMAL
EOSINOPHIL # BLD: 0.28 K/UL (ref 0.05–0.5)
EOSINOPHILS RELATIVE PERCENT: 4 % (ref 0–6)
ERYTHROCYTE [DISTWIDTH] IN BLOOD BY AUTOMATED COUNT: 15 % (ref 11.5–15)
GFR, ESTIMATED: >90 ML/MIN/1.73M2
GLUCOSE SERPL-MCNC: 84 MG/DL (ref 74–99)
HCT VFR BLD AUTO: 27.9 % (ref 34–48)
HGB BLD-MCNC: 9.4 G/DL (ref 11.5–15.5)
IMM GRANULOCYTES # BLD AUTO: 0.06 K/UL (ref 0–0.58)
IMM GRANULOCYTES NFR BLD: 1 % (ref 0–5)
LYMPHOCYTES NFR BLD: 1.52 K/UL (ref 1.5–4)
LYMPHOCYTES RELATIVE PERCENT: 20 % (ref 20–42)
MAGNESIUM SERPL-MCNC: 1.6 MG/DL (ref 1.6–2.6)
MCH RBC QN AUTO: 31.2 PG (ref 26–35)
MCHC RBC AUTO-ENTMCNC: 33.7 G/DL (ref 32–34.5)
MCV RBC AUTO: 92.7 FL (ref 80–99.9)
MONOCYTES NFR BLD: 0.69 K/UL (ref 0.1–0.95)
MONOCYTES NFR BLD: 9 % (ref 2–12)
NEUTROPHILS NFR BLD: 67 % (ref 43–80)
NEUTS SEG NFR BLD: 5.18 K/UL (ref 1.8–7.3)
PHOSPHATE SERPL-MCNC: 2.6 MG/DL (ref 2.5–4.5)
PLATELET # BLD AUTO: 406 K/UL (ref 130–450)
PMV BLD AUTO: 8.9 FL (ref 7–12)
POTASSIUM SERPL-SCNC: 3.2 MMOL/L (ref 3.5–5)
PROT SERPL-MCNC: 4.5 G/DL (ref 6.4–8.3)
RBC # BLD AUTO: 3.01 M/UL (ref 3.5–5.5)
SODIUM SERPL-SCNC: 134 MMOL/L (ref 132–146)
TRANSFUSION STATUS: NORMAL
UNIT DIVISION: 0
UNIT ISSUE DATE/TIME: NORMAL
WBC OTHER # BLD: 7.8 K/UL (ref 4.5–11.5)

## 2024-12-04 PROCEDURE — 2580000003 HC RX 258: Performed by: INTERNAL MEDICINE

## 2024-12-04 PROCEDURE — 6370000000 HC RX 637 (ALT 250 FOR IP): Performed by: NURSE PRACTITIONER

## 2024-12-04 PROCEDURE — 83735 ASSAY OF MAGNESIUM: CPT

## 2024-12-04 PROCEDURE — 6360000002 HC RX W HCPCS

## 2024-12-04 PROCEDURE — 6370000000 HC RX 637 (ALT 250 FOR IP): Performed by: INTERNAL MEDICINE

## 2024-12-04 PROCEDURE — 2700000000 HC OXYGEN THERAPY PER DAY

## 2024-12-04 PROCEDURE — 80053 COMPREHEN METABOLIC PANEL: CPT

## 2024-12-04 PROCEDURE — 6370000000 HC RX 637 (ALT 250 FOR IP): Performed by: GENERAL PRACTICE

## 2024-12-04 PROCEDURE — 6360000002 HC RX W HCPCS: Performed by: INTERNAL MEDICINE

## 2024-12-04 PROCEDURE — 36415 COLL VENOUS BLD VENIPUNCTURE: CPT

## 2024-12-04 PROCEDURE — 85025 COMPLETE CBC W/AUTO DIFF WBC: CPT

## 2024-12-04 PROCEDURE — 2060000000 HC ICU INTERMEDIATE R&B

## 2024-12-04 PROCEDURE — 84100 ASSAY OF PHOSPHORUS: CPT

## 2024-12-04 RX ORDER — POTASSIUM CHLORIDE 1500 MG/1
20 TABLET, EXTENDED RELEASE ORAL 2 TIMES DAILY WITH MEALS
Status: DISCONTINUED | OUTPATIENT
Start: 2024-12-04 | End: 2024-12-06 | Stop reason: HOSPADM

## 2024-12-04 RX ORDER — VITAMIN B COMPLEX
1000 TABLET ORAL DAILY
Status: DISCONTINUED | OUTPATIENT
Start: 2024-12-04 | End: 2024-12-06 | Stop reason: HOSPADM

## 2024-12-04 RX ORDER — FUROSEMIDE 10 MG/ML
20 INJECTION INTRAMUSCULAR; INTRAVENOUS ONCE
Status: COMPLETED | OUTPATIENT
Start: 2024-12-04 | End: 2024-12-04

## 2024-12-04 RX ADMIN — Medication 1 G: at 17:28

## 2024-12-04 RX ADMIN — CLONIDINE HYDROCHLORIDE 0.1 MG: 0.1 TABLET ORAL at 20:43

## 2024-12-04 RX ADMIN — APIXABAN 5 MG: 5 TABLET, FILM COATED ORAL at 07:46

## 2024-12-04 RX ADMIN — Medication 1 G: at 07:46

## 2024-12-04 RX ADMIN — POTASSIUM CHLORIDE 20 MEQ: 1500 TABLET, EXTENDED RELEASE ORAL at 17:28

## 2024-12-04 RX ADMIN — Medication 1000 UNITS: at 13:07

## 2024-12-04 RX ADMIN — WATER 2000 MG: 1 INJECTION INTRAMUSCULAR; INTRAVENOUS; SUBCUTANEOUS at 11:18

## 2024-12-04 RX ADMIN — CLONIDINE HYDROCHLORIDE 0.1 MG: 0.1 TABLET ORAL at 13:07

## 2024-12-04 RX ADMIN — LOSARTAN POTASSIUM 50 MG: 50 TABLET, FILM COATED ORAL at 17:28

## 2024-12-04 RX ADMIN — MENTHOL AND METHYL SALICYLATE 1 APPLICATION: 7.6; 29 OINTMENT TOPICAL at 07:51

## 2024-12-04 RX ADMIN — FUROSEMIDE 20 MG: 10 INJECTION, SOLUTION INTRAMUSCULAR; INTRAVENOUS at 05:09

## 2024-12-04 RX ADMIN — FUROSEMIDE 20 MG: 10 INJECTION, SOLUTION INTRAMUSCULAR; INTRAVENOUS at 11:18

## 2024-12-04 RX ADMIN — CLONIDINE HYDROCHLORIDE 0.1 MG: 0.1 TABLET ORAL at 07:51

## 2024-12-04 RX ADMIN — MENTHOL AND METHYL SALICYLATE 1 APPLICATION: 7.6; 29 OINTMENT TOPICAL at 20:43

## 2024-12-04 RX ADMIN — APIXABAN 5 MG: 5 TABLET, FILM COATED ORAL at 20:43

## 2024-12-04 RX ADMIN — POTASSIUM CHLORIDE 20 MEQ: 1500 TABLET, EXTENDED RELEASE ORAL at 09:38

## 2024-12-04 RX ADMIN — PANTOPRAZOLE SODIUM 40 MG: 40 TABLET, DELAYED RELEASE ORAL at 05:09

## 2024-12-04 RX ADMIN — DILTIAZEM HYDROCHLORIDE 120 MG: 120 CAPSULE, COATED, EXTENDED RELEASE ORAL at 07:46

## 2024-12-04 ASSESSMENT — PAIN SCALES - GENERAL: PAINLEVEL_OUTOF10: 0

## 2024-12-04 NOTE — PROGRESS NOTES
Patient seen feels better. Vss afebrile. Bp fair. Potassium better, will supplement. Tolerating meds. Passible dc after consultants see.

## 2024-12-04 NOTE — PROGRESS NOTES
[facility]  Nephrology Attending Progress Note  Curly Lopez MD        SUBJECTIVE:     From consult note 11/22/2024  The patient is a 55-year-old  lady who has underlying history of chronic hyponatremia seen by Dr. Curly Lopez, COPD and bilateral PEs.  The last time she saw Dr. Lopez was in year 2018 and subsequently, the patient chose not to follow up.  The patient was initially seen in consultation for hyponatremia in 01/2018.  At that time, the hyponatremia was treated with oral salt tablets.    They presented on 11/21/2024 complaining of back pain.  She has had back pain since her prior admission that slightly worsened.  She chronically has shortness of breath at baseline.  On arrival there blood pressure 108/78 HR 87 RR 18 on room air and afebrile.  Initial labs were pertinent for sodium of 126, potassium of 5.7 bicarb of 21.  Imaging showed right middle lobe atelectasis, mild enlargement of moderate right pleural effusion and ascending thoracic aortic aneurysm of 4.7 cm.  Nephrology was consulted for hyponatremia.       On 11/30/24  she had large amount of bloody drainage around the chest tube site followed by brown emesis followed by hypotension and transfer to the ICU.     12/1/2024- complains of worsening swelling. No issues overnight     12/2: eating lunch, ct in place. Didn't eat well yesterday    12/3: remains weak, seen and examined    12/4: pt seen and examined, very weak, no appetite, no cp or sob    PROBLEM LIST:    Patient Active Problem List   Diagnosis    Hyponatremia    Acute hyponatremia    Traumatic closed displaced fracture of left shoulder with anterior dislocation    Acute respiratory failure with hypoxia    LFTs abnormal    Primary hypertension    Multiple closed fractures of ribs of left side    Acute hypoxic respiratory failure    Moderate protein-calorie malnutrition (HCC)    Adrenal crisis (HCC)        PAST MEDICAL HISTORY:    Past Medical History:   Diagnosis Date

## 2024-12-04 NOTE — PROGRESS NOTES
[facility] Quality Flow/Interdisciplinary Rounds Progress Note        Quality Flow Rounds held on December 4, 2024    Disciplines Attending:  Bedside Nurse, , , and Nursing Unit Leadership    Maria T Romeo was admitted on 11/21/2024  1:00 PM    Anticipated Discharge Date:       Disposition:    Micky Score:  Micky Scale Score: 20    Readmission Risk              Risk of Unplanned Readmission:  23           Discussed patient goal for the day, patient clinical progression, and barriers to discharge.  The following Goal(s) of the Day/Commitment(s) have been identified:   grupo wang iv abx,       Ann Sepulveda RN  December 4, 2024

## 2024-12-04 NOTE — PLAN OF CARE
Problem: Discharge Planning  Goal: Discharge to home or other facility with appropriate resources  12/4/2024 1133 by Heidy Nicholson, RN  Outcome: Progressing  12/4/2024 0248 by Aspen Mitchell RN  Outcome: Progressing     Problem: Pain  Goal: Verbalizes/displays adequate comfort level or baseline comfort level  12/4/2024 1133 by Heidy Nicholson, RN  Outcome: Progressing  12/4/2024 0248 by Aspen Mitchell, RN  Outcome: Progressing     Problem: Safety - Adult  Goal: Free from fall injury  12/4/2024 1133 by Heidy Nicholson, RN  Outcome: Progressing  12/4/2024 0248 by Aspen Mitchell, RN  Outcome: Progressing     Problem: Nutrition Deficit:  Goal: Optimize nutritional status  12/4/2024 1133 by Heidy Nicholson, RN  Outcome: Progressing  12/4/2024 0248 by Aspen Mitchell, RN  Outcome: Progressing

## 2024-12-04 NOTE — PROGRESS NOTES
associated   atelectasis. Suspect loculated component on the right.   2. Bronchial wall thickening and interstitial coarsening. Some of this is   probably chronic or related to subsegmental atelectasis, though there could   be a superimposed component of edema, bronchitis or atypical infection.         XR CHEST PORTABLE   Final Result   1. Interval placement of pigtail catheter terminating at the right lung base   medial with decreased right loculated effusion demonstrating moderate   persistence. No pneumothorax evident.   2. Partially loculated increasing small to moderate left pleural effusion.         NM BONE SCAN WHOLE BODY   Final Result   Multifocal degenerative change, without a generalized scintigraphic pattern   of osseous metastatic disease.         CT GUIDED CHEST TUBE   Final Result   Successful CT guided placement of a 12 [place] right chest tube.         US CHEST INCLUDING MEDIASTINUM   Final Result   Complicated right pleural effusion.  At least moderate.  Multiple areas of   loculation.         XR CHEST (2 VW)   Final Result   1. Right upper lobe pneumonia   2. Persistent loculated pleural effusion within the right lung base   3. The left lung is clear.         US RETROPERITONEAL COMPLETE   Final Result   1. Diffuse urinary bladder wall thickening up to 4.9 cm.   2. Unremarkable kidneys.         XR RIBS RIGHT (2 VIEWS)   Final Result   1. Small to moderate right pleural effusion similar to the prior examination.   2. No displaced right rib fractures.         CTA PULMONARY W CONTRAST   Final Result   There is no evidence of pulmonary arterial embolization.      Improving dense right middle lobe atelectasis, without complete resolution.      Mild interval enlargement of the small to moderate size right pleural   effusion.      Stable ascending thoracic aortic aneurysm with a maximal AP diameter of 4.7   cm.         XR CHEST PORTABLE    (Results Pending)               Assessment:  Right pleural

## 2024-12-05 ENCOUNTER — APPOINTMENT (OUTPATIENT)
Dept: GENERAL RADIOLOGY | Age: 59
End: 2024-12-05
Payer: COMMERCIAL

## 2024-12-05 LAB
ALBUMIN SERPL-MCNC: 2.2 G/DL (ref 3.5–5.2)
ALP SERPL-CCNC: 100 U/L (ref 35–104)
ALT SERPL-CCNC: 5 U/L (ref 0–32)
ANION GAP SERPL CALCULATED.3IONS-SCNC: 7 MMOL/L (ref 7–16)
AST SERPL-CCNC: 11 U/L (ref 0–31)
BASOPHILS # BLD: 0.03 K/UL (ref 0–0.2)
BASOPHILS NFR BLD: 0 % (ref 0–2)
BILIRUB SERPL-MCNC: 0.3 MG/DL (ref 0–1.2)
BNP SERPL-MCNC: 1333 PG/ML (ref 0–125)
BUN SERPL-MCNC: 4 MG/DL (ref 6–20)
CALCIUM SERPL-MCNC: 7.8 MG/DL (ref 8.6–10.2)
CHLORIDE SERPL-SCNC: 94 MMOL/L (ref 98–107)
CO2 SERPL-SCNC: 31 MMOL/L (ref 22–29)
CREAT SERPL-MCNC: 0.6 MG/DL (ref 0.5–1)
EOSINOPHIL # BLD: 0.28 K/UL (ref 0.05–0.5)
EOSINOPHILS RELATIVE PERCENT: 4 % (ref 0–6)
ERYTHROCYTE [DISTWIDTH] IN BLOOD BY AUTOMATED COUNT: 14.7 % (ref 11.5–15)
GFR, ESTIMATED: >90 ML/MIN/1.73M2
GLUCOSE SERPL-MCNC: 85 MG/DL (ref 74–99)
HCT VFR BLD AUTO: 25.5 % (ref 34–48)
HGB BLD-MCNC: 8.5 G/DL (ref 11.5–15.5)
IMM GRANULOCYTES # BLD AUTO: 0.04 K/UL (ref 0–0.58)
IMM GRANULOCYTES NFR BLD: 1 % (ref 0–5)
LYMPHOCYTES NFR BLD: 1.63 K/UL (ref 1.5–4)
LYMPHOCYTES RELATIVE PERCENT: 22 % (ref 20–42)
MAGNESIUM SERPL-MCNC: 1.4 MG/DL (ref 1.6–2.6)
MCH RBC QN AUTO: 31.3 PG (ref 26–35)
MCHC RBC AUTO-ENTMCNC: 33.3 G/DL (ref 32–34.5)
MCV RBC AUTO: 93.8 FL (ref 80–99.9)
MICROORGANISM SPEC CULT: NORMAL
MICROORGANISM SPEC CULT: NORMAL
MONOCYTES NFR BLD: 0.65 K/UL (ref 0.1–0.95)
MONOCYTES NFR BLD: 9 % (ref 2–12)
NEUTROPHILS NFR BLD: 64 % (ref 43–80)
NEUTS SEG NFR BLD: 4.69 K/UL (ref 1.8–7.3)
PHOSPHATE SERPL-MCNC: 2.6 MG/DL (ref 2.5–4.5)
PLATELET # BLD AUTO: 397 K/UL (ref 130–450)
PMV BLD AUTO: 8.8 FL (ref 7–12)
POTASSIUM SERPL-SCNC: 3.1 MMOL/L (ref 3.5–5)
PROT SERPL-MCNC: 4.3 G/DL (ref 6.4–8.3)
RBC # BLD AUTO: 2.72 M/UL (ref 3.5–5.5)
SERVICE CMNT-IMP: NORMAL
SERVICE CMNT-IMP: NORMAL
SODIUM SERPL-SCNC: 132 MMOL/L (ref 132–146)
SPECIMEN DESCRIPTION: NORMAL
SPECIMEN DESCRIPTION: NORMAL
WBC OTHER # BLD: 7.3 K/UL (ref 4.5–11.5)

## 2024-12-05 PROCEDURE — 6360000002 HC RX W HCPCS: Performed by: INTERNAL MEDICINE

## 2024-12-05 PROCEDURE — 6370000000 HC RX 637 (ALT 250 FOR IP): Performed by: INTERNAL MEDICINE

## 2024-12-05 PROCEDURE — 2060000000 HC ICU INTERMEDIATE R&B

## 2024-12-05 PROCEDURE — 84100 ASSAY OF PHOSPHORUS: CPT

## 2024-12-05 PROCEDURE — 2580000003 HC RX 258: Performed by: INTERNAL MEDICINE

## 2024-12-05 PROCEDURE — 80053 COMPREHEN METABOLIC PANEL: CPT

## 2024-12-05 PROCEDURE — 71046 X-RAY EXAM CHEST 2 VIEWS: CPT

## 2024-12-05 PROCEDURE — 6360000002 HC RX W HCPCS: Performed by: GENERAL PRACTICE

## 2024-12-05 PROCEDURE — 85025 COMPLETE CBC W/AUTO DIFF WBC: CPT

## 2024-12-05 PROCEDURE — 83880 ASSAY OF NATRIURETIC PEPTIDE: CPT

## 2024-12-05 PROCEDURE — 36415 COLL VENOUS BLD VENIPUNCTURE: CPT

## 2024-12-05 PROCEDURE — 83735 ASSAY OF MAGNESIUM: CPT

## 2024-12-05 PROCEDURE — 6370000000 HC RX 637 (ALT 250 FOR IP): Performed by: GENERAL PRACTICE

## 2024-12-05 PROCEDURE — 6370000000 HC RX 637 (ALT 250 FOR IP): Performed by: NURSE PRACTITIONER

## 2024-12-05 PROCEDURE — 2700000000 HC OXYGEN THERAPY PER DAY

## 2024-12-05 RX ORDER — POTASSIUM CHLORIDE 1500 MG/1
20 TABLET, EXTENDED RELEASE ORAL ONCE
Status: COMPLETED | OUTPATIENT
Start: 2024-12-05 | End: 2024-12-05

## 2024-12-05 RX ORDER — MAGNESIUM SULFATE IN WATER 40 MG/ML
2000 INJECTION, SOLUTION INTRAVENOUS ONCE
Status: COMPLETED | OUTPATIENT
Start: 2024-12-05 | End: 2024-12-05

## 2024-12-05 RX ADMIN — POTASSIUM CHLORIDE 20 MEQ: 1500 TABLET, EXTENDED RELEASE ORAL at 09:08

## 2024-12-05 RX ADMIN — Medication 1000 UNITS: at 09:06

## 2024-12-05 RX ADMIN — WATER 2000 MG: 1 INJECTION INTRAMUSCULAR; INTRAVENOUS; SUBCUTANEOUS at 00:02

## 2024-12-05 RX ADMIN — MENTHOL AND METHYL SALICYLATE 1 APPLICATION: 7.6; 29 OINTMENT TOPICAL at 09:07

## 2024-12-05 RX ADMIN — DILTIAZEM HYDROCHLORIDE 120 MG: 120 CAPSULE, COATED, EXTENDED RELEASE ORAL at 09:07

## 2024-12-05 RX ADMIN — CLONIDINE HYDROCHLORIDE 0.1 MG: 0.1 TABLET ORAL at 12:17

## 2024-12-05 RX ADMIN — LOSARTAN POTASSIUM 50 MG: 50 TABLET, FILM COATED ORAL at 16:41

## 2024-12-05 RX ADMIN — APIXABAN 5 MG: 5 TABLET, FILM COATED ORAL at 09:07

## 2024-12-05 RX ADMIN — MENTHOL AND METHYL SALICYLATE 1 APPLICATION: 7.6; 29 OINTMENT TOPICAL at 20:19

## 2024-12-05 RX ADMIN — Medication 1 G: at 16:42

## 2024-12-05 RX ADMIN — POTASSIUM CHLORIDE 20 MEQ: 1500 TABLET, EXTENDED RELEASE ORAL at 09:07

## 2024-12-05 RX ADMIN — WATER 2000 MG: 1 INJECTION INTRAMUSCULAR; INTRAVENOUS; SUBCUTANEOUS at 12:17

## 2024-12-05 RX ADMIN — Medication 1 G: at 09:07

## 2024-12-05 RX ADMIN — POTASSIUM CHLORIDE 20 MEQ: 1500 TABLET, EXTENDED RELEASE ORAL at 16:42

## 2024-12-05 RX ADMIN — PANTOPRAZOLE SODIUM 40 MG: 40 TABLET, DELAYED RELEASE ORAL at 06:44

## 2024-12-05 RX ADMIN — APIXABAN 5 MG: 5 TABLET, FILM COATED ORAL at 20:19

## 2024-12-05 RX ADMIN — MAGNESIUM SULFATE HEPTAHYDRATE 2000 MG: 40 INJECTION, SOLUTION INTRAVENOUS at 06:45

## 2024-12-05 RX ADMIN — CLONIDINE HYDROCHLORIDE 0.1 MG: 0.1 TABLET ORAL at 20:19

## 2024-12-05 NOTE — PLAN OF CARE
Problem: Discharge Planning  Goal: Discharge to home or other facility with appropriate resources  12/4/2024 2119 by Love Corrigan RN  Outcome: Progressing  12/4/2024 1133 by Heidy Nicholson, RN  Outcome: Progressing     Problem: Pain  Goal: Verbalizes/displays adequate comfort level or baseline comfort level  12/4/2024 2119 by Love Corrigan RN  Outcome: Progressing  12/4/2024 1133 by Heidy Nicholson, RN  Outcome: Progressing     Problem: Safety - Adult  Goal: Free from fall injury  12/4/2024 2119 by Love Corrigan RN  Outcome: Progressing  12/4/2024 1133 by Heidy Nicholson, RN  Outcome: Progressing     Problem: Nutrition Deficit:  Goal: Optimize nutritional status  12/4/2024 2119 by Love Corrigan RN  Outcome: Progressing  12/4/2024 1133 by Heidy Nicholson, RN  Outcome: Progressing

## 2024-12-05 NOTE — PROGRESS NOTES
Patient seen . Doing fair. Potassium and mag still low and replaced. Diuretic induced?. Legs less edema. Cxr and ct new left pleural effusion. Pulmonary following. On antibiotics. H/h stable no bleeding. Tolerating eliquis for pe. Await further input from pulmonary

## 2024-12-05 NOTE — PROGRESS NOTES
malnutrition (HCC)    Adrenal crisis (HCC)        PAST MEDICAL HISTORY:    Past Medical History:   Diagnosis Date    Acid reflux     Fracture of left shoulder 10/2018    Frozen shoulder     left    Hypertension     Hyponatremia     Lung abnormality     MVP (mitral valve prolapse)     no issues    Skin cancer        DIET:    ADULT DIET; Regular  ADULT ORAL NUTRITION SUPPLEMENT; Breakfast, Lunch, Dinner; Standard High Calorie/High Protein Oral Supplement  ADULT ORAL NUTRITION SUPPLEMENT; Dinner; Frozen Oral Supplement     PHYSICAL EXAM:     Patient Vitals for the past 24 hrs:   BP Temp Temp src Pulse Resp SpO2   12/05/24 1215 (!) 140/93 98.7 °F (37.1 °C) Oral 86 18 91 %   12/05/24 0900 114/78 98.1 °F (36.7 °C) Oral 87 18 95 %   12/05/24 0324 132/75 97.8 °F (36.6 °C) Oral 99 20 90 %   12/04/24 1934 110/73 98.4 °F (36.9 °C) Oral 71 20 --   12/04/24 1549 -- -- -- -- -- 90 %   12/04/24 1547 117/73 97.9 °F (36.6 °C) Oral 71 20 (!) 86 %   12/04/24 1307 123/87 -- -- -- -- --   @    No intake or output data in the 24 hours ending 12/05/24 1227        Wt Readings from Last 3 Encounters:   12/04/24 58.1 kg (128 lb 1.6 oz)   11/11/24 46.3 kg (102 lb)   11/11/24 46.3 kg (102 lb)       Constitutional:  Pt is in no acute distress  Head: normocephalic, atraumatic  Neck: no JVD  Cardiovascular: regular rate and rhythm, no murmurs, gallops, or rubs  Respiratory:  No rales, rhochi, or wheezes  Gastrointestinal:  Soft, nontender, nondistended, bowel sounds x 4  Ext: no edema  Skin: dry, no rash  Neuro: aaox3    MEDS (scheduled):    potassium chloride  20 mEq Oral BID WC    Vitamin D  1,000 Units Oral Daily    dilTIAZem  120 mg Oral Daily    ceFEPIme (MAXIPIME) 2,000 mg in sterile water 20 mL IV syringe  2,000 mg IntraVENous Q12H    sodium chloride  500 mL IntraVENous Once    pantoprazole  40 mg Oral QAM AC    losartan  50 mg Oral Daily    [Held by provider] carvedilol  25 mg Oral BID WC    cloNIDine  0.1 mg Oral TID    lidocaine  1  patch TransDERmal Daily    Icy Hot Balm Extra Strength  1 Application Apply externally BID    sodium chloride  1 g Oral BID WC    [Held by provider] urea  15 g Oral Daily    apixaban  5 mg Oral BID    ipratropium 0.5 mg-albuterol 2.5 mg  1 Dose Inhalation TID RT       MEDS (infusions):   sodium chloride      dextrose         MEDS (prn):  ondansetron **OR** ondansetron, polyethylene glycol, acetaminophen **OR** acetaminophen, sodium chloride, HYDROcodone-acetaminophen, glucose, dextrose bolus **OR** dextrose bolus, glucagon (rDNA), dextrose    DATA:    Recent Labs     12/03/24  0504 12/04/24  0520 12/05/24 0421   WBC 8.2 7.8 7.3   HGB 9.2* 9.4* 8.5*   HCT 27.3* 27.9* 25.5*   MCV 92.5 92.7 93.8    406 397     Recent Labs     12/03/24  0504 12/03/24  1734 12/04/24  0520 12/05/24 0421   *  --  134 132   K 2.9* 3.5 3.2* 3.1*   CL 99  --  98 94*   CO2 25  --  30* 31*   BUN 6  --  4* 4*   CREATININE 0.5  --  0.6 0.6   LABGLOM >90  --  >90 >90   GLUCOSE 89  --  84 85   CALCIUM 7.7*  --  7.8* 7.8*   ALT 5  --  5 5   AST 11  --  12 11   BILITOT 0.3  --  0.4 0.3   ALKPHOS 99  --  104 100   MG 1.4* 1.7 1.6 1.4*   PHOS 3.0  --  2.6 2.6       No results found for: \"LABALBU\"  Lab Results   Component Value Date    TSH 0.970 02/28/2021       Iron Studies  Lab Results   Component Value Date    FERRITIN 2,289 09/21/2022     No results found for: \"LPFXPVPI02\"  No results found for: \"FOLATE\"    Vit D, 25-Hydroxy   Date Value Ref Range Status   12/02/2024 <6.0 (L) 30.0 - 100.0 ng/mL Final     No results found for: \"PTH\"    No components found for: \"URIC\"    Lab Results   Component Value Date/Time    COLORU Yellow 11/30/2024 04:40 AM    NITRU NEGATIVE 11/30/2024 04:40 AM    GLUCOSEU NEGATIVE 11/30/2024 04:40 AM    GLUCOSEU NEGATIVE 05/23/2012 03:15 PM    KETUA NEGATIVE 11/30/2024 04:40 AM    UROBILINOGEN 0.2 11/30/2024 04:40 AM    BILIRUBINUR NEGATIVE 11/30/2024 04:40 AM       No results found for:

## 2024-12-05 NOTE — PROGRESS NOTES
[facility] Quality Flow/Interdisciplinary Rounds Progress Note        Quality Flow Rounds held on December 5, 2024    Disciplines Attending:  Bedside Nurse, , , and Nursing Unit Leadership    Maria T Romeo was admitted on 11/21/2024  1:00 PM    Anticipated Discharge Date:  Expected Discharge Date: 12/06/24    Disposition:    Micky Score:  Micky Scale Score: 21    Readmission Risk              Risk of Unplanned Readmission:  24           Discussed patient goal for the day, patient clinical progression, and barriers to discharge.  The following Goal(s) of the Day/Commitment(s) have been identified:   discharge planning, nephro plan, monitor electrolytes, pulm plan, spot dosing IV lasix      Gokul Rodriguez RN  December 5, 2024

## 2024-12-05 NOTE — PROGRESS NOTES
Pulmonary Progress Note    Admit Date: 2024  Hospital day                               PCP: Jensen Pelayo DO    Chief Complaint (s):  Patient Active Problem List   Diagnosis    Hyponatremia    Acute hyponatremia    Traumatic closed displaced fracture of left shoulder with anterior dislocation    Acute respiratory failure with hypoxia    LFTs abnormal    Primary hypertension    Multiple closed fractures of ribs of left side    Acute hypoxic respiratory failure    Moderate protein-calorie malnutrition (HCC)    Adrenal crisis (HCC)       Subjective:  Patient seen on room air. She feels okay. Ongoing pain on right back up into the front that she describes as a spasm  Edema improved. Encourage ambulation. Chest imaging reviewed and posted below. BNP pending.         Vitals:  VITALS:  /78   Pulse 87   Temp 98.1 °F (36.7 °C) (Oral)   Resp 18   Ht 1.575 m (5' 2\")   Wt 58.1 kg (128 lb 1.6 oz)   LMP 2012   SpO2 95%   BMI 23.43 kg/m²     24HR INTAKE/OUTPUT:    No intake or output data in the 24 hours ending 24 1111      24HR PULSE OXIMETRY RANGE:    SpO2  Av.4 %  Min: 86 %  Max: 95 %      Medications:  IV:   sodium chloride      dextrose         Scheduled Meds:   potassium chloride  20 mEq Oral BID WC    Vitamin D  1,000 Units Oral Daily    dilTIAZem  120 mg Oral Daily    ceFEPIme (MAXIPIME) 2,000 mg in sterile water 20 mL IV syringe  2,000 mg IntraVENous Q12H    sodium chloride  500 mL IntraVENous Once    pantoprazole  40 mg Oral QAM AC    losartan  50 mg Oral Daily    [Held by provider] carvedilol  25 mg Oral BID WC    cloNIDine  0.1 mg Oral TID    lidocaine  1 patch TransDERmal Daily    Icy Hot Balm Extra Strength  1 Application Apply externally BID    sodium chloride  1 g Oral BID WC    [Held by provider] urea  15 g Oral Daily    apixaban  5 mg Oral BID    ipratropium 0.5 mg-albuterol 2.5 mg  1 Dose Inhalation TID RT       Diet:   ADULT DIET; Regular  ADULT ORAL NUTRITION

## 2024-12-06 ENCOUNTER — APPOINTMENT (OUTPATIENT)
Age: 59
End: 2024-12-06
Attending: GENERAL PRACTICE
Payer: COMMERCIAL

## 2024-12-06 VITALS
SYSTOLIC BLOOD PRESSURE: 121 MMHG | WEIGHT: 121.3 LBS | HEART RATE: 93 BPM | TEMPERATURE: 98.5 F | DIASTOLIC BLOOD PRESSURE: 79 MMHG | OXYGEN SATURATION: 93 % | RESPIRATION RATE: 18 BRPM | BODY MASS INDEX: 22.32 KG/M2 | HEIGHT: 62 IN

## 2024-12-06 LAB
ALBUMIN SERPL-MCNC: 2.2 G/DL (ref 3.5–5.2)
ALP SERPL-CCNC: 105 U/L (ref 35–104)
ALT SERPL-CCNC: 5 U/L (ref 0–32)
ANION GAP SERPL CALCULATED.3IONS-SCNC: 7 MMOL/L (ref 7–16)
AST SERPL-CCNC: 12 U/L (ref 0–31)
BASOPHILS # BLD: 0.02 K/UL (ref 0–0.2)
BASOPHILS NFR BLD: 0 % (ref 0–2)
BILIRUB SERPL-MCNC: 0.2 MG/DL (ref 0–1.2)
BUN SERPL-MCNC: 4 MG/DL (ref 6–20)
CALCIUM SERPL-MCNC: 7.7 MG/DL (ref 8.6–10.2)
CHLORIDE SERPL-SCNC: 98 MMOL/L (ref 98–107)
CO2 SERPL-SCNC: 30 MMOL/L (ref 22–29)
CREAT SERPL-MCNC: 0.6 MG/DL (ref 0.5–1)
ECHO AO ASC DIAM: 4 CM
ECHO AO ASCENDING AORTA INDEX: 2.58 CM/M2
ECHO AO ROOT DIAM: 3.9 CM
ECHO AO ROOT INDEX: 2.52 CM/M2
ECHO AV AREA PEAK VELOCITY: 1.8 CM2
ECHO AV AREA VTI: 2 CM2
ECHO AV AREA/BSA PEAK VELOCITY: 1.2 CM2/M2
ECHO AV AREA/BSA VTI: 1.3 CM2/M2
ECHO AV CUSP MM: 1.3 CM
ECHO AV MEAN GRADIENT: 8 MMHG
ECHO AV MEAN VELOCITY: 1.4 M/S
ECHO AV PEAK GRADIENT: 17 MMHG
ECHO AV PEAK VELOCITY: 2 M/S
ECHO AV VELOCITY RATIO: 0.6
ECHO AV VTI: 37.8 CM
ECHO BSA: 1.42 M2
ECHO EST RA PRESSURE: 3 MMHG
ECHO LA DIAMETER INDEX: 1.87 CM/M2
ECHO LA DIAMETER: 2.9 CM
ECHO LA TO AORTIC ROOT RATIO: 0.74
ECHO LA VOL A-L A2C: 47 ML (ref 22–52)
ECHO LA VOL A-L A4C: 37 ML (ref 22–52)
ECHO LA VOL MOD A2C: 45 ML (ref 22–52)
ECHO LA VOL MOD A4C: 35 ML (ref 22–52)
ECHO LA VOLUME AREA LENGTH: 43 ML
ECHO LA VOLUME INDEX A-L A2C: 30 ML/M2 (ref 16–34)
ECHO LA VOLUME INDEX A-L A4C: 24 ML/M2 (ref 16–34)
ECHO LA VOLUME INDEX AREA LENGTH: 28 ML/M2 (ref 16–34)
ECHO LA VOLUME INDEX MOD A2C: 29 ML/M2 (ref 16–34)
ECHO LA VOLUME INDEX MOD A4C: 23 ML/M2 (ref 16–34)
ECHO LV E' LATERAL VELOCITY: 10 CM/S
ECHO LV E' SEPTAL VELOCITY: 8 CM/S
ECHO LV EF PHYSICIAN: 65 %
ECHO LV FRACTIONAL SHORTENING: 33 % (ref 28–44)
ECHO LV INTERNAL DIMENSION DIASTOLE INDEX: 2.77 CM/M2
ECHO LV INTERNAL DIMENSION DIASTOLIC: 4.3 CM (ref 3.9–5.3)
ECHO LV INTERNAL DIMENSION SYSTOLIC INDEX: 1.87 CM/M2
ECHO LV INTERNAL DIMENSION SYSTOLIC: 2.9 CM
ECHO LV IVSD: 1 CM (ref 0.6–0.9)
ECHO LV IVSS: 1.5 CM
ECHO LV MASS 2D: 123.3 G (ref 67–162)
ECHO LV MASS INDEX 2D: 79.5 G/M2 (ref 43–95)
ECHO LV POSTERIOR WALL DIASTOLIC: 0.8 CM (ref 0.6–0.9)
ECHO LV POSTERIOR WALL SYSTOLIC: 1.1 CM
ECHO LV RELATIVE WALL THICKNESS RATIO: 0.37
ECHO LVOT AREA: 3.1 CM2
ECHO LVOT AV VTI INDEX: 0.65
ECHO LVOT DIAM: 2 CM
ECHO LVOT MEAN GRADIENT: 3 MMHG
ECHO LVOT PEAK GRADIENT: 6 MMHG
ECHO LVOT PEAK VELOCITY: 1.2 M/S
ECHO LVOT STROKE VOLUME INDEX: 49.6 ML/M2
ECHO LVOT SV: 76.9 ML
ECHO LVOT VTI: 24.5 CM
ECHO MV "A" WAVE DURATION: 143 MSEC
ECHO MV A VELOCITY: 0.75 M/S
ECHO MV AREA PHT: 4.1 CM2
ECHO MV AREA VTI: 4.2 CM2
ECHO MV E DECELERATION TIME (DT): 181.6 MS
ECHO MV E VELOCITY: 0.6 M/S
ECHO MV E/A RATIO: 0.8
ECHO MV E/E' LATERAL: 6
ECHO MV E/E' RATIO (AVERAGED): 6.75
ECHO MV E/E' SEPTAL: 7.5
ECHO MV LVOT VTI INDEX: 0.75
ECHO MV MAX VELOCITY: 0.8 M/S
ECHO MV MEAN GRADIENT: 1 MMHG
ECHO MV MEAN VELOCITY: 0.5 M/S
ECHO MV PEAK GRADIENT: 2 MMHG
ECHO MV PRESSURE HALF TIME (PHT): 53.2 MS
ECHO MV VTI: 18.3 CM
ECHO PV MAX VELOCITY: 0.8 M/S
ECHO PV MEAN GRADIENT: 1 MMHG
ECHO PV MEAN VELOCITY: 0.5 M/S
ECHO PV PEAK GRADIENT: 2 MMHG
ECHO PV VTI: 15.3 CM
ECHO PVEIN A DURATION: 106.1 MS
ECHO PVEIN A VELOCITY: 0.3 M/S
ECHO PVEIN PEAK D VELOCITY: 0.5 M/S
ECHO PVEIN PEAK S VELOCITY: 0.8 M/S
ECHO PVEIN S/D RATIO: 1.6
ECHO RIGHT VENTRICULAR SYSTOLIC PRESSURE (RVSP): 33 MMHG
ECHO RV INTERNAL DIMENSION: 3.1 CM
ECHO RV TAPSE: 2.1 CM (ref 1.7–?)
ECHO TV REGURGITANT MAX VELOCITY: 2.75 M/S
ECHO TV REGURGITANT PEAK GRADIENT: 30 MMHG
EOSINOPHIL # BLD: 0.37 K/UL (ref 0.05–0.5)
EOSINOPHILS RELATIVE PERCENT: 5 % (ref 0–6)
ERYTHROCYTE [DISTWIDTH] IN BLOOD BY AUTOMATED COUNT: 14.6 % (ref 11.5–15)
GFR, ESTIMATED: >90 ML/MIN/1.73M2
GLUCOSE SERPL-MCNC: 90 MG/DL (ref 74–99)
HCT VFR BLD AUTO: 26.7 % (ref 34–48)
HGB BLD-MCNC: 8.8 G/DL (ref 11.5–15.5)
IMM GRANULOCYTES # BLD AUTO: 0.08 K/UL (ref 0–0.58)
IMM GRANULOCYTES NFR BLD: 1 % (ref 0–5)
LYMPHOCYTES NFR BLD: 1.55 K/UL (ref 1.5–4)
LYMPHOCYTES RELATIVE PERCENT: 21 % (ref 20–42)
MAGNESIUM SERPL-MCNC: 1.7 MG/DL (ref 1.6–2.6)
MCH RBC QN AUTO: 31.2 PG (ref 26–35)
MCHC RBC AUTO-ENTMCNC: 33 G/DL (ref 32–34.5)
MCV RBC AUTO: 94.7 FL (ref 80–99.9)
MONOCYTES NFR BLD: 0.68 K/UL (ref 0.1–0.95)
MONOCYTES NFR BLD: 9 % (ref 2–12)
NEUTROPHILS NFR BLD: 64 % (ref 43–80)
NEUTS SEG NFR BLD: 4.72 K/UL (ref 1.8–7.3)
PHOSPHATE SERPL-MCNC: 2.6 MG/DL (ref 2.5–4.5)
PLATELET # BLD AUTO: 418 K/UL (ref 130–450)
PMV BLD AUTO: 8.6 FL (ref 7–12)
POTASSIUM SERPL-SCNC: 3.7 MMOL/L (ref 3.5–5)
PROT SERPL-MCNC: 4.4 G/DL (ref 6.4–8.3)
RBC # BLD AUTO: 2.82 M/UL (ref 3.5–5.5)
SODIUM SERPL-SCNC: 135 MMOL/L (ref 132–146)
WBC OTHER # BLD: 7.4 K/UL (ref 4.5–11.5)

## 2024-12-06 PROCEDURE — 85025 COMPLETE CBC W/AUTO DIFF WBC: CPT

## 2024-12-06 PROCEDURE — 6370000000 HC RX 637 (ALT 250 FOR IP): Performed by: GENERAL PRACTICE

## 2024-12-06 PROCEDURE — 80053 COMPREHEN METABOLIC PANEL: CPT

## 2024-12-06 PROCEDURE — 93306 TTE W/DOPPLER COMPLETE: CPT | Performed by: INTERNAL MEDICINE

## 2024-12-06 PROCEDURE — 6370000000 HC RX 637 (ALT 250 FOR IP): Performed by: INTERNAL MEDICINE

## 2024-12-06 PROCEDURE — 6360000002 HC RX W HCPCS: Performed by: INTERNAL MEDICINE

## 2024-12-06 PROCEDURE — 2580000003 HC RX 258: Performed by: INTERNAL MEDICINE

## 2024-12-06 PROCEDURE — 6370000000 HC RX 637 (ALT 250 FOR IP): Performed by: NURSE PRACTITIONER

## 2024-12-06 PROCEDURE — 93306 TTE W/DOPPLER COMPLETE: CPT

## 2024-12-06 PROCEDURE — 84100 ASSAY OF PHOSPHORUS: CPT

## 2024-12-06 PROCEDURE — 83735 ASSAY OF MAGNESIUM: CPT

## 2024-12-06 RX ORDER — CLONIDINE HYDROCHLORIDE 0.1 MG/1
0.1 TABLET ORAL 3 TIMES DAILY
Qty: 60 TABLET | Refills: 3 | Status: SHIPPED | OUTPATIENT
Start: 2024-12-06

## 2024-12-06 RX ORDER — CEFDINIR 300 MG/1
300 CAPSULE ORAL 2 TIMES DAILY
Qty: 4 CAPSULE | Refills: 0 | Status: SHIPPED | OUTPATIENT
Start: 2024-12-06 | End: 2024-12-08

## 2024-12-06 RX ORDER — LOSARTAN POTASSIUM 50 MG/1
50 TABLET ORAL DAILY
Qty: 30 TABLET | Refills: 3 | Status: SHIPPED | OUTPATIENT
Start: 2024-12-06

## 2024-12-06 RX ORDER — MENTHOL AND METHYL SALICYLATE 7.6; 29 G/100G; G/100G
1 OINTMENT TOPICAL 2 TIMES DAILY
Qty: 100 G | Refills: 1 | Status: SHIPPED | OUTPATIENT
Start: 2024-12-06

## 2024-12-06 RX ORDER — IPRATROPIUM BROMIDE AND ALBUTEROL SULFATE 2.5; .5 MG/3ML; MG/3ML
1 SOLUTION RESPIRATORY (INHALATION) EVERY 4 HOURS PRN
Status: DISCONTINUED | OUTPATIENT
Start: 2024-12-06 | End: 2024-12-06 | Stop reason: HOSPADM

## 2024-12-06 RX ORDER — VITAMIN B COMPLEX
1000 TABLET ORAL DAILY
Qty: 60 TABLET | Refills: 2 | Status: SHIPPED | OUTPATIENT
Start: 2024-12-07

## 2024-12-06 RX ORDER — PANTOPRAZOLE SODIUM 40 MG/1
40 TABLET, DELAYED RELEASE ORAL
Qty: 30 TABLET | Refills: 3 | Status: SHIPPED | OUTPATIENT
Start: 2024-12-07

## 2024-12-06 RX ORDER — LIDOCAINE 4 G/G
1 PATCH TOPICAL DAILY
Qty: 10 PATCH | Refills: 1 | Status: SHIPPED | OUTPATIENT
Start: 2024-12-07

## 2024-12-06 RX ORDER — IPRATROPIUM BROMIDE AND ALBUTEROL SULFATE 2.5; .5 MG/3ML; MG/3ML
3 SOLUTION RESPIRATORY (INHALATION)
Qty: 360 ML | Refills: 2 | Status: SHIPPED | OUTPATIENT
Start: 2024-12-06

## 2024-12-06 RX ADMIN — CLONIDINE HYDROCHLORIDE 0.1 MG: 0.1 TABLET ORAL at 08:08

## 2024-12-06 RX ADMIN — Medication 1000 UNITS: at 08:08

## 2024-12-06 RX ADMIN — APIXABAN 5 MG: 5 TABLET, FILM COATED ORAL at 08:08

## 2024-12-06 RX ADMIN — WATER 2000 MG: 1 INJECTION INTRAMUSCULAR; INTRAVENOUS; SUBCUTANEOUS at 12:46

## 2024-12-06 RX ADMIN — PANTOPRAZOLE SODIUM 40 MG: 40 TABLET, DELAYED RELEASE ORAL at 05:59

## 2024-12-06 RX ADMIN — Medication 1 G: at 08:08

## 2024-12-06 RX ADMIN — POTASSIUM CHLORIDE 20 MEQ: 1500 TABLET, EXTENDED RELEASE ORAL at 08:08

## 2024-12-06 RX ADMIN — WATER 2000 MG: 1 INJECTION INTRAMUSCULAR; INTRAVENOUS; SUBCUTANEOUS at 00:15

## 2024-12-06 RX ADMIN — MENTHOL AND METHYL SALICYLATE 1 APPLICATION: 7.6; 29 OINTMENT TOPICAL at 08:09

## 2024-12-06 RX ADMIN — DILTIAZEM HYDROCHLORIDE 120 MG: 120 CAPSULE, COATED, EXTENDED RELEASE ORAL at 08:08

## 2024-12-06 NOTE — PROGRESS NOTES
[facility]  Nephrology Attending Progress Note  Curly Lopez MD        SUBJECTIVE:     From consult note 11/22/2024  The patient is a 55-year-old  lady who has underlying history of chronic hyponatremia seen by Dr. Curly Lopez, COPD and bilateral PEs.  The last time she saw Dr. Lopez was in year 2018 and subsequently, the patient chose not to follow up.  The patient was initially seen in consultation for hyponatremia in 01/2018.  At that time, the hyponatremia was treated with oral salt tablets.    They presented on 11/21/2024 complaining of back pain.  She has had back pain since her prior admission that slightly worsened.  She chronically has shortness of breath at baseline.  On arrival there blood pressure 108/78 HR 87 RR 18 on room air and afebrile.  Initial labs were pertinent for sodium of 126, potassium of 5.7 bicarb of 21.  Imaging showed right middle lobe atelectasis, mild enlargement of moderate right pleural effusion and ascending thoracic aortic aneurysm of 4.7 cm.  Nephrology was consulted for hyponatremia.       On 11/30/24  she had large amount of bloody drainage around the chest tube site followed by brown emesis followed by hypotension and transfer to the ICU.     12/1/2024- complains of worsening swelling. No issues overnight     12/2: eating lunch, ct in place. Didn't eat well yesterday    12/3: remains weak, seen and examined    12/4: pt seen and examined, very weak, no appetite, no cp or sob    12/5: pt  seen and examined, chest tube in place, not eating, k and mg low, denies edema, cp, cough, sob    12/6: has no cp, cough, claims to be eating ok, no diarrhea    PROBLEM LIST:    Patient Active Problem List   Diagnosis    Hyponatremia    Acute hyponatremia    Traumatic closed displaced fracture of left shoulder with anterior dislocation    Acute respiratory failure with hypoxia    LFTs abnormal    Primary hypertension    Multiple closed fractures of ribs of left side    Acute

## 2024-12-06 NOTE — PROGRESS NOTES
Spoke with Dr. Michaud regarding if it is ok per their standpoint for pt to go home today. Notified Dr. Pelayo placed orders for dc but did not order antibiotics. States Valentine Clemente saw pt and he will have her contact us regarding this.

## 2024-12-06 NOTE — CARE COORDINATION
Social Work / Discharge Planning : Patient has a discharge order. Patient independent and plan is HOME.ERIN updated by RN that patient feels she does NOT need HHC now due to independence. SW updated [facility]. SW to follow. Electronically signed by ANGI Muse on 12/6/24 at 10:41 AM EST     Addendum: patient will need a nebulizer. SW followed up with patient and she does NOT have one. SW called [facility] in [facility] and they do NOT have nebulizer in stock. SW updated patient and ordered through [facility] and will be delivered to patient room. Patient has transportation home. SW to follow. Electronically signed by ANGI Muse on 12/6/24 at 1:04 PM EST

## 2024-12-06 NOTE — PROGRESS NOTES
Pulmonary Progress Note    Admit Date: 2024  Hospital day                               PCP: Jensen Pelayo DO    Chief Complaint (s):  Patient Active Problem List   Diagnosis    Hyponatremia    Acute hyponatremia    Traumatic closed displaced fracture of left shoulder with anterior dislocation    Acute respiratory failure with hypoxia    LFTs abnormal    Primary hypertension    Multiple closed fractures of ribs of left side    Acute hypoxic respiratory failure    Moderate protein-calorie malnutrition (HCC)    Adrenal crisis (HCC)       Subjective:  Patient seen on room air. She feels okay. Peripheral edema improved. Not wearing ERICKSON hose. Electrolytes better.         Vitals:  VITALS:  BP (!) 140/89   Pulse 80   Temp 98.8 °F (37.1 °C) (Oral)   Resp 18   Ht 1.575 m (5' 2\")   Wt 55 kg (121 lb 4.8 oz)   LMP 2012   SpO2 93%   BMI 22.19 kg/m²     24HR INTAKE/OUTPUT:      Intake/Output Summary (Last 24 hours) at 2024 1040  Last data filed at 2024 0516  Gross per 24 hour   Intake 480 ml   Output --   Net 480 ml         24HR PULSE OXIMETRY RANGE:    SpO2  Av.3 %  Min: 91 %  Max: 93 %      Medications:  IV:   sodium chloride      dextrose         Scheduled Meds:   potassium chloride  20 mEq Oral BID WC    Vitamin D  1,000 Units Oral Daily    dilTIAZem  120 mg Oral Daily    ceFEPIme (MAXIPIME) 2,000 mg in sterile water 20 mL IV syringe  2,000 mg IntraVENous Q12H    sodium chloride  500 mL IntraVENous Once    pantoprazole  40 mg Oral QAM AC    losartan  50 mg Oral Daily    [Held by provider] carvedilol  25 mg Oral BID WC    cloNIDine  0.1 mg Oral TID    lidocaine  1 patch TransDERmal Daily    Icy Hot Balm Extra Strength  1 Application Apply externally BID    sodium chloride  1 g Oral BID WC    [Held by provider] urea  15 g Oral Daily    apixaban  5 mg Oral BID       Diet:   ADULT DIET; Regular  ADULT ORAL NUTRITION SUPPLEMENT; Breakfast, Lunch, Dinner; Standard High Calorie/High

## 2024-12-06 NOTE — DISCHARGE INSTRUCTIONS
Obtain chest xray prior to appointment with pulmonology so they have this information when they see you.

## 2024-12-06 NOTE — PROGRESS NOTES
[facility] Quality Flow/Interdisciplinary Rounds Progress Note        Quality Flow Rounds held on December 6, 2024    Disciplines Attending:  Bedside Nurse, , , and Nursing Unit Leadership    Maria T Romeo was admitted on 11/21/2024  1:00 PM    Anticipated Discharge Date:  Expected Discharge Date: 12/06/24    Disposition:    Micky Score:  Micky Scale Score: 21    Readmission Risk              Risk of Unplanned Readmission:  25           Discussed patient goal for the day, patient clinical progression, and barriers to discharge.  The following Goal(s) of the Day/Commitment(s) have been identified:   discharge planning, pulm plan, monitor electrolytes, echo pending       Gokul Rodriguez RN  December 6, 2024

## 2024-12-06 NOTE — PROGRESS NOTES
CLINICAL PHARMACY NOTE: MEDS TO BEDS    Total # of Prescriptions Filled: 4   The following medications were delivered to the patient:  Losartan 50 mg   Vitamin D 3 (25mcg)  Lidocaine 4% patch   Clonidine 0.1 mg       Additional Documentation:

## 2024-12-06 NOTE — PLAN OF CARE
Problem: Discharge Planning  Goal: Discharge to home or other facility with appropriate resources  12/6/2024 1051 by Rolando Yanez RN  Outcome: Progressing  Flowsheets (Taken 12/6/2024 0800)  Discharge to home or other facility with appropriate resources: Identify barriers to discharge with patient and caregiver  12/5/2024 2113 by Raiza Villa RN  Outcome: Progressing     Problem: Pain  Goal: Verbalizes/displays adequate comfort level or baseline comfort level  12/6/2024 1051 by Rolando Yanez RN  Outcome: Progressing  12/5/2024 2113 by Raiza Villa, RN  Outcome: Progressing     Problem: Safety - Adult  Goal: Free from fall injury  12/6/2024 1051 by Rolando Yanez RN  Outcome: Progressing  12/5/2024 2113 by Raiza Villa, RN  Outcome: Progressing     Problem: Nutrition Deficit:  Goal: Optimize nutritional status  12/6/2024 1051 by Rolando Yanez RN  Outcome: Progressing  12/5/2024 2113 by Raiza Villa, RN  Outcome: Progressing

## 2024-12-10 NOTE — PROGRESS NOTES
Physician Progress Note      PATIENT:               KARL GARCIA  CSN #:                  [id number]  :                       1965  ADMIT DATE:       2024 1:00 PM  DISCH DATE:        2024 1:59 PM  RESPONDING  PROVIDER #:        Jorge Gonzalez DO          QUERY TEXT:    Pt admitted with Adrenal crisis. Pt noted to have pneumonia. If possible,   please document in the progress notes and discharge summary if you are   evaluating and /or treating any of the following:  The medical record reflects the following:  Risk Factors: pleural effusions, pneumonia  Clinical Indicators: ICU consult note state: Septic w/u. WBC 13.4 ICU -BP   72/48    Treatment: chest tube, antibiotics,  Options provided:  -- Sepsis, present on admission due to pneumonia  -- Sepsis was ruled out  -- Other - I will add my own diagnosis  -- Disagree - Not applicable / Not valid  -- Disagree - Clinically unable to determine / Unknown  -- Refer to Clinical Documentation Reviewer    PROVIDER RESPONSE TEXT:    Sepsis, present on admission due to pneumonia    Query created by: Semaj Jaramillo on 12/10/2024 10:28 AM      Electronically signed by:  Jorge Gonzalez DO 12/10/2024 12:58 PM

## 2025-02-11 ENCOUNTER — HOSPITAL ENCOUNTER (OUTPATIENT)
Dept: PULMONOLOGY | Age: 60
Discharge: HOME | End: 2025-02-11
Payer: COMMERCIAL

## 2025-02-11 DIAGNOSIS — J44.9 CHRONIC OBSTRUCTIVE PULMONARY DISEASE, UNSPECIFIED COPD TYPE (HCC): ICD-10-CM

## 2025-02-11 PROCEDURE — 94726 PLETHYSMOGRAPHY LUNG VOLUMES: CPT

## 2025-02-11 PROCEDURE — 94729 DIFFUSING CAPACITY: CPT

## 2025-02-11 PROCEDURE — 94060 EVALUATION OF WHEEZING: CPT

## 2025-03-05 ENCOUNTER — OFFICE VISIT (OUTPATIENT)
Dept: PRIMARY CARE CLINIC | Age: 60
End: 2025-03-05
Payer: COMMERCIAL

## 2025-03-05 VITALS
OXYGEN SATURATION: 95 % | WEIGHT: 99 LBS | HEIGHT: 62 IN | DIASTOLIC BLOOD PRESSURE: 88 MMHG | BODY MASS INDEX: 18.22 KG/M2 | TEMPERATURE: 97.6 F | SYSTOLIC BLOOD PRESSURE: 131 MMHG | HEART RATE: 83 BPM

## 2025-03-05 DIAGNOSIS — S99.911A INJURY OF RIGHT ANKLE AND FOOT, INITIAL ENCOUNTER: Primary | ICD-10-CM

## 2025-03-05 DIAGNOSIS — S99.921A INJURY OF RIGHT ANKLE AND FOOT, INITIAL ENCOUNTER: Primary | ICD-10-CM

## 2025-03-05 PROCEDURE — 3079F DIAST BP 80-89 MM HG: CPT | Performed by: NURSE PRACTITIONER

## 2025-03-05 PROCEDURE — G8419 CALC BMI OUT NRM PARAM NOF/U: HCPCS | Performed by: NURSE PRACTITIONER

## 2025-03-05 PROCEDURE — 4004F PT TOBACCO SCREEN RCVD TLK: CPT | Performed by: NURSE PRACTITIONER

## 2025-03-05 PROCEDURE — G8427 DOCREV CUR MEDS BY ELIG CLIN: HCPCS | Performed by: NURSE PRACTITIONER

## 2025-03-05 PROCEDURE — 99213 OFFICE O/P EST LOW 20 MIN: CPT | Performed by: NURSE PRACTITIONER

## 2025-03-05 PROCEDURE — 3075F SYST BP GE 130 - 139MM HG: CPT | Performed by: NURSE PRACTITIONER

## 2025-03-05 PROCEDURE — 3017F COLORECTAL CA SCREEN DOC REV: CPT | Performed by: NURSE PRACTITIONER

## 2025-03-05 RX ORDER — METOPROLOL SUCCINATE 25 MG/1
TABLET, EXTENDED RELEASE ORAL
COMMUNITY
Start: 2025-01-23

## 2025-03-05 NOTE — PROGRESS NOTES
Chief Complaint:  Ankle Injury and Foot Injury      History of Present Illness:  Source of history provided by:  patient.          Maria T Romeo is a 59 y.o. old female presenting to the [facility] for right ankle/foot injury that occurred 2 weeks ago. Pt stated she rolled her ankle inward.  Denies prior ankle/foot injuries/surgeries.  States there is swelling and bruising present.  Denies any N/T, knee pain, fever, chills, or abrasions.  Pt states there is pain with ambulation.    Review of Systems:  Unless otherwise stated in this report or unable to obtain because of the patient's clinical or mental status as evidenced by the medical record, this patients's positive and negative responses for Review of Systems, constitutional, psych, eyes, ENT, cardiovascular, respiratory, gastrointestinal, neurological, genitourinary, musculoskeletal, integument systems and systems related to the presenting problem are either stated in the preceding or were not pertinent or were negative for the symptoms and/or complaints related to the medical problem.    Past Medical History:  has a past medical history of Acid reflux, Fracture of left shoulder, Frozen shoulder, Hypertension, Hyponatremia, Lung abnormality, MVP (mitral valve prolapse), and Skin cancer.  Past Surgical History:  has a past surgical history that includes Foot surgery (Right, 1980's); pr optx prox humeral fx w/int fixj rpr tuberosity (Left, 10/23/2018); CLOSED MANIPULATION SHOULDER (Left, 3/5/2019); Skin cancer excision (2018); bronchoscopy (N/A, 1/8/2024); bronchoscopy (N/A, 11/13/2024); and CT GUIDED CHEST TUBE (11/26/2024).  Social History:  reports that she has been smoking cigarettes. She started smoking about 33 years ago. She has a 33.2 pack-year smoking history. She has never used smokeless tobacco. She reports current alcohol use. She reports that she does not use drugs.  Family History: family history is not on file.   Allergies: Patient has no known

## 2025-03-05 NOTE — PLAN OF CARE
Problem: Discharge Planning  Goal: Discharge to home or other facility with appropriate resources  11/23/2024 2048 by Magdy Villasenor, RN  Outcome: Progressing     Problem: Pain  Goal: Verbalizes/displays adequate comfort level or baseline comfort level  11/23/2024 2048 by Magdy Villasenor, RN  Outcome: Progressing     Problem: Safety - Adult  Goal: Free from fall injury  11/23/2024 2048 by Magdy Villasenor, RN  Outcome: Progressing      36.1

## 2025-03-26 ENCOUNTER — OFFICE VISIT (OUTPATIENT)
Dept: PRIMARY CARE CLINIC | Age: 60
End: 2025-03-26
Payer: COMMERCIAL

## 2025-03-26 VITALS
WEIGHT: 93.6 LBS | TEMPERATURE: 96.1 F | OXYGEN SATURATION: 94 % | BODY MASS INDEX: 17.23 KG/M2 | SYSTOLIC BLOOD PRESSURE: 146 MMHG | HEIGHT: 62 IN | HEART RATE: 92 BPM | RESPIRATION RATE: 17 BRPM | DIASTOLIC BLOOD PRESSURE: 94 MMHG

## 2025-03-26 DIAGNOSIS — R06.02 SOB (SHORTNESS OF BREATH): ICD-10-CM

## 2025-03-26 DIAGNOSIS — J44.0 COPD WITH ACUTE LOWER RESPIRATORY INFECTION: ICD-10-CM

## 2025-03-26 DIAGNOSIS — R05.8 COUGH PRODUCTIVE OF YELLOW SPUTUM: ICD-10-CM

## 2025-03-26 LAB
INFLUENZA A ANTIGEN, POC: NEGATIVE
INFLUENZA B ANTIGEN, POC: NEGATIVE
Lab: NORMAL
PERFORMING INSTRUMENT: NORMAL
QC PASS/FAIL: NORMAL
SARS-COV-2, POC: NORMAL

## 2025-03-26 PROCEDURE — G8427 DOCREV CUR MEDS BY ELIG CLIN: HCPCS | Performed by: NURSE PRACTITIONER

## 2025-03-26 PROCEDURE — G8419 CALC BMI OUT NRM PARAM NOF/U: HCPCS | Performed by: NURSE PRACTITIONER

## 2025-03-26 PROCEDURE — 94640 AIRWAY INHALATION TREATMENT: CPT | Performed by: NURSE PRACTITIONER

## 2025-03-26 PROCEDURE — 87804 INFLUENZA ASSAY W/OPTIC: CPT | Performed by: NURSE PRACTITIONER

## 2025-03-26 PROCEDURE — 3017F COLORECTAL CA SCREEN DOC REV: CPT | Performed by: NURSE PRACTITIONER

## 2025-03-26 PROCEDURE — 3080F DIAST BP >= 90 MM HG: CPT | Performed by: NURSE PRACTITIONER

## 2025-03-26 PROCEDURE — 3077F SYST BP >= 140 MM HG: CPT | Performed by: NURSE PRACTITIONER

## 2025-03-26 PROCEDURE — 87426 SARSCOV CORONAVIRUS AG IA: CPT | Performed by: NURSE PRACTITIONER

## 2025-03-26 PROCEDURE — 4004F PT TOBACCO SCREEN RCVD TLK: CPT | Performed by: NURSE PRACTITIONER

## 2025-03-26 PROCEDURE — 3023F SPIROM DOC REV: CPT | Performed by: NURSE PRACTITIONER

## 2025-03-26 PROCEDURE — 99213 OFFICE O/P EST LOW 20 MIN: CPT | Performed by: NURSE PRACTITIONER

## 2025-03-26 RX ORDER — PREDNISONE 10 MG/1
10 TABLET ORAL 2 TIMES DAILY
Qty: 10 TABLET | Refills: 0 | Status: SHIPPED | OUTPATIENT
Start: 2025-03-26 | End: 2025-03-31

## 2025-03-26 RX ORDER — DEXTROMETHORPHAN HYDROBROMIDE AND PROMETHAZINE HYDROCHLORIDE 15; 6.25 MG/5ML; MG/5ML
5 SYRUP ORAL 4 TIMES DAILY PRN
Qty: 180 ML | Refills: 0 | Status: SHIPPED | OUTPATIENT
Start: 2025-03-26 | End: 2025-04-02

## 2025-03-26 RX ORDER — DOXYCYCLINE HYCLATE 100 MG
100 TABLET ORAL 2 TIMES DAILY
Qty: 20 TABLET | Refills: 0 | Status: SHIPPED | OUTPATIENT
Start: 2025-03-26 | End: 2025-04-05

## 2025-03-26 RX ORDER — IPRATROPIUM BROMIDE AND ALBUTEROL SULFATE 2.5; .5 MG/3ML; MG/3ML
1 SOLUTION RESPIRATORY (INHALATION) ONCE
Status: COMPLETED | OUTPATIENT
Start: 2025-03-26 | End: 2025-03-26

## 2025-03-26 RX ADMIN — IPRATROPIUM BROMIDE AND ALBUTEROL SULFATE 1 DOSE: 2.5; .5 SOLUTION RESPIRATORY (INHALATION) at 10:44

## 2025-03-26 NOTE — PROGRESS NOTES
Chief Complaint:   Congestion, Cough, Shortness of Breath, and Chills      History of Present Illness   Source of history provided by:  patient.      Maria T Romeo is a 59 y.o. old female with a past medical history of:   Past Medical History:   Diagnosis Date    Acid reflux     Fracture of left shoulder 10/2018    Frozen shoulder     left    Hypertension     Hyponatremia     Lung abnormality     MVP (mitral valve prolapse)     no issues    Skin cancer         Pt presents to the [facility] with a cough/chest congestion/SOB/chills and painful breathing for the past few days.  States the cough is  productive with yellow sputum.  No  fever noted. Denies any N/V/D, abdominal pain, CP, dizziness, or lethargy.   Pt has a h/o COPD/collapse right lung ( 11/11/24 ). Pt stated she did hit her back on a wall several days ago          ROS    Unless otherwise stated in this report or unable to obtain because of the patient's clinical or mental status as evidenced by the medical record, this patients's positive and negative responses for Review of Systems, constitutional, psych, eyes, ENT, cardiovascular, respiratory, gastrointestinal, neurological, genitourinary, musculoskeletal, integument systems and systems related to the presenting problem are either stated in the preceding or were not pertinent or were negative for the symptoms and/or complaints related to the medical problem.    Past Surgical History:  has a past surgical history that includes Foot surgery (Right, 1980's); pr optx prox humeral fx w/int fixj rpr tuberosity (Left, 10/23/2018); CLOSED MANIPULATION SHOULDER (Left, 3/5/2019); Skin cancer excision (2018); bronchoscopy (N/A, 1/8/2024); bronchoscopy (N/A, 11/13/2024); and CT GUIDED CHEST TUBE (11/26/2024).  Social History:  reports that she has been smoking cigarettes. She started smoking about 33 years ago. She has a 33.2 pack-year smoking history. She has never used smokeless tobacco. She reports current

## 2025-06-03 ENCOUNTER — RESULTS FOLLOW-UP (OUTPATIENT)
Dept: PRIMARY CARE CLINIC | Age: 60
End: 2025-06-03

## 2025-06-03 ENCOUNTER — OFFICE VISIT (OUTPATIENT)
Dept: PRIMARY CARE CLINIC | Age: 60
End: 2025-06-03
Payer: COMMERCIAL

## 2025-06-03 VITALS
WEIGHT: 105 LBS | HEART RATE: 103 BPM | SYSTOLIC BLOOD PRESSURE: 167 MMHG | HEIGHT: 62 IN | DIASTOLIC BLOOD PRESSURE: 81 MMHG | OXYGEN SATURATION: 93 % | BODY MASS INDEX: 19.32 KG/M2 | TEMPERATURE: 99.7 F

## 2025-06-03 DIAGNOSIS — R06.02 SOB (SHORTNESS OF BREATH): ICD-10-CM

## 2025-06-03 DIAGNOSIS — J44.0 COPD WITH ACUTE LOWER RESPIRATORY INFECTION (HCC): ICD-10-CM

## 2025-06-03 DIAGNOSIS — R05.8 COUGH PRODUCTIVE OF YELLOW SPUTUM: ICD-10-CM

## 2025-06-03 PROCEDURE — 3079F DIAST BP 80-89 MM HG: CPT | Performed by: NURSE PRACTITIONER

## 2025-06-03 PROCEDURE — 94640 AIRWAY INHALATION TREATMENT: CPT | Performed by: NURSE PRACTITIONER

## 2025-06-03 PROCEDURE — G8427 DOCREV CUR MEDS BY ELIG CLIN: HCPCS | Performed by: NURSE PRACTITIONER

## 2025-06-03 PROCEDURE — 3023F SPIROM DOC REV: CPT | Performed by: NURSE PRACTITIONER

## 2025-06-03 PROCEDURE — 4004F PT TOBACCO SCREEN RCVD TLK: CPT | Performed by: NURSE PRACTITIONER

## 2025-06-03 PROCEDURE — G8420 CALC BMI NORM PARAMETERS: HCPCS | Performed by: NURSE PRACTITIONER

## 2025-06-03 PROCEDURE — 99213 OFFICE O/P EST LOW 20 MIN: CPT | Performed by: NURSE PRACTITIONER

## 2025-06-03 PROCEDURE — 3017F COLORECTAL CA SCREEN DOC REV: CPT | Performed by: NURSE PRACTITIONER

## 2025-06-03 PROCEDURE — 3077F SYST BP >= 140 MM HG: CPT | Performed by: NURSE PRACTITIONER

## 2025-06-03 RX ORDER — DEXTROMETHORPHAN HYDROBROMIDE AND PROMETHAZINE HYDROCHLORIDE 15; 6.25 MG/5ML; MG/5ML
5 SYRUP ORAL 4 TIMES DAILY PRN
Qty: 180 ML | Refills: 0 | Status: SHIPPED | OUTPATIENT
Start: 2025-06-03 | End: 2025-06-10

## 2025-06-03 RX ORDER — IPRATROPIUM BROMIDE AND ALBUTEROL SULFATE 2.5; .5 MG/3ML; MG/3ML
1 SOLUTION RESPIRATORY (INHALATION) ONCE
Status: COMPLETED | OUTPATIENT
Start: 2025-06-03 | End: 2025-06-03

## 2025-06-03 RX ORDER — DOXYCYCLINE HYCLATE 100 MG
100 TABLET ORAL 2 TIMES DAILY
Qty: 20 TABLET | Refills: 0 | Status: SHIPPED | OUTPATIENT
Start: 2025-06-03 | End: 2025-06-13

## 2025-06-03 RX ORDER — PREDNISONE 20 MG/1
20 TABLET ORAL 2 TIMES DAILY
Qty: 10 TABLET | Refills: 0 | Status: SHIPPED | OUTPATIENT
Start: 2025-06-03 | End: 2025-06-08

## 2025-06-03 RX ADMIN — IPRATROPIUM BROMIDE AND ALBUTEROL SULFATE 1 DOSE: 2.5; .5 SOLUTION RESPIRATORY (INHALATION) at 14:45

## 2025-06-03 NOTE — PROGRESS NOTES
Chief Complaint:   Cough (Productive cough with yellow sputum ), Congestion, and Shortness of Breath      History of Present Illness   Source of history provided by:  patient.      Maria T Romeo is a 59 y.o. old female with a past medical history of:   Past Medical History:   Diagnosis Date    Acid reflux     Fracture of left shoulder 10/2018    Frozen shoulder     left    Hypertension     Hyponatremia     Lung abnormality     MVP (mitral valve prolapse)     no issues    Skin cancer         Pt presents to the [facility] with a cough/chest congestion/SOB for the past few days.  States the cough is  productive with yellow sputum.  Temp 99.7   Denies any N/V/D, abdominal pain, CP, dizziness, or lethargy.   Pt has a significant h/o COPD      ROS    Unless otherwise stated in this report or unable to obtain because of the patient's clinical or mental status as evidenced by the medical record, this patients's positive and negative responses for Review of Systems, constitutional, psych, eyes, ENT, cardiovascular, respiratory, gastrointestinal, neurological, genitourinary, musculoskeletal, integument systems and systems related to the presenting problem are either stated in the preceding or were not pertinent or were negative for the symptoms and/or complaints related to the medical problem.    Past Surgical History:  has a past surgical history that includes Foot surgery (Right, 1980's); pr optx prox humeral fx w/int fixj rpr tuberosity (Left, 10/23/2018); CLOSED MANIPULATION SHOULDER (Left, 3/5/2019); Skin cancer excision (2018); bronchoscopy (N/A, 1/8/2024); bronchoscopy (N/A, 11/13/2024); and CT GUIDED CHEST TUBE (11/26/2024).  Social History:  reports that she has been smoking cigarettes. She started smoking about 33 years ago. She has a 33.4 pack-year smoking history. She has never used smokeless tobacco. She reports current alcohol use. She reports that she does not use drugs.  Family History: family history is

## 2025-06-11 ENCOUNTER — OFFICE VISIT (OUTPATIENT)
Dept: PRIMARY CARE CLINIC | Age: 60
End: 2025-06-11
Payer: COMMERCIAL

## 2025-06-11 ENCOUNTER — RESULTS FOLLOW-UP (OUTPATIENT)
Dept: PRIMARY CARE CLINIC | Age: 60
End: 2025-06-11

## 2025-06-11 VITALS
WEIGHT: 101 LBS | DIASTOLIC BLOOD PRESSURE: 86 MMHG | BODY MASS INDEX: 18.58 KG/M2 | SYSTOLIC BLOOD PRESSURE: 125 MMHG | HEIGHT: 62 IN | OXYGEN SATURATION: 95 % | TEMPERATURE: 98.2 F | HEART RATE: 105 BPM

## 2025-06-11 DIAGNOSIS — M79.632 PAIN AND SWELLING OF FOREARM, LEFT: Primary | ICD-10-CM

## 2025-06-11 DIAGNOSIS — M79.89 PAIN AND SWELLING OF FOREARM, LEFT: Primary | ICD-10-CM

## 2025-06-11 PROCEDURE — 4004F PT TOBACCO SCREEN RCVD TLK: CPT | Performed by: NURSE PRACTITIONER

## 2025-06-11 PROCEDURE — 99213 OFFICE O/P EST LOW 20 MIN: CPT | Performed by: NURSE PRACTITIONER

## 2025-06-11 PROCEDURE — G8427 DOCREV CUR MEDS BY ELIG CLIN: HCPCS | Performed by: NURSE PRACTITIONER

## 2025-06-11 PROCEDURE — 3079F DIAST BP 80-89 MM HG: CPT | Performed by: NURSE PRACTITIONER

## 2025-06-11 PROCEDURE — 3017F COLORECTAL CA SCREEN DOC REV: CPT | Performed by: NURSE PRACTITIONER

## 2025-06-11 PROCEDURE — 3074F SYST BP LT 130 MM HG: CPT | Performed by: NURSE PRACTITIONER

## 2025-06-11 PROCEDURE — G8419 CALC BMI OUT NRM PARAM NOF/U: HCPCS | Performed by: NURSE PRACTITIONER

## 2025-06-12 ENCOUNTER — HOSPITAL ENCOUNTER (EMERGENCY)
Age: 60
Discharge: HOME | End: 2025-06-12
Attending: EMERGENCY MEDICINE
Payer: COMMERCIAL

## 2025-06-12 VITALS
OXYGEN SATURATION: 96 % | HEART RATE: 117 BPM | DIASTOLIC BLOOD PRESSURE: 94 MMHG | SYSTOLIC BLOOD PRESSURE: 140 MMHG | RESPIRATION RATE: 20 BRPM | TEMPERATURE: 97.8 F

## 2025-06-12 DIAGNOSIS — L03.114 CELLULITIS OF LEFT UPPER EXTREMITY: Primary | ICD-10-CM

## 2025-06-12 PROCEDURE — 99283 EMERGENCY DEPT VISIT LOW MDM: CPT

## 2025-06-12 RX ORDER — SULFAMETHOXAZOLE AND TRIMETHOPRIM 800; 160 MG/1; MG/1
1 TABLET ORAL 2 TIMES DAILY
Qty: 20 TABLET | Refills: 0 | Status: SHIPPED | OUTPATIENT
Start: 2025-06-12 | End: 2025-06-22

## 2025-06-12 RX ORDER — CEPHALEXIN 500 MG/1
500 CAPSULE ORAL 4 TIMES DAILY
Qty: 28 CAPSULE | Refills: 0 | Status: SHIPPED | OUTPATIENT
Start: 2025-06-12 | End: 2025-06-19

## 2025-06-12 ASSESSMENT — PAIN - FUNCTIONAL ASSESSMENT
PAIN_FUNCTIONAL_ASSESSMENT: NONE - DENIES PAIN
PAIN_FUNCTIONAL_ASSESSMENT: NONE - DENIES PAIN
PAIN_FUNCTIONAL_ASSESSMENT: 0-10

## 2025-06-12 ASSESSMENT — PAIN SCALES - GENERAL
PAINLEVEL_OUTOF10: 0
PAINLEVEL_OUTOF10: 0
PAINLEVEL_OUTOF10: 3

## 2025-06-12 ASSESSMENT — PAIN DESCRIPTION - LOCATION: LOCATION: ARM

## 2025-06-12 ASSESSMENT — PAIN DESCRIPTION - ORIENTATION: ORIENTATION: LEFT

## 2025-06-12 NOTE — ED PROVIDER NOTES
[facility] EMERGENCY DEPARTMENT  EMERGENCY DEPARTMENT ENCOUNTER        Pt Name: Maria T Romeo  MRN: [id number]  Birthdate 1965  Date of evaluation: 6/12/2025  Provider: Ema James DO  PCP: Jensen Pelayo DO  Note Started: 6:42 AM EDT 6/12/25    CHIEF COMPLAINT       Chief Complaint   Patient presents with    Arm Pain     Swelling and pain no known injury. Had xray yesterday at [facility] walk in clinic       HISTORY OF PRESENT ILLNESS: 1 or more Elements   History From: patient    Limitations to history : None    Maria T Romeo is a 59 y.o. female who presents with left forearm redness, swelling and pain beginning a couple days ago.  It started with some bruising but she is on Eliquis for blood clots.  She denies any known trauma.  She is right-handed.  She denies fever, paresthesias, weakness or other complaints.  She was seen at walk-in clinic yesterday had an x-ray which was negative.  Today she returns stating the redness, warmth and pain is spreading.  The complaint has been persistent, moderate in severity, and worsened by nothing.  Patient denies fever/chills, sore throat, cough, congestion, chest pain, shortness of breath, edema, headache, visual disturbances, focal paresthesias, focal weakness, abdominal pain, nausea, vomiting, diarrhea, constipation, dysuria, hematuria, trauma, neck or back pain, rash or other complaints.    Of note patient is currently on doxycycline for an upper respiratory infection, has 3 days left of those pills.                Nursing Notes were all reviewed and agreed with or any disagreements were addressed in the HPI.    REVIEW OF SYSTEMS :           Positives and Pertinent negatives as per HPI.     SURGICAL HISTORY     Past Surgical History:   Procedure Laterality Date    BRONCHOSCOPY N/A 1/8/2024    BRONCHOSCOPY DIAGNOSTIC OR CELL WASH ONLY performed by Lenin Carney MD at [facility] ENDOSCOPY    BRONCHOSCOPY N/A 11/13/2024    BRONCHOSCOPY  Neck- normal range of motion, no nuchal rigidity   Respiratory:  No respiratory distress, normal breath sounds, no rales, no wheezing   Cardiovascular:  Normal rate, normal rhythm. Radial pulses 2+ bilaterally.    Musculoskeletal:  No edema, no deformities.  Left forearm with well-demarcated area of redness, warmth, induration but no fluctuance and tenderness proximal to the wrist, around the radial aspect to both sides and extending towards elbow with several contusions proximally, see photo below.  Compartments are soft.  Integument:  Well hydrated, no rash. Adequate perfusion.   Neurologic:  Alert & oriented x 3, CN 2-12 normal,no focal deficits noted. Normal gait.    Psychiatric:  Speech and behavior appropriate   **Informed Consent**    The patient has given verbal consent to have photos taken of arm and electronically inserted into their ED Provider Note as part of their permanent medical record for purposes of illustration, documentation, treatment management and/or medical review.     All Images taken on 6/12/25 of patient name: Maria T Romeo were taken by a [facility] approved registered mobile device via [employer] mobile application and transmitted then stored on a secured [employer]  Site located within Media Folder.     Left forearm        Left forearm        Left forearm            DIAGNOSTIC RESULTS   LABS:  No results found for this visit on 06/12/25.    As interpreted by me, the above displayed labs are abnormal as marked by (H) or (L). All other labs obtained during this visit were within normal range or not returned as of this dictation.      RADIOLOGY:   Non-plain film images such as CT, Ultrasound, Xray and MRI are read by the radiologist.     Interpretation per the Radiologist below, if available at the time of this note:    No orders to display     XR RADIUS ULNA LEFT (2 VIEWS)  Result Date: 6/11/2025  EXAMINATION: TWO XRAY VIEWS OF THE LEFT FOREARM 6/11/2025

## 2025-08-19 ENCOUNTER — HOSPITAL ENCOUNTER (EMERGENCY)
Age: 60
Discharge: HOME | End: 2025-08-19
Payer: COMMERCIAL

## 2025-08-19 VITALS
OXYGEN SATURATION: 93 % | TEMPERATURE: 98.2 F | HEIGHT: 62 IN | WEIGHT: 96 LBS | DIASTOLIC BLOOD PRESSURE: 109 MMHG | SYSTOLIC BLOOD PRESSURE: 177 MMHG | RESPIRATION RATE: 16 BRPM | HEART RATE: 97 BPM | BODY MASS INDEX: 17.66 KG/M2

## 2025-08-19 DIAGNOSIS — S50.12XA TRAUMATIC HEMATOMA OF LEFT FOREARM, INITIAL ENCOUNTER: Primary | ICD-10-CM

## 2025-08-19 LAB
BASOPHILS # BLD: 0.02 K/UL (ref 0–0.2)
BASOPHILS NFR BLD: 0 % (ref 0–2)
EOSINOPHIL # BLD: 0.04 K/UL (ref 0.05–0.5)
EOSINOPHILS RELATIVE PERCENT: 1 % (ref 0–6)
ERYTHROCYTE [DISTWIDTH] IN BLOOD BY AUTOMATED COUNT: 12.5 % (ref 11.5–15)
HCT VFR BLD AUTO: 32.8 % (ref 34–48)
HGB BLD-MCNC: 11.5 G/DL (ref 11.5–15.5)
IMM GRANULOCYTES # BLD AUTO: 0.03 K/UL (ref 0–0.58)
IMM GRANULOCYTES NFR BLD: 0 % (ref 0–5)
INR PPP: 1.3
LYMPHOCYTES NFR BLD: 1.94 K/UL (ref 1.5–4)
LYMPHOCYTES RELATIVE PERCENT: 24 % (ref 20–42)
MCH RBC QN AUTO: 34.6 PG (ref 26–35)
MCHC RBC AUTO-ENTMCNC: 35.1 G/DL (ref 32–34.5)
MCV RBC AUTO: 98.8 FL (ref 80–99.9)
MONOCYTES NFR BLD: 0.68 K/UL (ref 0.1–0.95)
MONOCYTES NFR BLD: 8 % (ref 2–12)
NEUTROPHILS NFR BLD: 67 % (ref 43–80)
NEUTS SEG NFR BLD: 5.56 K/UL (ref 1.8–7.3)
PLATELET # BLD AUTO: 258 K/UL (ref 130–450)
PMV BLD AUTO: 8.1 FL (ref 7–12)
PROTHROMBIN TIME: 14.4 SEC (ref 9.3–12.4)
RBC # BLD AUTO: 3.32 M/UL (ref 3.5–5.5)
WBC OTHER # BLD: 8.3 K/UL (ref 4.5–11.5)

## 2025-08-19 PROCEDURE — 99283 EMERGENCY DEPT VISIT LOW MDM: CPT

## 2025-08-19 PROCEDURE — 85610 PROTHROMBIN TIME: CPT

## 2025-08-19 PROCEDURE — 85025 COMPLETE CBC W/AUTO DIFF WBC: CPT

## 2025-08-19 ASSESSMENT — PAIN - FUNCTIONAL ASSESSMENT: PAIN_FUNCTIONAL_ASSESSMENT: 0-10

## 2025-08-19 ASSESSMENT — PAIN SCALES - GENERAL: PAINLEVEL_OUTOF10: 0

## 2025-09-02 ENCOUNTER — OFFICE VISIT (OUTPATIENT)
Dept: PRIMARY CARE CLINIC | Age: 60
End: 2025-09-02
Payer: COMMERCIAL

## 2025-09-02 VITALS
TEMPERATURE: 98.1 F | SYSTOLIC BLOOD PRESSURE: 172 MMHG | BODY MASS INDEX: 17.81 KG/M2 | OXYGEN SATURATION: 97 % | WEIGHT: 96.8 LBS | HEIGHT: 62 IN | HEART RATE: 83 BPM | DIASTOLIC BLOOD PRESSURE: 100 MMHG | RESPIRATION RATE: 20 BRPM

## 2025-09-02 DIAGNOSIS — R06.02 SOB (SHORTNESS OF BREATH): ICD-10-CM

## 2025-09-02 DIAGNOSIS — R07.1 PAINFUL BREATHING: Primary | ICD-10-CM

## 2025-09-02 DIAGNOSIS — R05.3 PERSISTENT COUGH: ICD-10-CM

## 2025-09-02 PROCEDURE — 99213 OFFICE O/P EST LOW 20 MIN: CPT | Performed by: NURSE PRACTITIONER

## 2025-09-02 PROCEDURE — G8419 CALC BMI OUT NRM PARAM NOF/U: HCPCS | Performed by: NURSE PRACTITIONER

## 2025-09-02 PROCEDURE — 3077F SYST BP >= 140 MM HG: CPT | Performed by: NURSE PRACTITIONER

## 2025-09-02 PROCEDURE — 3017F COLORECTAL CA SCREEN DOC REV: CPT | Performed by: NURSE PRACTITIONER

## 2025-09-02 PROCEDURE — G8427 DOCREV CUR MEDS BY ELIG CLIN: HCPCS | Performed by: NURSE PRACTITIONER

## 2025-09-02 PROCEDURE — 3080F DIAST BP >= 90 MM HG: CPT | Performed by: NURSE PRACTITIONER

## 2025-09-02 PROCEDURE — 4004F PT TOBACCO SCREEN RCVD TLK: CPT | Performed by: NURSE PRACTITIONER

## 2025-09-02 RX ORDER — PREDNISONE 20 MG/1
20 TABLET ORAL 2 TIMES DAILY
Qty: 10 TABLET | Refills: 0 | Status: SHIPPED | OUTPATIENT
Start: 2025-09-02 | End: 2025-09-07

## 2025-09-02 RX ORDER — CODEINE PHOSPHATE AND GUAIFENESIN 10; 100 MG/5ML; MG/5ML
10 SOLUTION ORAL 3 TIMES DAILY PRN
Qty: 150 ML | Refills: 0 | Status: SHIPPED | OUTPATIENT
Start: 2025-09-02 | End: 2025-09-07

## 2025-09-04 ENCOUNTER — APPOINTMENT (OUTPATIENT)
Dept: GENERAL RADIOLOGY | Age: 60
End: 2025-09-04
Payer: COMMERCIAL

## 2025-09-04 ENCOUNTER — HOSPITAL ENCOUNTER (EMERGENCY)
Age: 60
Discharge: HOME | End: 2025-09-04
Attending: EMERGENCY MEDICINE
Payer: COMMERCIAL

## 2025-09-04 ENCOUNTER — APPOINTMENT (OUTPATIENT)
Dept: CT IMAGING | Age: 60
End: 2025-09-04
Payer: COMMERCIAL

## 2025-09-04 VITALS
TEMPERATURE: 98.2 F | HEART RATE: 79 BPM | DIASTOLIC BLOOD PRESSURE: 106 MMHG | OXYGEN SATURATION: 97 % | SYSTOLIC BLOOD PRESSURE: 183 MMHG | RESPIRATION RATE: 17 BRPM

## 2025-09-04 DIAGNOSIS — S09.90XA CLOSED HEAD INJURY, INITIAL ENCOUNTER: ICD-10-CM

## 2025-09-04 DIAGNOSIS — S43.014A ANTERIOR SHOULDER DISLOCATION, RIGHT, INITIAL ENCOUNTER: ICD-10-CM
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
## 2025-09-04 PROCEDURE — 6370000000 HC RX 637 (ALT 250 FOR IP): Performed by: NURSE PRACTITIONER

## 2025-09-04 PROCEDURE — 6360000002 HC RX W HCPCS

## 2025-09-04 PROCEDURE — 70450 CT HEAD/BRAIN W/O DYE: CPT

## 2025-09-04 PROCEDURE — 96374 THER/PROPH/DIAG INJ IV PUSH: CPT

## 2025-09-04 PROCEDURE — 99152 MOD SED SAME PHYS/QHP 5/>YRS: CPT

## 2025-09-04 PROCEDURE — 2500000003 HC RX 250 WO HCPCS: Performed by: NURSE PRACTITIONER

## 2025-09-04 PROCEDURE — 73030 X-RAY EXAM OF SHOULDER: CPT

## 2025-09-04 PROCEDURE — 23650 CLTX SHO DSLC W/MNPJ WO ANES: CPT

## 2025-09-04 PROCEDURE — 96375 TX/PRO/DX INJ NEW DRUG ADDON: CPT

## 2025-09-04 PROCEDURE — 99153 MOD SED SAME PHYS/QHP EA: CPT

## 2025-09-04 PROCEDURE — 6360000002 HC RX W HCPCS: Performed by: NURSE PRACTITIONER

## 2025-09-04 PROCEDURE — 99285 EMERGENCY DEPT VISIT HI MDM: CPT

## 2025-09-04 PROCEDURE — 72125 CT NECK SPINE W/O DYE: CPT

## 2025-09-04 RX ORDER — FENTANYL CITRATE 50 UG/ML
50 INJECTION, SOLUTION INTRAMUSCULAR; INTRAVENOUS ONCE
Status: COMPLETED | OUTPATIENT
Start: 2025-09-04 | End: 2025-09-04

## 2025-09-04 RX ORDER — KETAMINE HYDROCHLORIDE 10 MG/ML
1 INJECTION, SOLUTION INTRAMUSCULAR; INTRAVENOUS ONCE
Status: COMPLETED | OUTPATIENT
Start: 2025-09-04 | End: 2025-09-04

## 2025-09-04 RX ORDER — METOPROLOL TARTRATE 50 MG
25 TABLET ORAL ONCE
Status: COMPLETED | OUTPATIENT
Start: 2025-09-04 | End: 2025-09-04

## 2025-09-04 RX ORDER — MIDAZOLAM HYDROCHLORIDE 2 MG/2ML
2 INJECTION, SOLUTION INTRAMUSCULAR; INTRAVENOUS ONCE
Status: COMPLETED | OUTPATIENT
Start: 2025-09-04 | End: 2025-09-04

## 2025-09-04 RX ORDER — ONDANSETRON 2 MG/ML
INJECTION INTRAMUSCULAR; INTRAVENOUS
Status: COMPLETED
Start: 2025-09-04 | End: 2025-09-04

## 2025-09-04 RX ORDER — CLONIDINE HYDROCHLORIDE 0.1 MG/1
0.1 TABLET ORAL ONCE
Status: COMPLETED | OUTPATIENT
Start: 2025-09-04 | End: 2025-09-04

## 2025-09-04 RX ORDER — HYDROCODONE BITARTRATE AND ACETAMINOPHEN 5; 325 MG/1; MG/1
1 TABLET ORAL EVERY 8 HOURS PRN
Qty: 12 TABLET | Refills: 0 | Status: SHIPPED | OUTPATIENT
Start: 2025-09-04 | End: 2025-09-08

## 2025-09-04 RX ORDER — OXYCODONE AND ACETAMINOPHEN 5; 325 MG/1; MG/1
1 TABLET ORAL ONCE
Refills: 0 | Status: DISCONTINUED | OUTPATIENT
Start: 2025-09-04 | End: 2025-09-04

## 2025-09-04 RX ORDER — ONDANSETRON 2 MG/ML
4 INJECTION INTRAMUSCULAR; INTRAVENOUS ONCE
Status: COMPLETED | OUTPATIENT
Start: 2025-09-04 | End: 2025-09-04

## 2025-09-04 RX ADMIN — ONDANSETRON 4 MG: 2 INJECTION, SOLUTION INTRAMUSCULAR; INTRAVENOUS at 12:15

## 2025-09-04 RX ADMIN — ONDANSETRON 4 MG: 2 INJECTION INTRAMUSCULAR; INTRAVENOUS at 12:15

## 2025-09-04 RX ADMIN — KETAMINE HYDROCHLORIDE 23.9 MG: 10 INJECTION INTRAMUSCULAR; INTRAVENOUS at 12:18

## 2025-09-04 RX ADMIN — PROPOFOL 23.9 MG: 10 INJECTION, EMULSION INTRAVENOUS at 12:16

## 2025-09-04 RX ADMIN — MIDAZOLAM HYDROCHLORIDE 1 MG: 1 INJECTION, SOLUTION INTRAMUSCULAR; INTRAVENOUS at 12:18

## 2025-09-04 RX ADMIN — FENTANYL CITRATE 50 MCG: 50 INJECTION, SOLUTION INTRAMUSCULAR; INTRAVENOUS at 11:27

## 2025-09-04 RX ADMIN — CLONIDINE HYDROCHLORIDE 0.1 MG: 0.1 TABLET ORAL at 13:32

## 2025-09-04 RX ADMIN — METOPROLOL TARTRATE 25 MG: 50 TABLET, FILM COATED ORAL at 13:32

## 2025-09-04 ASSESSMENT — PAIN - FUNCTIONAL ASSESSMENT
PAIN_FUNCTIONAL_ASSESSMENT: 0-10
PAIN_FUNCTIONAL_ASSESSMENT: 0-10

## 2025-09-04 ASSESSMENT — PAIN SCALES - GENERAL
PAINLEVEL_OUTOF10: 7
PAINLEVEL_OUTOF10: 9
PAINLEVEL_OUTOF10: 9

## 2025-09-04 ASSESSMENT — PAIN DESCRIPTION - DESCRIPTORS
DESCRIPTORS: ACHING;DISCOMFORT;DULL

## 2025-09-04 ASSESSMENT — PAIN DESCRIPTION - LOCATION
LOCATION: SHOULDER;HEAD
LOCATION: SHOULDER
LOCATION: SHOULDER

## 2025-09-04 ASSESSMENT — PAIN DESCRIPTION - ORIENTATION
ORIENTATION: RIGHT

## 2025-09-05 ENCOUNTER — HOSPITAL ENCOUNTER (OUTPATIENT)
Dept: CT IMAGING | Age: 60
Discharge: HOME | End: 2025-09-05
Payer: COMMERCIAL

## 2025-09-05 DIAGNOSIS — J18.9 PNEUMONIA OF RIGHT MIDDLE LOBE DUE TO INFECTIOUS ORGANISM: ICD-10-CM

## 2025-09-05 PROCEDURE — 71250 CT THORAX DX C-: CPT

## (undated) DEVICE — PAD,ABDOMINAL,5"X9",ST,LF,25/BX: Brand: MEDLINE INDUSTRIES, INC.

## (undated) DEVICE — 3M™ STERI-DRAPE™ U-DRAPE 1015: Brand: STERI-DRAPE™

## (undated) DEVICE — COVER HNDL LT DISP

## (undated) DEVICE — Z DISCONTINUED NO SUB IDED MASK RESP UNIV N95 4 PANEL HD STRP INDIVIDUALLY WRP LF

## (undated) DEVICE — SWITCH DRAPE TENET 7633

## (undated) DEVICE — 1870+ HEALTH CARE PART RESP. 120/CASE: Brand: AURA™

## (undated) DEVICE — Device

## (undated) DEVICE — SOLUTION IRRIG 500ML 0.9% SOD CHLO USP POUR PLAS BTL

## (undated) DEVICE — TUBING SUCT 12FR MAL ALUM SHFT FN CAP VENT UNIV CONN W/ OBT

## (undated) DEVICE — SYRINGE IRRIG 60ML SFT PLIABLE BLB EZ TO GRP 1 HND USE W/

## (undated) DEVICE — BIT DRL DIA2.5MM FT ANK CALIB FOR SM BNE FIX SYS

## (undated) DEVICE — T-MAX DISPOSABLE FACE MASK 8 PER BOX

## (undated) DEVICE — Z DISCONTINUED PER MEDLINE USE 2741943 DRESSING AQUACEL 10 IN ALG W9XL25CM SIL CVR WTRPRF VIR BACT BARR ANTIMIC

## (undated) DEVICE — DRESSING,GAUZE,XEROFORM,CURAD,1"X8",ST: Brand: CURAD

## (undated) DEVICE — GOWN,SIRUS,FABRNF,XL,20/CS: Brand: MEDLINE

## (undated) DEVICE — TOWEL,OR,DSP,ST,BLUE,STD,6/PK,12PK/CS: Brand: MEDLINE

## (undated) DEVICE — SURGICAL PROCEDURE PACK BRONCH

## (undated) DEVICE — 3M™ COBAN™ NL STERILE NON-LATEX SELF-ADHERENT WRAP, 2084S, 4 IN X 5 YD (10 CM X 4,5 M), 18 ROLLS/CASE: Brand: 3M™ COBAN™

## (undated) DEVICE — PACK,SHOULDER SPLIT: Brand: MEDLINE

## (undated) DEVICE — SHOULDER STABILIZATION KIT,                                    DISPOSABLE 12 PER BOX

## (undated) DEVICE — SPONGE GZ W4XL4IN RAYON POLY FILL CVR W/ NONWOVEN FAB

## (undated) DEVICE — SUTURE SUTTAPE L40IN DIA1.3MM NONABSORBABLE WHT BLU L26.5MM AR7500

## (undated) DEVICE — PATIENT RETURN ELECTRODE, SINGLE-USE, CONTACT QUALITY MONITORING, ADULT, WITH 9FT CORD, FOR PATIENTS WEIGING OVER 33LBS. (15KG): Brand: MEGADYNE

## (undated) DEVICE — STANDARD HYPODERMIC NEEDLE,POLYPROPYLENE HUB: Brand: MONOJECT

## (undated) DEVICE — DOUBLE BASIN SET: Brand: MEDLINE INDUSTRIES, INC.

## (undated) DEVICE — PACK PROCEDURE SURG GEN CUST

## (undated) DEVICE — 3M™ IOBAN™ 2 ANTIMICROBIAL INCISE DRAPE 6650EZ: Brand: IOBAN™ 2

## (undated) DEVICE — CONVERTORS STOCKINETTE: Brand: CONVERTORS

## (undated) DEVICE — SKIN AFFIX SURG ADHESIVE 72/CS 0.55ML: Brand: MEDLINE

## (undated) DEVICE — SOLUTION IV IRRIG 500ML 0.9% SODIUM CHL 2F7123

## (undated) DEVICE — DRAPE C ARM W41XL74IN UNIV MOB W RUBBERBAND CLP

## (undated) DEVICE — PAD SENSOR ALRM 13X13 IN THN PROF WIDE PRT MDT85 CHAIR WHT

## (undated) DEVICE — 1000 S-DRAPE TOWEL DRAPE 10/BX: Brand: STERI-DRAPE™

## (undated) DEVICE — CHLORAPREP 26ML ORANGE

## (undated) DEVICE — GOWN,SIRUS,POLYRNF,BRTHSLV,XLN/XL,20/CS: Brand: MEDLINE

## (undated) DEVICE — 4-PORT MANIFOLD: Brand: NEPTUNE 2

## (undated) DEVICE — GOWN,SIRUS,FABRNF,L,20/CS: Brand: MEDLINE

## (undated) DEVICE — STRIP,CLOSURE,WOUND,MEDI-STRIP,1/2X4: Brand: MEDLINE

## (undated) DEVICE — SPONGE LAP W18XL18IN WHT COT 4 PLY FLD STRUNG RADPQ DISP ST

## (undated) DEVICE — IMMOBILIZER SHLDR L10.5-17IN D7IN SLNG W/ 15DEG ABD PLLW

## (undated) DEVICE — INTENDED FOR TISSUE SEPARATION, AND OTHER PROCEDURES THAT REQUIRE A SHARP SURGICAL BLADE TO PUNCTURE OR CUT.: Brand: BARD-PARKER ® STAINLESS STEEL BLADES

## (undated) DEVICE — NEEDLE SUT T-5 L26.5MM 1/2 CIR TAPR W/ NIT LOOP

## (undated) DEVICE — DRAPE,U/ SHT,SPLIT,PLAS,STERIL: Brand: MEDLINE